# Patient Record
Sex: MALE | Race: WHITE | NOT HISPANIC OR LATINO | Employment: OTHER | URBAN - METROPOLITAN AREA
[De-identification: names, ages, dates, MRNs, and addresses within clinical notes are randomized per-mention and may not be internally consistent; named-entity substitution may affect disease eponyms.]

---

## 2017-02-13 ENCOUNTER — GENERIC CONVERSION - ENCOUNTER (OUTPATIENT)
Dept: OTHER | Facility: OTHER | Age: 67
End: 2017-02-13

## 2017-02-17 ENCOUNTER — TRANSCRIBE ORDERS (OUTPATIENT)
Dept: ADMINISTRATIVE | Facility: HOSPITAL | Age: 67
End: 2017-02-17

## 2017-02-17 DIAGNOSIS — R79.89 OTHER ABNORMAL BLOOD CHEMISTRY: Primary | ICD-10-CM

## 2017-02-27 ENCOUNTER — HOSPITAL ENCOUNTER (OUTPATIENT)
Dept: ULTRASOUND IMAGING | Facility: HOSPITAL | Age: 67
Discharge: HOME | End: 2017-02-27
Attending: INTERNAL MEDICINE
Payer: COMMERCIAL

## 2017-02-27 DIAGNOSIS — R79.89 OTHER ABNORMAL BLOOD CHEMISTRY: ICD-10-CM

## 2017-02-27 PROCEDURE — 76700 US EXAM ABDOM COMPLETE: CPT

## 2017-03-06 ENCOUNTER — GENERIC CONVERSION - ENCOUNTER (OUTPATIENT)
Dept: OTHER | Facility: OTHER | Age: 67
End: 2017-03-06

## 2017-08-02 ENCOUNTER — GENERIC CONVERSION - ENCOUNTER (OUTPATIENT)
Dept: OTHER | Facility: OTHER | Age: 67
End: 2017-08-02

## 2018-01-11 ENCOUNTER — GENERIC CONVERSION - ENCOUNTER (OUTPATIENT)
Dept: OTHER | Facility: OTHER | Age: 68
End: 2018-01-11

## 2018-01-11 LAB
BASOPHILS # BLD AUTO: 0.1 X10E3/UL (ref 0–0.2)
BASOPHILS # BLD AUTO: 1 %
BUN SERPL-MCNC: 15 MG/DL (ref 8–27)
BUN/CREA RATIO (HISTORICAL): 16 (ref 10–24)
CALCIUM SERPL-MCNC: 9.7 MG/DL (ref 8.6–10.2)
CHLORIDE SERPL-SCNC: 98 MMOL/L (ref 96–106)
CHOLEST SERPL-MCNC: 213 MG/DL (ref 100–199)
CO2 SERPL-SCNC: 25 MMOL/L (ref 18–29)
CREAT SERPL-MCNC: 0.96 MG/DL (ref 0.76–1.27)
DEPRECATED RDW RBC AUTO: 15 % (ref 12.3–15.4)
EGFR AFRICAN AMERICAN (HISTORICAL): 95 ML/MIN/1.73
EGFR-AMERICAN CALC (HISTORICAL): 82 ML/MIN/1.73
EOSINOPHIL # BLD AUTO: 0.3 X10E3/UL (ref 0–0.4)
EOSINOPHIL # BLD AUTO: 3 %
GLUCOSE SERPL-MCNC: 101 MG/DL (ref 65–99)
HCT VFR BLD AUTO: 42.5 % (ref 37.5–51)
HDLC SERPL-MCNC: 56 MG/DL
HGB BLD-MCNC: 15.1 G/DL (ref 13–17.7)
IMM.GRANULOCYTES (CD4/8) (HISTORICAL): 0.1 X10E3/UL (ref 0–0.1)
IMM.GRANULOCYTES (CD4/8) (HISTORICAL): 1 %
LDLC SERPL CALC-MCNC: 123 MG/DL (ref 0–99)
LYMPHOCYTES # BLD AUTO: 2.2 X10E3/UL (ref 0.7–3.1)
LYMPHOCYTES # BLD AUTO: 26 %
MCH RBC QN AUTO: 33.9 PG (ref 26.6–33)
MCHC RBC AUTO-ENTMCNC: 35.5 G/DL (ref 31.5–35.7)
MCV RBC AUTO: 96 FL (ref 79–97)
MONOCYTES # BLD AUTO: 0.6 X10E3/UL (ref 0.1–0.9)
MONOCYTES (HISTORICAL): 6 %
NEUTROPHILS # BLD AUTO: 5.5 X10E3/UL (ref 1.4–7)
NEUTROPHILS # BLD AUTO: 63 %
PLATELET # BLD AUTO: 326 X10E3/UL (ref 150–379)
POTASSIUM SERPL-SCNC: 4.6 MMOL/L (ref 3.5–5.2)
PROSTATE SPECIFIC ANTIGEN (HISTORICAL): 0.6 NG/ML (ref 0–4)
RBC (HISTORICAL): 4.45 X10E6/UL (ref 4.14–5.8)
SODIUM SERPL-SCNC: 139 MMOL/L (ref 134–144)
TRIGL SERPL-MCNC: 168 MG/DL (ref 0–149)
WBC # BLD AUTO: 8.7 X10E3/UL (ref 3.4–10.8)

## 2018-01-12 NOTE — RESULT NOTES
Message   has Hep C-refer to GI     Verified Results  (1) HEP B SURFACE ANTIBODY [id number] 12:15PM Fior Frees     Test Name Result Flag Reference   Hep B Surface Ab, Qual Reactive     Non Reactive:  Inconsistent with immunity,                                             less than 10 mIU/mL                               Reactive:     Consistent with immunity,                                             greater than 9 9 mIU/mL     (1) HEP B CORE AB, TOTAL [id number] 12:15PM Fior Frees     Test Name Result Flag Reference   Hep B Core Ab, Tot Positive A Negative   **Verified by repeat analysis**     () HBsAg Screen [id number] 12:15PM Fior Frees     Test Name Result Flag Reference   HBsAg Screen Negative  Negative

## 2018-01-14 NOTE — RESULT NOTES
Message   Call patient, labs OK Hep C increase LFT may be from alcohol  Get ultrasound of liver     Verified Results  ([facility]) HCV Antibody [id number] 07:52AM Marylen Hilt     Test Name Result Flag Reference   Hep C Virus Ab <0 1 s/co ratio  0 0-0 9   Negative:     < 0 8                                              Indeterminate: 0 8 - 0 9                                                   Positive:     > 0 9                  The CDC recommends that a positive HCV antibody result                  be followed up with a HCV Nucleic Acid Amplification                  test (292384)

## 2018-01-14 NOTE — PROGRESS NOTES
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)   · POST RADIATION AND CHEMOTHERAPY--2008    Plan  Elevated LFTs    · (1) HEP B CORE AB, TOTAL; Status:Active; Requested for:08Nov2016;    · (1) HEP B SURFACE ANTIBODY; Status:Active; Requested for:08Nov2016;    · (1) HEP B SURFACE ANTIGEN; Status:Active; Requested for:08Nov2016;   Elevated LFTs, Health Maintenance, Hypercholesterolemia, Lesion of right external ear    · VAS CMS AAA SCREENING; Status:Hold For - Scheduling; Requested for:08Nov2016;   Malignant neoplasm of laryngeal cartilages    · 1 Ayaan High MD Friday Otolaryngology Physician Referral  Consult-previous surgery from  your partner  Status: Hold For - Scheduling  Requested for: [id number]  Care Summary provided  : Yes    Discussion/Summary  Impression: Welcome to Medicare Visit  Diabetes screening and counseling: the risks and benefits of screening were discussed  Colorectal cancer screening and counseling: screening is current and up to date  Prostate cancer screening and counseling: screening is current  Abdominal aortic aneurysm screening and counseling: screening US recommended  Chief Complaint  Pt here for Welcome to Medicare exam  Offers no specific concerns  Does not have glasses with him for eye exam  colonoscopy up to date  Declines flu shot  CR      History of Present Illness  Welcome to Medicare and Wellness Visits: The patient is being seen for the welcome to medicare visit  Medicare Screening and Risk Factors   Hospitalizations: no previous hospitalizations  Once per lifetime medicare screening tests: AAA screening US has not yet been done  Medicare Screening Tests Risk Questions   Abdominal aortic aneurysm risk assessment: tobacco use and 25 per day  Osteoporosis risk assessment: , over 48years of age and alcohol use, but none indicated     Drug and Alcohol Use: The patient is a current cigarette smoker, currently smokes 25 cigarettes per day and has never used smokeless tobacco  He has smoked for 45 year(s)  He is ready to quit using tobacco  The patient reports occasional alcohol use, not being in recovery from alcohol abuse, drinking 8 drinks per week and drinking 32 drinks per month  Alcohol concern:   The patient has no concerns about alcohol abuse  He has never used illicit drugs  Diet and Physical Activity: Current diet includes limited junk food, 1 servings of fruit per day, 1 servings of vegetables per day, 1 servings of meat per day, 1 servings of whole grains per day, 1 servings of dairy products per day, 2 cups of coffee per day, 0 cups of tea per day, 0 cans of regular soda per day and 0 cans of diet soda per day  He exercises infrequently and exercises 2 times per week  Exercise: walking, strength training 90 minutes per week  Functional Ability/Level of Safety: Hearing is slightly decreased, slightly decreased in the right ear, slightly decreased in the left ear and a hearing aid is not used  The patient is currently able to participate in social activities with limitations, but able to do activities of daily living without limitations and able to drive without limitations  Fall risk factors: The patient fell 0 times in the past 12 months  Advance Directives: Advance directives: no living will  Co-Managers and Medical Equipment/Suppliers: See Patient Care Team      Patient Care Team    Care Team Member Role Specialty Office Number   Anoop TONEY  Internal Medicine [phone]     Review of Systems    Constitutional: negative  Active Problems    1  Elevated LFTs (790 6) (R79 89)   2  Hypercholesterolemia (272 0) (E78 00)   3  Lesion of right external ear (380 9) (H61 91)   4  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)   5  No advance directives (V49 89) (Z78 9)   6   Screening for colon cancer (V76 51) (Z12 11)   7  Screening PSA (prostate specific antigen) (V76 44) (Z12 5)   8  Tobacco use disorder (305 1) (F17 200)    Past Medical History    · 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)    Surgical History    · History of Appendectomy   · History of Skull Repair    Family History  Father    · No pertinent family history  Family History    · No pertinent family history    Social History    · Caffeine use (V49 89) (F15 90)   · COFFEE DAILY   · Cultural background   · NOT  OR    · Current every day smoker (305 1) (F17 200)   · SMOKES 1/2 PACK CIGARETTES DAILY   · Moderate alcohol use   · BEER TWICE WEEKLY   · No advance directives (V49 89) (Z78 9)   · Primary spoken language English   · Racial background   · WHITE   · Single    Current Meds   1  Atorvastatin Calcium 40 MG Oral Tablet; TAKE 1 TABLET DAILY  Requested for:   [id number]; Last Rx:03Nov2015 Ordered   2  Chantix 1 MG Oral Tablet; TAKE 1 TABLET TWICE DAILY; Therapy: [id number] to (Evaluate:10Oct2016); Last Rx:[id number] Ordered   3  Chantix Starting Month [place] 0 5 MG X 11 & 1 MG X 42 Oral Tablet; TAKE AS DIRECTED   PER PACKAGE INSTRUCTIONS; Therapy: [id number] to (Last Rx:[id number]) Ordered    Allergies    1  No Known Drug Allergies    2  No Known Environmental Allergies   3   No Known Food Allergies    Immunizations   1    PPSV  07-Dec-2009     Vitals  Signs    Systolic: 827  Diastolic: 77   Systolic: 626, LUE, Sitting  Diastolic: 82, LUE, Sitting  Heart Rate: 76, L Radial  Pulse Quality: Normal, L Radial  Height: 5 ft 6 in  Weight: 173 lb   BMI Calculated: 27 92  BSA Calculated: 1 88    Signatures   Electronically signed by : SHIRA Cortez ; Nov 8 2016  9:50AM EST                       (Author)

## 2018-01-16 NOTE — RESULT NOTES
Message   call aorta scan normal     Verified Results  VAS CMS AAA SCREENING [id number] 12:14PM Law Mode Order Number: [id number]   Performing Comments: meets criteria   - Patient Instructions: To schedule this appointment, please contact Central Scheduling at [phone]   Order Number: [id number]   Performing Comments: meets criteria   - Patient Instructions: To schedule this appointment, please contact Central Scheduling at [phone]  Test Name Result Flag Reference   VAS CMS AAA SCREENING (Report)     [facility] REPORT   CLINICAL:   Indications: Screening for Unspecified CardioVascular condition [Z13 6]  AAA   screening  Risk Factors   The patient has history of hyperlipidemia and a smoker  FINDINGS:      Unilateral   PSV (cm/s) Diameter AP    Sup-Esme Ao       53     2 5    Px Inf-Donnell Ao           2 1    Ds Inf-Donnell Ao           1 9             CONCLUSION:   Impression   The abdominal aorta is patent and normal in caliber        SIGNATURE:   Electronically Signed by: Junior Qing MD on 2016-12-27 05:22:19 PM

## 2018-01-18 NOTE — RESULT NOTES
Message   labs ok except mild elevaton of liver test-get Hep C antibody     Verified Results  (1) COMPREHENSIVE METABOLIC PANEL [id number] 37:48QC Ej Moreno     Test Name Result Flag Reference   Glucose, Serum 92 mg/dL  65-99   BUN 13 mg/dL  8-27   Creatinine, Serum 0 86 mg/dL  0 76-1 27   eGFR If NonAfricn Am 91 mL/min/1 73  >59   eGFR If Africn Am 105 mL/min/1 73  >59   BUN/Creatinine Ratio 15  10-22   Sodium, Serum 139 mmol/L  134-144   Potassium, Serum 4 7 mmol/L  3 5-5 2   Chloride, Serum 100 mmol/L     Carbon Dioxide, Total 24 mmol/L  18-29   Calcium, Serum 9 6 mg/dL  8 6-10 2   Protein, Total, Serum 6 6 g/dL  6 0-8 5   Albumin, Serum 4 5 g/dL  3 6-4 8   Globulin, Total 2 1 g/dL  1 5-4 5   A/G Ratio 2 1  1 1-2 5   Bilirubin, Total 0 3 mg/dL  0 0-1 2   Alkaline Phosphatase, S 74 IU/L     AST (SGOT) 68 IU/L H 0-40   ALT (SGPT) 18 IU/L  0-44     (1) CBC/ PLT (NO DIFF) [id number] 01:00PM Ej Moreno     Test Name Result Flag Reference   WBC 10 9 x10E3/uL H 3 4-10 8   RBC 4 23 x10E6/uL  4 14-5 80   Hemoglobin 13 9 g/dL  12 6-17 7   Hematocrit 41 5 %  37 5-51 0   MCV 98 fL H 79-97   MCH 32 9 pg  26 6-33 0   MCHC 33 5 g/dL  31 5-35 7   RDW 15 0 %  12 3-15 4   Platelets 560 G46X2/DJ  150-379     (1) PSA (SCREEN) (Dx V76 44 Screen for Prostate Cancer) [id number] 01:00PM Ej Moreno     Test Name Result Flag Reference   Prostate Specific Ag, Serum 1 5 ng/mL  0 0-4 0   Roche ECLIA methodology  According to the American Urological Association, Serum PSA should  decrease and remain at undetectable levels after radical  prostatectomy  The AUA defines biochemical recurrence as an initial  PSA value 0 2 ng/mL or greater followed by a subsequent confirmatory  PSA value 0 2 ng/mL or greater  Values obtained with different assay methods or kits cannot be used  interchangeably  Results cannot be interpreted as absolute evidence  of the presence or absence of malignant disease

## 2018-02-05 DIAGNOSIS — E78.00 PURE HYPERCHOLESTEROLEMIA: Primary | ICD-10-CM

## 2018-02-05 RX ORDER — ATORVASTATIN CALCIUM 40 MG/1
40 TABLET, FILM COATED ORAL DAILY
Qty: 30 TABLET | Refills: 0 | Status: SHIPPED | OUTPATIENT
Start: 2018-02-05 | End: 2018-05-29 | Stop reason: SDUPTHER

## 2018-02-12 ENCOUNTER — OFFICE VISIT (OUTPATIENT)
Dept: FAMILY MEDICINE CLINIC | Facility: HOSPITAL | Age: 68
End: 2018-02-12
Payer: COMMERCIAL

## 2018-02-12 VITALS
OXYGEN SATURATION: 98 % | HEIGHT: 67 IN | BODY MASS INDEX: 28.09 KG/M2 | SYSTOLIC BLOOD PRESSURE: 130 MMHG | DIASTOLIC BLOOD PRESSURE: 78 MMHG | HEART RATE: 72 BPM | WEIGHT: 179 LBS

## 2018-02-12 DIAGNOSIS — E78.00 HYPERCHOLESTEROLEMIA: Primary | ICD-10-CM

## 2018-02-12 DIAGNOSIS — C32.3: ICD-10-CM

## 2018-02-12 DIAGNOSIS — F17.200 TOBACCO USE DISORDER: ICD-10-CM

## 2018-02-12 PROCEDURE — 3725F SCREEN DEPRESSION PERFORMED: CPT | Performed by: INTERNAL MEDICINE

## 2018-02-12 PROCEDURE — 4040F PNEUMOC VAC/ADMIN/RCVD: CPT | Performed by: INTERNAL MEDICINE

## 2018-02-12 PROCEDURE — 1160F RVW MEDS BY RX/DR IN RCRD: CPT | Performed by: INTERNAL MEDICINE

## 2018-02-12 PROCEDURE — 99214 OFFICE O/P EST MOD 30 MIN: CPT | Performed by: INTERNAL MEDICINE

## 2018-02-12 PROCEDURE — 3008F BODY MASS INDEX DOCD: CPT | Performed by: INTERNAL MEDICINE

## 2018-02-12 PROCEDURE — 1101F PT FALLS ASSESS-DOCD LE1/YR: CPT | Performed by: INTERNAL MEDICINE

## 2018-02-12 RX ORDER — VARENICLINE TARTRATE 25 MG
KIT ORAL
COMMUNITY
Start: 2016-07-12 | End: 2019-01-28 | Stop reason: SDDI

## 2018-02-12 RX ORDER — VARENICLINE TARTRATE 1 MG/1
1 TABLET, FILM COATED ORAL 2 TIMES DAILY
COMMUNITY
Start: 2016-07-12 | End: 2019-01-28 | Stop reason: SDDI

## 2018-02-12 NOTE — PROGRESS NOTES
Assessment/Plan:       Diagnoses and all orders for this visit:    Hypercholesterolemia    Primary malignant neoplasm of laryngeal cartilage ([address] )    Tobacco use disorder        Labs reviewed and discussed with the patient  All of the above diagnoses have been assessed  Yes   Reufses any further vaccines    Subjective:      Patient ID: Danish Burgess is a 78 y o  male  HPI     Hypothyroid    Patient denies any symptoms such as heat or cold intolerance, change in hair texture, or change in bowel habits  There has been no significant change in weight  Tobacco- still smokes 1 PPD-still has chantix    The following portions of the patient's history were reviewed and updated as appropriate: allergies and current medications  Current Outpatient Prescriptions   Medication Sig Dispense Refill    atorvastatin (LIPITOR) 40 mg tablet Take 1 tablet (40 mg total) by mouth daily 30 tablet 0    varenicline (CHANTIX STARTING MONTH PAK) 0 5 MG X 11 & 1 MG X 42 tablet Take by mouth      varenicline (CHANTIX) 1 mg tablet Take 1 tablet by mouth 2 (two) times a day       No current facility-administered medications for this visit  Review of Systems   Constitutional: Negative  HENT:        Hard of hearing   Respiratory: Negative  Cardiovascular: Negative  Gastrointestinal: Negative  Had colonscopy in 2016   Endocrine: Negative  Genitourinary: Negative  All other systems reviewed and are negative  Objective:    /78   Pulse 72   Ht 5' 7" (1 702 m)   Wt 81 2 kg (179 lb)   SpO2 98%   BMI 28 04 kg/m²      Physical Exam   Constitutional: He appears well-developed and well-nourished  Neck: No JVD present  Cardiovascular: Normal rate and regular rhythm  Pulmonary/Chest: Effort normal and breath sounds normal    Abdominal: Soft  Bowel sounds are normal    Musculoskeletal: He exhibits no edema  Skin: Skin is warm and dry  Vitals reviewed          No visits with results within 2 Week(s) from this visit  Latest known visit with results is:   Generic Conversion - Encounter on 01/11/2018   Component Date Value Ref Range Status    PROSTATE SPECIFIC ANTIGEN 01/11/2018 0 6  0 0 - 4 0 ng/mL Final    Comment: Result Comment: Roche ECLIA methodology  According to the American Urological Association, Serum PSA should  decrease and remain at undetectable levels after radical  prostatectomy  The AUA defines biochemical recurrence as an initial  PSA value 0 2 ng/mL or greater followed by a subsequent confirmatory  PSA value 0 2 ng/mL or greater  Values obtained with different assay methods or kits cannot be used  interchangeably  Results cannot be interpreted as absolute evidence  of the presence or absence of malignant disease        Glucose 01/11/2018 101* 65 - 99 mg/dL Final    BUN 01/11/2018 15  8 - 27 mg/dL Final    Creatinine 01/11/2018 0 96  0 76 - 1 27 mg/dL Final    BUN/CREA Ratio 01/11/2018 16  10 - 24 Final    Sodium 01/11/2018 139  134 - 144 mmol/L Final    Potassium 01/11/2018 4 6  3 5 - 5 2 mmol/L Final    Chloride 01/11/2018 98  96 - 106 mmol/L Final    CO2 01/11/2018 25  18 - 29 mmol/L Final    Calcium 01/11/2018 9 7  8 6 - 10 2 mg/dL Final    EGFR-AMERICAN CALC 01/11/2018 82  >59 mL/min/1 73 Final    eGFR African American 01/11/2018 95  >59 mL/min/1 73 Final    Cholesterol 01/11/2018 213* 100 - 199 mg/dL Final    Triglycerides 01/11/2018 168* 0 - 149 mg/dL Final    HDL 01/11/2018 56  >39 mg/dL Final    LDL Calculated 01/11/2018 123* 0 - 99 mg/dL Final    WBC 01/11/2018 8 7  3 4 - 10 8 x10E3/uL Final    RBC 01/11/2018 4 45  4 14 - 5 80 x10E6/uL Final    Hemoglobin 01/11/2018 15 1  13 0 - 17 7 g/dL Final    Hematocrit 01/11/2018 42 5  37 5 - 51 0 % Final    MCV 01/11/2018 96  79 - 97 fL Final    MCH 01/11/2018 33 9* 26 6 - 33 0 pg Final    MCHC 01/11/2018 35 5  31 5 - 35 7 g/dL Final    RDW 01/11/2018 15 0  12 3 - 15 4 % Final    Platelets 01/11/2018 326  150 - 379 x10E3/uL Final    Neutrophils Absolute 01/11/2018 63  Not Estab  % Final    Lymphocytes Absolute 01/11/2018 26  Not Estab  % Final    MONOCYTES 01/11/2018 6  Not Estab  % Final    Eosinophils Absolute 01/11/2018 3  Not Estab  % Final    Basophils Absolute 01/11/2018 1  Not Estab  % Final    Neutrophils Absolute 01/11/2018 5 5  1 4 - 7 0 x10E3/uL Final    Lymphocytes Absolute 01/11/2018 2 2  0 7 - 3 1 x10E3/uL Final    Monocytes Absolute 01/11/2018 0 6  0 1 - 0 9 x10E3/uL Final    Eosinophils Absolute 01/11/2018 0 3  0 0 - 0 4 x10E3/uL Final    Basophils Absolute 01/11/2018 0 1  0 0 - 0 2 x10E3/uL Final    IMM  GRANULOCYTES (CD4/8) 01/11/2018 1  Not Estab  % Final    IMM  GRANULOCYTES (CD4/8) 01/11/2018 0 1  0 0 - 0 1 x10E3/uL Final         Raymundo Samuels MD

## 2018-05-29 DIAGNOSIS — E78.00 PURE HYPERCHOLESTEROLEMIA: ICD-10-CM

## 2018-05-29 RX ORDER — ATORVASTATIN CALCIUM 40 MG/1
40 TABLET, FILM COATED ORAL DAILY
Qty: 90 TABLET | Refills: 1 | Status: SHIPPED | OUTPATIENT
Start: 2018-05-29 | End: 2019-02-25 | Stop reason: SDUPTHER

## 2018-12-18 ENCOUNTER — TELEPHONE (OUTPATIENT)
Dept: FAMILY MEDICINE CLINIC | Facility: HOSPITAL | Age: 68
End: 2018-12-18

## 2018-12-18 DIAGNOSIS — E78.00 HYPERCHOLESTEROLEMIA: Primary | ICD-10-CM

## 2018-12-18 DIAGNOSIS — Z12.5 SCREENING FOR PROSTATE CANCER: ICD-10-CM

## 2019-01-25 LAB
ALBUMIN SERPL-MCNC: 4.6 G/DL (ref 3.6–4.8)
ALBUMIN/GLOB SERPL: 1.8 {RATIO} (ref 1.2–2.2)
ALP SERPL-CCNC: 88 IU/L (ref 39–117)
ALT SERPL-CCNC: 10 IU/L (ref 0–44)
AST SERPL-CCNC: 20 IU/L (ref 0–40)
BASOPHILS # BLD AUTO: 0.1 X10E3/UL (ref 0–0.2)
BASOPHILS NFR BLD AUTO: 1 %
BILIRUB SERPL-MCNC: 0.4 MG/DL (ref 0–1.2)
BUN SERPL-MCNC: 13 MG/DL (ref 8–27)
BUN/CREAT SERPL: 13 (ref 10–24)
CALCIUM SERPL-MCNC: 9.5 MG/DL (ref 8.6–10.2)
CHLORIDE SERPL-SCNC: 101 MMOL/L (ref 96–106)
CHOLEST SERPL-MCNC: 176 MG/DL (ref 100–199)
CHOLEST/HDLC SERPL: 4.1 RATIO (ref 0–5)
CO2 SERPL-SCNC: 24 MMOL/L (ref 20–29)
CREAT SERPL-MCNC: 1.02 MG/DL (ref 0.76–1.27)
EOSINOPHIL # BLD AUTO: 0.2 X10E3/UL (ref 0–0.4)
EOSINOPHIL NFR BLD AUTO: 2 %
ERYTHROCYTE [DISTWIDTH] IN BLOOD BY AUTOMATED COUNT: 14.3 % (ref 12.3–15.4)
GLOBULIN SER-MCNC: 2.5 G/DL (ref 1.5–4.5)
GLUCOSE SERPL-MCNC: 102 MG/DL (ref 65–99)
HCT VFR BLD AUTO: 44.1 % (ref 37.5–51)
HDLC SERPL-MCNC: 43 MG/DL
HGB BLD-MCNC: 15 G/DL (ref 13–17.7)
IMM GRANULOCYTES # BLD: 0.1 X10E3/UL (ref 0–0.1)
IMM GRANULOCYTES NFR BLD: 1 %
LDLC SERPL CALC-MCNC: 96 MG/DL (ref 0–99)
LYMPHOCYTES # BLD AUTO: 2.3 X10E3/UL (ref 0.7–3.1)
LYMPHOCYTES NFR BLD AUTO: 22 %
MCH RBC QN AUTO: 33.7 PG (ref 26.6–33)
MCHC RBC AUTO-ENTMCNC: 34 G/DL (ref 31.5–35.7)
MCV RBC AUTO: 99 FL (ref 79–97)
MONOCYTES # BLD AUTO: 0.8 X10E3/UL (ref 0.1–0.9)
MONOCYTES NFR BLD AUTO: 8 %
MORPHOLOGY BLD-IMP: ABNORMAL
NEUTROPHILS # BLD AUTO: 7.1 X10E3/UL (ref 1.4–7)
NEUTROPHILS NFR BLD AUTO: 66 %
PLATELET # BLD AUTO: 344 X10E3/UL (ref 150–379)
POTASSIUM SERPL-SCNC: 4.5 MMOL/L (ref 3.5–5.2)
PROT SERPL-MCNC: 7.1 G/DL (ref 6–8.5)
PSA FREE MFR SERPL: 41.7 %
PSA FREE SERPL-MCNC: 0.25 NG/ML
PSA SERPL-MCNC: 0.6 NG/ML (ref 0–4)
RBC # BLD AUTO: 4.45 X10E6/UL (ref 4.14–5.8)
SL AMB EGFR AFRICAN AMERICAN: 88 ML/MIN/1.73
SL AMB EGFR NON AFRICAN AMERICAN: 76 ML/MIN/1.73
SL AMB VLDL CHOLESTEROL CALC: 37 MG/DL (ref 5–40)
SODIUM SERPL-SCNC: 140 MMOL/L (ref 134–144)
TRIGL SERPL-MCNC: 185 MG/DL (ref 0–149)
WBC # BLD AUTO: 10.6 X10E3/UL (ref 3.4–10.8)

## 2019-01-28 ENCOUNTER — OFFICE VISIT (OUTPATIENT)
Dept: FAMILY MEDICINE CLINIC | Facility: HOSPITAL | Age: 69
End: 2019-01-28
Payer: COMMERCIAL

## 2019-01-28 VITALS — SYSTOLIC BLOOD PRESSURE: 130 MMHG | HEART RATE: 82 BPM | DIASTOLIC BLOOD PRESSURE: 80 MMHG

## 2019-01-28 DIAGNOSIS — E78.00 HYPERCHOLESTEROLEMIA: ICD-10-CM

## 2019-01-28 DIAGNOSIS — Z23 NEED FOR VACCINATION: Primary | ICD-10-CM

## 2019-01-28 DIAGNOSIS — G45.3 AMAUROSIS FUGAX: ICD-10-CM

## 2019-01-28 DIAGNOSIS — J40 BRONCHITIS: ICD-10-CM

## 2019-01-28 DIAGNOSIS — F17.200 TOBACCO USE DISORDER: ICD-10-CM

## 2019-01-28 PROCEDURE — 1170F FXNL STATUS ASSESSED: CPT | Performed by: INTERNAL MEDICINE

## 2019-01-28 PROCEDURE — 1125F AMNT PAIN NOTED PAIN PRSNT: CPT | Performed by: INTERNAL MEDICINE

## 2019-01-28 PROCEDURE — 99214 OFFICE O/P EST MOD 30 MIN: CPT | Performed by: INTERNAL MEDICINE

## 2019-01-28 PROCEDURE — G0439 PPPS, SUBSEQ VISIT: HCPCS | Performed by: INTERNAL MEDICINE

## 2019-01-28 PROCEDURE — 1160F RVW MEDS BY RX/DR IN RCRD: CPT | Performed by: INTERNAL MEDICINE

## 2019-01-28 RX ORDER — CEFUROXIME AXETIL 500 MG/1
500 TABLET ORAL EVERY 12 HOURS SCHEDULED
Qty: 14 TABLET | Refills: 0 | Status: SHIPPED | OUTPATIENT
Start: 2019-01-28 | End: 2019-02-04

## 2019-01-28 RX ORDER — ASPIRIN 81 MG/1
81 TABLET ORAL DAILY
Start: 2019-01-28

## 2019-01-28 NOTE — PROGRESS NOTES
Assessment/Plan:       Diagnoses and all orders for this visit:    Need for vaccination  -     PREFERRED: influenza vaccine, [id number], high-dose, PF 0 5 mL, for patients 65 yr+ (FLUZONE HIGH-DOSE)  -     PNEUMOCOCCAL CONJUGATE VACCINE 13-VALENT GREATER THAN 6 MONTHS    Hypercholesterolemia    Amaurosis fugax  -     VAS carotid complete study; Future  -     VAS transcranial doppler, limited study; Future  -     aspirin (ECOTRIN LOW STRENGTH) 81 mg EC tablet; Take 1 tablet (81 mg total) by mouth daily    Tobacco use disorder    Bronchitis          All of the above diagnoses have been assessed  Additional COMMENTS/PLAN: needs to quit smoking  Will get CT lung scan program will also get carotid ultrasound  I have added aspirin  Subjective:      Patient ID: Prosper Ch is a 80 y o  male  HPI     Hyperlipidemia- The patient is compliant with diet and taking meds  The patient understands the efficacy of the treatment in vascular prevention  Has bronchitis sxs  This is for 1 week    Had loss of vision in the left eye for about 2-3 minutes-No other assoc sxs    The following portions of the patient's history were revised and updated as appropriate: Problem list, allergies, med list, FH, SH, Past medical and surgical histories  Current Outpatient Prescriptions   Medication Sig Dispense Refill    atorvastatin (LIPITOR) 40 mg tablet Take 1 tablet (40 mg total) by mouth daily 90 tablet 1    aspirin (ECOTRIN LOW STRENGTH) 81 mg EC tablet Take 1 tablet (81 mg total) by mouth daily       No current facility-administered medications for this visit  Review of Systems   Respiratory: Positive for cough  All other systems reviewed and are negative  Objective:    /80 (BP Location: Left arm, Patient Position: Sitting, Cuff Size: Large)   Pulse 82      Physical Exam   Constitutional: He appears well-developed and well-nourished  HENT:   Head: Normocephalic and atraumatic     Neck: No JVD present  No thyromegaly present  No bruits   Cardiovascular: Normal rate, regular rhythm and normal heart sounds  Pulmonary/Chest: Effort normal    Few rhonchi   Abdominal: Soft  Bowel sounds are normal  There is no tenderness  Musculoskeletal: Normal range of motion  He exhibits no edema  Skin: Skin is warm and dry  Vitals reviewed  No visits with results within 2 Week(s) from this visit  Latest known visit with results is:   Telephone on 12/18/2018   Component Date Value Ref Range Status    White Blood Cell Count 01/24/2019 10 6  3 4 - 10 8 x10E3/uL Final    Red Blood Cell Count 01/24/2019 4 45  4 14 - 5 80 x10E6/uL Final    Hemoglobin 01/24/2019 15 0  13 0 - 17 7 g/dL Final    HCT 01/24/2019 44 1  37 5 - 51 0 % Final    MCV 01/24/2019 99* 79 - 97 fL Final    MCH 01/24/2019 33 7* 26 6 - 33 0 pg Final    MCHC 01/24/2019 34 0  31 5 - 35 7 g/dL Final    RDW 01/24/2019 14 3  12 3 - 15 4 % Final    Platelet Count 24/48/0834 344  150 - 379 x10E3/uL Final    Neutrophils 01/24/2019 66  Not Estab  % Final    Lymphocytes 01/24/2019 22  Not Estab  % Final    Monocytes 01/24/2019 8  Not Estab  % Final    Eosinophils 01/24/2019 2  Not Estab  % Final    Basophils PCT 01/24/2019 1  Not Estab  % Final    Neutrophils (Absolute) 01/24/2019 7 1* 1 4 - 7 0 x10E3/uL Final    Lymphocytes (Absolute) 01/24/2019 2 3  0 7 - 3 1 x10E3/uL Final    Monocytes (Absolute) 01/24/2019 0 8  0 1 - 0 9 x10E3/uL Final    Eosinophils (Absolute) 01/24/2019 0 2  0 0 - 0 4 x10E3/uL Final    Basophils ABS 01/24/2019 0 1  0 0 - 0 2 x10E3/uL Final    Immature Granulocytes 01/24/2019 1  Not Estab  % Final    Immature Granulocytes (Absolute) 01/24/2019 0 1  0 0 - 0 1 x10E3/uL Final    Hematology Comments 01/24/2019 Note:   Final    Verified by microscopic examination      Glucose, Random 01/24/2019 102* 65 - 99 mg/dL Final    BUN 01/24/2019 13  8 - 27 mg/dL Final    Creatinine 01/24/2019 1 02  0 76 - 1 27 mg/dL Final    eGFR Non  01/24/2019 76  >59 mL/min/1 73 Final    SL AMB EGFR  01/24/2019 88  >59 mL/min/1 73 Final    SL AMB BUN/CREATININE RATIO 01/24/2019 13  10 - 24 Final    Sodium 01/24/2019 140  134 - 144 mmol/L Final    Potassium 01/24/2019 4 5  3 5 - 5 2 mmol/L Final    Chloride 01/24/2019 101  96 - 106 mmol/L Final    CO2 01/24/2019 24  20 - 29 mmol/L Final    CALCIUM 01/24/2019 9 5  8 6 - 10 2 mg/dL Final    SL AMB PROTEIN, TOTAL 01/24/2019 7 1  6 0 - 8 5 g/dL Final    Albumin 01/24/2019 4 6  3 6 - 4 8 g/dL Final    Globulin, Total 01/24/2019 2 5  1 5 - 4 5 g/dL Final    Albumin/Globulin Ratio 01/24/2019 1 8  1 2 - 2 2 Final    TOTAL BILIRUBIN 01/24/2019 0 4  0 0 - 1 2 mg/dL Final    Alk Phos Isoenzymes 01/24/2019 88  39 - 117 IU/L Final    SL AMB AST 01/24/2019 20  0 - 40 IU/L Final    SL AMB ALT 01/24/2019 10  0 - 44 IU/L Final    Cholesterol, Total 01/24/2019 176  100 - 199 mg/dL Final    Triglycerides 01/24/2019 185* 0 - 149 mg/dL Final    HDL 01/24/2019 43  >39 mg/dL Final    VLDL Cholesterol Calculated 01/24/2019 37  5 - 40 mg/dL Final    LDL Direct 01/24/2019 96  0 - 99 mg/dL Final    T  Chol/HDL Ratio 01/24/2019 4 1  0 0 - 5 0 ratio Final    Comment:                                   T  Chol/HDL Ratio                                              Men  Women                                1/2 Avg  Risk  3 4    3 3                                    Avg Risk  5 0    4 4                                 2X Avg  Risk  9 6    7 1                                 3X Avg  Risk 23 4   11 0      Prostate Specific Antigen Total 01/24/2019 0 6  0 0 - 4 0 ng/mL Final    Comment: Roche ECLIA methodology  According to the American Urological Association, Serum PSA should  decrease and remain at undetectable levels after radical  prostatectomy   The AUA defines biochemical recurrence as an initial  PSA value 0 2 ng/mL or greater followed by a subsequent confirmatory  PSA value 0 2 ng/mL or greater  Values obtained with different assay methods or kits cannot be used  interchangeably  Results cannot be interpreted as absolute evidence  of the presence or absence of malignant disease   PSA, Free 01/24/2019 0 25  N/A ng/mL Final    Roche ECLIA methodology   PSA, Free Pct 01/24/2019 41 7  % Final    Comment: The table below lists the probability of prostate cancer for  men with non-suspicious DENNIS results and total PSA between  4 and 10 ng/mL, by patient age [facility], [address] [place] [address],  [phone])  % Free PSA       50-64 yr        65-75 yr                    0 00-10 00%        56%             55%                   10 01-15 00%        24%             35%                   15 01-20 00%        17%             23%                   20 01-25 00%        10%             20%                        >25 00%         5%              9%  Please note:  Asa et al did not make specific                recommendations regarding the use of                percent free PSA for any other population                of men             Doug Cintron MD

## 2019-01-28 NOTE — PROGRESS NOTES
Assessment and Plan:    Problem List Items Addressed This Visit     None      Visit Diagnoses     Medicare annual wellness visit, subsequent    -  Primary    Need for vaccination        Relevant Orders    PREFERRED: influenza vaccine, [id number], high-dose, PF 0 5 mL, for patients 65 yr+ (FLUZONE HIGH-DOSE)    PNEUMOCOCCAL CONJUGATE VACCINE 13-VALENT GREATER THAN 6 MONTHS        Health Maintenance Due   Topic Date Due    Medicare Annual Wellness Visit (AWV)  1950    DTaP,Tdap,and Td Vaccines (1 - Tdap) 05/23/1972         HPI:  Shankar Griffiths is a 80 y o  male here for his Subsequent Wellness Visit  Patient Active Problem List   Diagnosis    Hypercholesterolemia    Primary malignant neoplasm of laryngeal cartilage ([address] )    Tobacco use disorder     History reviewed  No pertinent past medical history  Past Surgical History:   Procedure Laterality Date    APPENDECTOMY  2003    SKULL FRACTURE ELEVATION  1984     Family History   Problem Relation Age of Onset    Heart disease Father     Substance Abuse Neg Hx     Mental illness Neg Hx      History   Smoking Status    Current Every Day Smoker    Packs/day: 0 50   Smokeless Tobacco    Never Used     History   Alcohol Use    2 4 oz/week    4 Cans of beer per week      History   Drug Use No       Current Outpatient Prescriptions   Medication Sig Dispense Refill    atorvastatin (LIPITOR) 40 mg tablet Take 1 tablet (40 mg total) by mouth daily 90 tablet 1     No current facility-administered medications for this visit  No Known Allergies  Immunization History   Administered Date(s) Administered    Pneumococcal Polysaccharide PPV23 12/07/2009       Patient Care Team:  Analilia Al MD as PCP - General  Analilia Al MD    Medicare Screening Tests and Risk Assessments:      Health Risk Assessment:  Patient rates overall health as good  Patient feels that their physical health rating is Same  Eyesight was rated as Slightly worse   Hearing was rated as Slightly worse  Patient feels that their emotional and mental health rating is Same  Pain experienced by patient in the last 7 days has been None  Patient states that he has experienced no weight loss or gain in last 6 months  Emotional/Mental Health:  Patient has been feeling nervous/anxious  PHQ-9 Depression Screening:    Frequency of the following problems over the past two weeks:      1  Little interest or pleasure in doing things: 0 - not at all      2  Feeling down, depressed, or hopeless: 0 - not at all  PHQ-2 Score: 0          Broken Bones/Falls: Fall Risk Assessment:    In the past year, patient has experienced: No history of falling in past year          Bladder/Bowel:  Patient has not leaked urine accidently in the last six months  Patient reports no loss of bowel control  Immunizations:  Patient has not had a flu vaccination within the last year  Patient has received a pneumonia shot  Patient has not received a shingles shot  Patient has not received tetanus/diphtheria shot  Home Safety:  Patient does not have trouble with stairs inside or outside of their home  Patient currently reports that there are no safety hazards present in home, working smoke alarms, no working carbon monoxide detectors  Preventative Screenings:   prostate cancer screen performed, 1/24/2019  colon cancer screen completed, 8/2/2017  cholesterol screen completed, 1/24/2019  no glaucoma eye exam completed    Nutrition:  Current diet: Regular with servings of the following:    Medications:  Patient is not currently taking any over-the-counter supplements  Patient is able to manage medications  Lifestyle Choices:  Patient reports current tobacco use  Patient reports alcohol use  Alcohol use per week: 3-4 beers per week  Patient drives a vehicle  Patient wears seat belt  Current level of exercise of physical activity described by patient as: limited physical activity          Activities of Daily Living:  Can get out of bed by his or her self, able to dress self, able to make own meals, able to do own shopping, able to bathe self, can do own laundry/housekeeping, can manage own money, pay bills and track expenses    Previous Hospitalizations:  No hospitalization or ED visit in past 12 months        Advanced Directives:  Patient has not decided on power of   Patient has not completed advanced directive  Preventative Screening/Counseling:      Cardiovascular:      General: Screening Current          Diabetes:      General: Screening Current          Colorectal Cancer:      General: Screening Current          Prostate Cancer:      General: Screening Current          Osteoporosis:      General: Screening Not Indicated          AAA:      General: Screening Current          Glaucoma:      General: Screening Current          HIV:      General: Screening Not Indicated          Hepatitis C:      General: Screening Current        Advanced Directives:   Patient has no living will for healthcare, does not have durable POA for healthcare, patient does not have an advanced directive       Immunizations:      Shingrix: Risks & Benefits Discussed

## 2019-02-06 ENCOUNTER — HOSPITAL ENCOUNTER (OUTPATIENT)
Dept: NON INVASIVE DIAGNOSTICS | Facility: HOSPITAL | Age: 69
Discharge: HOME | End: 2019-02-06
Attending: INTERNAL MEDICINE
Payer: COMMERCIAL

## 2019-02-06 DIAGNOSIS — G45.3 AMAUROSIS FUGAX: ICD-10-CM

## 2019-02-06 PROCEDURE — 93880 EXTRACRANIAL BILAT STUDY: CPT | Performed by: INTERNAL MEDICINE

## 2019-02-06 PROCEDURE — 93880 EXTRACRANIAL BILAT STUDY: CPT

## 2019-02-07 ENCOUNTER — HOSPITAL ENCOUNTER (OUTPATIENT)
Dept: CT IMAGING | Facility: HOSPITAL | Age: 69
Discharge: HOME | End: 2019-02-07
Attending: INTERNAL MEDICINE
Payer: COMMERCIAL

## 2019-02-07 DIAGNOSIS — F17.200 TOBACCO USE DISORDER: ICD-10-CM

## 2019-02-10 PROBLEM — I65.23 CAROTID OCCLUSION, BILATERAL: Status: ACTIVE | Noted: 2019-02-10

## 2019-02-25 DIAGNOSIS — E78.00 PURE HYPERCHOLESTEROLEMIA: ICD-10-CM

## 2019-02-25 RX ORDER — ATORVASTATIN CALCIUM 40 MG/1
TABLET, FILM COATED ORAL
Qty: 90 TABLET | Refills: 3 | Status: SHIPPED | OUTPATIENT
Start: 2019-02-25 | End: 2020-04-06 | Stop reason: SDUPTHER

## 2019-04-17 ENCOUNTER — HOSPITAL ENCOUNTER (OUTPATIENT)
Dept: NON INVASIVE DIAGNOSTICS | Facility: HOSPITAL | Age: 69
Discharge: HOME | End: 2019-04-17
Attending: INTERNAL MEDICINE
Payer: COMMERCIAL

## 2019-04-17 DIAGNOSIS — G45.3 AMAUROSIS FUGAX: ICD-10-CM

## 2019-04-17 PROCEDURE — 93886 INTRACRANIAL COMPLETE STUDY: CPT

## 2019-04-17 PROCEDURE — 93886 INTRACRANIAL COMPLETE STUDY: CPT | Performed by: SURGERY

## 2019-07-15 ENCOUNTER — OFFICE VISIT (OUTPATIENT)
Dept: FAMILY MEDICINE CLINIC | Facility: HOSPITAL | Age: 69
End: 2019-07-15
Payer: COMMERCIAL

## 2019-07-15 VITALS
HEART RATE: 83 BPM | SYSTOLIC BLOOD PRESSURE: 120 MMHG | HEIGHT: 66 IN | WEIGHT: 166.5 LBS | DIASTOLIC BLOOD PRESSURE: 78 MMHG | BODY MASS INDEX: 26.76 KG/M2

## 2019-07-15 DIAGNOSIS — N52.9 ERECTILE DYSFUNCTION, UNSPECIFIED ERECTILE DYSFUNCTION TYPE: Primary | ICD-10-CM

## 2019-07-15 PROCEDURE — 3008F BODY MASS INDEX DOCD: CPT | Performed by: INTERNAL MEDICINE

## 2019-07-15 PROCEDURE — 1160F RVW MEDS BY RX/DR IN RCRD: CPT | Performed by: INTERNAL MEDICINE

## 2019-07-15 PROCEDURE — 99213 OFFICE O/P EST LOW 20 MIN: CPT | Performed by: INTERNAL MEDICINE

## 2019-07-15 RX ORDER — MULTIVITAMIN
1 TABLET ORAL DAILY
Status: ON HOLD | COMMUNITY
End: 2019-12-16 | Stop reason: ALTCHOICE

## 2019-07-15 RX ORDER — SILDENAFIL CITRATE 20 MG/1
40 TABLET ORAL DAILY PRN
Qty: 12 TABLET | Refills: 4 | Status: SHIPPED | OUTPATIENT
Start: 2019-07-15 | End: 2019-07-19 | Stop reason: SDUPTHER

## 2019-07-15 NOTE — PROGRESS NOTES
Assessment/Plan:       Diagnoses and all orders for this visit:    Erectile dysfunction, unspecified erectile dysfunction type  -     Testosterone, free, total; Future  -     sildenafil (REVATIO) 20 mg tablet; Take 2 tablets (40 mg total) by mouth daily as needed (ED)    Other orders  -     Multiple Vitamin (MULTIVITAMIN) tablet; Take 1 tablet by mouth daily          All of the above diagnoses have been assessed  Additional COMMENTS/PLAN:  visit in the new year      Subjective:      Patient ID: Marquis Montilla is a 77 y o  male  HPI     In a new relationship  Concerned about her hepatitis hx  He has been screened in the past-this is neg  Having some ED problems  The following portions of the patient's history were revised and updated as appropriate: Problem list, allergies, med list, FH, SH, Past medical and surgical histories  Current Outpatient Medications   Medication Sig Dispense Refill    aspirin (ECOTRIN LOW STRENGTH) 81 mg EC tablet Take 1 tablet (81 mg total) by mouth daily      atorvastatin (LIPITOR) 40 mg tablet TAKE 1 TABLET BY MOUTH ONCE DAILY 90 tablet 3    Multiple Vitamin (MULTIVITAMIN) tablet Take 1 tablet by mouth daily      sildenafil (REVATIO) 20 mg tablet Take 2 tablets (40 mg total) by mouth daily as needed (ED) 12 tablet 4     No current facility-administered medications for this visit  Review of Systems   All other systems reviewed and are negative  Objective:    /78 (BP Location: Left arm, Patient Position: Sitting, Cuff Size: Large)   Pulse 83   Ht 5' 6" (1 676 m)   Wt 75 5 kg (166 lb 8 oz)   BMI 26 87 kg/m²     BP Readings from Last 3 Encounters:   07/15/19 120/78   01/28/19 130/80   02/12/18 130/78                  Wt Readings from Last 3 Encounters:   07/15/19 75 5 kg (166 lb 8 oz)   02/12/18 81 2 kg (179 lb)   11/08/16 78 5 kg (173 lb)         Physical Exam   Vitals reviewed          No visits with results within 2 Week(s) from this visit  Latest known visit with results is:   Telephone on 12/18/2018   Component Date Value Ref Range Status    White Blood Cell Count 01/24/2019 10 6  3 4 - 10 8 x10E3/uL Final    Red Blood Cell Count 01/24/2019 4 45  4 14 - 5 80 x10E6/uL Final    Hemoglobin 01/24/2019 15 0  13 0 - 17 7 g/dL Final    HCT 01/24/2019 44 1  37 5 - 51 0 % Final    MCV 01/24/2019 99* 79 - 97 fL Final    MCH 01/24/2019 33 7* 26 6 - 33 0 pg Final    MCHC 01/24/2019 34 0  31 5 - 35 7 g/dL Final    RDW 01/24/2019 14 3  12 3 - 15 4 % Final    Platelet Count 19/25/2385 344  150 - 379 x10E3/uL Final    Neutrophils 01/24/2019 66  Not Estab  % Final    Lymphocytes 01/24/2019 22  Not Estab  % Final    Monocytes 01/24/2019 8  Not Estab  % Final    Eosinophils 01/24/2019 2  Not Estab  % Final    Basophils PCT 01/24/2019 1  Not Estab  % Final    Neutrophils (Absolute) 01/24/2019 7 1* 1 4 - 7 0 x10E3/uL Final    Lymphocytes (Absolute) 01/24/2019 2 3  0 7 - 3 1 x10E3/uL Final    Monocytes (Absolute) 01/24/2019 0 8  0 1 - 0 9 x10E3/uL Final    Eosinophils (Absolute) 01/24/2019 0 2  0 0 - 0 4 x10E3/uL Final    Basophils ABS 01/24/2019 0 1  0 0 - 0 2 x10E3/uL Final    Immature Granulocytes 01/24/2019 1  Not Estab  % Final    Immature Granulocytes (Absolute) 01/24/2019 0 1  0 0 - 0 1 x10E3/uL Final    Hematology Comments 01/24/2019 Note:   Final    Verified by microscopic examination      Glucose, Random 01/24/2019 102* 65 - 99 mg/dL Final    BUN 01/24/2019 13  8 - 27 mg/dL Final    Creatinine 01/24/2019 1 02  0 76 - 1 27 mg/dL Final    eGFR Non  01/24/2019 76  >59 mL/min/1 73 Final    eGFR  01/24/2019 88  >59 mL/min/1 73 Final    SL AMB BUN/CREATININE RATIO 01/24/2019 13  10 - 24 Final    Sodium 01/24/2019 140  134 - 144 mmol/L Final    Potassium 01/24/2019 4 5  3 5 - 5 2 mmol/L Final    Chloride 01/24/2019 101  96 - 106 mmol/L Final    CO2 01/24/2019 24  20 - 29 mmol/L Final    CALCIUM 01/24/2019 9 5  8 6 - 10 2 mg/dL Final    Protein, Total 01/24/2019 7 1  6 0 - 8 5 g/dL Final    Albumin 01/24/2019 4 6  3 6 - 4 8 g/dL Final    Globulin, Total 01/24/2019 2 5  1 5 - 4 5 g/dL Final    Albumin/Globulin Ratio 01/24/2019 1 8  1 2 - 2 2 Final    TOTAL BILIRUBIN 01/24/2019 0 4  0 0 - 1 2 mg/dL Final    Alk Phos Isoenzymes 01/24/2019 88  39 - 117 IU/L Final    AST 01/24/2019 20  0 - 40 IU/L Final    ALT 01/24/2019 10  0 - 44 IU/L Final    Cholesterol, Total 01/24/2019 176  100 - 199 mg/dL Final    Triglycerides 01/24/2019 185* 0 - 149 mg/dL Final    HDL 01/24/2019 43  >39 mg/dL Final    VLDL Cholesterol Calculated 01/24/2019 37  5 - 40 mg/dL Final    LDL Direct 01/24/2019 96  0 - 99 mg/dL Final    T  Chol/HDL Ratio 01/24/2019 4 1  0 0 - 5 0 ratio Final    Comment:                                   T  Chol/HDL Ratio                                              Men  Women                                1/2 Avg  Risk  3 4    3 3                                    Avg Risk  5 0    4 4                                 2X Avg  Risk  9 6    7 1                                 3X Avg  Risk 23 4   11 0      Prostate Specific Antigen Total 01/24/2019 0 6  0 0 - 4 0 ng/mL Final    Comment: Roche Quand[employer]IA methodology  According to the American Urological Association, Serum PSA should  decrease and remain at undetectable levels after radical  prostatectomy  The AUA defines biochemical recurrence as an initial  PSA value 0 2 ng/mL or greater followed by a subsequent confirmatory  PSA value 0 2 ng/mL or greater  Values obtained with different assay methods or kits cannot be used  interchangeably  Results cannot be interpreted as absolute evidence  of the presence or absence of malignant disease   PSA, Free 01/24/2019 0 25  N/A ng/mL Final    Roche ECLIA methodology      PSA, Free Pct 01/24/2019 41 7  % Final    Comment: The table below lists the probability of prostate cancer for  men with non-suspicious DENNIS results and total PSA between  4 and 10 ng/mL, by patient age Georgiana Goncalves, [address] [facility],  [phone])  % Free PSA       50-64 yr        65-75 yr                    0 00-10 00%        56%             55%                   10 01-15 00%        24%             35%                   15 01-20 00%        17%             23%                   20 01-25 00%        10%             20%                        >25 00%         5%              9%  Please note:  Asa et al did not make specific                recommendations regarding the use of                percent free PSA for any other population                of men             Vonnie Herman MD

## 2019-07-19 DIAGNOSIS — N52.9 ERECTILE DYSFUNCTION, UNSPECIFIED ERECTILE DYSFUNCTION TYPE: ICD-10-CM

## 2019-07-19 RX ORDER — SILDENAFIL CITRATE 20 MG/1
40 TABLET ORAL DAILY PRN
Qty: 12 TABLET | Refills: 4 | Status: SHIPPED | OUTPATIENT
Start: 2019-07-19 | End: 2019-12-19 | Stop reason: HOSPADM

## 2019-10-21 ENCOUNTER — OFFICE VISIT (OUTPATIENT)
Dept: FAMILY MEDICINE CLINIC | Facility: HOSPITAL | Age: 69
End: 2019-10-21
Payer: COMMERCIAL

## 2019-10-21 VITALS
SYSTOLIC BLOOD PRESSURE: 120 MMHG | HEIGHT: 66 IN | WEIGHT: 156.4 LBS | DIASTOLIC BLOOD PRESSURE: 78 MMHG | HEART RATE: 77 BPM | BODY MASS INDEX: 25.13 KG/M2 | TEMPERATURE: 98.1 F

## 2019-10-21 DIAGNOSIS — J01.90 ACUTE NON-RECURRENT SINUSITIS, UNSPECIFIED LOCATION: ICD-10-CM

## 2019-10-21 DIAGNOSIS — H66.91 OTITIS OF RIGHT EAR: Primary | ICD-10-CM

## 2019-10-21 PROCEDURE — G0008 ADMIN INFLUENZA VIRUS VAC: HCPCS

## 2019-10-21 PROCEDURE — 90662 IIV NO PRSV INCREASED AG IM: CPT

## 2019-10-21 PROCEDURE — 99213 OFFICE O/P EST LOW 20 MIN: CPT | Performed by: INTERNAL MEDICINE

## 2019-10-21 PROCEDURE — 3008F BODY MASS INDEX DOCD: CPT | Performed by: INTERNAL MEDICINE

## 2019-10-21 RX ORDER — AMOXICILLIN AND CLAVULANATE POTASSIUM 875; 125 MG/1; MG/1
1 TABLET, FILM COATED ORAL EVERY 12 HOURS SCHEDULED
Qty: 14 TABLET | Refills: 0 | Status: SHIPPED | OUTPATIENT
Start: 2019-10-21 | End: 2019-10-28

## 2019-10-21 RX ORDER — AMOXICILLIN AND CLAVULANATE POTASSIUM 875; 125 MG/1; MG/1
1 TABLET, FILM COATED ORAL EVERY 12 HOURS SCHEDULED
Qty: 14 TABLET | Refills: 0 | Status: SHIPPED | OUTPATIENT
Start: 2019-10-21 | End: 2019-10-21

## 2019-10-21 NOTE — PROGRESS NOTES
BMI Counseling: Body mass index is 25 24 kg/m²  The BMI is above normal  Nutrition recommendations include reducing portion sizes

## 2019-10-21 NOTE — PROGRESS NOTES
Assessment/Plan:       Diagnoses and all orders for this visit:    Otitis of right ear  -     Discontinue: amoxicillin-clavulanate (AUGMENTIN) 875-125 mg per tablet; Take 1 tablet by mouth every 12 (twelve) hours for 7 days  -     amoxicillin-clavulanate (AUGMENTIN) 875-125 mg per tablet; Take 1 tablet by mouth every 12 (twelve) hours for 7 days    Acute non-recurrent sinusitis, unspecified location  -     Discontinue: amoxicillin-clavulanate (AUGMENTIN) 875-125 mg per tablet; Take 1 tablet by mouth every 12 (twelve) hours for 7 days  -     amoxicillin-clavulanate (AUGMENTIN) 875-125 mg per tablet; Take 1 tablet by mouth every 12 (twelve) hours for 7 days          All of the above diagnoses have been assessed  Additional COMMENTS/PLAN: seems to be doing well      Subjective:      Patient ID: Humberto Tellez is a 77 y o  male  HPI     Has URI for 12 days with discomfort of the ear  This is the right  Coughing up phlegm  The following portions of the patient's history were revised and updated as appropriate: Problem list, allergies, med list, FH, SH, Past medical and surgical histories  Current Outpatient Medications   Medication Sig Dispense Refill    aspirin (ECOTRIN LOW STRENGTH) 81 mg EC tablet Take 1 tablet (81 mg total) by mouth daily      atorvastatin (LIPITOR) 40 mg tablet TAKE 1 TABLET BY MOUTH ONCE DAILY 90 tablet 3    Multiple Vitamin (MULTIVITAMIN) tablet Take 1 tablet by mouth daily      sildenafil (REVATIO) 20 mg tablet Take 2 tablets (40 mg total) by mouth daily as needed (ED) 12 tablet 4    amoxicillin-clavulanate (AUGMENTIN) 875-125 mg per tablet Take 1 tablet by mouth every 12 (twelve) hours for 7 days 14 tablet 0     No current facility-administered medications for this visit  Review of Systems   All other systems reviewed and are negative          Objective:    /78 (BP Location: Left arm, Patient Position: Sitting, Cuff Size: Standard)   Pulse 77   Temp 98 1 °F (36 7 °C) (Tympanic)   Ht 5' 6" (1 676 m)   Wt 70 9 kg (156 lb 6 4 oz)   BMI 25 24 kg/m²     BP Readings from Last 3 Encounters:   10/21/19 120/78   07/15/19 120/78   01/28/19 130/80                  Wt Readings from Last 3 Encounters:   10/21/19 70 9 kg (156 lb 6 4 oz)   07/15/19 75 5 kg (166 lb 8 oz)   02/12/18 81 2 kg (179 lb)         Physical Exam   Constitutional: He appears well-developed and well-nourished  HENT:   Mouth/Throat: Oropharynx is clear and moist    R otitis   Pulmonary/Chest: Effort normal and breath sounds normal    Vitals reviewed  No visits with results within 2 Week(s) from this visit     Latest known visit with results is:   Telephone on 12/18/2018   Component Date Value Ref Range Status    White Blood Cell Count 01/24/2019 10 6  3 4 - 10 8 x10E3/uL Final    Red Blood Cell Count 01/24/2019 4 45  4 14 - 5 80 x10E6/uL Final    Hemoglobin 01/24/2019 15 0  13 0 - 17 7 g/dL Final    HCT 01/24/2019 44 1  37 5 - 51 0 % Final    MCV 01/24/2019 99* 79 - 97 fL Final    MCH 01/24/2019 33 7* 26 6 - 33 0 pg Final    MCHC 01/24/2019 34 0  31 5 - 35 7 g/dL Final    RDW 01/24/2019 14 3  12 3 - 15 4 % Final    Platelet Count 64/52/2127 344  150 - 379 x10E3/uL Final    Neutrophils 01/24/2019 66  Not Estab  % Final    Lymphocytes 01/24/2019 22  Not Estab  % Final    Monocytes 01/24/2019 8  Not Estab  % Final    Eosinophils 01/24/2019 2  Not Estab  % Final    Basophils PCT 01/24/2019 1  Not Estab  % Final    Neutrophils (Absolute) 01/24/2019 7 1* 1 4 - 7 0 x10E3/uL Final    Lymphocytes (Absolute) 01/24/2019 2 3  0 7 - 3 1 x10E3/uL Final    Monocytes (Absolute) 01/24/2019 0 8  0 1 - 0 9 x10E3/uL Final    Eosinophils (Absolute) 01/24/2019 0 2  0 0 - 0 4 x10E3/uL Final    Basophils ABS 01/24/2019 0 1  0 0 - 0 2 x10E3/uL Final    Immature Granulocytes 01/24/2019 1  Not Estab  % Final    Immature Granulocytes (Absolute) 01/24/2019 0 1  0 0 - 0 1 x10E3/uL Final    Hematology Comments 01/24/2019 Note:   Final    Verified by microscopic examination   Glucose, Random 01/24/2019 102* 65 - 99 mg/dL Final    BUN 01/24/2019 13  8 - 27 mg/dL Final    Creatinine 01/24/2019 1 02  0 76 - 1 27 mg/dL Final    eGFR Non  01/24/2019 76  >59 mL/min/1 73 Final    eGFR  01/24/2019 88  >59 mL/min/1 73 Final    SL AMB BUN/CREATININE RATIO 01/24/2019 13  10 - 24 Final    Sodium 01/24/2019 140  134 - 144 mmol/L Final    Potassium 01/24/2019 4 5  3 5 - 5 2 mmol/L Final    Chloride 01/24/2019 101  96 - 106 mmol/L Final    CO2 01/24/2019 24  20 - 29 mmol/L Final    CALCIUM 01/24/2019 9 5  8 6 - 10 2 mg/dL Final    Protein, Total 01/24/2019 7 1  6 0 - 8 5 g/dL Final    Albumin 01/24/2019 4 6  3 6 - 4 8 g/dL Final    Globulin, Total 01/24/2019 2 5  1 5 - 4 5 g/dL Final    Albumin/Globulin Ratio 01/24/2019 1 8  1 2 - 2 2 Final    TOTAL BILIRUBIN 01/24/2019 0 4  0 0 - 1 2 mg/dL Final    Alk Phos Isoenzymes 01/24/2019 88  39 - 117 IU/L Final    AST 01/24/2019 20  0 - 40 IU/L Final    ALT 01/24/2019 10  0 - 44 IU/L Final    Cholesterol, Total 01/24/2019 176  100 - 199 mg/dL Final    Triglycerides 01/24/2019 185* 0 - 149 mg/dL Final    HDL 01/24/2019 43  >39 mg/dL Final    VLDL Cholesterol Calculated 01/24/2019 37  5 - 40 mg/dL Final    LDL Direct 01/24/2019 96  0 - 99 mg/dL Final    T  Chol/HDL Ratio 01/24/2019 4 1  0 0 - 5 0 ratio Final    Comment:                                   T  Chol/HDL Ratio                                              Men  Women                                1/2 Avg  Risk  3 4    3 3                                    Avg Risk  5 0    4 4                                 2X Avg  Risk  9 6    7 1                                 3X Avg  Risk 23 4   11 0      Prostate Specific Antigen Total 01/24/2019 0 6  0 0 - 4 0 ng/mL Final    Comment: Roche ECLIA methodology    According to the American Urological Association, Serum PSA should  decrease and remain at undetectable levels after radical  prostatectomy  The AUA defines biochemical recurrence as an initial  PSA value 0 2 ng/mL or greater followed by a subsequent confirmatory  PSA value 0 2 ng/mL or greater  Values obtained with different assay methods or kits cannot be used  interchangeably  Results cannot be interpreted as absolute evidence  of the presence or absence of malignant disease   PSA, Free 01/24/2019 0 25  N/A ng/mL Final    Roche ECLIA methodology   PSA, Free Pct 01/24/2019 41 7  % Final    Comment: The table below lists the probability of prostate cancer for  men with non-suspicious DENNIS results and total PSA between  4 and 10 ng/mL, by patient age Levankatlyn MejíaZeke, [facility],  [phone])  % Free PSA       50-64 yr        65-75 yr                    0 00-10 00%        56%             55%                   10 01-15 00%        24%             35%                   15 01-20 00%        17%             23%                   20 01-25 00%        10%             20%                        >25 00%         5%              9%  Please note:  Asa et al did not make specific                recommendations regarding the use of                percent free PSA for any other population                of men             Panda Castillo MD

## 2019-11-07 ENCOUNTER — TELEPHONE (OUTPATIENT)
Dept: FAMILY MEDICINE CLINIC | Facility: HOSPITAL | Age: 69
End: 2019-11-07

## 2019-11-18 PROBLEM — H91.90 HEARING LOSS: Status: ACTIVE | Noted: 2019-11-18

## 2019-11-18 PROBLEM — H93.8X1 BLOCKED EAR, RIGHT: Status: ACTIVE | Noted: 2019-11-18

## 2019-12-16 ENCOUNTER — APPOINTMENT (INPATIENT)
Dept: NON INVASIVE DIAGNOSTICS | Facility: HOSPITAL | Age: 69
DRG: 065 | End: 2019-12-16
Payer: COMMERCIAL

## 2019-12-16 ENCOUNTER — APPOINTMENT (EMERGENCY)
Dept: CT IMAGING | Facility: HOSPITAL | Age: 69
DRG: 065 | End: 2019-12-16
Payer: COMMERCIAL

## 2019-12-16 ENCOUNTER — HOSPITAL ENCOUNTER (INPATIENT)
Facility: HOSPITAL | Age: 69
LOS: 3 days | DRG: 065 | End: 2019-12-19
Attending: EMERGENCY MEDICINE | Admitting: INTERNAL MEDICINE
Payer: COMMERCIAL

## 2019-12-16 ENCOUNTER — APPOINTMENT (EMERGENCY)
Dept: RADIOLOGY | Facility: HOSPITAL | Age: 69
DRG: 065 | End: 2019-12-16
Payer: COMMERCIAL

## 2019-12-16 DIAGNOSIS — E78.00 HYPERCHOLESTEROLEMIA: ICD-10-CM

## 2019-12-16 DIAGNOSIS — I63.9 EMBOLIC STROKE (HCC): Primary | ICD-10-CM

## 2019-12-16 DIAGNOSIS — R42 DIZZINESS: ICD-10-CM

## 2019-12-16 DIAGNOSIS — I69.393 ATAXIA DUE TO RECENT STROKE: ICD-10-CM

## 2019-12-16 DIAGNOSIS — F17.200 TOBACCO USE DISORDER: ICD-10-CM

## 2019-12-16 LAB
ALBUMIN SERPL BCP-MCNC: 3.9 G/DL (ref 3.5–5)
ALP SERPL-CCNC: 85 U/L (ref 46–116)
ALT SERPL W P-5'-P-CCNC: 14 U/L (ref 12–78)
ANION GAP SERPL CALCULATED.3IONS-SCNC: 7 MMOL/L (ref 4–13)
APTT PPP: 25 SECONDS (ref 23–37)
AST SERPL W P-5'-P-CCNC: 11 U/L (ref 5–45)
ATRIAL RATE: 61 BPM
ATRIAL RATE: 62 BPM
BASOPHILS # BLD AUTO: 0.03 THOUSANDS/ΜL (ref 0–0.1)
BASOPHILS NFR BLD AUTO: 0 % (ref 0–1)
BILIRUB SERPL-MCNC: 0.5 MG/DL (ref 0.2–1)
BUN SERPL-MCNC: 13 MG/DL (ref 5–25)
CALCIUM SERPL-MCNC: 9.2 MG/DL (ref 8.3–10.1)
CHLORIDE SERPL-SCNC: 102 MMOL/L (ref 100–108)
CO2 SERPL-SCNC: 28 MMOL/L (ref 21–32)
CREAT SERPL-MCNC: 0.93 MG/DL (ref 0.6–1.3)
EOSINOPHIL # BLD AUTO: 0.03 THOUSAND/ΜL (ref 0–0.61)
EOSINOPHIL NFR BLD AUTO: 0 % (ref 0–6)
ERYTHROCYTE [DISTWIDTH] IN BLOOD BY AUTOMATED COUNT: 14.6 % (ref 11.6–15.1)
GFR SERPL CREATININE-BSD FRML MDRD: 84 ML/MIN/1.73SQ M
GLUCOSE SERPL-MCNC: 124 MG/DL (ref 65–140)
HCT VFR BLD AUTO: 40.4 % (ref 36.5–49.3)
HGB BLD-MCNC: 13.8 G/DL (ref 12–17)
IMM GRANULOCYTES # BLD AUTO: 0.08 THOUSAND/UL (ref 0–0.2)
IMM GRANULOCYTES NFR BLD AUTO: 1 % (ref 0–2)
INR PPP: 0.96 (ref 0.84–1.19)
LYMPHOCYTES # BLD AUTO: 1.72 THOUSANDS/ΜL (ref 0.6–4.47)
LYMPHOCYTES NFR BLD AUTO: 12 % (ref 14–44)
MCH RBC QN AUTO: 33.3 PG (ref 26.8–34.3)
MCHC RBC AUTO-ENTMCNC: 34.2 G/DL (ref 31.4–37.4)
MCV RBC AUTO: 97 FL (ref 82–98)
MONOCYTES # BLD AUTO: 0.83 THOUSAND/ΜL (ref 0.17–1.22)
MONOCYTES NFR BLD AUTO: 6 % (ref 4–12)
NEUTROPHILS # BLD AUTO: 11.37 THOUSANDS/ΜL (ref 1.85–7.62)
NEUTS SEG NFR BLD AUTO: 81 % (ref 43–75)
NRBC BLD AUTO-RTO: 0 /100 WBCS
P AXIS: 72 DEGREES
P AXIS: 78 DEGREES
PLATELET # BLD AUTO: 271 THOUSANDS/UL (ref 149–390)
PMV BLD AUTO: 10.2 FL (ref 8.9–12.7)
POTASSIUM SERPL-SCNC: 3.7 MMOL/L (ref 3.5–5.3)
PR INTERVAL: 144 MS
PR INTERVAL: 162 MS
PROT SERPL-MCNC: 7.2 G/DL (ref 6.4–8.2)
PROTHROMBIN TIME: 12.5 SECONDS (ref 11.6–14.5)
QRS AXIS: 67 DEGREES
QRS AXIS: 71 DEGREES
QRSD INTERVAL: 66 MS
QRSD INTERVAL: 84 MS
QT INTERVAL: 420 MS
QT INTERVAL: 444 MS
QTC INTERVAL: 426 MS
QTC INTERVAL: 446 MS
RBC # BLD AUTO: 4.15 MILLION/UL (ref 3.88–5.62)
SODIUM SERPL-SCNC: 137 MMOL/L (ref 136–145)
T WAVE AXIS: 62 DEGREES
T WAVE AXIS: 69 DEGREES
TROPONIN I SERPL-MCNC: <0.02 NG/ML
VENTRICULAR RATE: 61 BPM
VENTRICULAR RATE: 62 BPM
WBC # BLD AUTO: 14.06 THOUSAND/UL (ref 4.31–10.16)

## 2019-12-16 PROCEDURE — 85610 PROTHROMBIN TIME: CPT | Performed by: EMERGENCY MEDICINE

## 2019-12-16 PROCEDURE — 97163 PT EVAL HIGH COMPLEX 45 MIN: CPT

## 2019-12-16 PROCEDURE — 70496 CT ANGIOGRAPHY HEAD: CPT

## 2019-12-16 PROCEDURE — 85025 COMPLETE CBC W/AUTO DIFF WBC: CPT | Performed by: EMERGENCY MEDICINE

## 2019-12-16 PROCEDURE — 84484 ASSAY OF TROPONIN QUANT: CPT | Performed by: EMERGENCY MEDICINE

## 2019-12-16 PROCEDURE — 70498 CT ANGIOGRAPHY NECK: CPT

## 2019-12-16 PROCEDURE — 71046 X-RAY EXAM CHEST 2 VIEWS: CPT

## 2019-12-16 PROCEDURE — 99223 1ST HOSP IP/OBS HIGH 75: CPT | Performed by: PHYSICIAN ASSISTANT

## 2019-12-16 PROCEDURE — 70450 CT HEAD/BRAIN W/O DYE: CPT

## 2019-12-16 PROCEDURE — 93010 ELECTROCARDIOGRAM REPORT: CPT | Performed by: INTERNAL MEDICINE

## 2019-12-16 PROCEDURE — G8979 MOBILITY GOAL STATUS: HCPCS

## 2019-12-16 PROCEDURE — 97166 OT EVAL MOD COMPLEX 45 MIN: CPT

## 2019-12-16 PROCEDURE — 99285 EMERGENCY DEPT VISIT HI MDM: CPT | Performed by: PHYSICIAN ASSISTANT

## 2019-12-16 PROCEDURE — 85730 THROMBOPLASTIN TIME PARTIAL: CPT | Performed by: EMERGENCY MEDICINE

## 2019-12-16 PROCEDURE — G8988 SELF CARE GOAL STATUS: HCPCS

## 2019-12-16 PROCEDURE — G8978 MOBILITY CURRENT STATUS: HCPCS

## 2019-12-16 PROCEDURE — G8987 SELF CARE CURRENT STATUS: HCPCS

## 2019-12-16 PROCEDURE — 36415 COLL VENOUS BLD VENIPUNCTURE: CPT

## 2019-12-16 PROCEDURE — 80053 COMPREHEN METABOLIC PANEL: CPT | Performed by: EMERGENCY MEDICINE

## 2019-12-16 PROCEDURE — 99285 EMERGENCY DEPT VISIT HI MDM: CPT

## 2019-12-16 PROCEDURE — 93306 TTE W/DOPPLER COMPLETE: CPT

## 2019-12-16 PROCEDURE — 93005 ELECTROCARDIOGRAM TRACING: CPT

## 2019-12-16 RX ORDER — ASPIRIN 325 MG
325 TABLET ORAL ONCE
Status: COMPLETED | OUTPATIENT
Start: 2019-12-16 | End: 2019-12-16

## 2019-12-16 RX ORDER — ATORVASTATIN CALCIUM 40 MG/1
40 TABLET, FILM COATED ORAL DAILY
Status: DISCONTINUED | OUTPATIENT
Start: 2019-12-16 | End: 2019-12-19 | Stop reason: HOSPADM

## 2019-12-16 RX ORDER — MECLIZINE HCL 12.5 MG/1
25 TABLET ORAL EVERY 8 HOURS PRN
Status: DISCONTINUED | OUTPATIENT
Start: 2019-12-16 | End: 2019-12-19 | Stop reason: HOSPADM

## 2019-12-16 RX ORDER — ASPIRIN 81 MG/1
81 TABLET ORAL DAILY
Status: DISCONTINUED | OUTPATIENT
Start: 2019-12-16 | End: 2019-12-19 | Stop reason: HOSPADM

## 2019-12-16 RX ORDER — ASPIRIN 325 MG
325 TABLET ORAL ONCE
Status: DISCONTINUED | OUTPATIENT
Start: 2019-12-16 | End: 2019-12-16

## 2019-12-16 RX ORDER — NICOTINE 21 MG/24HR
1 PATCH, TRANSDERMAL 24 HOURS TRANSDERMAL DAILY PRN
Status: DISCONTINUED | OUTPATIENT
Start: 2019-12-16 | End: 2019-12-19 | Stop reason: HOSPADM

## 2019-12-16 RX ADMIN — NICOTINE 1 PATCH: 14 PATCH TRANSDERMAL at 16:08

## 2019-12-16 RX ADMIN — ASPIRIN 325 MG ORAL TABLET 325 MG: 325 PILL ORAL at 12:01

## 2019-12-16 RX ADMIN — ATORVASTATIN CALCIUM 40 MG: 40 TABLET, FILM COATED ORAL at 16:05

## 2019-12-16 RX ADMIN — ENOXAPARIN SODIUM 40 MG: 40 INJECTION SUBCUTANEOUS at 13:32

## 2019-12-16 RX ADMIN — IOHEXOL 85 ML: 350 INJECTION, SOLUTION INTRAVENOUS at 10:25

## 2019-12-16 NOTE — OCCUPATIONAL THERAPY NOTE
Occupational Therapy Evaluation      Anjelica Uribe    12/16/2019    Principal Problem:    Dizziness  Active Problems:    Hypercholesterolemia    Primary malignant neoplasm of laryngeal cartilage (HCC)    Tobacco use disorder    Carotid occlusion, bilateral    Blocked ear, right      Past Medical History:   Diagnosis Date    Cancer ([address] )     Hyperlipidemia        Past Surgical History:   Procedure Laterality Date    APPENDECTOMY  2003    SKULL FRACTURE ELEVATION  1984 12/16/19 1326   Note Type   Note type Eval only   Restrictions/Precautions   Weight Bearing Precautions Per Order No   Other Precautions Telemetry; Fall Risk   Home Living   Type of Home   (Condo)   Home Layout   (14STE)   Home Equipment   (denies)   Prior Function   Level of [place] Independent with ADLs and functional mobility   Lives With Alone   ADL Assistance Independent   IADLs Independent   Falls in the last 6 months 0   Vocational Retired   Subjective   Subjective I'm tired, I don't feel like doing anything   ADL   Eating Assistance 7  Independent   Grooming Assistance 7  Independent   UB Bathing Assistance 5  Supervision/Setup   LB Bathing Assistance 5  Supervision/Setup   [address] 5  Supervision/Setup   LB Dressing Assistance 5  [address]  5  Supervision/Setup   Bed Mobility   Supine to Sit 5  Supervision   Sit to Supine 5  Supervision   Transfers   Sit to Stand 5  Supervision   Stand to Sit 5  Supervision   Additional Comments Pt refused further   Functional Mobility   Additional Comments Pt refused   Activity Tolerance   Activity Tolerance Treatment limited secondary to agitation   Medical Staff Made Aware PT Mojgan   Nurse Made Aware MARGIE Pandey   RUE Assessment   RUE Assessment   (Pt refused)   LUE Assessment   LUE Assessment   (Pt refused)   Hand Function   Gross Motor Coordination   (Pt refused)   Fine Motor Coordination   (Pt refused)   Sensation   Additional Comments Pt refused testing but reports no issues   Vision-Basic Assessment   Current Vision Wears glasses all the time   Vision - Complex Assessment   Additional Comments Chart states R eye blurriness; pt declined full evaluation   Perception   Inattention/Neglect Appears intact   Motor Planning   (Pt declines further testing)   Cognition   Overall Cognitive Status WFL   Arousal/Participation Uncooperative   Attention Within functional limits   Orientation Level Oriented X4   Memory Within functional limits   Comments Pt uncooperative, refusing full evaluation  Assessment   Limitation Decreased ADL status; Decreased Safe judgement during ADL;Decreased endurance;Decreased self-care trans;Decreased high-level ADLs   Prognosis Fair   Assessment Pt is a 77 y o  male seen for OT evaluation at [facility], admitted 12/16/2019 w/ Dizziness  OT completed expanded review of pt's medical and social history  Comorbidities affecting pt's functional performance at time of assessment include: malignant neoplasm of laryngeal cartilage, tobacco use, carotid occlusion, hearing loss  Personal factors affecting pt at time of IE include:steps to enter environment, limited home support, behavioral pattern, difficulty performing ADLS, difficulty performing IADLS , limited insight into deficits, compliance, flat affect, decreased initiation and engagement  and health management   Prior to admission, pt was living in [facility], alone, with 14STE and was independent with ADL/IADL  Pt declining full OT/PT eval at this time  Willing to work with therapy/nursing for orthostatic BP only  Pt able to sit supine to EOB and stand with supervision  Pt declining MMT and other neuro/CVA eval items  Upon evaluation, pt presents to OT below baseline due to the following performance deficits: weakness, decreased strength, decreased balance and decreased tolerance   Pt to benefit from continued skilled OT tx while in the hospital to address deficits as defined above and maximize level of functional independence w ADL's and functional mobility  Occupational Performance areas to address include: grooming, bathing/shower, toilet hygiene, dressing, functional mobility and functional transfers  Based on findings, pt is of moderate complexity  At this time, OT recommendations at time of discharge are TBD, pending outcome of further assessment  Goals   Patient Goals get some rest   Plan   Treatment Interventions ADL retraining;Functional transfer training;UE strengthening/ROM; Endurance training;Cognitive reorientation;Patient/family training;Equipment evaluation/education; Neuromuscular reeducation; Fine motor coordination activities; Compensatory technique education;Continued evaluation; Energy conservation   Goal Expiration Date 12/26/19   OT Frequency 3-5x/wk   Recommendation   OT Discharge Recommendation Other (Comment)  (TBD pending full evaluation)   OT - OK to Discharge No   Barthel Index   Feeding 10   Bathing 0   Grooming Score 5   Dressing Score 5   Bladder Score 10   Bowels Score 10   Toilet Use Score 5   Transfers (Bed/Chair) Score 10   Mobility (Level Surface) Score 0   Stairs Score 0   Barthel Index Score 55     Pt will achieve the following goals within 10 days  *Pt will complete grooming with Mod I     *Pt will complete UB bathing and dressing with Mod I     *Pt will complete LB bathing and dressing with Mod I      *Pt will complete toileting (hygiene and clothing management) with independence    *Pt will complete bed mobility with independence, with bed flat and no side rail to prep for purposeful tasks    *Pt will perform functional transfers with independence in order to complete ADL routine  *Pt will increase standing tolerance to 10+ minutes in order to complete ADL/IADL routines  *Pt will complete item retrieval and light home management with Mod I while demonstrating good safety      *Pt will demonstrate increased activity tolerance in order to complete ADL routine  *Pt will participate in UE therapeutic exercise in order to maximize strength for ADL transfers      JW Player, OT

## 2019-12-16 NOTE — PHYSICAL THERAPY NOTE
PHYSICAL THERAPY EVAL       12/16/19 1325   Note Type   Note type Eval only   Pain Assessment   Pain Assessment No/denies pain   Pain Score No Pain   Home Living   Type of Home Other (Comment)  (condo)   Home Layout Stairs to enter with rails  (14STE)   Prior Function   Level of [place] Independent with ADLs and functional mobility   Lives With Alone   ADL Assistance Independent   IADLs Independent   Falls in the last 6 months 0   Restrictions/Precautions   Weight Bearing Precautions Per Order No   Other Precautions Fall Risk;Telemetry   General   Family/Caregiver Present Yes   Cognition   Overall Cognitive Status WFL   Arousal/Participation Alert   Orientation Level Oriented X4   Memory Within functional limits   Following Commands Follows all commands and directions without difficulty   RLE Assessment   RLE Assessment   (Patient declined testing)   LLE Assessment   LLE Assessment   (patient declined testing)   Coordination   Sensation WFL   Light Touch   RLE Light Touch Grossly intact   LLE Light Touch Grossly intact   Bed Mobility   Supine to Sit 5  Supervision   Additional items HOB elevated   Sit to Supine 5  Supervision   Additional items HOB elevated   Transfers   Sit to Stand 5  Supervision   Additional items Armrests   Stand to Sit 5  Supervision   Additional items Armrests   Balance   Static Sitting Good   Dynamic Sitting Fair +   Static Standing Fair +   Activity Tolerance   Medical Staff Made Aware RBOBI Fontenot   Nurse Made Aware MARGIE Pandey   Assessment   Prognosis Good   Problem List Decreased strength;Decreased endurance   Assessment Patient is a 70y/o M who presented with dizziness, blurriness in R eye  CT: chronic lacunar infarcts  PMH significant for R hearing loss, carotid occlusion, smoker, laryngeal ca, hypercholesterolemia  Lives alone in a condo with steps to enter   Was ind with mobility and ADL's prior to admission  Current medical status includes fall risk, telemetry, dizziness  Patient not fully cooperative with P T  Eval  Unwilling to undergo strength, sensation, proprioception and coordination testing  Performed bed mobility, transfers at a supervision level  RN took orthostatic vitals which were negative  Patient unwilling to perform further activity stating "This is too much today " Will need to further evaluate next session to determine discharge recommendation  Barriers to Discharge   (14 steps to enter)   Goals   Patient Goals to go home   STG Expiration Date 12/23/19   Short Term Goal #1 1  Perform supine<>sit with HOB flat and without the use of bedrails independently 2  perform sit<>stand transers at a Mary/I level 3  Amb 200ft at an independent level 4  Ascend/descend 14 stairs with railing mod I level reciprocal   PT Treatment Day 0   Plan   Treatment/Interventions Functional transfer training;Bed mobility;Gait training;Equipment eval/education;Spoke to nursing;OT   PT Frequency 2-3x/wk   Recommendation   Recommendation   (to be determined)   Equipment Recommended   (to be determined)   Modified [place] Scale   Modified Prosper Scale 4   Barthel Index   Feeding 10   Bathing 0   Grooming Score 5   Dressing Score 5   Bladder Score 10   Bowels Score 10   Toilet Use Score 5   Transfers (Bed/Chair) Score 10   Mobility (Level Surface) Score 0   Stairs Score 0   Barthel Index Score 55   Katherin Sanchez, PT            Patient Name: Aruna Booker  IBPFM'X Date: 12/16/2019

## 2019-12-16 NOTE — ASSESSMENT & PLAN NOTE
· Occurred yesterday at 12:30 p m   While patient was driving  · Admitted under stroke pathway  · Symptoms >12 hours prior to admission, NIH 0, no tPA given  · Hemoglobin A1c, lipid panel pending  · Neurology consulted  · Monitor on telemetry  · PT/OT  · Pt received 325 aspirin, continue 81mg daily and atorvastatin   · Initial CT revealed artifact, CTA negative for thrombus, revealed less than 50% stenosis bilaterally in carotids  · Meclizine made available PRN

## 2019-12-16 NOTE — PLAN OF CARE
Problem: OCCUPATIONAL THERAPY ADULT  Goal: Performs self-care activities at highest level of function for planned discharge setting  See evaluation for individualized goals  Outcome: Progressing  Note:   Limitation: Decreased ADL status, Decreased Safe judgement during ADL, Decreased endurance, Decreased self-care trans, Decreased high-level ADLs  Prognosis: Fair  Assessment: Pt is a 77 y o  male seen for OT evaluation at [facility], admitted 12/16/2019 w/ Dizziness  OT completed expanded review of pt's medical and social history  Comorbidities affecting pt's functional performance at time of assessment include: malignant neoplasm of laryngeal cartilage, tobacco use, carotid occlusion, hearing loss  Personal factors affecting pt at time of IE include:steps to enter environment, limited home support, behavioral pattern, difficulty performing ADLS, difficulty performing IADLS , limited insight into deficits, compliance, flat affect, decreased initiation and engagement  and health management   Prior to admission, pt was living in [facility], alone, with 14STE and was independent with ADL/IADL  Pt declining full OT/PT eval at this time  Willing to work with therapy/nursing for orthostatic BP only  Pt able to sit supine to EOB and stand with supervision  Pt declining MMT and other neuro/CVA eval items  Upon evaluation, pt presents to OT below baseline due to the following performance deficits: weakness, decreased strength, decreased balance and decreased tolerance  Pt to benefit from continued skilled OT tx while in the hospital to address deficits as defined above and maximize level of functional independence w ADL's and functional mobility  Occupational Performance areas to address include: grooming, bathing/shower, toilet hygiene, dressing, functional mobility and functional transfers  Based on findings, pt is of moderate complexity   At this time, OT recommendations at time of discharge are TBD, pending outcome of further assessment       OT Discharge Recommendation: Other (Comment)(TBD pending full evaluation)  OT - OK to Discharge: No

## 2019-12-16 NOTE — CONSULTS
Consultation - Neurology   Dorris Habermann 77 y o  male MRN: [id number]  Unit/Bed#: -01 Encounter: [id number]      Assessment/Plan   1)  Lightheadedness - unclear etiology at this time  Suspect this is related to pt's chronic R inner ear issues, but cannot exclude vascular etiology at this time  -MRI B pending   -CTA head and without contrast demonstrates no hemodynamically significant stenosis, occlusion or dissection   -CT head demonstrates no acute intracranial abnormality  There is a higher density associate with the basilar artery which appears to be artifactual   -tele   -echo pending   - x 1 then 81 mg p o  Daily    -the patient is on aspirin 81 out home, but he reports he takes it inconsistently   -statin   -HbA1c, lipid profile pending   -PT/OT/speech   -will continue to follow, please monitor exam and notify with changes    History of Present Illness     Reason for Consult / Principal Problem:  Ischemic stroke  Hx and PE limited by:  None  HPI: Dorris Habermann is a 77 y o   male, current smoker, with a history of laryngeal cartilage cancer approximately 10 years ago, status post radiation, in with history of blocked right ear" x several weeks, who presents with a feeling of "being unwell" starting this AM  Per patient, he was in his normal state of health this morning when he was driving to his girl friend's house when he suddenly started feeling unwell  Pt is an extremely poor and undescriptive historian, but does report that he was feeling "light headed" and "my right eye vision seemed off"  Denies CP, palpitations, vertigo, difficulty with speech or word finding, numbness, weakness, vision loss, blurred vision, ataxia, tunnel vision, diaphoresis  Reports that this was first ever occurrence  On exam today, the pt is resting in bed in no acute distress    A 12 point ROS is completed and is currently negative except for "I just feel bad" - pt unable to elaborate despite given several descriptors  No focal neurological deficits as detailed below  Inpatient consult to Neurology  Consult performed by: Iker Wayne PA-C  Consult ordered by: Masoud Holman PA-C    Inpatient consult to Neurology  Consult performed by: Iker Wayne PA-C  Consult ordered by: Shannon Quiles PA-C          Review of Systems   See HPI    Historical Information   Past Medical History:   Diagnosis Date    Cancer [facility])     Hyperlipidemia      Past Surgical History:   Procedure Laterality Date    APPENDECTOMY  2003    SKULL FRACTURE ELEVATION  1984     Social History   Social History     Substance and Sexual Activity   Alcohol Use Yes    Alcohol/week: 5 0 standard drinks    Types: 5 Cans of beer per week    Frequency: 2-3 times a week    Drinks per session: 3 or 4     Social History     Substance and Sexual Activity   Drug Use No     Social History     Tobacco Use   Smoking Status Current Every Day Smoker    Packs/day: 1 00   Smokeless Tobacco Never Used     Family History: non-contributory    Review of previous medical records was completed  Meds/Allergies    Scheduled Meds:  Current Facility-Administered Medications:  aspirin 81 mg Oral Daily Corie RODRIGUEZ PA-C   atorvastatin 40 mg Oral Daily Corie RODRIGUEZ PA-C   enoxaparin 40 mg Subcutaneous Daily Corie RODRIGUEZ PA-C   meclizine 25 mg Oral Q8H PRN Corie RODRIGUEZ PA-C   nicotine 1 patch Transdermal Daily PRN Corie RODRIGUEZ PA-C     Continuous Infusions:   PRN Meds: meclizine    nicotine      No Known Allergies    Objective   Vitals:Blood pressure 152/74, pulse 60, temperature 97 8 °F (36 6 °C), resp  rate 19, height 5' 6" (1 676 m), weight 71 6 kg (157 lb 13 6 oz), SpO2 96 %  ,Body mass index is 25 48 kg/m²  No intake or output data in the 24 hours ending 12/16/19 1335    Invasive Devices:    Invasive Devices     Peripheral Intravenous Line            Peripheral IV 12/16/19 Right Antecubital less than 1 day Physical Exam   Constitutional: He is oriented to person, place, and time  He appears well-developed and well-nourished  No distress  HENT:   Head: Normocephalic and atraumatic  Right Ear: External ear normal    Left Ear: External ear normal    Mouth/Throat: Oropharynx is clear and moist  No oropharyngeal exudate  Eyes: Conjunctivae are normal  Right eye exhibits no discharge  Left eye exhibits no discharge  No scleral icterus  Neck: Normal range of motion  Neck supple  Pulmonary/Chest: Effort normal  No respiratory distress  Musculoskeletal: Normal range of motion  He exhibits no edema, tenderness or deformity  Neurological: He is oriented to person, place, and time  He has normal strength  He has a normal Finger-Nose-Finger Test    Skin: Skin is warm and dry  No rash noted  He is not diaphoretic  No erythema  No pallor  Psychiatric: He has a normal mood and affect  His speech is normal and behavior is normal    Nursing note and vitals reviewed  Neurologic Exam     Mental Status   Oriented to person, place, and time  Follows 3 step commands  Attention: normal  Concentration: normal    Speech: speech is normal   Level of consciousness: alert  Knowledge: good  Able to name object  Able to repeat  Normal comprehension  Cranial Nerves   Cranial nerves II through XII intact  R fast beat nystagmus with far R gaze      Motor Exam   Muscle bulk: normal  Overall muscle tone: normal    Strength   Strength 5/5 throughout  Sensory Exam   Light touch normal      Gait, Coordination, and Reflexes     Gait  Gait: (deferred for safety )    Coordination   Finger to nose coordination: normal    Tremor   Resting tremor: absent    Reflexes   Right plantar: normal  Left plantar: normal  Right ankle clonus: absent  Left ankle clonus: absent      Lab Results: I have personally reviewed pertinent reports       Recent Results (from the past 24 hour(s))   ECG 12 lead    Collection Time: 12/16/19 8:32 AM   Result Value Ref Range    Ventricular Rate 62 BPM    Atrial Rate 62 BPM    NV Interval 162 ms    QRSD Interval 66 ms    QT Interval 420 ms    QTC Interval 426 ms    P Corea 72 degrees    QRS Axis 67 degrees    T Wave Axis 62 degrees   ECG 12 lead    Collection Time: 12/16/19  8:39 AM   Result Value Ref Range    Ventricular Rate 61 BPM    Atrial Rate 61 BPM    NV Interval 144 ms    QRSD Interval 84 ms    QT Interval 444 ms    QTC Interval 446 ms    P Axis 78 degrees    QRS Axis 71 degrees    T Wave Axis 69 degrees   CBC and differential    Collection Time: 12/16/19  8:42 AM   Result Value Ref Range    WBC 14 06 (H) 4 31 - 10 16 Thousand/uL    RBC 4 15 3 88 - 5 62 Million/uL    Hemoglobin 13 8 12 0 - 17 0 g/dL    Hematocrit 40 4 36 5 - 49 3 %    MCV 97 82 - 98 fL    MCH 33 3 26 8 - 34 3 pg    MCHC 34 2 31 4 - 37 4 g/dL    RDW 14 6 11 6 - 15 1 %    MPV 10 2 8 9 - 12 7 fL    Platelets 396 071 - 198 Thousands/uL    nRBC 0 /100 WBCs    Neutrophils Relative 81 (H) 43 - 75 %    Immat GRANS % 1 0 - 2 %    Lymphocytes Relative 12 (L) 14 - 44 %    Monocytes Relative 6 4 - 12 %    Eosinophils Relative 0 0 - 6 %    Basophils Relative 0 0 - 1 %    Neutrophils Absolute 11 37 (H) 1 85 - 7 62 Thousands/µL    Immature Grans Absolute 0 08 0 00 - 0 20 Thousand/uL    Lymphocytes Absolute 1 72 0 60 - 4 47 Thousands/µL    Monocytes Absolute 0 83 0 17 - 1 22 Thousand/µL    Eosinophils Absolute 0 03 0 00 - 0 61 Thousand/µL    Basophils Absolute 0 03 0 00 - 0 10 Thousands/µL   Comprehensive metabolic panel    Collection Time: 12/16/19  8:42 AM   Result Value Ref Range    Sodium 137 136 - 145 mmol/L    Potassium 3 7 3 5 - 5 3 mmol/L    Chloride 102 100 - 108 mmol/L    CO2 28 21 - 32 mmol/L    ANION GAP 7 4 - 13 mmol/L    BUN 13 5 - 25 mg/dL    Creatinine 0 93 0 60 - 1 30 mg/dL    Glucose 124 65 - 140 mg/dL    Calcium 9 2 8 3 - 10 1 mg/dL    AST 11 5 - 45 U/L    ALT 14 12 - 78 U/L    Alkaline Phosphatase 85 46 - 116 U/L    Total Protein 7 2 6 4 - 8 2 g/dL    Albumin 3 9 3 5 - 5 0 g/dL    Total Bilirubin 0 50 0 20 - 1 00 mg/dL    eGFR 84 ml/min/1 73sq m   Troponin I    Collection Time: 12/16/19  8:42 AM   Result Value Ref Range    Troponin I <0 02 <=0 04 ng/mL   Protime-INR    Collection Time: 12/16/19  8:42 AM   Result Value Ref Range    Protime 12 5 11 6 - 14 5 seconds    INR 0 96 0 84 - 1 19   APTT    Collection Time: 12/16/19  8:42 AM   Result Value Ref Range    PTT 25 23 - 37 seconds   ]    Imaging Studies: I have personally reviewed pertinent reports  and I have personally reviewed pertinent films in PACS  EKG, Pathology, and Other Studies: I have personally reviewed pertinent reports      VTE Prophylaxis: Sequential compression device (Venodyne)  and Enoxaparin (Lovenox)    Code Status: Level 1 - Full Code  Advance Directive and Living Will:      Power of :    POLST:

## 2019-12-16 NOTE — ED NOTES
Patient and family updated on admission status, no questions at this time        Brenda Marcial RN  12/16/19 6505

## 2019-12-16 NOTE — H&P
[address] [facility]  H&P- Aruna Huge 1950, 77 y o  male MRN: [id number]  Unit/Bed#: -01 Encounter: [id number]  Primary Care Provider: Ramu Hudson MD   Date and time admitted to hospital: 12/16/2019  8:26 AM    * Dizziness  Assessment & Plan  · Occurred yesterday at 12:30 p m  While patient was driving  · Admitted under stroke pathway  · Symptoms >12 hours prior to admission, NIH 0, no tPA given  · Hemoglobin A1c, lipid panel pending  · Neurology consulted  · Monitor on telemetry  · PT/OT  · Pt received 325 aspirin, continue 81mg daily and atorvastatin   · Initial CT revealed artifact, CTA negative for thrombus, revealed less than 50% stenosis bilaterally in carotids  · Meclizine made available PRN    Blocked ear, right  Assessment & Plan  · Following upper respiratory infection this October  · Has appointment with ENT in January    Carotid occlusion, bilateral  Assessment & Plan  · Evaluated yearly  · CTA this admission reveals <50% stenosis    Tobacco use disorder  Assessment & Plan  · Current daily smoker  · Nicotine patch provided p r n , patient wants to quit  Primary malignant neoplasm of laryngeal cartilage (HCC)  Assessment & Plan  · In remission >10 years  · No longer follows up outpatient    Hypercholesterolemia  Assessment & Plan  · Lipid panel pending this admission pending  · Continue Lipitor    VTE Prophylaxis: Enoxaparin (Lovenox)  / sequential compression device   Code Status: Full Code  POLST: POLST form is not discussed and not completed at this time  Discussion with family: Discussed care plan with patient and patient's girlfriend at bedside  Answered all questions to the best of my ability  Anticipated Length of Stay:  Patient will be admitted on an Inpatient basis with an anticipated length of stay of  Greater than 2 midnights  Justification for Hospital Stay: stroke pathway    Total Time for Visit, including Counseling / Coordination of Care: 45 minutes  Greater than 50% of this total time spent on direct patient counseling and coordination of care  Chief Complaint:   Dizziness/feeling "off"    History of Present Illness:    Margaree Brunner is a 77 y o  male a past medical history of laryngeal cancer active smoker, hypercholesterolemia who presents with dizziness  Patient reports he was driving about 12 30 yesterday when he started to feel off he reports some blurriness in his right eye and some dizziness/lightheadedness associated with difficulty keeping his balance after he parked and got out of his car  He denies experiencing anything like this before  He does have a history of cancer for which he received radiation and chemotherapy over 10 years ago and has been in remission since  He is an active smoker and takes atorvastatin and aspirin on a daily basis  Though he admits he does not always take the aspirin  Plan is to start improved slightly though he still study speech  Reviewed findings from CT and CTA on admission which revealed artifact CTA was negative for thrombus  Patient admitted on stroke pathway, neurology consulted  Discussed this with Neurology  Will await MRI report discussed importance pathway  Patient is medically stable  NIH score of 0  No tPA administered given the fact he was outside of the window and treatments 0  Review of Systems:    Review of Systems   Eyes:        Blurry vision, R eye   Neurological: Positive for dizziness  All other systems reviewed and are negative  Past Medical and Surgical History:     Past Medical History:   Diagnosis Date    Cancer ([facility] [address] )     Hyperlipidemia        Past Surgical History:   Procedure Laterality Date    APPENDECTOMY  2003    SKULL FRACTURE ELEVATION  1984       Meds/Allergies:    Prior to Admission medications    Medication Sig Start Date End Date Taking?  Authorizing Provider   aspirin (ECOTRIN LOW STRENGTH) 81 mg EC tablet Take 1 tablet (81 mg total) by mouth daily 1/28/19   Kael Pacheco MD   atorvastatin (LIPITOR) 40 mg tablet TAKE 1 TABLET BY MOUTH ONCE DAILY 2/25/19   Kael Pacheco MD   PREVIDENT 5000 BOOSTER PLUS 1 1 % PSTE Use as directed 11/5/19   Historical Provider, MD   sildenafil (REVATIO) 20 mg tablet Take 2 tablets (40 mg total) by mouth daily as needed (ED) 7/19/19   Valerie Mcknight PA-C   Multiple Vitamin (MULTIVITAMIN) tablet Take 1 tablet by mouth daily  12/16/19  Historical Provider, MD     I have reviewed home medications with patient personally  Allergies: No Known Allergies    Social History:     Marital Status: Single   Patient Pre-hospital Living Situation:  Home  Patient Pre-hospital Level of Mobility:  Independent  Patient Pre-hospital Diet Restrictions:  None  Substance Use History:   Social History     Substance and Sexual Activity   Alcohol Use Yes    Alcohol/week: 5 0 standard drinks    Types: 5 Cans of beer per week    Frequency: 2-3 times a week    Drinks per session: 3 or 4     Social History     Tobacco Use   Smoking Status Current Every Day Smoker    Packs/day: 1 00   Smokeless Tobacco Never Used     Social History     Substance and Sexual Activity   Drug Use No       Family History:    Family History   Problem Relation Age of Onset    Heart disease Father     Substance Abuse Neg Hx     Mental illness Neg Hx        Physical Exam:     Vitals:   Blood Pressure: 152/74 (12/16/19 1323)  Pulse: 60 (12/16/19 1323)  Temperature: 97 8 °F (36 6 °C) (12/16/19 1200)  Temp Source: Temporal (12/16/19 0828)  Respirations: 19 (12/16/19 1200)  Height: 5' 6" (167 6 cm) (12/16/19 0828)  Weight - Scale: 71 6 kg (157 lb 13 6 oz) (12/16/19 0828)  SpO2: 96 % (12/16/19 1200)    Physical Exam   Constitutional: He appears well-developed and well-nourished  No distress  HENT:   Head: Normocephalic and atraumatic  Eyes: Conjunctivae are normal  No scleral icterus  Cardiovascular: Normal rate and regular rhythm  No murmur heard    Pulmonary/Chest: Effort normal and breath sounds normal  No respiratory distress  He has no wheezes  He has no rales  Abdominal: Soft  He exhibits no distension  There is no tenderness  Musculoskeletal: He exhibits no edema  Neurological: He is alert  Skin: Skin is warm and dry  No erythema  Psychiatric: He has a normal mood and affect  Nursing note and vitals reviewed  Additional Data:     Lab Results: I have personally reviewed pertinent reports  Results from last 7 days   Lab Units 12/16/19  0842   WBC Thousand/uL 14 06*   HEMOGLOBIN g/dL 13 8   HEMATOCRIT % 40 4   PLATELETS Thousands/uL 271   NEUTROS PCT % 81*   LYMPHS PCT % 12*   MONOS PCT % 6   EOS PCT % 0     Results from last 7 days   Lab Units 12/16/19  0842   SODIUM mmol/L 137   POTASSIUM mmol/L 3 7   CHLORIDE mmol/L 102   CO2 mmol/L 28   BUN mg/dL 13   CREATININE mg/dL 0 93   ANION GAP mmol/L 7   CALCIUM mg/dL 9 2   ALBUMIN g/dL 3 9   TOTAL BILIRUBIN mg/dL 0 50   ALK PHOS U/L 85   ALT U/L 14   AST U/L 11   GLUCOSE RANDOM mg/dL 124     Results from last 7 days   Lab Units 12/16/19  0842   INR  0 96                   Imaging: I have personally reviewed pertinent reports  CTA head and neck with and without contrast   Final Result by Adeel Chanel DO (12/16 6223)      Mild left carotid stenosis less than 50%  Atherosclerotic calcification bilateral carotid bifurcation noted  No focal intracranial stenosis or aneurysm  Specifically, no evidence of basilar thrombus or stenosis  Stable appearance of 16 mm calcified left supraglottic laryngeal mass  This is unchanged for greater than 9 years                    Workstation performed: [id number]         CT head without contrast   Final Result by Jeff Monique MD (12/16 5159)      There is high density associated with the basilar artery which, though probably artifactual related to high density bone at the skull base, raises the suspicion of possible basilar artery thrombus and in this patient with vertigo, CT angiogram of the    head and neck is recommended  Chronic small vessel ischemic changes  Chronic lacunar infarctions  I personally discussed this study with Cassandra Luna on 12/16/2019 at 9:30 AM                Workstation performed: [id number]         XR chest 2 views   Final Result by Katheryn Mo MD (12/16 5742)      No acute cardiopulmonary disease  Workstation performed: [id number]         MRI inpatient order    (Results Pending)       EKG, Pathology, and Other Studies Reviewed on Admission:   · EKG: NSR 61bpm    Allscripts / Epic Records Reviewed: Yes     ** Please Note: This note has been constructed using a voice recognition system   **

## 2019-12-16 NOTE — ED PROVIDER NOTES
History  Chief Complaint   Patient presents with    Dizziness     Patient reports dizziness and weakness since 1230 yesterday  reports that he has difficulty with spacial awareness impairing his ability to walk  vision is "blurry sometimes or can't focus well " Admits to nausea and vomiting  Denies weakness on one side of the body, porblems swalling, difficulty speaking, facial droop     Patient is a 76 y/o M with h/o hyperlipidemia that presents to the ED with dizziness, feeling off balance since yesterday at noon  He appears to have a right facial droop, but patient states that is normal for him  He denies headaches or recent head injury  He denies weakness, but states he has had difficulty with spatial awareness  He denies chest pain or SOB  No vision changes or trouble speaking  He states he is supposed to take aspirin daily, but only takes it occasionally  History provided by:  Patient  Dizziness   Quality:  Imbalance  Severity:  Moderate  Onset quality:  Sudden  Duration:  2 days  Timing:  Constant  Progression:  Unchanged  Chronicity:  New  Relieved by:  Nothing  Worsened by:  Nothing  Ineffective treatments:  None tried  Associated symptoms: no blood in stool, no chest pain, no diarrhea, no headaches, no nausea, no palpitations, no shortness of breath, no vision changes, no vomiting and no weakness    Risk factors: no heart disease, no hx of stroke, no multiple medications and no new medications        Prior to Admission Medications   Prescriptions Last Dose Informant Patient Reported? Taking?    PREVIDENT 5000 BOOSTER PLUS 1 1 % PSTE   Yes No   Sig: Use as directed   aspirin (ECOTRIN LOW STRENGTH) 81 mg EC tablet  Self No No   Sig: Take 1 tablet (81 mg total) by mouth daily   atorvastatin (LIPITOR) 40 mg tablet  Self No No   Sig: TAKE 1 TABLET BY MOUTH ONCE DAILY   sildenafil (REVATIO) 20 mg tablet  Self No No   Sig: Take 2 tablets (40 mg total) by mouth daily as needed (ED) Facility-Administered Medications: None       Past Medical History:   Diagnosis Date    Cancer ([facility] [address] )     Hyperlipidemia        Past Surgical History:   Procedure Laterality Date    APPENDECTOMY  2003    SKULL FRACTURE ELEVATION  1984       Family History   Problem Relation Age of Onset    Heart disease Father     Substance Abuse Neg Hx     Mental illness Neg Hx      I have reviewed and agree with the history as documented  Social History     Tobacco Use    Smoking status: Current Every Day Smoker     Packs/day: 1 00    Smokeless tobacco: Never Used   Substance Use Topics    Alcohol use: Yes     Alcohol/week: 5 0 standard drinks     Types: 5 Cans of beer per week     Frequency: 2-3 times a week     Drinks per session: 3 or 4    Drug use: No        Review of Systems   Constitutional: Negative for chills and fever  HENT: Negative  Eyes: Negative for visual disturbance  Respiratory: Negative for cough and shortness of breath  Cardiovascular: Negative for chest pain, palpitations and leg swelling  Gastrointestinal: Negative for blood in stool, diarrhea, nausea and vomiting  Musculoskeletal: Negative for back pain  Skin: Negative for color change and rash  Neurological: Positive for dizziness and facial asymmetry  Negative for tremors, speech difficulty, weakness, light-headedness, numbness and headaches  Psychiatric/Behavioral: Negative for decreased concentration  All other systems reviewed and are negative  Physical Exam  Physical Exam   Constitutional: He is oriented to person, place, and time  He appears well-developed and well-nourished  He is cooperative  He does not appear ill  No distress  HENT:   Head: Normocephalic and atraumatic     Right Ear: Hearing and tympanic membrane normal    Left Ear: Hearing and tympanic membrane normal    Nose: Nose normal    Mouth/Throat: Uvula is midline and oropharynx is clear and moist    Eyes: Pupils are equal, round, and reactive to light  Conjunctivae and EOM are normal    Neck: Normal range of motion  Cardiovascular: Normal rate, regular rhythm and normal heart sounds  No murmur heard  Pulmonary/Chest: Effort normal and breath sounds normal    Abdominal: Soft  Normal appearance and bowel sounds are normal  There is no tenderness  Musculoskeletal: Normal range of motion  He exhibits no edema  Neurological: He is alert and oriented to person, place, and time  He has normal strength and normal reflexes  A cranial nerve deficit is present  No sensory deficit  GCS eye subscore is 4  GCS verbal subscore is 5  GCS motor subscore is 6  Patient with right facial droop  B/L UE and LE strength 5/5  Patient with past pointing on finger to nose test     Skin: Skin is warm and dry  No rash noted  He is not diaphoretic  No pallor  Psychiatric: His speech is normal  His affect is blunt  Cognition and memory are normal    Nursing note and vitals reviewed        Vital Signs  ED Triage Vitals [12/16/19 0828]   Temperature Pulse Respirations Blood Pressure SpO2   98 2 °F (36 8 °C) 59 18 167/82 97 %      Temp Source Heart Rate Source Patient Position - Orthostatic VS BP Location FiO2 (%)   Temporal Monitor Sitting Right arm --      Pain Score       No Pain           Vitals:    12/16/19 1319 12/16/19 1322 12/16/19 1323 12/16/19 1358   BP: 156/81 153/75 152/74    Pulse: 55 61 60 64   Patient Position - Orthostatic VS: Lying - Orthostatic VS Sitting - Orthostatic VS Standing - Orthostatic VS          Visual Acuity  Visual Acuity      Most Recent Value   L Pupil Size (mm)  3   R Pupil Size (mm)  3   L Pupil Shape  Round   R Pupil Shape  Round          ED Medications  Medications   meclizine (ANTIVERT) tablet 25 mg (has no administration in time range)   enoxaparin (LOVENOX) subcutaneous injection 40 mg (40 mg Subcutaneous Given 12/16/19 1332)   aspirin (ECOTRIN LOW STRENGTH) EC tablet 81 mg (81 mg Oral Not Given 12/16/19 1608)   atorvastatin (LIPITOR) tablet 40 mg (40 mg Oral Given 12/16/19 1605)   nicotine (NICODERM CQ) 14 mg/24hr TD 24 hr patch 1 patch (1 patch Transdermal Medication Applied 12/16/19 1608)   iohexol (OMNIPAQUE) 350 MG/ML injection (MULTI-DOSE) 85 mL (85 mL Intravenous Given 12/16/19 1025)   aspirin tablet 325 mg (325 mg Oral Given 12/16/19 1201)       Diagnostic Studies  Results Reviewed     Procedure Component Value Units Date/Time    Troponin I [[id number]]  (Normal) Collected:  12/16/19 0842    Lab Status:  Final result Specimen:  Blood from Arm, Right Updated:  12/16/19 0909     Troponin I <0 02 ng/mL     Comprehensive metabolic panel [[id number]] Collected:  12/16/19 0842    Lab Status:  Final result Specimen:  Blood from Arm, Right Updated:  12/16/19 0908     Sodium 137 mmol/L      Potassium 3 7 mmol/L      Chloride 102 mmol/L      CO2 28 mmol/L      ANION GAP 7 mmol/L      BUN 13 mg/dL      Creatinine 0 93 mg/dL      Glucose 124 mg/dL      Calcium 9 2 mg/dL      AST 11 U/L      ALT 14 U/L      Alkaline Phosphatase 85 U/L      Total Protein 7 2 g/dL      Albumin 3 9 g/dL      Total Bilirubin 0 50 mg/dL      eGFR 84 ml/min/1 73sq m     Narrative:       [place] guidelines for Chronic Kidney Disease (CKD):     Stage 1 with normal or high GFR (GFR > 90 mL/min/1 73 square meters)    Stage 2 Mild CKD (GFR = 60-89 mL/min/1 73 square meters)    Stage 3A Moderate CKD (GFR = 45-59 mL/min/1 73 square meters)    Stage 3B Moderate CKD (GFR = 30-44 mL/min/1 73 square meters)    Stage 4 Severe CKD (GFR = 15-29 mL/min/1 73 square meters)    Stage 5 End Stage CKD (GFR <15 mL/min/1 73 square meters)  Note: GFR calculation is accurate only with a steady state creatinine    Protime-INR [[id number]]  (Normal) Collected:  12/16/19 0842    Lab Status:  Final result Specimen:  Blood from Arm, Right Updated:  12/16/19 0904     Protime 12 5 seconds      INR 0 96    APTT [[id number]]  (Normal) Collected:  12/16/19 0842    Lab Status: Final result Specimen:  Blood from Arm, Right Updated:  12/16/19 0904     PTT 25 seconds     CBC and differential [[id number]]  (Abnormal) Collected:  12/16/19 0842    Lab Status:  Final result Specimen:  Blood from Arm, Right Updated:  12/16/19 0848     WBC 14 06 Thousand/uL      RBC 4 15 Million/uL      Hemoglobin 13 8 g/dL      Hematocrit 40 4 %      MCV 97 fL      MCH 33 3 pg      MCHC 34 2 g/dL      RDW 14 6 %      MPV 10 2 fL      Platelets 985 Thousands/uL      nRBC 0 /100 WBCs      Neutrophils Relative 81 %      Immat GRANS % 1 %      Lymphocytes Relative 12 %      Monocytes Relative 6 %      Eosinophils Relative 0 %      Basophils Relative 0 %      Neutrophils Absolute 11 37 Thousands/µL      Immature Grans Absolute 0 08 Thousand/uL      Lymphocytes Absolute 1 72 Thousands/µL      Monocytes Absolute 0 83 Thousand/µL      Eosinophils Absolute 0 03 Thousand/µL      Basophils Absolute 0 03 Thousands/µL                  CTA head and neck with and without contrast   Final Result by Adeel Chanel DO (12/16 1126)      Mild left carotid stenosis less than 50%  Atherosclerotic calcification bilateral carotid bifurcation noted  No focal intracranial stenosis or aneurysm  Specifically, no evidence of basilar thrombus or stenosis  Stable appearance of 16 mm calcified left supraglottic laryngeal mass  This is unchanged for greater than 9 years  Workstation performed: [id number]         CT head without contrast   Final Result by Ynes Lynne MD (12/16 0932)      There is high density associated with the basilar artery which, though probably artifactual related to high density bone at the skull base, raises the suspicion of possible basilar artery thrombus and in this patient with vertigo, CT angiogram of the    head and neck is recommended  Chronic small vessel ischemic changes  Chronic lacunar infarctions               I personally discussed this study with Veronica Lagos on 12/16/2019 at 9:30 AM                Workstation performed: [id number]         XR chest 2 views   Final Result by Coco Wiggins MD (12/16 1806)      No acute cardiopulmonary disease  Workstation performed: [id number]         MRI inpatient order    (Results Pending)              Procedures  ECG 12 Lead Documentation Only  Date/Time: 12/16/2019 9:00 AM  Performed by: Karlene Lorenz PA-C  Authorized by: Karlene Lorenz PA-C     Indications / Diagnosis:  Dizziness  ECG reviewed by me, the ED Provider: yes    Patient location:  ED  Previous ECG:     Previous ECG:  Unavailable  Rate:     ECG rate:  62    ECG rate assessment: normal    Rhythm:     Rhythm: sinus rhythm    T waves:     T waves: inverted      Inverted:  V2 and V1             ED Course  ED Course as of Dec 16 1616   Mon Dec 16, 2019   1200 Dr Shama Dong from neurology aware of patient and advised aspirin  Stroke Assessment     Row Name 12/16/19 0900             NIH Stroke Scale    Interval  Baseline      Level of Consciousness (1a )  0      LOC Questions (1b )  0      LOC Commands (1c )  0      Best Gaze (2 )  0      Visual (3 )  0      Facial Palsy (4 )  1      Motor Arm, Left (5a )  0      Motor Arm, Right (5b )  0      Motor Leg, Left (6a )  0      Motor Leg, Right (6b )  0      Limb Ataxia (7 )  1      Sensory (8 )  0      Best Language (9 )  0      Dysarthria (10 )  0      Extinction and Inattention (11 ) (Formerly Neglect)  0      Total  2          First Filed Value   TPA Decision  Patient not a TPA candidate  Patient is not a candidate options  Unclear time of onset outside appropriate time window  MDM  Number of Diagnoses or Management Options  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: new and requires workup  Dizziness: new and requires workup  Diagnosis management comments: Patient with dizziness, off balance, will check labs, CT head to r/o Stroke           Amount and/or Complexity of Data Reviewed  Clinical lab tests: ordered and reviewed  Tests in the radiology section of CPT®: ordered and reviewed  Discuss the patient with other providers: yes (Dr Jayna Sifuentes)          Disposition  Final diagnoses:   Dizziness   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     Time reflects when diagnosis was documented in both MDM as applicable and the Disposition within this note     Time User Action Codes Description Comment    12/16/2019 12:03 PM Estle Doom Add [R42] Dizziness     12/16/2019 12:03 PM Estle Doom Add [I69 [id number]] CVA, old, ataxia     12/16/2019 12:04 PM Osmin Purpura [I69 [id number]] CVA, old, ataxia     12/16/2019 12:04 PM Estle Doom Add [I69 [id number]] 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     12/16/2019  1:30 PM Georgiacatrachocholo Malcolm Add [E78 [id number]] Hypercholesterolemia       ED Disposition     ED Disposition Condition Date/Time Comment    Admit Stable Mon Dec 16, 2019 12:03 PM Case was discussed with Dr Jayna Sifuentes and the patient's admission status was agreed to be Admission Status: inpatient status to the service of Dr Daphne Nur   Follow-up Information    None         Current Discharge Medication List      CONTINUE these medications which have NOT CHANGED    Details   aspirin (ECOTRIN LOW STRENGTH) 81 mg EC tablet Take 1 tablet (81 mg total) by mouth daily    Associated Diagnoses: Amaurosis fugax      atorvastatin (LIPITOR) 40 mg tablet TAKE 1 TABLET BY MOUTH ONCE DAILY  Qty: 90 tablet, Refills: 3    Associated Diagnoses: Pure hypercholesterolemia      PREVIDENT 5000 BOOSTER PLUS 1 1 % PSTE Use as directed  Refills: 5      sildenafil (REVATIO) 20 mg tablet Take 2 tablets (40 mg total) by mouth daily as needed (ED)  Qty: 12 tablet, Refills: 4    Associated Diagnoses: Erectile dysfunction, unspecified erectile dysfunction type           No discharge procedures on file      ED Provider  Electronically Signed by           Rasheeda Everett PA-C  12/16/19 1125

## 2019-12-16 NOTE — PLAN OF CARE
Problem: PHYSICAL THERAPY ADULT  Goal: Performs mobility at highest level of function for planned discharge setting  See evaluation for individualized goals  Description  Treatment/Interventions: Functional transfer training, Bed mobility, Gait training, Equipment eval/education, Spoke to nursing, OT  Equipment Recommended: (to be determined)       See flowsheet documentation for full assessment, interventions and recommendations  Note:   Prognosis: Good  Problem List: Decreased strength, Decreased endurance  Assessment: Patient is a 70y/o M who presented with dizziness, blurriness in R eye  CT: chronic lacunar infarcts  PMH significant for R hearing loss, carotid occlusion, smoker, laryngeal ca, hypercholesterolemia  Lives alone in a condo with steps to enter  Was ind with mobility and ADL's prior to admission  Current medical status includes fall risk, telemetry, dizziness  Patient not fully cooperative with P T  Eval  Unwilling to undergo strength, sensation, proprioception and coordination testing  Performed bed mobility, transfers at a supervision level  RN took orthostatic vitals which were negative  Patient unwilling to perform further activity stating "This is too much today " Will need to further evaluate next session to determine discharge recommendation  Barriers to Discharge: (14 steps to enter)     Recommendation: (to be determined)          See flowsheet documentation for full assessment

## 2019-12-17 ENCOUNTER — APPOINTMENT (INPATIENT)
Dept: MRI IMAGING | Facility: HOSPITAL | Age: 69
DRG: 065 | End: 2019-12-17
Payer: COMMERCIAL

## 2019-12-17 ENCOUNTER — APPOINTMENT (INPATIENT)
Dept: CT IMAGING | Facility: HOSPITAL | Age: 69
DRG: 065 | End: 2019-12-17
Payer: COMMERCIAL

## 2019-12-17 PROBLEM — I63.9 EMBOLIC STROKE (HCC): Status: ACTIVE | Noted: 2019-12-16

## 2019-12-17 LAB
ANION GAP SERPL CALCULATED.3IONS-SCNC: 9 MMOL/L (ref 4–13)
BASOPHILS # BLD AUTO: 0.05 THOUSANDS/ΜL (ref 0–0.1)
BASOPHILS NFR BLD AUTO: 1 % (ref 0–1)
BUN SERPL-MCNC: 12 MG/DL (ref 5–25)
CALCIUM SERPL-MCNC: 8.7 MG/DL (ref 8.3–10.1)
CHLORIDE SERPL-SCNC: 104 MMOL/L (ref 100–108)
CHOLEST SERPL-MCNC: 166 MG/DL (ref 50–200)
CO2 SERPL-SCNC: 24 MMOL/L (ref 21–32)
CREAT SERPL-MCNC: 0.84 MG/DL (ref 0.6–1.3)
EOSINOPHIL # BLD AUTO: 0.18 THOUSAND/ΜL (ref 0–0.61)
EOSINOPHIL NFR BLD AUTO: 2 % (ref 0–6)
ERYTHROCYTE [DISTWIDTH] IN BLOOD BY AUTOMATED COUNT: 14.6 % (ref 11.6–15.1)
EST. AVERAGE GLUCOSE BLD GHB EST-MCNC: 123 MG/DL
GFR SERPL CREATININE-BSD FRML MDRD: 90 ML/MIN/1.73SQ M
GLUCOSE SERPL-MCNC: 96 MG/DL (ref 65–140)
HBA1C MFR BLD: 5.9 % (ref 4.2–6.3)
HCT VFR BLD AUTO: 38.6 % (ref 36.5–49.3)
HDLC SERPL-MCNC: 46 MG/DL
HGB BLD-MCNC: 13.2 G/DL (ref 12–17)
IMM GRANULOCYTES # BLD AUTO: 0.05 THOUSAND/UL (ref 0–0.2)
IMM GRANULOCYTES NFR BLD AUTO: 1 % (ref 0–2)
LDLC SERPL CALC-MCNC: 94 MG/DL (ref 0–100)
LYMPHOCYTES # BLD AUTO: 2.09 THOUSANDS/ΜL (ref 0.6–4.47)
LYMPHOCYTES NFR BLD AUTO: 20 % (ref 14–44)
MCH RBC QN AUTO: 33.3 PG (ref 26.8–34.3)
MCHC RBC AUTO-ENTMCNC: 34.2 G/DL (ref 31.4–37.4)
MCV RBC AUTO: 98 FL (ref 82–98)
MONOCYTES # BLD AUTO: 0.69 THOUSAND/ΜL (ref 0.17–1.22)
MONOCYTES NFR BLD AUTO: 7 % (ref 4–12)
NEUTROPHILS # BLD AUTO: 7.57 THOUSANDS/ΜL (ref 1.85–7.62)
NEUTS SEG NFR BLD AUTO: 69 % (ref 43–75)
NONHDLC SERPL-MCNC: 120 MG/DL
NRBC BLD AUTO-RTO: 0 /100 WBCS
PLATELET # BLD AUTO: 240 THOUSANDS/UL (ref 149–390)
PMV BLD AUTO: 10.3 FL (ref 8.9–12.7)
POTASSIUM SERPL-SCNC: 3.6 MMOL/L (ref 3.5–5.3)
RBC # BLD AUTO: 3.96 MILLION/UL (ref 3.88–5.62)
SODIUM SERPL-SCNC: 137 MMOL/L (ref 136–145)
TRIGL SERPL-MCNC: 131 MG/DL
WBC # BLD AUTO: 10.63 THOUSAND/UL (ref 4.31–10.16)

## 2019-12-17 PROCEDURE — 97530 THERAPEUTIC ACTIVITIES: CPT

## 2019-12-17 PROCEDURE — 83036 HEMOGLOBIN GLYCOSYLATED A1C: CPT | Performed by: PHYSICIAN ASSISTANT

## 2019-12-17 PROCEDURE — 97116 GAIT TRAINING THERAPY: CPT

## 2019-12-17 PROCEDURE — 97112 NEUROMUSCULAR REEDUCATION: CPT

## 2019-12-17 PROCEDURE — 99232 SBSQ HOSP IP/OBS MODERATE 35: CPT | Performed by: INTERNAL MEDICINE

## 2019-12-17 PROCEDURE — 99221 1ST HOSP IP/OBS SF/LOW 40: CPT | Performed by: INTERNAL MEDICINE

## 2019-12-17 PROCEDURE — 85025 COMPLETE CBC W/AUTO DIFF WBC: CPT | Performed by: PHYSICIAN ASSISTANT

## 2019-12-17 PROCEDURE — 70551 MRI BRAIN STEM W/O DYE: CPT

## 2019-12-17 PROCEDURE — 71260 CT THORAX DX C+: CPT

## 2019-12-17 PROCEDURE — 93306 TTE W/DOPPLER COMPLETE: CPT | Performed by: INTERNAL MEDICINE

## 2019-12-17 PROCEDURE — 74177 CT ABD & PELVIS W/CONTRAST: CPT

## 2019-12-17 PROCEDURE — 80061 LIPID PANEL: CPT | Performed by: PHYSICIAN ASSISTANT

## 2019-12-17 PROCEDURE — 80048 BASIC METABOLIC PNL TOTAL CA: CPT | Performed by: PHYSICIAN ASSISTANT

## 2019-12-17 PROCEDURE — 99233 SBSQ HOSP IP/OBS HIGH 50: CPT | Performed by: PSYCHIATRY & NEUROLOGY

## 2019-12-17 RX ADMIN — ATORVASTATIN CALCIUM 40 MG: 40 TABLET, FILM COATED ORAL at 10:15

## 2019-12-17 RX ADMIN — NICOTINE 1 PATCH: 14 PATCH TRANSDERMAL at 10:20

## 2019-12-17 RX ADMIN — ASPIRIN 81 MG: 81 TABLET, COATED ORAL at 10:15

## 2019-12-17 RX ADMIN — IOHEXOL 100 ML: 350 INJECTION, SOLUTION INTRAVENOUS at 18:02

## 2019-12-17 RX ADMIN — IOHEXOL 50 ML: 240 INJECTION, SOLUTION INTRATHECAL; INTRAVASCULAR; INTRAVENOUS; ORAL at 18:02

## 2019-12-17 RX ADMIN — ENOXAPARIN SODIUM 40 MG: 40 INJECTION SUBCUTANEOUS at 10:15

## 2019-12-17 NOTE — ASSESSMENT & PLAN NOTE
· Occurred yesterday at 12:30 p m  While patient was driving  · Admitted under stroke pathway  · Symptoms >12 hours prior to admission, NIH 0, no tPA given  · Hemoglobin A1c, pending  · Lipid panel WNL  · Neurology consulted  · MRI reviewed, will continue to look for embolic source of stroke    · D-dimer pending, CT chest, abd, pelvis for malignancy   · Reveals right cerebellum and left frontal lobe ischemia  · Monitor on telemetry  · PT/OT  · Pt received 325 aspirin, continue 81mg daily and atorvastatin   · Initial CT revealed artifact, CTA negative for thrombus, revealed less than 50% stenosis bilaterally in carotids

## 2019-12-17 NOTE — ASSESSMENT & PLAN NOTE
· In remission >10 years  · No longer follows up outpatient  · Will order CT chest, abdomen, pelvis with contrast to assess for recurrence given embolic stroke

## 2019-12-17 NOTE — PROGRESS NOTES
Pastoral Care Progress Note    2019  Patient: Ashly Hoang : 1950  Admission Date & Time: 2019 8949  MRN: [id number] CSN: [id number]                     Chaplaincy Interventions Utilized:   Empowerment: Provided chaplaincy education    Exploration: Explored emotional needs & resources and Explored spiritual needs & resources    Relationship Building: Cultivated a relationship of care and support    Chaplaincy Outcomes Achieved:  Declined  support    Spiritual Coping Strategies Utilized:   No spiritual coping       [facility] Affiliation None   Spiritual Beliefs/Perceptions   Support Systems Friends/neighbors   Stress Factors   Patient Stress Factors None identified   Coping Responses   Patient Coping Accepting; Anxiety   Plan of Care   Assessment Completed by: Unit visit

## 2019-12-17 NOTE — UTILIZATION REVIEW
Notification of Inpatient Admission/Inpatient Authorization Request   This is a Notification of Inpatient Admission for [address]  Be advised that this patient was admitted to our facility under Inpatient Status  Contact Vita Crain at [phone] for additional admission information  [facility] DEPT  DEDICATED [phone]  Patient Name:   Cristiane Harper   YOB: 1950       Authorization Information   Servicing Facility:   [address] [place] [address]  Tax ID: [id number]  NPI: [id number] Attending Provider/NPI: Liss Epperson [[id number]]   Place of Service Code: 24     Place of Service Name:  [address] [place] [address]   Start Date: 12/16/19 1205     Discharge Date & Time: No discharge date for patient encounter  Type of Admission: Inpatient Status Discharge Disposition (if discharged): Final discharge disposition not confirmed   Patient Diagnoses: Dizziness [R42]  Nausea [R11 0]  Dizzy [R42]  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 [I69 393]     Orders: Admission Orders (From admission, onward)     Ordered        12/16/19 1205  Inpatient Admission (expected length of stay for this patient Order details is greater than two midnights)  Once                    Assigned Utilization Review Contact: Hiram Crain  Utilization   Network Utilization Review Department  Phone: [phone]; Fax [phone]  Email: [email or web address]   ATTENTION PAYERS: Please call the assigned Utilization  directly with any questions or concerns ALL voicemails in the department are confidential  Send all requests for admission clinical reviews, approved or denied determinations and any other requests to dedicated fax number belonging to the campus where the patient is receiving treatment

## 2019-12-17 NOTE — CONSULTS
Consultation - Cardiology   Denise Ramirez 77 y o  male MRN: [id number]  Unit/Bed#: -01 Encounter: [id number]        Assessment:   Principal Problem:    Embolic stroke [facility])  Active Problems:    Hypercholesterolemia    Primary malignant neoplasm of laryngeal cartilage (HCC)    Tobacco use disorder    Blocked ear, right        Plan:     Stroke-MRI evidence of multifocal distribution, suggestive of embolic phenomenon  Eliquis started empirically by Neurology  Echocardiogram shows normal LV function, minimal valve disease  Quite reasonable to proceed with loop recorder implantation to look for arrhythmic etiology to his stroke  Her at this time the patient remains resistant to this idea  He is being placed on Eliquis in the meantime which I think is appropriate  He will follow up with his internal medicine physician, Neurology, and can be referred back to our office if he is willing to proceed with loop recorder implantation, or if he is ever found to be in atrial fibrillation at any follow-up appointments  History of Present Illness   Physician Requesting Consult: Amilcar Erazo DO  Reason for Consult / Principal Problem:  CVA  HPI: Denise Ramirez is a 77y o  year old male who presents with a stroke  Imaging revealed multifocal strokes, embolism suspected, were consulted for loop recorder implantation  Patient acknowledges that he had a stroke, understands the reason for the loop recorder, but at this time resistant  He denies palpitations, lightheadedness, chest pain, shortness of breath he has a visual field defect, some ambulatory dysfunction, some unilateral weakness and mild facial droop  Imaging also revealed brain volume loss, suspicious for cerebral vascular disease  He admits to a lifelong poor diet, states he has been taking statin therapy for a few years  He has a h/o laryngeal cancer and is an active smoker      Review of Systems   Constitutional: Negative for appetite change, diaphoresis, fatigue and fever  Eyes: Positive for visual disturbance  Respiratory: Negative for chest tightness, shortness of breath and wheezing  Cardiovascular: Negative for chest pain, palpitations and leg swelling  Gastrointestinal: Negative for abdominal pain and blood in stool  Musculoskeletal: Negative for arthralgias and joint swelling  Skin: Negative for rash  Neurological: Negative for dizziness, syncope and light-headedness  Historical Information   Past Medical History:   Diagnosis Date    Cancer ([facility] [address] )     Hyperlipidemia      Past Surgical History:   Procedure Laterality Date    APPENDECTOMY  2003    SKULL FRACTURE ELEVATION  1984     Social History     Substance and Sexual Activity   Alcohol Use Yes    Alcohol/week: 5 0 standard drinks    Types: 5 Cans of beer per week    Frequency: 2-3 times a week    Drinks per session: 3 or 4     Social History     Substance and Sexual Activity   Drug Use No     Social History     Tobacco Use   Smoking Status Current Every Day Smoker    Packs/day: 1 00   Smokeless Tobacco Never Used     Family History   Problem Relation Age of Onset    Heart disease Father     Substance Abuse Neg Hx     Mental illness Neg Hx        Allergies:  No Known Allergies    Medications:   Scheduled Meds:  Current Facility-Administered Medications:  apixaban 5 mg Oral BID Ara Fly, DO   aspirin 81 mg Oral Daily MARYCARMEN Huston-VANIA   atorvastatin 40 mg Oral Daily MARYCARMEN Huston-VANIA   enoxaparin 40 mg Subcutaneous Daily MARYCARMEN Huston-VANIA   meclizine 25 mg Oral Q8H PRN MARYCARMEN Huston-VANIA   nicotine 1 patch Transdermal Daily PRN MARYCARMEN Huston-VANIA     Continuous Infusions:   PRN Meds:    meclizine 25 mg Q8H PRN   nicotine 1 patch Daily PRN       Objective:   Vitals:   Vitals:    12/17/19 1323   BP: 143/65   Pulse: 80   Resp:    Temp: 97 8 °F (36 6 °C)   SpO2: 96%     Body mass index is 25 48 kg/m²    Orthostatic Blood Pressures      Most Recent Value Blood Pressure  143/65 filed at 12/17/2019 1323   Patient Position - Orthostatic VS  Sitting filed at 12/17/2019 1931        Systolic (06PQO), KLT:401 , Min:125 , JGO:862     Diastolic (23CFH), XDR:73, Min:65, Max:79    No intake or output data in the 24 hours ending 12/17/19 1623  Weight (last 2 days)     Date/Time   Weight    12/16/19 0828   71 6 (157 85)            Invasive Devices     Peripheral Intravenous Line            Peripheral IV 12/16/19 Right Antecubital 1 day                Physical Exam   Constitutional: He is oriented to person, place, and time  He appears well-developed and well-nourished  No distress  HENT:   Head: Normocephalic and atraumatic  Mouth/Throat: Oropharynx is clear and moist    Eyes: Pupils are equal, round, and reactive to light  Conjunctivae and EOM are normal  No scleral icterus  Neck: Normal range of motion  Neck supple  No JVD present  No thyromegaly present  Cardiovascular: Normal rate, regular rhythm, normal heart sounds and intact distal pulses  Exam reveals no gallop and no friction rub  No murmur heard  Pulmonary/Chest: Effort normal and breath sounds normal  No respiratory distress  He has no wheezes  He has no rales  Abdominal: Soft  Bowel sounds are normal  He exhibits no distension  There is no tenderness  Musculoskeletal: Normal range of motion  He exhibits no edema or deformity  Neurological: He is alert and oriented to person, place, and time  + right facial droop  Skin: Skin is warm and dry  No rash noted  He is not diaphoretic  No erythema  Psychiatric: He has a normal mood and affect         Labs:     Results from last 7 days   Lab Units 12/16/19  0842   TROPONIN I ng/mL <0 02     CBC with diff: Results from last 7 days   Lab Units 12/17/19  0459 12/16/19  0842   WBC Thousand/uL 10 63* 14 06*   HEMOGLOBIN g/dL 13 2 13 8   HEMATOCRIT % 38 6 40 4   MCV fL 98 97   PLATELETS Thousands/uL 240 271   MCH pg 33 3 33 3   MCHC g/dL 34 2 34 2   RDW % 14 6 14 6   MPV fL 10 3 10 2   NRBC AUTO /100 WBCs 0 0     CMP:  Results from last 7 days   Lab Units 199 19  0842   POTASSIUM mmol/L 3 6 3 7   CHLORIDE mmol/L 104 102   CO2 mmol/L 24 28   BUN mg/dL 12 13   CREATININE mg/dL 0 84 0 93   CALCIUM mg/dL 8 7 9 2   AST U/L  --  11   ALT U/L  --  14   ALK PHOS U/L  --  85   EGFR ml/min/1 73sq m 90 84     NT-proBNP: No results found for: NTBNP   Magnesium:    Coags:  Results from last 7 days   Lab Units 19  0842   PTT seconds 25   INR  0 96     TSH:     Hemoglobin A1C:  Results from last 7 days   Lab Units 19   HEMOGLOBIN A1C % 5 9     Lipid Profile:   Lab Results   Component Value Date    CHOL 213 (H) 2018    CHOL 156 2015    CHOL 248 2014     Lab Results   Component Value Date    HDL 46 2019    HDL 43 2019    HDL 56 2018     Lab Results   Component Value Date    LDLCALC 94 2019    LDLCALC 123 (H) 2018    LDLCALC 98 2015     Lab Results   Component Value Date    TRIG 131 2019    TRIG 185 (H) 2019    TRIG 168 (H) 2018       Imaging & Testing   Cardiac testing:   EKG reviewed personally: NSR  Telemetry reviewed personally: NSR  Results for orders placed during the hospital encounter of 19   Echo complete with contrast if indicated    Narrative [address] [place] [address]    Transthoracic Echocardiogram  2D, M-mode, Doppler, and Color Doppler    Study date:  16-Dec-2019    Patient: Love Reynoso  MR number: [id number]  Account number: [de-identified]  : 1950  Age: 77 years  Gender: Male  Status: Inpatient  Location: [facility]  Height: 66 in  Weight: 156 6 lb  BP: 152/ 74 mmHg    Indications: CVA    Diagnoses: 56 - CVA    Sonographer:  VILMA Knott  Primary Physician:  Wesley Shultz MD  Referring Physician:  Justin May DO  Group:  [facility]  Interpreting Physician:  Eladio Dinh MD    SUMMARY    LEFT VENTRICLE:  Systolic function was normal  Ejection fraction was estimated to be 60 %  There were no regional wall motion abnormalities  MITRAL VALVE:  There was trace regurgitation  AORTIC VALVE:  The valve was trileaflet  Leaflets exhibited mildly increased thickness, mild calcification, and sclerosis  TRICUSPID VALVE:  There was mild regurgitation  HISTORY: PRIOR HISTORY: HLD; Smoker; Carotid Occl ; Dizziness; Cancer    PROCEDURE: The study was performed in the [facility]  This was a routine study  The transthoracic approach was used  The study included complete 2D imaging, M-mode, complete spectral Doppler, and color Doppler  Echocardiographic views were limited due to decreased penetration and lung interference  This was a technically difficult study  LEFT VENTRICLE: Size was normal  Systolic function was normal  Ejection fraction was estimated to be 60 %  There were no regional wall motion abnormalities  Wall thickness was normal  DOPPLER: Left ventricular diastolic function parameters  were normal     RIGHT VENTRICLE: The size was normal  Systolic function was normal  Wall thickness was normal     LEFT ATRIUM: Size was normal     RIGHT ATRIUM: Size was normal     MITRAL VALVE: Valve structure was normal  There was normal leaflet separation  DOPPLER: The transmitral velocity was within the normal range  There was no evidence for stenosis  There was trace regurgitation  AORTIC VALVE: The valve was trileaflet  Leaflets exhibited mildly increased thickness, mild calcification, and sclerosis  DOPPLER: Transaortic velocity was within the normal range  There was no evidence for stenosis  There was no  significant regurgitation  TRICUSPID VALVE: The valve structure was normal  There was normal leaflet separation  DOPPLER: The transtricuspid velocity was within the normal range  There was no evidence for stenosis   There was mild regurgitation  PULMONIC VALVE: Leaflets exhibited normal thickness, no calcification, and normal cuspal separation  DOPPLER: The transpulmonic velocity was within the normal range  There was no significant regurgitation  PERICARDIUM: There was no pericardial effusion  The pericardium was normal in appearance  AORTA: The root exhibited normal size  SYSTEMIC VEINS: IVC: The inferior vena cava was normal in size  SYSTEM MEASUREMENT TABLES    2D  %FS: 29 02 %  AV Diam: 2 39 cm  EDV(Teich): 59 14 ml  EF(Teich): 56 57 %  ESV(Teich): 25 69 ml  IVSd: 0 75 cm  LA Area: 15 69 cm2  LA Diam: 2 69 cm  LVEDV MOD A4C: 119 95 ml  LVEF MOD A4C: 60 07 %  LVESV MOD A4C: 47 89 ml  LVIDd: 3 73 cm  LVIDs: 2 65 cm  LVLd A4C: 8 67 cm  LVLs A4C: 6 25 cm  LVPWd: 0 96 cm  RA Area: 13 38 cm2  RVIDd: 3 52 cm  SV MOD A4C: 72 06 ml  SV(Teich): 33 46 ml    CW  RAP: 10 mmHg  TR Vmax: 2 51 m/s  TR maxP 29 mmHg    PW  E': 0 12 m/s  E/E': 8 66  MV A Wil: 0 96 m/s  MV Dec Warrick: 4 6 m/s2  MV DecT: 225 55 ms  MV E Wil: 1 04 m/s  MV E/A Ratio: 1 08  MV PHT: 65 41 ms  MVA By PHT: 3 36 cm2  RVSP: 35 29 mmHg    [facility]    Prepared and electronically signed by    Mary Arroyo MD  Signed 17-Dec-2019 12:39:03       No results found for this or any previous visit  No results found for this or any previous visit  No results found for this or any previous visit  Imaging:   I have personally reviewed pertinent reports  Cta Head And Neck With And Without Contrast    Result Date: 2019  Narrative: CTA NECK AND BRAIN WITH CONTRAST INDICATION: off balance COMPARISON:   CT neck 2010 TECHNIQUE: Post contrast imaging was performed after administration of iodinated contrast through the neck and brain  Post contrast axial 0 625 mm images timed to opacify the arterial system  3D rendering was performed on an independent workstation  MIP reconstructions performed   Coronal reconstructions were performed of the noncontrast portion of the brain  Radiation dose length product (DLP) for this visit:  357 81 mGy-cm   This examination, like all CT scans performed in the [facility], was performed utilizing techniques to minimize radiation dose exposure, including the use of iterative  reconstruction and automated exposure control  IV Contrast:  85 mL of iohexol (OMNIPAQUE)  IMAGE QUALITY:   Diagnostic FINDINGS: CERVICAL VASCULATURE AORTIC ARCH AND GREAT VESSELS:  Normal aortic arch and great vessel origins  Normal visualized subclavian vessels  RIGHT VERTEBRAL ARTERY CERVICAL SEGMENT:  Normal origin  The vessel is normal in caliber throughout the neck  LEFT VERTEBRAL ARTERY CERVICAL SEGMENT:  Normal origin  The vessel is normal in caliber throughout the neck  RIGHT EXTRACRANIAL CAROTID SEGMENT:  Mild atherosclerotic disease of the distal common carotid artery and proximal cervical internal carotid artery without significant stenosis compared to the more distal ICA  LEFT EXTRACRANIAL CAROTID SEGMENT:  Mild atherosclerotic disease of the distal common carotid artery and proximal cervical internal carotid artery without significant stenosis compared to the more distal ICA  NASCET criteria was used to determine the degree of internal carotid artery diameter stenosis  INTRACRANIAL VASCULATURE INTERNAL CAROTID ARTERIES:  Normal enhancement of the intracranial portions of the internal carotid arteries  Normal ophthalmic artery origins  Normal ICA terminus  ANTERIOR CIRCULATION:  Symmetric A1 segments and anterior cerebral arteries with normal enhancement  Normal anterior communicating artery  MIDDLE CEREBRAL ARTERY CIRCULATION:  M1 segment and middle cerebral artery branches demonstrate normal enhancement bilaterally  DISTAL VERTEBRAL ARTERIES:  Normal distal vertebral arteries  Posterior inferior cerebellar artery origins are normal  Normal vertebral basilar junction  BASILAR ARTERY:  Basilar artery is normal in caliber  Normal superior cerebellar arteries  POSTERIOR CEREBRAL ARTERIES: Both posterior cerebral arteries arises from the basilar tip  Both arteries demonstrate normal enhancement  Normal posterior communicating arteries  DURAL VENOUS SINUSES:  Normal  NON VASCULAR ANATOMY BONY STRUCTURES:  No acute osseous abnormality  SOFT TISSUES OF THE NECK:  Supraglottic left-sided calcified laryngeal mass again identified unchanged from prior study  This is stable for greater than 9 years  THORACIC INLET:  Unremarkable  Impression: Mild left carotid stenosis less than 50%  Atherosclerotic calcification bilateral carotid bifurcation noted  No focal intracranial stenosis or aneurysm  Specifically, no evidence of basilar thrombus or stenosis  Stable appearance of 16 mm calcified left supraglottic laryngeal mass  This is unchanged for greater than 9 years  Workstation performed: [id number]     Xr Chest 2 Views    Result Date: 12/16/2019  Narrative: CHEST INDICATION:   Chest Pain  COMPARISON:  9/5/2008 EXAM PERFORMED/VIEWS:  XR CHEST PA & LATERAL FINDINGS: Cardiomediastinal silhouette appears unremarkable  The lungs are clear  No pneumothorax or pleural effusion  Osseous structures appear within normal limits for patient age  Impression: No acute cardiopulmonary disease  Workstation performed: [id number]     Ct Head Without Contrast    Result Date: 12/16/2019  Narrative: CT BRAIN - WITHOUT CONTRAST INDICATION:   Dizziness, non-specific  COMPARISON:  November 18, 2009 TECHNIQUE:  CT examination of the brain was performed  In addition to axial images, coronal 2D reformatted images were created and submitted for interpretation  Radiation dose length product (DLP) for this visit:  841 01 mGy-cm     This examination, like all CT scans performed in the [facility], was performed utilizing techniques to minimize radiation dose exposure, including the use of iterative  reconstruction and automated exposure control  IMAGE QUALITY:  Diagnostic  FINDINGS: PARENCHYMA: Decreased attenuation is noted in periventricular and subcortical white matter demonstrating an appearance that is statistically most likely to represent moderate microangiopathic change; this appearance is similar when compared to most recent prior examination  Large chronic lacunar infarction in left anterior limb internal capsule is unchanged from prior examination  There is a small chronic lacunar infarction in the left thalamus, though this was not present in November 2009  No CT signs of acute infarction  No intracranial mass, mass effect or midline shift  No acute parenchymal hemorrhage  There is high density associated with the basilar artery which, though probably artifactual related to high density bone at the skull base, raises the suspicion of possible basilar artery thrombus and in this patient with vertigo, CT angiogram of the head and neck is necessary  VENTRICLES AND EXTRA-AXIAL SPACES:  Normal for the patient's age  VISUALIZED ORBITS AND PARANASAL SINUSES:  Orbits appear unremarkable  Ethmoid mucosal thickening is seen  CALVARIUM AND EXTRACRANIAL SOFT TISSUES:  Normal      Impression: There is high density associated with the basilar artery which, though probably artifactual related to high density bone at the skull base, raises the suspicion of possible basilar artery thrombus and in this patient with vertigo, CT angiogram of the head and neck is recommended  Chronic small vessel ischemic changes  Chronic lacunar infarctions  I personally discussed this study with Esperanza Mirza on 12/16/2019 at 9:30 AM  Workstation performed: [id number]     Mri Brain Wo Contrast    Result Date: 12/17/2019  Narrative: MRI BRAIN WITHOUT CONTRAST INDICATION: stroke r/o  COMPARISON:   CT/CTA 12/16/2019 TECHNIQUE:  Sagittal T1, axial T2, axial FLAIR, axial T1, axial [place] and axial diffusion imaging  IMAGE QUALITY:  Diagnostic  FINDINGS: BRAIN PARENCHYMA:  Multifocal recent ischemic disease is identified to include a subcentimeter focus in the mid posteromedial margin of the right brachium pontis, adjacent to the lateral margin of the 4th ventricle, a linear focus in the left centrum semiovale ovale and at least 2 punctate cortical foci in the opercular left frontal lobe  All lesions are small, without hemorrhage or significant mass effect  Multidose distribution changes suggest embolic disease  Findings occurred in the face of diffuse chronic small vessel disease and diffuse generalized volume loss greater than would be expected for age  VENTRICLES:  Prominent, reflecting precocious volume loss  SELLA AND PITUITARY GLAND:  Partially empty sella ORBITS:  Normal  PARANASAL SINUSES:  Scattered sinus disease with near opacification of the dominant right frontal sinus  Right mastoid fluid  VASCULATURE:  Evaluation of the major intracranial vasculature demonstrates appropriate flow voids  CALVARIUM AND SKULL BASE:  Normal  EXTRACRANIAL SOFT TISSUES:  Normal      Impression: Recent ischemia within the right cerebellum and left frontal lobe, without hemorrhage or significant mass effect; suggesting embolic etiology  Precocious volume loss and chronic small vessel disease  * I personally telephoned this result to Nimo Boothe on 12/17/2019 11:50 PM  Workstation performed: [id number]       Cheyenne Mckay MD    Portions of the record may have been created with voice recognition software   Occasional wrong word or "sound a like" substitutions may have occurred due to the inherent limitations of voice recognition software   Read the chart carefully and recognize, using context, where substitutions have occurred

## 2019-12-17 NOTE — OCCUPATIONAL THERAPY NOTE
Occupational Therapy Note    Patient Name: Vladislav Nguyen  [id number] Date: 12/17/2019    Pt refused full OT evaluation yesterday, only able to assess bed mobility and standing  Attempted to see for followup session to address further neuro testing and more through evaluation this session, but pt DON at MRI  Will reattempt later today      [place], OT
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
## 2019-12-17 NOTE — SOCIAL WORK
LOS 1 DAY  PATIENT IS NOT A BUNDLE  PATIENT IS NOT A READMISSION  CM met with patient at bedside to complete CM open and review discharge planning  Patient reported with friend at bedside that he lives in 2 story home with about 14 steps to enter  Patient denied Hx of VNA or Rehab and reported no DME use  Angle, is currently retired, does not have POA or [employer] [place] and is not interested in documents  [facility] and reported that he doesn't believe he will need anything  Cm will await PT, OT, and MRI  CM department will continue to follow through discharge  CM reviewed d/c planning process including the following: identifying help at home, patient preference for d/c planning needs, availability of treatment team to discuss questions or concerns patient and/or family may have regarding understanding medications and recognizing signs and symptoms once discharged  CM also encouraged patient to follow up with all recommended appointments after discharge  Patient advised of importance for patient and family to participate in managing patients medical well being  No referrals placed at this time  CM department will continue to follow and await PT, OT, and MRI results  CM department will follow for any possible needs

## 2019-12-17 NOTE — PLAN OF CARE
Problem: Potential for Falls  Goal: Patient will remain free of falls  Description  INTERVENTIONS:  - Assess patient frequently for physical needs  -  Identify cognitive and physical deficits and behaviors that affect risk of falls    -  [place] fall precautions as indicated by assessment   - Educate patient/family on patient safety including physical limitations  - Instruct patient to call for assistance with activity based on assessment  - Modify environment to reduce risk of injury  - Consider OT/PT consult to assist with strengthening/mobility  Outcome: Progressing     Problem: SAFETY ADULT  Goal: Maintain or return to baseline ADL function  Description  INTERVENTIONS:  -  Assess patient's ability to carry out ADLs; assess patient's baseline for ADL function and identify physical deficits which impact ability to perform ADLs (bathing, care of mouth/teeth, toileting, grooming, dressing, etc )  - Assess/evaluate cause of self-care deficits   - Assess range of motion  - Assess patient's mobility; develop plan if impaired  - Assess patient's need for assistive devices and provide as appropriate  - Encourage maximum independence but intervene and supervise when necessary  - Involve family in performance of ADLs  - Assess for home care needs following discharge   - Consider OT consult to assist with ADL evaluation and planning for discharge  - Provide patient education as appropriate  Outcome: Progressing  Goal: Maintain or return mobility status to optimal level  Description  INTERVENTIONS:  - Assess patient's baseline mobility status (ambulation, transfers, stairs, etc )    - Identify cognitive and physical deficits and behaviors that affect mobility  - Identify mobility aids required to assist with transfers and/or ambulation (gait belt, sit-to-stand, lift, walker, cane, etc )  - [place] fall precautions as indicated by assessment  - Record patient progress and toleration of activity level on Mobility SBAR; progress patient to next Phase/Stage  - Instruct patient to call for assistance with activity based on assessment  - Consider rehabilitation consult to assist with strengthening/weightbearing, etc   Outcome: Progressing     Problem: DISCHARGE PLANNING  Goal: Discharge to home or other facility with appropriate resources  Description  INTERVENTIONS:  - Identify barriers to discharge w/patient and caregiver  - Arrange for needed discharge resources and transportation as appropriate  - Identify discharge learning needs (meds, wound care, etc )  - Arrange for interpretive services to assist at discharge as needed  - Refer to Case Management Department for coordinating discharge planning if the patient needs post-hospital services based on physician/advanced practitioner order or complex needs related to functional status, cognitive ability, or social support system  Outcome: Progressing     Problem: Knowledge Deficit  Goal: Patient/family/caregiver demonstrates understanding of disease process, treatment plan, medications, and discharge instructions  Description  Complete learning assessment and assess knowledge base    Interventions:  - Provide teaching at level of understanding  - Provide teaching via preferred learning methods  Outcome: Progressing     Problem: NEUROSENSORY - ADULT  Goal: Achieves stable or improved neurological status  Description  INTERVENTIONS  - Monitor and report changes in neurological status  - Monitor vital signs such as temperature, blood pressure, glucose, and any other labs ordered   - Initiate measures to prevent increased intracranial pressure  - Monitor for seizure activity and implement precautions if appropriate      Outcome: Progressing

## 2019-12-17 NOTE — PHYSICAL THERAPY NOTE
PT tx     12/17/19 5345   Pain Assessment   Pain Assessment No/denies pain   Pain Score No Pain   Restrictions/Precautions   Other Precautions Cognitive; Fall Risk   General   Chart Reviewed Yes   Additional Pertinent History MRI + for infarctions   Cognition   Overall Cognitive Status Impaired   Arousal/Participation Alert; Cooperative   Attention Attends with cues to redirect   Orientation Level Oriented X4   Memory Within functional limits   Following Commands Follows one step commands with increased time or repetition   Comments decreased insight into deficits   Subjective   Subjective feels better thatn on admission, states has been walking into BR alone holding furniture, reminded to ring for staff   Transfers   Sit to Stand 5  Supervision   Stand to Sit 5  Supervision   Stand pivot 4  Minimal assistance   Additional items Assist x 1; Increased time required;Verbal cues   Ambulation/Elevation   Gait pattern Narrow ANJANA; Forward Flexion; Inconsistent taj; Short stride  (veers to R adn decreased attention to items on R)   Gait Assistance 4  Minimal assist   Additional items Assist x 1;Verbal cues; Tactile cues  (sasist for walker management)   Assistive Device Rolling walker   Distance 50',100'   Balance   Static Sitting Good   Dynamic Sitting Good   Static Standing Fair   Dynamic Standing Fair   Ambulatory Fair -   Endurance Deficit   Endurance Deficit No   Activity Tolerance   Activity Tolerance Patient tolerated treatment well   Medical Staff Made Aware ROBBI Fontenot   Nurse Made Aware MARGIE Pandey   Assessment   Prognosis Good   Problem List Impaired balance;Decreased coordination;Decreased cognition;Decreased safety awareness   Assessment Pt able to use RW but requires assist to manage safely fatoumata veering to R and jonas not attned well to R unless cued  Can benefit form skilledTP serivces to regain  safe ind functional mobility  PT not sure if he is agreeable to in-pt rehab   Would benefit from same as pt has safety issues   Barriers to Discharge   (medical clearance)   Goals   Patient Goals go home  asks if he can drive!    Plan   Treatment/Interventions ADL retraining;Functional transfer training;Gait training;Equipment eval/education;Patient/family training;Cognitive reorientation;Elevations   Progress Progressing toward goals   PT Frequency 5x/wk   Recommendation   Recommendation Short-term skilled PT;Post acute IP rehab   Equipment Recommended Walker   PT - OK to Discharge Yes   Additional Comments   (to STR)   Yvonne Montesinos, PT

## 2019-12-17 NOTE — PROGRESS NOTES
Pleasant man  Oriented him and guest to pastoral care  Chose not to engage in any depth       12/17/19 Zeny 46 Affiliation None   Spiritual Beliefs/Perceptions   Support Systems Friends/neighbors   Stress Factors   Patient Stress Factors None identified   Coping Responses   Patient Coping Accepting; Anxiety   Plan of Care   Assessment Completed by: Unit visit

## 2019-12-17 NOTE — SOCIAL WORK
CM met with patient at bedside again today and review PT and OT recs post assessment  CM reviewed options as far as acute due to his movement but deficits  CM department will need to follow up tomorrow regarding options and if he is agreeable to going to acute rehab  Acute rehab would be appropriate for patient given mobility  Cm department will follow
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
## 2019-12-17 NOTE — PLAN OF CARE
Problem: PHYSICAL THERAPY ADULT  Goal: Performs mobility at highest level of function for planned discharge setting  See evaluation for individualized goals  Description  Treatment/Interventions: ADL retraining, Functional transfer training, Gait training, Equipment eval/education, Patient/family training, Cognitive reorientation, Elevations  Equipment Recommended: [place]       See flowsheet documentation for full assessment, interventions and recommendations  Outcome: Progressing  Note:   Prognosis: Good  Problem List: Impaired balance, Decreased coordination, Decreased cognition, Decreased safety awareness  Assessment: Pt able to use RW but requires assist to manage safely fatoumata veering to R and jonas not attned well to R unless cued  Can benefit form skilledTP serivces to regain  safe ind functional mobility  PT not sure if he is agreeable to in-pt rehab  Would benefit from same as pt has safety issues  Barriers to Discharge: (medical clearance)     Recommendation: Short-term skilled PT, Post acute IP rehab     PT - OK to Discharge: Yes    See flowsheet documentation for full assessment

## 2019-12-17 NOTE — OCCUPATIONAL THERAPY NOTE
Occupational Therapy Treatment Note    Patient Name: Kim Eubanks  PTKGD'A Date: 12/17/2019 12/17/19 1447   Restrictions/Precautions   Weight Bearing Precautions Per Order No   Other Precautions Cognitive; Impulsive; Chair Alarm; Bed Alarm; Fall Risk   Pain Assessment   Pain Assessment No/denies pain  (pt only complains of feeling "off balance")   Psychosocial   Psychosocial (WDL) WDL   Length of Time/Family Visitation   (friend present- pt more agreeable to participate this session)   Bed Mobility   Additional Comments Pt received OOB in recliner chair   Transfers   Sit to Stand 5  Supervision   Stand to Sit 5  Supervision   Stand pivot 4  Minimal assistance  (CGA)   Additional items   (RW)   Functional Mobility   Functional Mobility 4  Minimal assistance  (CGA)   Additional Comments X1; VCs for steering RW, pt drifting to R side  Additional items Rolling walker   Balance   Dynamic Standing   (see PT note)   Activity Tolerance   Activity Tolerance Patient tolerated treatment well   Medical Staff Made Aware PT Kathy   Nurse Made Aware MARGIE DIAZ   RUE Assessment   RUE Assessment WFL   LUE Assessment   LUE Assessment WFL   Hand Function   Gross Motor Coordination Functional   Fine Motor Coordination Impaired  (difficulty with finger-nose coordination and grooming)   Sensation   Additional Comments No deficits   Vision-Basic Assessment   Current Vision Wears glasses all the time   Vision - Complex Assessment   Additional Comments Pt reports no deficits but RN reports he has been experiencing visual deficits in one eye since admission   Perception   Inattention/Neglect Cues to attend right visual field  (drifting to right side with ambulation, cues for RW safety)   Motor Planning   (some mild deficits noted)   Cognition   Overall Cognitive Status Impaired   Arousal/Participation Alert; Cooperative   Attention Attends with cues to redirect   Orientation Level Oriented X4   Memory Within functional limits Following Commands Follows one step commands without difficulty   Comments Pt with poor safety awareness/insight  Recommend driving cessation (at least temporarily)     Plan   OT Treatment Day 1   Recommendation   OT Discharge Recommendation Short Term Rehab  (Pt ideal for acute rehab)   OT - OK to Discharge Yes  (to acute rehab when medically stable)     Jass Alegre, OT

## 2019-12-17 NOTE — PLAN OF CARE
Problem: OCCUPATIONAL THERAPY ADULT  Goal: Performs self-care activities at highest level of function for planned discharge setting  See evaluation for individualized goals  Outcome: Progressing  Note:   Limitation: Decreased ADL status, Decreased Safe judgement during ADL, Decreased endurance, Decreased self-care trans, Decreased high-level ADLs  Prognosis: Fair  Assessment: Pt is a 77 y o  male seen for OT evaluation at [facility], admitted 12/16/2019 w/ Dizziness  OT completed expanded review of pt's medical and social history  Comorbidities affecting pt's functional performance at time of assessment include: malignant neoplasm of laryngeal cartilage, tobacco use, carotid occlusion, hearing loss  Personal factors affecting pt at time of IE include:steps to enter environment, limited home support, behavioral pattern, difficulty performing ADLS, difficulty performing IADLS , limited insight into deficits, compliance, flat affect, decreased initiation and engagement  and health management   Prior to admission, pt was living in [facility], alone, with 14STE and was independent with ADL/IADL  Pt declining full OT/PT eval at this time  Willing to work with therapy/nursing for orthostatic BP only  Pt able to sit supine to EOB and stand with supervision  Pt declining MMT and other neuro/CVA eval items  Upon evaluation, pt presents to OT below baseline due to the following performance deficits: weakness, decreased strength, decreased balance and decreased tolerance  Pt to benefit from continued skilled OT tx while in the hospital to address deficits as defined above and maximize level of functional independence w ADL's and functional mobility  Occupational Performance areas to address include: grooming, bathing/shower, toilet hygiene, dressing, functional mobility and functional transfers  Based on findings, pt is of moderate complexity   At this time, OT recommendations at time of discharge are TBD, pending outcome of further assessment       OT Discharge Recommendation: Short Term Rehab(Pt ideal for acute rehab)  OT - OK to Discharge: Yes(to acute rehab when medically stable)

## 2019-12-17 NOTE — PROGRESS NOTES
Progress Note - Neurology   Leidy Torre 77 y o  male MRN: [id number]  Unit/Bed#: -01 Encounter: [id number]        Assessment/Plan :  78 yo male with multiple vascular risk factors including HLD, current tobacco use, h/o  laryngeal cartilage cancer s/p radiation who presented with lightheadedness  MRI Brain demonstrating recent ischemia in right cerebellum and left frontal lobe  Etiology likely embolic  Plan:  - Discussed with Attending Neurologist  Sergio Chase not need MONI at this time but recommend Loop recorder placement  Will discuss with Primary team and Cardiology  - CT chest/abdomen/pelvis pending   - telemetry monitoring  - secondary stroke prevention   - recommend initiation of NOAC (Eliquis preferred)    - continue lipitor 40 mg daily   - permissive hypertension; avoid hypotension   - recommend euglycemia; HgA1c pending   - recommend tobacco cessation, ongoing support and education   - stroke education  - PT/OT/SL following  - medical management per Primary team including infectious and metabolic monitoring and correction  Results reviewed:  - labs:  Triglycerides 131, HDL 46, LDL 94  - 12/17/19 Echocardiogram: LVEF 60%, no regional wall abnormalities, trace MR, mild TR, aortic valve with increased thickness, mild calcification and sclerosis  Bilateral atrium normal size  - 12/17/19 MRI Brain wo contrast: Recent ischemia within the right cerebellum and left frontal lobe, without hemorrhage or significant mass effect; suggesting embolic etiology  Precocious volume loss and chronic small vessel disease    - 12/16/19 CTA head and neck: per report: Mild left carotid stenosis less than 50%  Atherosclerotic calcification bilateral carotid bifurcation noted  No focal intracranial stenosis or aneurysm  Specifically, no evidence of basilar thrombus or stenosis  Stable appearance of 16 mm calcified left supraglottic laryngeal mass  This is unchanged for greater than 9 years    12/16/19 CT Head wo contrast: per report: Marialuisa Arrington is high density associated with the basilar artery which, though probably artifactual related to high density bone at the skull base, raises the suspicion of possible basilar artery thrombus and in this patient with vertigo, CT angiogram of the   head and neck is recommended      Chronic small vessel ischemic changes  Chronic lacunar infarctions  Subjective:   Patient reports he continues to feel "weird"  When asked if he feels off balance he says yes, especially when up walking  Otherwise he has no new complaints  He has no numbness/tingling/weakness in face or extremities, reports no visual or speech difficulties, chest pain , palpitations, nausea, or dyspepsia  Per PT/OT patient with right neglect, needs cueing to attend to right visual field, drifts to right when ambulating with rolling walker  Review of Systems - conducted and negative except as noted in subjective  Vitals: Blood pressure 143/79, pulse 62, temperature 97 9 °F (36 6 °C), resp  rate 18, height 5' 6" (1 676 m), weight 71 6 kg (157 lb 13 6 oz), SpO2 94 %  ,Body mass index is 25 48 kg/m²  MEDS:    Current Facility-Administered Medications:  aspirin 81 mg Oral Daily Corie RODRIGUEZ PA-C   atorvastatin 40 mg Oral Daily Corie Nichols V, KASIA   enoxaparin 40 mg Subcutaneous Daily MARYCARMEN Huston-VANIA   meclizine 25 mg Oral Q8H PRN Corie RODRIGUEZ PA-C   nicotine 1 patch Transdermal Daily PRN Nathan Lawton PA-C     Physical Exam   Constitutional: He is oriented to person, place, and time  He appears well-developed and well-nourished  No distress  HENT:   Head: Normocephalic and atraumatic  Mouth/Throat: Oropharynx is clear and moist    Eyes: Pupils are equal, round, and reactive to light  Conjunctivae and EOM are normal  Right eye exhibits no discharge  Left eye exhibits no discharge  Neck: Normal range of motion  Cardiovascular: Normal rate, regular rhythm and normal heart sounds   Exam reveals no gallop and no friction rub  No murmur heard  Pulmonary/Chest: Effort normal and breath sounds normal  No respiratory distress  He has no wheezes  He has no rales  He exhibits no tenderness  Abdominal: Soft  Bowel sounds are normal  He exhibits no distension  There is no tenderness  Musculoskeletal: Normal range of motion  He exhibits no edema  Neurological: He is alert and oriented to person, place, and time  He has normal strength  He has an abnormal Finger-Nose-Finger Test (left slight clumsiness)  Skin: Skin is warm and dry  He is not diaphoretic  Psychiatric: He has a normal mood and affect  His speech is normal and behavior is normal  Judgment and thought content normal    Vitals reviewed  Neurologic Exam     Mental Status   Oriented to person, place, and time  Follows 3 step commands  Attention: normal  Concentration: normal    Speech: speech is normal   Level of consciousness: alert  Knowledge: good  Able to name object  Cranial Nerves     CN II   Visual fields full to confrontation  CN III, IV, VI   Pupils are equal, round, and reactive to light  Extraocular motions are normal    Right pupil: Size: 2 mm  Shape: regular  Left pupil: Size: 2 mm  Shape: regular  Nystagmus: none   Diplopia: none    CN V   Facial sensation intact  CN VII   Facial expression full, symmetric  CN VIII   Hearing: intact    CN IX, X   Palate: symmetric    CN XI   CN XI normal      CN XII   Tongue deviation: left    Motor Exam   Muscle bulk: normal  Overall muscle tone: normal  Right arm pronator drift: absent  Left arm pronator drift: absent    Strength   Strength 5/5 throughout  Sensory Exam   Light touch normal      Gait, Coordination, and Reflexes     Gait  Gait: (per PT/OT patient veering to right when ambulating  with rolling walker   He is not aware of deficits  )    Coordination   Finger to nose coordination: abnormal (left slight clumsiness)      Lab Results:   I have personally reviewed pertinent reports  CBC:   Results from last 7 days   Lab Units 12/17/19  0459 12/16/19  0842   WBC Thousand/uL 10 63* 14 06*   RBC Million/uL 3 96 4 15   HEMOGLOBIN g/dL 13 2 13 8   HEMATOCRIT % 38 6 40 4   MCV fL 98 97   PLATELETS Thousands/uL 240 271   BMP/CMP:   Results from last 7 days   Lab Units 12/17/19  0459 12/16/19  0842   SODIUM mmol/L 137 137   POTASSIUM mmol/L 3 6 3 7   CHLORIDE mmol/L 104 102   CO2 mmol/L 24 28   BUN mg/dL 12 13   CREATININE mg/dL 0 84 0 93   CALCIUM mg/dL 8 7 9 2   AST U/L  --  11   ALT U/L  --  14   ALK PHOS U/L  --  85   EGFR ml/min/1 73sq m 90 84   Coagulation:   Results from last 7 days   Lab Units 12/16/19  0842   INR  0 96   Lipid Profile:   Results from last 7 days   Lab Units 12/17/19  0459   HDL mg/dL 46   LDL CALC mg/dL 94   TRIGLYCERIDES mg/dL 131     Imaging Studies: I have personally reviewed pertinent reports  and I have personally reviewed pertinent films in PACS     EEG, Pathology, and Other Studies: I have personally reviewed pertinent reports  and I have personally reviewed pertinent films in PACS    VTE Prophylaxis: Sequential compression device (Venodyne)  and Enoxaparin (Lovenox)     Counseling / Coordination of Care  Total time spent today 40 minutes  Greater than 50% of total time was spent with the patient and / or family counseling and / or coordination of care  A description of the counseling / coordination of care: discussion of diagnostic results and plan of care including medication, recommendation for tobacco cessation, Loop  recorder and follow up with Stroke clinic

## 2019-12-18 PROBLEM — I65.29 CAROTID STENOSIS: Status: ACTIVE | Noted: 2019-02-10

## 2019-12-18 LAB
ANION GAP SERPL CALCULATED.3IONS-SCNC: 8 MMOL/L (ref 4–13)
BASOPHILS # BLD AUTO: 0.05 THOUSANDS/ΜL (ref 0–0.1)
BASOPHILS NFR BLD AUTO: 1 % (ref 0–1)
BUN SERPL-MCNC: 12 MG/DL (ref 5–25)
CALCIUM SERPL-MCNC: 8.6 MG/DL (ref 8.3–10.1)
CHLORIDE SERPL-SCNC: 104 MMOL/L (ref 100–108)
CO2 SERPL-SCNC: 23 MMOL/L (ref 21–32)
CREAT SERPL-MCNC: 0.92 MG/DL (ref 0.6–1.3)
EOSINOPHIL # BLD AUTO: 0.22 THOUSAND/ΜL (ref 0–0.61)
EOSINOPHIL NFR BLD AUTO: 2 % (ref 0–6)
ERYTHROCYTE [DISTWIDTH] IN BLOOD BY AUTOMATED COUNT: 14.6 % (ref 11.6–15.1)
GFR SERPL CREATININE-BSD FRML MDRD: 85 ML/MIN/1.73SQ M
GLUCOSE SERPL-MCNC: 101 MG/DL (ref 65–140)
HCT VFR BLD AUTO: 39.5 % (ref 36.5–49.3)
HGB BLD-MCNC: 13.3 G/DL (ref 12–17)
IMM GRANULOCYTES # BLD AUTO: 0.06 THOUSAND/UL (ref 0–0.2)
IMM GRANULOCYTES NFR BLD AUTO: 1 % (ref 0–2)
LYMPHOCYTES # BLD AUTO: 2.21 THOUSANDS/ΜL (ref 0.6–4.47)
LYMPHOCYTES NFR BLD AUTO: 22 % (ref 14–44)
MCH RBC QN AUTO: 33.2 PG (ref 26.8–34.3)
MCHC RBC AUTO-ENTMCNC: 33.7 G/DL (ref 31.4–37.4)
MCV RBC AUTO: 99 FL (ref 82–98)
MONOCYTES # BLD AUTO: 0.86 THOUSAND/ΜL (ref 0.17–1.22)
MONOCYTES NFR BLD AUTO: 8 % (ref 4–12)
NEUTROPHILS # BLD AUTO: 6.89 THOUSANDS/ΜL (ref 1.85–7.62)
NEUTS SEG NFR BLD AUTO: 66 % (ref 43–75)
NRBC BLD AUTO-RTO: 0 /100 WBCS
PLATELET # BLD AUTO: 242 THOUSANDS/UL (ref 149–390)
PMV BLD AUTO: 10.5 FL (ref 8.9–12.7)
POTASSIUM SERPL-SCNC: 3.6 MMOL/L (ref 3.5–5.3)
RBC # BLD AUTO: 4.01 MILLION/UL (ref 3.88–5.62)
SODIUM SERPL-SCNC: 135 MMOL/L (ref 136–145)
WBC # BLD AUTO: 10.29 THOUSAND/UL (ref 4.31–10.16)

## 2019-12-18 PROCEDURE — 85025 COMPLETE CBC W/AUTO DIFF WBC: CPT | Performed by: PHYSICIAN ASSISTANT

## 2019-12-18 PROCEDURE — 99232 SBSQ HOSP IP/OBS MODERATE 35: CPT | Performed by: INTERNAL MEDICINE

## 2019-12-18 PROCEDURE — 80048 BASIC METABOLIC PNL TOTAL CA: CPT | Performed by: PHYSICIAN ASSISTANT

## 2019-12-18 RX ORDER — NICOTINE 21 MG/24HR
21 PATCH, TRANSDERMAL 24 HOURS TRANSDERMAL DAILY
Status: DISCONTINUED | OUTPATIENT
Start: 2019-12-18 | End: 2019-12-19 | Stop reason: HOSPADM

## 2019-12-18 RX ORDER — NICOTINE 21 MG/24HR
1 PATCH, TRANSDERMAL 24 HOURS TRANSDERMAL DAILY PRN
Qty: 28 PATCH | Refills: 0 | Status: CANCELLED | OUTPATIENT
Start: 2019-12-18

## 2019-12-18 RX ADMIN — ATORVASTATIN CALCIUM 40 MG: 40 TABLET, FILM COATED ORAL at 08:30

## 2019-12-18 RX ADMIN — APIXABAN 5 MG: 5 TABLET, FILM COATED ORAL at 17:27

## 2019-12-18 RX ADMIN — ASPIRIN 81 MG: 81 TABLET, COATED ORAL at 08:30

## 2019-12-18 RX ADMIN — APIXABAN 5 MG: 5 TABLET, FILM COATED ORAL at 08:30

## 2019-12-18 RX ADMIN — NICOTINE 21 MG: 21 PATCH, EXTENDED RELEASE TRANSDERMAL at 15:41

## 2019-12-18 NOTE — ACP (ADVANCE CARE PLANNING)
Continuing to follow patient  Spoke with Stanley Powers and bladimir is approved there for [facility]  She will work on obtaining [facility]  Notified patient of above and he is agreeable

## 2019-12-18 NOTE — PROGRESS NOTES
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[facility])  Assessment & Plan  · MRI: Recent ischemia within the right cerebellum and left frontal lobe, without hemorrhage or significant mass effect; suggesting embolic etiology  Precocious volume loss and chronic small vessel disease  · Symptoms began 12/15 at 12:30 PM while patient was driving  · R7O 5 9 (prediabetes), LDL 94  · Neurology consulted and recommended initiating eliquis given concern for embolic phenomenon  · Patient was recommended to have loop recorder however he is unclear if he will proceed with this  · No indication for MONI  · Cardio evaluated - if patient agreeable to loop recorder this can be put in in the office  · Await rehab at [facility]     Carotid stenosis  Assessment & Plan  · CT: Mild left carotid stenosis less than 50%  Atherosclerotic calcification bilateral carotid bifurcation noted  · Statin and ASA    Hypercholesterolemia  Assessment & Plan  · LDL 94 on lipid panel  · Continue Lipitor    Primary malignant neoplasm of laryngeal cartilage (HCC)  Assessment & Plan  · In remission >10 years  · Saw ENT 11/2019 and laryngoscopy showed no evidence of lesions  · Was unclear whether his primary treatment was laryngeal carcinoma versus pharyngeal  · Status post radiation and chemotherapy in 2008  · CT chest abdomen pelvis shows no evidence of malignancy  · outpt follow up with ENT    Tobacco use disorder  Assessment & Plan  · Current daily smoker  · Nicotine patch provided  VTE Pharmacologic Prophylaxis:   Pharmacologic: Apixaban (Eliquis)  Mechanical VTE Prophylaxis in Place: No    Patient Centered Rounds: I have performed bedside rounds with nursing staff today   nursing    Discussions with Specialists or Other Care Team Provider: patient    Education and Discussions with Family / Patient: patient, friend at bedside    Time Spent for Care: 30 minutes  More than 50% of total time spent on counseling and coordination of care as described above  Current Length of Stay: 2 day(s)    Current Patient Status: Inpatient   Certification Statement: The patient will continue to require additional inpatient hospital stay due to awaiting rehab    Discharge Plan: await good ba insurance auth    Code Status: Level 1 - Full Code      Subjective:   No nausea or vomiting  No chest pain or shortness of breath  Anxious to be able to go to rehab and started getting stronger  Objective:     Vitals:   Temp (24hrs), Av 8 °F (36 6 °C), Min:97 5 °F (36 4 °C), Max:98 1 °F (36 7 °C)    Temp:  [97 5 °F (36 4 °C)-98 1 °F (36 7 °C)] 97 5 °F (36 4 °C)  HR:  [55-75] 58  Resp:  [17-20] 20  BP: (113-152)/(63-86) 113/63  SpO2:  [93 %-98 %] 95 %  Body mass index is 25 48 kg/m²  Input and Output Summary (last 24 hours):     No intake or output data in the 24 hours ending 19 1549    Physical Exam:     Physical Exam   Constitutional: He is oriented to person, place, and time  No distress  HENT:   Head: Normocephalic and atraumatic  Eyes: No scleral icterus  Cardiovascular: Normal rate, regular rhythm, normal heart sounds and intact distal pulses  No murmur heard  Pulmonary/Chest: Effort normal and breath sounds normal  No accessory muscle usage  No respiratory distress  He has no wheezes  He has no rales  Abdominal: Soft  Bowel sounds are normal  He exhibits no distension  There is no tenderness  There is no rigidity, no rebound and no guarding  Musculoskeletal: He exhibits no edema  Neurological: He is alert and oriented to person, place, and time  Left hand clumsiness when eating salad  Speech clear without dysarthria  Follows commands  Skin: Skin is warm and dry  No rash noted  He is not diaphoretic  No erythema  Psychiatric: He has a normal mood and affect  His behavior is normal    Nursing note and vitals reviewed  Additional Data:     Labs:    Results from last 7 days   Lab Units 12/18/19  0508   WBC Thousand/uL 10 29*   HEMOGLOBIN g/dL 13 3   HEMATOCRIT % 39 5   PLATELETS Thousands/uL 242   NEUTROS PCT % 66   LYMPHS PCT % 22   MONOS PCT % 8   EOS PCT % 2     Results from last 7 days   Lab Units 12/18/19  0508  12/16/19  0842   SODIUM mmol/L 135*   < > 137   POTASSIUM mmol/L 3 6   < > 3 7   CHLORIDE mmol/L 104   < > 102   CO2 mmol/L 23   < > 28   BUN mg/dL 12   < > 13   CREATININE mg/dL 0 92   < > 0 93   ANION GAP mmol/L 8   < > 7   CALCIUM mg/dL 8 6   < > 9 2   ALBUMIN g/dL  --   --  3 9   TOTAL BILIRUBIN mg/dL  --   --  0 50   ALK PHOS U/L  --   --  85   ALT U/L  --   --  14   AST U/L  --   --  11   GLUCOSE RANDOM mg/dL 101   < > 124    < > = values in this interval not displayed  Results from last 7 days   Lab Units 12/16/19  0842   INR  0 96         Results from last 7 days   Lab Units 12/17/19  0459   HEMOGLOBIN A1C % 5 9     * I Have Reviewed All Lab Data Listed Above  * Additional Pertinent Lab Tests Reviewed: All Labs Within Last 24 Hours Reviewed    Imaging:    Imaging Reports Reviewed Today Include: MRI  Imaging Personally Reviewed by Myself Includes:  none    Recent Cultures (last 7 days):           Last 24 Hours Medication List:     Current Facility-Administered Medications:  apixaban 5 mg Oral BID Nimo Boothe, DO   aspirin 81 mg Oral Daily Corie RODRIGUEZ PA-C   atorvastatin 40 mg Oral Daily Corie RODRIGUEZ PA-C   meclizine 25 mg Oral Q8H PRN Corie RODRIGUEZ PA-C   nicotine 1 patch Transdermal Daily PRN Corie RODRIGUEZ PA-C   nicotine 21 mg Transdermal Daily Nimo Peak, DO        Today, Patient Was Seen By: Pallavi Roldan PA-C    ** Please Note: Dictation voice to text software may have been used in the creation of this document   **

## 2019-12-18 NOTE — ASSESSMENT & PLAN NOTE
· In remission >10 years  · Saw ENT 11/2019 and laryngoscopy showed no evidence of lesions    · Was unclear whether his primary treatment was laryngeal carcinoma versus pharyngeal  · Status post radiation and chemotherapy in 2008  · CT chest abdomen pelvis shows no evidence of malignancy  · outpt follow up with ENT

## 2019-12-18 NOTE — ASSESSMENT & PLAN NOTE
· CT: Mild left carotid stenosis less than 50%  Atherosclerotic calcification bilateral carotid bifurcation noted    · Statin and ASA

## 2019-12-18 NOTE — ASSESSMENT & PLAN NOTE
· MRI: Recent ischemia within the right cerebellum and left frontal lobe, without hemorrhage or significant mass effect; suggesting embolic etiology  Precocious volume loss and chronic small vessel disease  · Symptoms began 12/15 at 12:30 PM while patient was driving  · M6K 5 9 (prediabetes), LDL 94  · Neurology consulted and recommended initiating eliquis given concern for embolic phenomenon  · Patient was recommended to have loop recorder however he is unclear if he will proceed with this    · No indication for MONI  · Cardio evaluated - if patient agreeable to loop recorder this can be put in in the office  · Await rehab at [facility]

## 2019-12-18 NOTE — SOCIAL WORK
Continuing to follow patient  Met with patient and after thinking about acute rehab overnight, he has decided he wants acute inpatient rehab  List provided and patient request a referral be made to [facility] acute rehab  Referrals sent via [address], wait bed availability

## 2019-12-18 NOTE — PLAN OF CARE
Problem: Potential for Falls  Goal: Patient will remain free of falls  Description  INTERVENTIONS:  - Assess patient frequently for physical needs  -  Identify cognitive and physical deficits and behaviors that affect risk of falls    -  [place] fall precautions as indicated by assessment   - Educate patient/family on patient safety including physical limitations  - Instruct patient to call for assistance with activity based on assessment  - Modify environment to reduce risk of injury  - Consider OT/PT consult to assist with strengthening/mobility  Outcome: Progressing     Problem: SAFETY ADULT  Goal: Maintain or return to baseline ADL function  Description  INTERVENTIONS:  -  Assess patient's ability to carry out ADLs; assess patient's baseline for ADL function and identify physical deficits which impact ability to perform ADLs (bathing, care of mouth/teeth, toileting, grooming, dressing, etc )  - Assess/evaluate cause of self-care deficits   - Assess range of motion  - Assess patient's mobility; develop plan if impaired  - Assess patient's need for assistive devices and provide as appropriate  - Encourage maximum independence but intervene and supervise when necessary  - Involve family in performance of ADLs  - Assess for home care needs following discharge   - Consider OT consult to assist with ADL evaluation and planning for discharge  - Provide patient education as appropriate  Outcome: Progressing  Goal: Maintain or return mobility status to optimal level  Description  INTERVENTIONS:  - Assess patient's baseline mobility status (ambulation, transfers, stairs, etc )    - Identify cognitive and physical deficits and behaviors that affect mobility  - Identify mobility aids required to assist with transfers and/or ambulation (gait belt, sit-to-stand, lift, walker, cane, etc )  - [place] fall precautions as indicated by assessment  - Record patient progress and toleration of activity level on Mobility SBAR; progress patient to next Phase/Stage  - Instruct patient to call for assistance with activity based on assessment  - Consider rehabilitation consult to assist with strengthening/weightbearing, etc   Outcome: Progressing     Problem: DISCHARGE PLANNING  Goal: Discharge to home or other facility with appropriate resources  Description  INTERVENTIONS:  - Identify barriers to discharge w/patient and caregiver  - Arrange for needed discharge resources and transportation as appropriate  - Identify discharge learning needs (meds, wound care, etc )  - Arrange for interpretive services to assist at discharge as needed  - Refer to Case Management Department for coordinating discharge planning if the patient needs post-hospital services based on physician/advanced practitioner order or complex needs related to functional status, cognitive ability, or social support system  Outcome: Progressing     Problem: Knowledge Deficit  Goal: Patient/family/caregiver demonstrates understanding of disease process, treatment plan, medications, and discharge instructions  Description  Complete learning assessment and assess knowledge base  Interventions:  - Provide teaching at level of understanding  - Provide teaching via preferred learning methods  Outcome: Progressing     Problem: NEUROSENSORY - ADULT  Goal: Achieves stable or improved neurological status  Description  INTERVENTIONS  - Monitor and report changes in neurological status  - Monitor vital signs such as temperature, blood pressure, glucose, and any other labs ordered   - Initiate measures to prevent increased intracranial pressure  - Monitor for seizure activity and implement precautions if appropriate      Outcome: Progressing     Problem: Nutrition/Hydration-ADULT  Goal: Nutrient/Hydration intake appropriate for improving, restoring or maintaining nutritional needs  Description  Monitor and assess patient's nutrition/hydration status for malnutrition   Collaborate with interdisciplinary team and initiate plan and interventions as ordered  Monitor patient's weight and dietary intake as ordered or per policy  Utilize nutrition screening tool and intervene as necessary  Determine patient's food preferences and provide high-protein, high-caloric foods as appropriate       INTERVENTIONS:  - Monitor oral intake, urinary output, labs, and treatment plans  - Assess nutrition and hydration status and recommend course of action  - Evaluate amount of meals eaten  - Assist patient with eating if necessary   - Allow adequate time for meals  - Recommend/ encourage appropriate diets, oral nutritional supplements, and vitamin/mineral supplements  - Order, calculate, and assess calorie counts as needed  - Recommend, monitor, and adjust tube feedings and TPN/PPN based on assessed needs  - Assess need for intravenous fluids  - Provide specific nutrition/hydration education as appropriate  - Include patient/family/caregiver in decisions related to nutrition  Outcome: Progressing

## 2019-12-18 NOTE — SOCIAL WORK
Received call from [address] [id number] and she is attempting to obtain auth from [place] [id number], she was informed their were updating their computer system and it would be over an hour til she could submit information for auth  Daniel Morales will not be able to be obtained til Thursday

## 2019-12-18 NOTE — DISCHARGE SUMMARY
Discharge Summary - Danelle Diallo 77 y o  male MRN: [id number]    Unit/Bed#: -01 Encounter: [id number]    Admission Date: 12/16/2019     Admitting Diagnosis: Dizziness [R42]  Nausea [R11 0]  Dizzy [R42]  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 [I69 [id number]]    HPI: 77year old male presented with dizziness and feeling of  imbalance  Consults  Neurology- Dr Iman Crabtree  Cardiology- Dr Umberto Valdes  PT/OT  Procedures Performed:   Heidy Larkin This Encounter   Procedures    ED ECG Documentation Only       Hospital Course:Patient was noted to have 2 sites of acute stroke on MRI- right cerebellar region and left frontal and was started on Eliquis  He was seen by neurology and cArdiology and consideration for  loop recorder was raised but patient initially declined this option  The plan is for him to have follow-up as an outpatient with Cardiology team as well as Neurology  He had nausea and dizziness on presentation and difficulty ambulating with tendency to fall to right  He was seen by Pt and ot and was recommended for acute rehab  He has a prior hx of laryngeal cancer so a ct of chest abdomen and pelvis were performed withou out any evidence of recurrent disease    Significant Findings, Care, Treatment and Services Provided:  MRI BRAIN WITHOUT CONTRAST     INDICATION: stroke r/o      COMPARISON:   CT/CTA 12/16/2019     TECHNIQUE:  Sagittal T1, axial T2, axial FLAIR, axial T1, axial [place] and axial diffusion imaging      IMAGE QUALITY:  Diagnostic      FINDINGS:     BRAIN PARENCHYMA:  Multifocal recent ischemic disease is identified to include a subcentimeter focus in the mid posteromedial margin of the right brachium pontis, adjacent to the lateral margin of the 4th ventricle, a linear focus in the left centrum   semiovale ovale and at least 2 punctate cortical foci in the opercular left frontal lobe  All lesions are small, without hemorrhage or significant mass effect    Multidose distribution changes suggest embolic disease      Findings occurred in the face of diffuse chronic small vessel disease and diffuse generalized volume loss greater than would be expected for age      VENTRICLES:  Prominent, reflecting precocious volume loss      SELLA AND PITUITARY GLAND:  Partially empty sella     ORBITS:  Normal      PARANASAL SINUSES:  Scattered sinus disease with near opacification of the dominant right frontal sinus  Right mastoid fluid      VASCULATURE:  Evaluation of the major intracranial vasculature demonstrates appropriate flow voids      CALVARIUM AND SKULL BASE:  Normal      EXTRACRANIAL SOFT TISSUES:  Normal      IMPRESSION:     Recent ischemia within the right cerebellum and left frontal lobe, without hemorrhage or significant mass effect; suggesting embolic etiology      Precocious volume loss and chronic small vessel disease  General appearance: alert and cooperative  Neck: no adenopathy and no JVD  Lungs: clear to auscultation bilaterally  Heart: regular rate and rhythm, S1, S2 normal, no murmur, click, rub or gallop  Abdomen: soft, non-tender; bowel sounds normal; no masses,  no organomegaly  Extremities: extremities normal, warm and well-perfused; no cyanosis, clubbing, or edema  Skin: Skin color, texture, turgor normal  No rashes or lesions  Neurologic:  Tendency to lean to the right with ambulation  Speech is clear        Complications: none    Discharge Diagnosis: Principal Problem:    Embolic stroke [facility])  Active Problems:    Hypercholesterolemia    Primary malignant neoplasm of laryngeal cartilage (HCC)    Tobacco use disorder    Carotid occlusion, bilateral    Blocked ear, right        Condition at Discharge: good     Discharge instructions/Information to patient and family:   See after visit summary for information provided to patient and family  Provisions for Follow-Up Care:  See after visit summary for information related to follow-up care and any pertinent home health orders        Disposition: Acute care rehab [facility] [place]    Planned Readmission: No    Discharge Statement   I spent 30 minutes discharging the patient  This time was spent on the day of discharge  I had direct contact with the patient on the day of discharge  Discharge Medications:  See after visit summary for reconciled discharge medications provided to patient and family          Ryan Dominguez DO

## 2019-12-19 VITALS
OXYGEN SATURATION: 95 % | TEMPERATURE: 98 F | WEIGHT: 157.85 LBS | HEIGHT: 66 IN | RESPIRATION RATE: 15 BRPM | SYSTOLIC BLOOD PRESSURE: 138 MMHG | DIASTOLIC BLOOD PRESSURE: 81 MMHG | HEART RATE: 69 BPM | BODY MASS INDEX: 25.37 KG/M2

## 2019-12-19 PROBLEM — H93.8X1 BLOCKED EAR, RIGHT: Status: RESOLVED | Noted: 2019-11-18 | Resolved: 2019-12-19

## 2019-12-19 PROCEDURE — 97116 GAIT TRAINING THERAPY: CPT

## 2019-12-19 PROCEDURE — 99238 HOSP IP/OBS DSCHRG MGMT 30/<: CPT | Performed by: INTERNAL MEDICINE

## 2019-12-19 PROCEDURE — 97530 THERAPEUTIC ACTIVITIES: CPT

## 2019-12-19 RX ORDER — NICOTINE 21 MG/24HR
1 PATCH, TRANSDERMAL 24 HOURS TRANSDERMAL DAILY
Qty: 28 PATCH | Refills: 0 | Status: SHIPPED | OUTPATIENT
Start: 2019-12-20 | End: 2020-10-06

## 2019-12-19 RX ADMIN — ATORVASTATIN CALCIUM 40 MG: 40 TABLET, FILM COATED ORAL at 09:32

## 2019-12-19 RX ADMIN — ASPIRIN 81 MG: 81 TABLET, COATED ORAL at 09:32

## 2019-12-19 RX ADMIN — APIXABAN 5 MG: 5 TABLET, FILM COATED ORAL at 09:32

## 2019-12-19 RX ADMIN — NICOTINE 21 MG: 21 PATCH, EXTENDED RELEASE TRANSDERMAL at 09:32

## 2019-12-19 NOTE — PLAN OF CARE
Problem: OCCUPATIONAL THERAPY ADULT  Goal: Performs self-care activities at highest level of function for planned discharge setting  See evaluation for individualized goals  Outcome: Progressing  Note:   Limitation: Decreased ADL status, Decreased Safe judgement during ADL, Decreased endurance, Decreased self-care trans, Decreased high-level ADLs  Prognosis: Fair  Assessment: Patient participated in Skilled OT session this date with interventions consisting of ADL re training with the use of correct body mechnaics, Energy Conservation techniques, safety awareness and fall prevention techniques,  therapeutic activities to: increase activity tolerance and increase dynamic sit/ stand balance during functional activity    Patient agreeable to OT treatment session, upon arrival patient was found supine in bed  Treatment session as follows: Pt able to sit EOB with supervision  VCs required for safety with sit to stand  Pt able to ambulate with RW and CGA, requiring VCs to attend to R side  Pt reports he has been up OOB doing some ADL tasks but pt demos poor safety insight and balance and was re-educated that he requires supervision/assist to reduce risk of falls  Pt agreeable and reports he is excited to get to rehab, work hard and get better  Patient continues to be functioning below baseline level, occupational performance remains limited secondary to factors listed above and increased risk for falls and injury  From OT standpoint, recommendation at time of d/c would be Short Term Rehab (acute)  Patient to benefit from continued Occupational Therapy treatment while in the hospital to address deficits as defined above and maximize level of functional independence with ADLs and functional mobility  Pt left with call bell in reach, tray table in reach, needs met       OT Discharge Recommendation: Short Term Rehab(pt ideal for acute rehab)  OT - OK to Discharge: Yes(to acute rehab when medically stable)

## 2019-12-19 NOTE — SOCIAL WORK
LOS: 3    Spoke with Jessica of [facility] and she was able to obtain auth from [facility] [employer] for inpatient rehab beginning today  Notified and his friend, she will transport him at 2:30  Notified Jessica of [address] and Eliz Diamond, patient's nurse  Also provided Eliz Diamond phone number of [phone] to give report

## 2019-12-19 NOTE — PROGRESS NOTES
Pastoral Care Progress Note    2019  Patient: Aruna Booker : 1950  Admission Date & Time: 2019 2196  MRN: [id number] CSN: [id number]                     Chaplaincy Interventions Utilized:   Empowerment: Normalized experience of patient/family, Provided anxiety containment and Provided chaplaincy education    Exploration: Explored emotional needs & resources, Explored relational needs & resources, Explored spiritual needs & resources and Facilitated story telling    Relationship Building: Facilitated reconciliation with the transcendent and Hospitality    Chaplaincy Outcomes Achieved:  Debriefed/defused experience and Expressed gratitude    Spiritual Coping Strategies Utilized:   No spiritual coping       [facility] Affiliation None   Stress Factors   Patient Stress Factors Health changes; Other (Comment)  (Uncertainty of future recovery is stressing him )   Coping Responses   Patient Coping Anxiety; Fearful;Open/discussion; Other (Comment)  (Put this in context of his difficult  [address] experiences )   Plan of Care   Assessment Completed by: Unit visit

## 2019-12-19 NOTE — PLAN OF CARE
Problem: Potential for Falls  Goal: Patient will remain free of falls  Description  INTERVENTIONS:  - Assess patient frequently for physical needs  -  Identify cognitive and physical deficits and behaviors that affect risk of falls    -  [place] fall precautions as indicated by assessment   - Educate patient/family on patient safety including physical limitations  - Instruct patient to call for assistance with activity based on assessment  - Modify environment to reduce risk of injury  - Consider OT/PT consult to assist with strengthening/mobility  Outcome: Adequate for Discharge     Problem: SAFETY ADULT  Goal: Maintain or return to baseline ADL function  Description  INTERVENTIONS:  -  Assess patient's ability to carry out ADLs; assess patient's baseline for ADL function and identify physical deficits which impact ability to perform ADLs (bathing, care of mouth/teeth, toileting, grooming, dressing, etc )  - Assess/evaluate cause of self-care deficits   - Assess range of motion  - Assess patient's mobility; develop plan if impaired  - Assess patient's need for assistive devices and provide as appropriate  - Encourage maximum independence but intervene and supervise when necessary  - Involve family in performance of ADLs  - Assess for home care needs following discharge   - Consider OT consult to assist with ADL evaluation and planning for discharge  - Provide patient education as appropriate  Outcome: Adequate for Discharge  Goal: Maintain or return mobility status to optimal level  Description  INTERVENTIONS:  - Assess patient's baseline mobility status (ambulation, transfers, stairs, etc )    - Identify cognitive and physical deficits and behaviors that affect mobility  - Identify mobility aids required to assist with transfers and/or ambulation (gait belt, sit-to-stand, lift, walker, cane, etc )  - [place] fall precautions as indicated by assessment  - Record patient progress and toleration of activity level on Mobility SBAR; progress patient to next Phase/Stage  - Instruct patient to call for assistance with activity based on assessment  - Consider rehabilitation consult to assist with strengthening/weightbearing, etc   Outcome: Adequate for Discharge     Problem: DISCHARGE PLANNING  Goal: Discharge to home or other facility with appropriate resources  Description  INTERVENTIONS:  - Identify barriers to discharge w/patient and caregiver  - Arrange for needed discharge resources and transportation as appropriate  - Identify discharge learning needs (meds, wound care, etc )  - Arrange for interpretive services to assist at discharge as needed  - Refer to Case Management Department for coordinating discharge planning if the patient needs post-hospital services based on physician/advanced practitioner order or complex needs related to functional status, cognitive ability, or social support system  Outcome: Adequate for Discharge     Problem: Knowledge Deficit  Goal: Patient/family/caregiver demonstrates understanding of disease process, treatment plan, medications, and discharge instructions  Description  Complete learning assessment and assess knowledge base    Interventions:  - Provide teaching at level of understanding  - Provide teaching via preferred learning methods  Outcome: Adequate for Discharge     Problem: NEUROSENSORY - ADULT  Goal: Achieves stable or improved neurological status  Description  INTERVENTIONS  - Monitor and report changes in neurological status  - Monitor vital signs such as temperature, blood pressure, glucose, and any other labs ordered   - Initiate measures to prevent increased intracranial pressure  - Monitor for seizure activity and implement precautions if appropriate      Outcome: Adequate for Discharge     Problem: Nutrition/Hydration-ADULT  Goal: Nutrient/Hydration intake appropriate for improving, restoring or maintaining nutritional needs  Description  Monitor and assess patient's nutrition/hydration status for malnutrition  Collaborate with interdisciplinary team and initiate plan and interventions as ordered  Monitor patient's weight and dietary intake as ordered or per policy  Utilize nutrition screening tool and intervene as necessary  Determine patient's food preferences and provide high-protein, high-caloric foods as appropriate       INTERVENTIONS:  - Monitor oral intake, urinary output, labs, and treatment plans  - Assess nutrition and hydration status and recommend course of action  - Evaluate amount of meals eaten  - Assist patient with eating if necessary   - Allow adequate time for meals  - Recommend/ encourage appropriate diets, oral nutritional supplements, and vitamin/mineral supplements  - Order, calculate, and assess calorie counts as needed  - Recommend, monitor, and adjust tube feedings and TPN/PPN based on assessed needs  - Assess need for intravenous fluids  - Provide specific nutrition/hydration education as appropriate  - Include patient/family/caregiver in decisions related to nutrition  Outcome: Adequate for Discharge

## 2019-12-19 NOTE — PHYSICAL THERAPY NOTE
PHYSICAL THERAPY NOTE       12/19/19 1026   Pain Assessment   Pain Assessment No/denies pain   Pain Score No Pain   Restrictions/Precautions   Weight Bearing Precautions Per Order No   Other Precautions Impulsive; Fall Risk   General   Chart Reviewed Yes   Response to Previous Treatment Patient with no complaints from previous session  Family/Caregiver Present Yes   Cognition   Overall Cognitive Status Impaired   Arousal/Participation Alert; Cooperative   Attention Attends with cues to redirect   Orientation Level Oriented X4   Following Commands Follows one step commands with increased time or repetition   Comments Impulsive   Subjective   Subjective "I am ready to start rehab "   Bed Mobility   Supine to Sit 5  Supervision   Additional items HOB elevated   Additional Comments Sitting edge of bed at end of session with call bell and all needs met  Transfers   Sit to Stand 4  Minimal assistance   Additional items Assist x 1; Armrests; Verbal cues   Stand to Sit 4  Minimal assistance   Additional items Assist x 1; Armrests; Verbal cues   Additional Comments 5 repeated sit to stand with use of UE's at a min A level  Mild sway to the R observed  Ambulation/Elevation   Gait pattern Narrow ANJANA  (Unsteady  Veering towards the R with +LOB)   Gait Assistance 4  Minimal assist   Additional items Assist x 1;Verbal cues; Tactile cues   Assistive Device Rolling walker   Distance 60ft x 2  (Standing rest break)   Balance   Static Sitting Good   Dynamic Sitting Fair +   Static Standing Fair   Dynamic Standing Fair   Ambulatory Fair -   Higher level balance   (Standing with feet together-+LOB min A to correct)   Endurance Deficit   Endurance Deficit No   Activity Tolerance   Activity Tolerance Patient tolerated treatment well   Medical Staff Made Aware ROBBI Fontenot   Nurse Made Aware RN   Assessment   Prognosis Good   Problem List Impaired balance; Impaired judgement;Decreased safety awareness   Assessment Patient received supine in bed and agreeable to session  No c/o pain  Performed transfers and amb with min A  Unsteady gait with LOB towards the R  Patient is a high fall risk  Difficulty navigating RW at times due to balance deficits  Patient also unable to perform higher level balance activities without LOB  He is also very impulsive and lacks safety awareness  Verbal cues and re-direction required  He is not currently at his baseline  At baseline he was independent without the use of an AD  He will require rehab at discharge   Barriers to Discharge Inaccessible home environment;Decreased caregiver support   Goals   Patient Goals To get better and not have to use an AD   STG Expiration Date 12/23/19   Plan   Treatment/Interventions Functional transfer training;Elevations; Equipment eval/education; Bed mobility;Gait training;Spoke to nursing;OT   Progress Progressing toward goals   PT Frequency 5x/wk   Recommendation   Recommendation Post acute IP rehab   Equipment Recommended Walker   PT - OK to Discharge Yes   Additional Comments when medically stable   Katherin Sanchez, PT            Patient Name: Gena Bolanos  [id number] Date: 12/19/2019

## 2019-12-19 NOTE — PROGRESS NOTES
Patient initially reluctant to allow  to visit  Later expressed in words and presentation that he was impatient to "get on with it" and was anxious about the full recovery of function  12/19/19 [facility] Affiliation None   Stress Factors   Patient Stress Factors Health changes; Other (Comment)  (Uncertainty of future recovery is stressing him )   Coping Responses   Patient Coping Anxiety; Fearful;Open/discussion; Other (Comment)  (Put this in context of his difficult [address] experiences )   Plan of Care   Assessment Completed by: Unit visit

## 2019-12-19 NOTE — OCCUPATIONAL THERAPY NOTE
Occupational Therapy Treatment Note    Patient Name: Aruna Booker  NIAWQ'N Date: 12/19/2019 12/19/19 1025   Restrictions/Precautions   Weight Bearing Precautions Per Order No   Other Precautions Fall Risk;Impulsive   General   Response to Previous Treatment Patient with no complaints from previous session   Pain Assessment   Pain Assessment No/denies pain   ADL   Eating Assistance 7  Independent   Grooming Assistance 5  Supervision/Setup   UB Bathing Assistance 5  Supervision/Setup   LB Bathing Assistance 4  Minimal Assistance   UB Dressing Assistance 5  Supervision/Setup   LB Dressing Assistance 4  Minimal Assistance   Toileting Assistance  5  Supervision/Setup   Functional Standing Tolerance   Time approx 2-3 min   Activity balance activity   Comments CGA/Min A   Bed Mobility   Supine to Sit 5  Supervision   Sit to Supine 5  Supervision   Transfers   Sit to Stand 5  Supervision   Stand to Sit 5  Supervision   Stand pivot   (contact guard assist w RW)   Functional Mobility   Functional Mobility 4  Minimal assistance   Additional Comments CGA; VCs to attend to right side   Additional items Rolling walker   Cognition   Overall Cognitive Status Impaired   Arousal/Participation Alert; Cooperative   Attention Attends with cues to redirect   Orientation Level Oriented X4   Memory Within functional limits   Following Commands Follows one step commands without difficulty   Comments Impulsive; requiring VCs for safety/neglect; poor safety awareness/judgement   Activity Tolerance   Activity Tolerance Patient tolerated treatment well   Medical Staff Made Aware PT Mojgan   Assessment   Assessment Patient participated in Skilled OT session this date with interventions consisting of ADL re training with the use of correct body mechnaics, Energy Conservation techniques, safety awareness and fall prevention techniques,  therapeutic activities to: increase activity tolerance and increase dynamic sit/ stand balance during functional activity    Patient agreeable to OT treatment session, upon arrival patient was found supine in bed  Treatment session as follows: Pt able to sit EOB with supervision  VCs required for safety with sit to stand  Pt able to ambulate with RW and CGA, requiring VCs to attend to R side  Pt reports he has been up OOB doing some ADL tasks but pt demos poor safety insight and balance and was re-educated that he requires supervision/assist to reduce risk of falls  Pt agreeable and reports he is excited to get to rehab, work hard and get better  Patient continues to be functioning below baseline level, occupational performance remains limited secondary to factors listed above and increased risk for falls and injury  From OT standpoint, recommendation at time of d/c would be Short Term Rehab (acute)  Patient to benefit from continued Occupational Therapy treatment while in the hospital to address deficits as defined above and maximize level of functional independence with ADLs and functional mobility  Pt left with call bell in reach, tray table in reach, needs met     Plan   OT Treatment Day 2   Recommendation   OT Discharge Recommendation Short Term Rehab  (pt ideal for acute rehab)   OT - OK to Discharge Yes  (to acute rehab when medically stable)     Meena Navas, OT

## 2019-12-27 ENCOUNTER — TELEPHONE (OUTPATIENT)
Dept: NEUROLOGY | Facility: CLINIC | Age: 69
End: 2019-12-27

## 2019-12-27 NOTE — TELEPHONE ENCOUNTER
----- Message from Rometta Dance, RN sent at 12/26/2019  2:31 PM EST -----  Regarding: FW: HFU  Please assist with scheduling  Thank you!    ----- Message -----  From: ANTONIO Mathews  Sent: 12/18/2019   3:22 PM EST  To: Rometta Dance, RN, Neurology [facility] Clerical  Subject: HFU                                              Diagnosis/Reason for follow-up: acute left frontal and right cerebellar infarcts likely embolic  Subspecialty for follow-up:  Stroke clinic  Existing neurologist: none  Recommended timing for follow-up: within 4-6 weeks  Tests/Labs/Imaging ordered: none  AP/Attending: Attending  Additional notes: Started on Eliquis and lipitor  Recommended LOOP placement to assess for arrhythmia but patient refused  Thank You so much!

## 2020-01-02 ENCOUNTER — TRANSITIONAL CARE MANAGEMENT (OUTPATIENT)
Dept: FAMILY MEDICINE CLINIC | Facility: HOSPITAL | Age: 70
End: 2020-01-02

## 2020-01-07 ENCOUNTER — OFFICE VISIT (OUTPATIENT)
Dept: FAMILY MEDICINE CLINIC | Facility: HOSPITAL | Age: 70
End: 2020-01-07
Payer: COMMERCIAL

## 2020-01-07 VITALS
BODY MASS INDEX: 25.07 KG/M2 | HEIGHT: 66 IN | HEART RATE: 74 BPM | DIASTOLIC BLOOD PRESSURE: 78 MMHG | WEIGHT: 156 LBS | SYSTOLIC BLOOD PRESSURE: 130 MMHG

## 2020-01-07 DIAGNOSIS — E78.00 HYPERCHOLESTEROLEMIA: ICD-10-CM

## 2020-01-07 DIAGNOSIS — I63.10 CEREBROVASCULAR ACCIDENT (CVA) DUE TO EMBOLISM OF PRECEREBRAL ARTERY (HCC): Primary | ICD-10-CM

## 2020-01-07 PROCEDURE — 99495 TRANSJ CARE MGMT MOD F2F 14D: CPT | Performed by: INTERNAL MEDICINE

## 2020-01-07 RX ORDER — AMLODIPINE BESYLATE 2.5 MG/1
2.5 TABLET ORAL DAILY
COMMUNITY
Start: 2019-12-27 | End: 2020-02-27 | Stop reason: SDUPTHER

## 2020-01-07 NOTE — PROGRESS NOTES
Assessment/Plan:      Diagnoses and all orders for this visit:    Cerebrovascular accident (CVA) due to embolism of precerebral artery ([address] )  -     Ambulatory referral to Physical Therapy; Future  -     Ambulatory referral to Occupational Therapy; Future    Hypercholesterolemia    Other orders  -     amLODIPine (NORVASC) 2 5 mg tablet; Take 2 5 mg by mouth daily         Subjective:     Patient ID: Rich Min is a 77 y o  male  HPI     Patient seen after being discharged from rehabilitation  The patient presents hospital with embolic stroke in both hemispheric distributions  Patient was felt to possibly have a arrhythmias specifically PAF  His workup for other embolic substrates was unremarkable  He during that hospitalization refused he loop recorder  The patient [facility] to [facility]  The pt has made progress  R foot still drags  Handwriting is off  Has not been able to drive  Review of Systems   All other systems reviewed and are negative  Objective:     Physical Exam   Constitutional: He appears well-developed and well-nourished  Neck: No JVD present  Cardiovascular: Normal rate and regular rhythm  Pulmonary/Chest: Effort normal    Abdominal: Soft  Bowel sounds are normal    Musculoskeletal: He exhibits edema  Neurological:   Patient alert orient x3  Cranial nerves are intact  Finger-nose testing is slightly diminished on the right hand side  There is minimal weakness and slight pronator drift on the right upper extremity there is no weakness in lower extremity  Gait both stressed and nondistressed appears to be reasonably intact  There is no foot drop  No sensory loss   Vitals reviewed          Vitals:    01/07/20 1331   BP: 130/78   BP Location: Left arm   Patient Position: Sitting   Cuff Size: Standard   Pulse: 74   Weight: 70 8 kg (156 lb)   Height: 5' 6" (1 676 m)       Transitional Care Management Review:  Rich Min is a 77 y o  male here for TCM follow up  During the TCM phone call patient stated:    TCM Call (since 12/7/2019)     Date and time call was made  1/3/2020  3:05 PM    Hospital care reviewed  Records not available    Patient was hospitialized at  [facility]    Date of Admission  12/16/19    Date of discharge  12/19/19 <img src='C:FILES (X86)    Diagnosis  Embolic stroke     Disposition  Home <img src='C:FILES (X86)    Were the patients medications reviewed and updated  No <img src='C:FILES (X86)    Current Symptoms  -- <img src='C:FILES (X86)      TCM Call (since 12/7/2019)     Should patient be enrolled in anticoag monitoring? No    Scheduled for follow up? Yes    Did you obtain your prescribed medications  Yes    Do you need help managing your prescriptions or medications  No    Is transportation to your appointment needed  No    I have advised the patient to call PCP with any new or worsening symptoms  EMILY Garcia MA    Living Arrangements  Spouse or Significiant other    Support System  Spouse; Friends; Family    Are you recieving any outpatient services  Yes    What type of services  Physical therapy/OT/speech     Are you recieving home care services  No          No will see the patient back in 3 weeks  Hopefully will be able to release him to drive at that time after he has PT and OT evaluation here      Jolly Mirza MD

## 2020-01-07 NOTE — PROGRESS NOTES
BMI Counseling: There is no height or weight on file to calculate BMI  The BMI is above normal  Nutrition recommendations include reducing portion sizes

## 2020-01-08 ENCOUNTER — EVALUATION (OUTPATIENT)
Dept: PHYSICAL THERAPY | Facility: CLINIC | Age: 70
End: 2020-01-08
Payer: COMMERCIAL

## 2020-01-08 ENCOUNTER — EVALUATION (OUTPATIENT)
Dept: OCCUPATIONAL THERAPY | Facility: CLINIC | Age: 70
End: 2020-01-08
Payer: COMMERCIAL

## 2020-01-08 DIAGNOSIS — I63.10 CEREBROVASCULAR ACCIDENT (CVA) DUE TO EMBOLISM OF PRECEREBRAL ARTERY (HCC): ICD-10-CM

## 2020-01-08 PROCEDURE — 97166 OT EVAL MOD COMPLEX 45 MIN: CPT | Performed by: OCCUPATIONAL THERAPIST

## 2020-01-08 PROCEDURE — 97162 PT EVAL MOD COMPLEX 30 MIN: CPT | Performed by: PHYSICAL THERAPIST

## 2020-01-08 NOTE — PROGRESS NOTES
OT Evaluation     Today's date: 2020  Patient name: Anjelica Uribe  : 1950  MRN: [id number]  Referring provider: Rita Hudson MD  Dx:   Encounter Diagnosis     ICD-10-CM    1  Cerebrovascular accident (CVA) due to embolism of precerebral artery ([facility] [address] ) [address] 10 Ambulatory referral to Occupational Therapy                  Assessment  Assessment details: Pt  S/p CVA with R sided weakness and decreased [address] [place] and dexterity distally  He has good functional strength and is modified Independent with ADLs at home  Pt  To benefit from outpatient OT to improve hand dexterity of R dominant hand for IADLs and return to baseline function  Impairments: abnormal coordination and lacks appropriate home exercise program  Functional limitations: Pt  has difficulty with [address] [place], dexterity  He has slower speed with coordinated movements  He is not cleared by MD to return to drive  His girlfriend has been staying with him for supervision with IADLs  Understanding of Dx/Px/POC: excellent  Goals  STG( 6 visits)  1  Compliant with HEP  2  Improve purdue pegboard R hand by 2 pegs for [address] [place]  LTG( 12 visits)  1  Improve FOTO score to predicted outcome or greater  2  Return to driving independently  3  Improve R hand [address] [place] to WNLs for IADLs  Plan  Patient would benefit from: skilled occupational therapy  Planned therapy interventions: therapeutic exercise, therapeutic activities, graded exercise, graded activity, home exercise program and coordination  Frequency: 2x week  Plan of Care beginning date: 2020  Plan of Care expiration date: 2020  Treatment plan discussed with: patient        Subjective Evaluation    History of Present Illness  Mechanism of injury: Patient was noted to have 2 sites of acute stroke on MRI- right cerebellar region and left frontal that occurred on 19  He was hospitalized for 3 days then transitioned to acute rehab with Carmen N[facility] for 11 days, he was discharged on 19    He is now referred to outpatient occupational therapy for evaluation and treatment  Quality of life: excellent    Pain  Current pain ratin  At worst pain ratin  Relieving factors: medications    Social Support  Lives in: condominium  Lives with: alone    Employment status: not working  Hand dominance: right    Treatments  Previous treatment: physical therapy, occupational therapy and speech therapy  Current treatment: occupational therapy  Discharged from (in last 30 days): inpatient hospitalization  Patient Goals  Patient goal: to recover back to my normal        Objective     Neurological Testing     Sensation     Wrist/Hand     Right   Intact: light touch    Active Range of Motion     Right Shoulder   Normal active range of motion    Right Elbow   Normal active range of motion    Right Wrist   Normal active range of motion    Additional Active Range of Motion Details  Full composite fist    Strength/Myotome Testing     Right Shoulder     Planes of Motion   Flexion: 4   Extension: 4   Abduction: 4   Adduction: 4     Right Elbow   Flexion: 4  Extension: 4    Left Wrist/Hand      (2nd hand position)     Trial 1: 100    Thumb Strength  Key/Lateral Pinch     Trail 1: 18  Tip/Two-Point Pinch     Trial 1: 11  Palmar/Three-Point Pinch     Trial 1: 12    Right Wrist/Hand   Wrist extension: 4-  Wrist flexion: 4-     (2nd hand position)     Trial 1: 110    Thumb Strength   Key/Lateral Pinch     Trial 1: 18  Tip/Two-Point Pinch     Trial 1: 13  Palmar/Three-Point Pinch     Trial 1: 13    General Comments:      Wrist/Hand Comments  + convergence, peripheral vision WFLs  FTN moderate slower R side  Rapid alternating hand moderate slow R hand    Purdue pegboard (1min) L 20 pegs, R 14pegs    Neuro Exam :     Cognition   Following Directions: follows one step commands    Long-term memory: long-term memory within normal limits    short-term memory: short-term memory within normal limits    Problem Solving:  WNL    Judgement: intact      Fine Motor Assessment   Left    Strength Trial 1:  100  Right    Strength Trial 1:  110    Neurologic Exam     Mental Status   Oriented to country, city, area and street  Oriented to time  Oriented to year and day  Attention: normal  Concentration: normal    Speech: speech is normal   Knowledge: good            Precautions: Universal      Manual  1/8                                                                                 Exercise Diary  1/8            UBE             Wrist curls             P/S therabar             Pinch clips w/ rotation             Translation small pegs             tweezer w/ pegs             Stress load on table                                                                                                                                                                         HEP- [address] reviewed                Modalities

## 2020-01-08 NOTE — PROGRESS NOTES
PT Evaluation     Today's date: 2020  Patient name: Rusty Joseph  : 1950  MRN: [id number]  Referring provider: Skyler Molina MD  Dx:   Encounter Diagnosis     ICD-10-CM    1  Cerebrovascular accident (CVA) due to embolism of precerebral artery [facility]) I63 10 Ambulatory referral to Physical Therapy                  Assessment  Assessment details: Rusty Joseph is a 77 y o  male presenting to outpatient physical therapy at [place] [address] with complaints of poor R LE balance  He presents with decreased strength, altered gait pattern, poor balance, decreased tolerance to activity and decreased functional mobility due to Cerebrovascular accident (CVA) due to embolism of precerebral artery ([address] ) on 12/15/19  He would benefit from skilled PT services in order to address these deficits and reach maximum level of function  Thank you for the referral!  Impairments: abnormal gait, activity intolerance, impaired balance, impaired physical strength and lacks appropriate home exercise program  Barriers to therapy: None  Understanding of Dx/Px/POC: excellent   Prognosis: good    Goals  ST  Independent with HEP in 2 weeks  2  Good R LE balance in 3 weeks     LT  Achieve FOTO score of 78/100 in 10 weeks   2  Able to ambulate with normal gait pattern in 10 weeks  3  Strength = 5/5 R DF in 6 weeks  4  Excellent R LE balance in 10 weeks  5    Able to fish on a boat standing in 10 weeks    Plan  Patient would benefit from: skilled PT and PT eval  Planned therapy interventions: ADL retraining, balance/weight bearing training, flexibility, functional ROM exercises, home exercise program, manual therapy, neuromuscular re-education, postural training, strengthening, stretching, therapeutic activities, therapeutic exercise and gait training  Frequency: 2x week  Duration in weeks: 10  Treatment plan discussed with: patient        Subjective Evaluation    History of Present Illness  Mechanism of injury: Pt reports having a CVA with R hemiparesis on 12/15/19 and was hospitalized until 19  He is a retired coley  Feels that he drags his R foot at times and feels unsteady on uneven surfaces, but is strong  No prior hx of balance limitations  Reports straining his LB a few days ago at home which is altering his gait pattern a little more  Used a walker for a few days after the stroke, but none now  Would like to be able to stand on a boat to fish              Not a recurrent problem   Quality of life: good    Pain  Current pain ratin  At best pain ratin  At worst pain ratin    Social Support  Steps to enter house: yes  Stairs in house: yes   Lives in: condominium  Lives with: significant other    Employment status: not working  Treatments  Previous treatment: physical therapy  Patient Goals  Patient goals for therapy: improved balance, increased strength and return to sport/leisure activities          Objective     Neurological Testing     Sensation     Ankle/Foot   Left Ankle/Foot   Intact: light touch    Right Ankle/Foot   Intact: light touch     Reflexes   Left   Patellar (L4): normal (2+)  Achilles (S1): normal (2+)    Right   Patellar (L4): normal (2+)  Achilles (S1): brisk (3+)    Active Range of Motion   Left Hip   Normal active range of motion    Right Hip   Normal active range of motion  Left Knee   Normal active range of motion    Right Knee   Normal active range of motion  Left Ankle/Foot   Normal active range of motion    Right Ankle/Foot   Normal active range of motion    Strength/Myotome Testing     Left Hip   Normal muscle strength    Right Hip   Normal muscle strength    Left Knee   Flexion: 5  Extension: 5    Right Knee   Flexion: 5  Extension: 5    Left Ankle/Foot   Dorsiflexion: 5  Plantar flexion: 5    Right Ankle/Foot   Dorsiflexion: 4+  Plantar flexion: 4    Ambulation   Weight-Bearing Status   Assistive device used: none    Ambulation: Level Surfaces   Ambulation without assistive device: independent    Ambulation: Stairs   Ascend stairs: independent  Pattern: reciprocal  Railings: one rail  Descend stairs: independent  Pattern: reciprocal  Railings: one rail  Curbs: independent    Observational Gait     Additional Observational Gait Details  Mod increased R hip flexion with swing phase to attempt to clean R foot  Pt used to have R foot drop and has formed altered gait pattern habits  Comments   Good L vs poor R LE balance  Daily Treatment Diary     Dx:    1   Cerebrovascular accident (CVA) due to embolism of precerebral artery ([facility] [address] )      POC EXPIRES On:  3/18/20  PRECAUTIONS:  Fall risk  CO-MORBIDITES:  CVA with R hemiparesis 12/15/19  PERSONAL FACTORS:  None    Manual                                                                                   Exercise Diary              Bike             SLS R             Tandem walk             T-band DF R             Marching             Stepping over hurdles             Gait training                                                                                                                                                                                          Modalities

## 2020-01-08 NOTE — LETTER
2020    MD Rubi Quarles  [address] [facility]  [id number] [place] [id number]    Patient: Mahesh Mims   YOB: 1950   Date of Visit: 2020     Encounter Diagnosis     ICD-10-CM    1  Cerebrovascular accident (CVA) due to embolism of precerebral artery [facility]) I63 10 Ambulatory referral to Occupational Therapy       Dear Dr Sidra Zimmerman:    Thank you for your recent referral of Mahesh Mims  Please review the attached evaluation summary from Jere's recent visit  Please verify that you agree with the plan of care by signing the attached order  If you have any questions or concerns, please do not hesitate to call  I sincerely appreciate the opportunity to share in the care of one of your patients and hope to have another opportunity to work with you in the near future  Sincerely,    Alana Andre OT      Referring Provider:     I certify that I have read the below Plan of Care and certify the need for these services furnished under this plan of treatment while under my care  MD Rubi Quarles  [facility] [address]  [address] [place] [address]        OT Evaluation     Today's date: 2020  Patient name: Mahesh Mims  : 1950  MRN: [id number]  Referring provider: Kayden Langford MD  Dx:   Encounter Diagnosis     ICD-10-CM    1  Cerebrovascular accident (CVA) due to embolism of precerebral artery ([address] ) I63 10 Ambulatory referral to Occupational Therapy                  Assessment  Assessment details: Pt  S/p CVA with R sided weakness and decreased [address] and dexterity distally  He has good functional strength and is modified Independent with ADLs at home  Pt  To benefit from outpatient OT to improve hand dexterity of R dominant hand for IADLs and return to baseline function  Impairments: abnormal coordination and lacks appropriate home exercise program  Functional limitations: Pt  has difficulty with [address] [place], dexterity    He has slower speed with coordinated movements  He is not cleared by MD to return to drive  His girlfriend has been staying with him for supervision with IADLs  Understanding of Dx/Px/POC: excellent  Goals  STG( 6 visits)  1  Compliant with HEP  2  Improve purdue pegboard R hand by 2 pegs for [address]  LTG( 12 visits)  1  Improve FOTO score to predicted outcome or greater  2  Return to driving independently  3  Improve R hand [address] to WNLs for IADLs  Plan  Patient would benefit from: skilled occupational therapy  Planned therapy interventions: therapeutic exercise, therapeutic activities, graded exercise, graded activity, home exercise program and coordination  Frequency: 2x week  Plan of Care beginning date: 2020  Plan of Care expiration date: 2020  Treatment plan discussed with: patient        Subjective Evaluation    History of Present Illness  Mechanism of injury: Patient was noted to have 2 sites of acute stroke on MRI- right cerebellar region and left frontal that occurred on 19  He was hospitalized for 3 days then transitioned to acute rehab with Joce Pineda for 11 days, he was discharged on 19  He is now referred to outpatient occupational therapy for evaluation and treatment    Quality of life: excellent    Pain  Current pain ratin  At worst pain ratin  Relieving factors: medications    Social Support  Lives in: condominium  Lives with: alone    Employment status: not working  Hand dominance: right    Treatments  Previous treatment: physical therapy, occupational therapy and speech therapy  Current treatment: occupational therapy  Discharged from (in last 30 days): inpatient hospitalization  Patient Goals  Patient goal: to recover back to my normal        Objective     Neurological Testing     Sensation     Wrist/Hand     Right   Intact: light touch    Active Range of Motion     Right Shoulder   Normal active range of motion    Right Elbow   Normal active range of motion    Right Wrist Normal active range of motion    Additional Active Range of Motion Details  Full composite fist    Strength/Myotome Testing     Right Shoulder     Planes of Motion   Flexion: 4   Extension: 4   Abduction: 4   Adduction: 4     Right Elbow   Flexion: 4  Extension: 4    Left Wrist/Hand      (2nd hand position)     Trial 1: 100    Thumb Strength  Key/Lateral Pinch     Trail 1: 18  Tip/Two-Point Pinch     Trial 1: 11  Palmar/Three-Point Pinch     Trial 1: 12    Right Wrist/Hand   Wrist extension: 4-  Wrist flexion: 4-     (2nd hand position)     Trial 1: 110    Thumb Strength   Key/Lateral Pinch     Trial 1: 18  Tip/Two-Point Pinch     Trial 1: 13  Palmar/Three-Point Pinch     Trial 1: 13    General Comments:      Wrist/Hand Comments  + convergence, peripheral vision WFLs  FTN moderate slower R side  Rapid alternating hand moderate slow R hand    Purdue pegboard (1min) L 20 pegs, R 14pegs    Neuro Exam :     Cognition   Following Directions: follows one step commands    Long-term memory: long-term memory within normal limits    short-term memory: short-term memory within normal limits    Problem Solving: WNL    Judgement: intact      Fine Motor Assessment   Left    Strength Trial 1:  100  Right    Strength Trial 1:  110    Neurologic Exam     Mental Status   Oriented to country, city, area and street  Oriented to time  Oriented to year and day  Attention: normal  Concentration: normal    Speech: speech is normal   Knowledge: good            Precautions: Universal      Manual  1/8                                                                                 Exercise Diary  1/8            UBE             Wrist curls             P/S therabar             Pinch clips w/ rotation             Translation small pegs             tweezer w/ pegs             Stress load on table [facility] reviewed                Modalities

## 2020-01-13 ENCOUNTER — OFFICE VISIT (OUTPATIENT)
Dept: OCCUPATIONAL THERAPY | Facility: CLINIC | Age: 70
End: 2020-01-13
Payer: COMMERCIAL

## 2020-01-13 ENCOUNTER — OFFICE VISIT (OUTPATIENT)
Dept: PHYSICAL THERAPY | Facility: CLINIC | Age: 70
End: 2020-01-13
Payer: COMMERCIAL

## 2020-01-13 DIAGNOSIS — I63.10 CEREBROVASCULAR ACCIDENT (CVA) DUE TO EMBOLISM OF PRECEREBRAL ARTERY (HCC): Primary | ICD-10-CM

## 2020-01-13 PROCEDURE — 97112 NEUROMUSCULAR REEDUCATION: CPT | Performed by: OCCUPATIONAL THERAPIST

## 2020-01-13 PROCEDURE — 97110 THERAPEUTIC EXERCISES: CPT | Performed by: PHYSICAL THERAPIST

## 2020-01-13 PROCEDURE — 97530 THERAPEUTIC ACTIVITIES: CPT | Performed by: OCCUPATIONAL THERAPIST

## 2020-01-13 PROCEDURE — 97112 NEUROMUSCULAR REEDUCATION: CPT | Performed by: PHYSICAL THERAPIST

## 2020-01-13 NOTE — PROGRESS NOTES
Daily Note     Today's date: 2020  Patient name: Kim Eubanks  : 1950  MRN: [id number]  Referring provider: Khushi Baeza MD  Dx:   Encounter Diagnosis     ICD-10-CM    1  Cerebrovascular accident (CVA) due to embolism of precerebral artery (HCC) I63 10                   Subjective:  Pt reports feeling good  Most concern is his LBP  Objective:  See treatment diary below      Assessment:  Pt presented to outpatient physical therapy at [place] [address] with complaints of poor R LE balance  He presented with decreased strength, altered gait pattern, poor balance, decreased tolerance to activity and decreased functional mobility due to Cerebrovascular accident (CVA) due to embolism of precerebral artery ([facility] [address] ) on 12/15/19  He will continue to benefit from skilled PT services in order to address these deficits and reach maximum level of function  Pt did better than expected with balance ex today  He is very motivated to improve  Plan:  Add step overs, rocker board march, more reps of joselin step overs  Daily Treatment Diary     Dx:    1   Cerebrovascular accident (CVA) due to embolism of precerebral artery ([facility] [address] )      POC EXPIRES On:  3/18/20  PRECAUTIONS:  Fall risk  CO-MORBIDITES:  CVA with R hemiparesis 12/15/19  PERSONAL FACTORS:  None    Manual                                                                                   Exercise Diary              Bike L1 8'            SLS R on foam disc 5" 10            Tandem walk on foam 12 ft x 3 laps FWD            T-band DF R Blue 30            Marching on foam L/R No foam 20 ea Add foam           Stepping over hurdles FWD/SIDE 6 ea            Gait training             T-band kicks for LE balance hip flex/ext L/R Blue 15 ea            RB march NV            Step overs R NV                                                                                                                                                  Modalities

## 2020-01-13 NOTE — PROGRESS NOTES
Daily Note     Today's date: 2020  Patient name: Kim Eubanks  : 1950  MRN: [id number]  Referring provider: Khushi Baeza MD  Dx:   Encounter Diagnosis     ICD-10-CM    1  Cerebrovascular accident (CVA) due to embolism of precerebral artery (HCC) I63 10                   Subjective: I am just a little tired from PT  Objective: See treatment diary below      Assessment: Tolerated treatment well  Patient demonstrated fatigue post treatment  Pt  Has slow coordinated movements with dexterity activities  Plan: Continue per plan of care  Daily Treatment Diary     Dx:    1   Cerebrovascular accident (CVA) due to embolism of precerebral artery ([address] )      POC EXPIRES On:  3/18/20  PRECAUTIONS:  Fall risk  CO-MORBIDITES:  CVA with R hemiparesis 12/15/19  PERSONAL FACTORS:  None    Exercise Diary             UBE  5/5 2 0 level           Wrist curls  #4 3x10           P/S therabar  Red 3x10           Pinch clips w/ rotation  Green 3 sets           Translation small pegs  Small pegs/board           tweezer w/ pegs  3 rows           Stress load on table  1m                                                                                                                                                                        [facility] reviewed                Modalities

## 2020-01-15 ENCOUNTER — OFFICE VISIT (OUTPATIENT)
Dept: OCCUPATIONAL THERAPY | Facility: CLINIC | Age: 70
End: 2020-01-15
Payer: COMMERCIAL

## 2020-01-15 ENCOUNTER — OFFICE VISIT (OUTPATIENT)
Dept: PHYSICAL THERAPY | Facility: CLINIC | Age: 70
End: 2020-01-15
Payer: COMMERCIAL

## 2020-01-15 DIAGNOSIS — I63.10 CEREBROVASCULAR ACCIDENT (CVA) DUE TO EMBOLISM OF PRECEREBRAL ARTERY (HCC): Primary | ICD-10-CM

## 2020-01-15 PROCEDURE — 97110 THERAPEUTIC EXERCISES: CPT | Performed by: OCCUPATIONAL THERAPIST

## 2020-01-15 PROCEDURE — 97110 THERAPEUTIC EXERCISES: CPT | Performed by: PHYSICAL THERAPIST

## 2020-01-15 PROCEDURE — 97112 NEUROMUSCULAR REEDUCATION: CPT | Performed by: OCCUPATIONAL THERAPIST

## 2020-01-15 PROCEDURE — 97530 THERAPEUTIC ACTIVITIES: CPT | Performed by: OCCUPATIONAL THERAPIST

## 2020-01-15 PROCEDURE — 97112 NEUROMUSCULAR REEDUCATION: CPT | Performed by: PHYSICAL THERAPIST

## 2020-01-15 NOTE — PROGRESS NOTES
Daily Note     Today's date: 1/15/2020  Patient name: Vladislav Nguyen  : 1950  MRN: [id number]  Referring provider: Minnie Thorpe MD  Dx:   Encounter Diagnosis     ICD-10-CM    1  Cerebrovascular accident (CVA) due to embolism of precerebral artery (HCC) I63 10                   Subjective: My back is bothering me more  Objective: See treatment diary below      Assessment: Tolerated treatment well  Patient demonstrated fatigue post treatment  Pt  Has slow coordinated movements with dexterity activities  Plan: Continue per plan of care  Daily Treatment Diary     Dx:    1   Cerebrovascular accident (CVA) due to embolism of precerebral artery ([address] )      POC EXPIRES On:  3/18/20  PRECAUTIONS:  Fall risk  CO-MORBIDITES:  CVA with R hemiparesis 12/15/19  PERSONAL FACTORS:  None    Exercise Diary  1/8 1/13 1/15          UBE  5/5 2 0 level /5 2 0 level          Wrist curls  #4 3x10 #4 3x10          P/S therabar  Red 3x10 Red 3x10          Pinch clips w/ rotation  Green 3 sets Green 3 sets          Translation small pegs  Small pegs/board Small /pegs board          tweezer w/ pegs  3 rows           Stress load on table  1m  1m          Clip w/ pegs   Red clip                                                                                                                                                         HEP- [facility] reviewed                Modalities

## 2020-01-15 NOTE — PROGRESS NOTES
Daily Note     Today's date: 1/15/2020  Patient name: Leoncio Morse  : 1950  MRN: [id number]  Referring provider: Fely Mills MD  Dx:   Encounter Diagnosis     ICD-10-CM    1  Cerebrovascular accident (CVA) due to embolism of precerebral artery (HCC) I63 10                   Subjective:  Pt reports feeling good  Most concern is his constipation that is causing LBP  Objective:  See treatment diary below      Assessment:  Pt presented to outpatient physical therapy at [place] [address] with complaints of poor R LE balance  He presented with decreased strength, altered gait pattern, poor balance, decreased tolerance to activity and decreased functional mobility due to Cerebrovascular accident (CVA) due to embolism of precerebral artery ([facility] [address] ) on 12/15/19  He will continue to benefit from skilled PT services in order to address these deficits and reach maximum level of function  Pt is showing improved balance already, but still needs mod more to be safe with ambulation in uneven surfaces  He is very motivated to improve  Plan:  Add step overs, more reps of joselin step overs  Daily Treatment Diary     Dx:    1   Cerebrovascular accident (CVA) due to embolism of precerebral artery ([facility] [address] )      POC EXPIRES On:  3/18/20  PRECAUTIONS:  Fall risk  CO-MORBIDITES:  CVA with R hemiparesis 12/15/19  PERSONAL FACTORS:  None    Manual                                                                                   Exercise Diary  1/13 1/15           Bike L1 8' L1 8'           SLS R on foam disc Blue 5" 10 Blue 10" 10           Tandem walk on foam 12 ft x 3 laps FWD 12 ft x 3 laps FWD           T-band DF R Blue 30 Blue 30           Marching on foam L/R No foam 20 ea            Stepping over hurdles FWD/SIDE 6 ea 6 hurdles x4 lengths ea           Gait training             T-band kicks for LE balance hip flex/ext L/R Blue 15 ea Blue 15 ea           RB march NV 15 ea           Step overs R NV Modalities

## 2020-01-16 ENCOUNTER — TELEPHONE (OUTPATIENT)
Dept: FAMILY MEDICINE CLINIC | Facility: HOSPITAL | Age: 70
End: 2020-01-16

## 2020-01-20 ENCOUNTER — OFFICE VISIT (OUTPATIENT)
Dept: PHYSICAL THERAPY | Facility: CLINIC | Age: 70
End: 2020-01-20
Payer: COMMERCIAL

## 2020-01-20 ENCOUNTER — OFFICE VISIT (OUTPATIENT)
Dept: OCCUPATIONAL THERAPY | Facility: CLINIC | Age: 70
End: 2020-01-20
Payer: COMMERCIAL

## 2020-01-20 DIAGNOSIS — I63.10 CEREBROVASCULAR ACCIDENT (CVA) DUE TO EMBOLISM OF PRECEREBRAL ARTERY (HCC): Primary | ICD-10-CM

## 2020-01-20 PROCEDURE — 97530 THERAPEUTIC ACTIVITIES: CPT | Performed by: OCCUPATIONAL THERAPIST

## 2020-01-20 PROCEDURE — 97110 THERAPEUTIC EXERCISES: CPT | Performed by: OCCUPATIONAL THERAPIST

## 2020-01-20 PROCEDURE — 97110 THERAPEUTIC EXERCISES: CPT

## 2020-01-20 PROCEDURE — 97112 NEUROMUSCULAR REEDUCATION: CPT

## 2020-01-20 PROCEDURE — 97112 NEUROMUSCULAR REEDUCATION: CPT | Performed by: OCCUPATIONAL THERAPIST

## 2020-01-20 NOTE — PROGRESS NOTES
Daily Note     Today's date: 2020  Patient name: Rich Min  : 1950  MRN: [id number]  Referring provider: Tae Arana MD  Dx:   Encounter Diagnosis     ICD-10-CM    1  Cerebrovascular accident (CVA) due to embolism of precerebral artery (HCC) I63 10                   Subjective: I have been driving a little bit  Objective: See treatment diary below      Assessment: Tolerated treatment well  Patient demonstrated fatigue post treatment  Pt  Coordination is improving  Pt  Reports he has driven locally  Plan: Continue per plan of care  Daily Treatment Diary     Dx:    1   Cerebrovascular accident (CVA) due to embolism of precerebral artery ([address] )      POC EXPIRES On:  3/18/20  PRECAUTIONS:  Fall risk  CO-MORBIDITES:  CVA with R hemiparesis 12/15/19  PERSONAL FACTORS:  None    Exercise Diary  1/8 1/13 1/15 1/20         UBE  5/5 2 0 level 5/5 2 0 level 5/5 2 0 level         Wrist curls  #4 3x10 #4 3x10 #4 3x10         P/S therabar  Red 3x10 Red 3x10 Red 3x10         Pinch clips w/ rotation  Green 3 sets Green 3 sets Blue 3 std         Translation small pegs  Small pegs/board Small /pegs board [place]          tweezer w/ pegs  3 rows           Stress load on table  1m  1m 1m         Clip w/ pegs   Red clip                                                                                                                                                         HEP- [facility] reviewed                Modalities

## 2020-01-20 NOTE — PROGRESS NOTES
Daily Note     Today's date: 2020  Patient name: Anjelica Uribe  : 1950  MRN: [id number]  Referring provider: Rita Hudson MD  Dx:   Encounter Diagnosis     ICD-10-CM    1  Cerebrovascular accident (CVA) due to embolism of precerebral artery (HCC) I63 10      Transfer OT->PT 10' late  Subjective:  Pt reports his back is hurting him  Objective:  See treatment diary below      Assessment:  Pt presented to outpatient physical therapy at [place] [address] with complaints of poor R LE balance  He presented with decreased strength, altered gait pattern, poor balance, decreased tolerance to activity and decreased functional mobility due to Cerebrovascular accident (CVA) due to embolism of precerebral artery ([facility] ) on 12/15/19  He will continue to benefit from skilled PT services in order to address these deficits and reach maximum level of function  Pt performed below TE with good TE however, pt experienced increased LBP/discomfort  Pt required vcs throughout [place] especially during standing TB ext/abd  Plan:  Add step overs, more reps of joselin step overs  Daily Treatment Diary     Dx:    1   Cerebrovascular accident (CVA) due to embolism of precerebral artery ([facility] )      POC EXPIRES On:  3/18/20  PRECAUTIONS:  Fall risk  CO-MORBIDITES:  CVA with R hemiparesis 12/15/19  PERSONAL FACTORS:  None    Manual                                                                                   Exercise Diary  1/13 1/15 1/20          Bike L1 8' L1 8' L2 8'          Gastroc stretch on wedge   30" 3          SLS R on foam disc Blue 5" 10 Blue 10" 10 Blue 10" 10          Tandem walk on foam 12 ft x 3 laps FWD 12 ft x 3 laps FWD           T-band DF R Blue 30 Blue 30           Marching on foam L/R No foam 20 ea            Stepping over hurdles FWD/SIDE 6 ea 6 hurdles x4 lengths ea 6 hurdles x4 lengths ea          Gait training             T-band kicks for LE balance hip flex/ext L/R Blue 15 ea Blue 15 ea Blue 15 ea          RB march NV 15 ea 15 ea          Step overs R NV                                                                                                                                                  Modalities

## 2020-01-22 ENCOUNTER — OFFICE VISIT (OUTPATIENT)
Dept: PHYSICAL THERAPY | Facility: CLINIC | Age: 70
End: 2020-01-22
Payer: COMMERCIAL

## 2020-01-22 ENCOUNTER — OFFICE VISIT (OUTPATIENT)
Dept: OCCUPATIONAL THERAPY | Facility: CLINIC | Age: 70
End: 2020-01-22
Payer: COMMERCIAL

## 2020-01-22 DIAGNOSIS — I63.10 CEREBROVASCULAR ACCIDENT (CVA) DUE TO EMBOLISM OF PRECEREBRAL ARTERY (HCC): Primary | ICD-10-CM

## 2020-01-22 PROCEDURE — 97110 THERAPEUTIC EXERCISES: CPT | Performed by: OCCUPATIONAL THERAPIST

## 2020-01-22 PROCEDURE — 97530 THERAPEUTIC ACTIVITIES: CPT | Performed by: OCCUPATIONAL THERAPIST

## 2020-01-22 PROCEDURE — 97110 THERAPEUTIC EXERCISES: CPT | Performed by: PHYSICAL THERAPIST

## 2020-01-22 PROCEDURE — 97112 NEUROMUSCULAR REEDUCATION: CPT | Performed by: PHYSICAL THERAPIST

## 2020-01-22 PROCEDURE — 97112 NEUROMUSCULAR REEDUCATION: CPT | Performed by: OCCUPATIONAL THERAPIST

## 2020-01-22 NOTE — PROGRESS NOTES
Daily Note     Today's date: 2020  Patient name: Leoncio Morse  : 1950  MRN: [id number]  Referring provider: Fely Mills MD  Dx:   Encounter Diagnosis     ICD-10-CM    1  Cerebrovascular accident (CVA) due to embolism of precerebral artery (HCC) I63 10                   Subjective: I drove here  Objective: See treatment diary below      Assessment: Tolerated treatment well  Patient demonstrated fatigue post treatment  Pt  Coordination is improving  Pt  Reports he has driven locally  Plan: Continue per plan of care  Daily Treatment Diary     Dx:    1   Cerebrovascular accident (CVA) due to embolism of precerebral artery ([address] )      POC EXPIRES On:  3/18/20  PRECAUTIONS:  Fall risk  CO-MORBIDITES:  CVA with R hemiparesis 12/15/19  PERSONAL FACTORS:  None    Exercise Diary  1/8 1/13 1/15 1/20 1/22        UBE  5/5 2 0 level 5/5 2 0 level 5/5 2 0 level 5/5 2 0 level        Wrist curls  #4 3x10 #4 3x10 #4 3x10 #4 3x10        P/S therabar  Red 3x10 Red 3x10 Red 3x10 Red 3x10        Pinch clips w/ rotation  Green 3 sets Green 3 sets Blue 3 std Blue 3 sets        Translation small pegs  Small pegs/board Small /pegs board [place]          tweezer w/ pegs  3 rows           Stress load on table  1m  1m 1m 1m        Clip w/ pegs   Red clip  Red clip                                                                                                                                                       HEP- [address] reviewed                Modalities

## 2020-01-22 NOTE — PROGRESS NOTES
Daily Note     Today's date: 2020  Patient name: Humberto Tellez  : 1950  MRN: [id number]  Referring provider: Tien Bridges MD  Dx:   Encounter Diagnosis     ICD-10-CM    1  Cerebrovascular accident (CVA) due to embolism of precerebral artery (HCC) I63 10                  Subjective:  Pt reports that his back is improving, but still hurting him at times  Objective:  See treatment diary below      Assessment:  Pt presented to outpatient physical therapy at [place] [address] with complaints of poor R LE balance  He presented with decreased strength, altered gait pattern, poor balance, decreased tolerance to activity and decreased functional mobility due to Cerebrovascular accident (CVA) due to embolism of precerebral artery ([facility] [address] ) on 12/15/19  He will continue to benefit from skilled PT services in order to address these deficits and reach maximum level of function  Pt performed well with increased difficulty on balance ex with less difficulty on rocker board marching, but had moderate challenge with addition of foam to joselin step  Plan:  Try tandem walk on foam BWD  Daily Treatment Diary     Dx:    1   Cerebrovascular accident (CVA) due to embolism of precerebral artery ([facility] [address] )      POC EXPIRES On:  3/18/20  PRECAUTIONS:  Fall risk  CO-MORBIDITES:  CVA with R hemiparesis 12/15/19  PERSONAL FACTORS:  None    Manual                                                                                   Exercise Diary  1/13 1/15 1/20 1/22         Bike L1 8' L1 8' L2 8' L2 8'         Gastroc stretch on wedge   30" 3 30" 3         SLS R on foam disc Blue 5" 10 Blue 10" 10 Blue 10" 10 Black 10" 10          Tandem walk on foam 12 ft x 3 laps FWD 12 ft x 3 laps FWD  12 ft x 3 laps FWD         T-band DF R Blue 30 Blue 30  Blue 30         Marching on foam L/R No foam 20 ea            Stepping over hurdles FWD/SIDE 6 ea 6 hurdles x4 lengths ea 6 hurdles x4 lengths ea Hurdles 4x12 ft ea on foam T-band kicks for LE balance hip flex/ext L/R Blue 15 ea Blue 15 ea Blue 15 ea Blue 15 ea         RB march NV 15 ea 15 ea 20 ea                                                                                                                                                            Modalities

## 2020-01-27 ENCOUNTER — OFFICE VISIT (OUTPATIENT)
Dept: OCCUPATIONAL THERAPY | Facility: CLINIC | Age: 70
End: 2020-01-27
Payer: COMMERCIAL

## 2020-01-27 ENCOUNTER — OFFICE VISIT (OUTPATIENT)
Dept: PHYSICAL THERAPY | Facility: CLINIC | Age: 70
End: 2020-01-27
Payer: COMMERCIAL

## 2020-01-27 DIAGNOSIS — I63.10 CEREBROVASCULAR ACCIDENT (CVA) DUE TO EMBOLISM OF PRECEREBRAL ARTERY (HCC): Primary | ICD-10-CM

## 2020-01-27 PROCEDURE — 97112 NEUROMUSCULAR REEDUCATION: CPT

## 2020-01-27 PROCEDURE — 97110 THERAPEUTIC EXERCISES: CPT

## 2020-01-27 PROCEDURE — 97110 THERAPEUTIC EXERCISES: CPT | Performed by: OCCUPATIONAL THERAPIST

## 2020-01-27 PROCEDURE — 97112 NEUROMUSCULAR REEDUCATION: CPT | Performed by: OCCUPATIONAL THERAPIST

## 2020-01-27 PROCEDURE — 97530 THERAPEUTIC ACTIVITIES: CPT | Performed by: OCCUPATIONAL THERAPIST

## 2020-01-27 NOTE — PROGRESS NOTES
Daily Note     Today's date: 2020  Patient name: To Pina  : 1950  MRN: [id number]  Referring provider: Kayla Pacheco MD  Dx:   Encounter Diagnosis     ICD-10-CM    1  Cerebrovascular accident (CVA) due to embolism of precerebral artery (HCC) I63 10                   Subjective: I drove here  Objective: See treatment diary below      Assessment: Tolerated treatment well  Patient demonstrated fatigue post treatment  Pt  Coordination is improving  Pt  Reports he has driven locally  [employer] (1min) R 11 pegs,  14 pegs ()      Plan: Continue per plan of care  re do FOTO     Daily Treatment Diary     Dx:    1   Cerebrovascular accident (CVA) due to embolism of precerebral artery ([address] )      POC EXPIRES On:  3/18/20  PRECAUTIONS:  Fall risk  CO-MORBIDITES:  CVA with R hemiparesis 12/15/19  PERSONAL FACTORS:  None    Exercise Diary  1/8 1/13 1/15 1/20 1/22 1/27       UBE  5/5 2 0 level 5/5 2 0 level 5/5 2 0 level 5/5 2 0 level 5/5 2 0 level       Wrist curls  #4 3x10 #4 3x10 #4 3x10 #4 3x10 #4 3x10       P/S therabar  Red 3x10 Red 3x10 Red 3x10 Red 3x10 Green 3x10       Pinch clips w/ rotation  Green 3 sets Green 3 sets Blue 3 std Blue 3 sets [employer] 3 sets       Translation small pegs  Small pegs/board Small /pegs board [place]          tweezer w/ pegs  3 rows           Stress load on table  1m  1m 1m 1m 1m       Clip w/ pegs   Red clip  Red clip Blue clip                                                                                                                                                      HEP- [facility] reviewed                Modalities

## 2020-01-27 NOTE — PROGRESS NOTES
Daily Note     Today's date: 2020  Patient name: Mahesh Mims  : 1950  MRN: [id number]  Referring provider: Kayden Langford MD  Dx:   Encounter Diagnosis     ICD-10-CM    1  Cerebrovascular accident (CVA) due to embolism of precerebral artery (HCC) I63 10                  Subjective:  Pt states he has no changes form last session  Objective:  See treatment diary below      Assessment:   Pt progressed through exercises well with no increase in pain and min to moderate fatigue  Pt required VC for proper proper form with balance recovery as he automatically uses his hands and we focused on ankle strategy  Pt was able to preformed tandem walking backwards well  Pt would continue to benefit from Pt  Plan:  Continue with POC  Daily Treatment Diary     Dx:    1  Cerebrovascular accident (CVA) due to embolism of precerebral artery (HCC)      POC EXPIRES On:  3/18/20  PRECAUTIONS:  Fall risk  CO-MORBIDITES:  CVA with R hemiparesis 12/15/19  PERSONAL FACTORS:  None    Manual                                                                                   Exercise Diary  1/13 1/15 1/20 1/22 1/27         Bike L1 8' L1 8' L2 8' L2 8' L2 8'         Gastroc stretch on wedge   30" 3 30" 3 30" x 3         SLS R on foam disc Blue 5" 10 Blue 10" 10 Blue 10" 10 Black 10" 10  Black   10 x 10"         Tandem walk on foam 12 ft x 3 laps FWD 12 ft x 3 laps FWD  12 ft x 3 laps FWD 12 ft x 3 laps FWD/ BWD        T-band DF R Blue 30 Blue 30  Blue 30 Heel raise R ecc   Control         Marching on foam L/R No foam 20 ea            Stepping over hurdles FWD/SIDE 6 ea 6 hurdles x4 lengths ea 6 hurdles x4 lengths ea Hurdles 4x12 ft ea on foam Hurdles 4x12 ft ea on foam                     T-band kicks for LE balance hip flex/ext L/R Blue 15 ea Blue 15 ea Blue 15 ea Blue 15 ea BTB 15x ea         RB march NV 15 ea 15 ea 20 ea 20x ea Modalities

## 2020-01-29 ENCOUNTER — OFFICE VISIT (OUTPATIENT)
Dept: PHYSICAL THERAPY | Facility: CLINIC | Age: 70
End: 2020-01-29
Payer: COMMERCIAL

## 2020-01-29 ENCOUNTER — OFFICE VISIT (OUTPATIENT)
Dept: OCCUPATIONAL THERAPY | Facility: CLINIC | Age: 70
End: 2020-01-29
Payer: COMMERCIAL

## 2020-01-29 DIAGNOSIS — I63.10 CEREBROVASCULAR ACCIDENT (CVA) DUE TO EMBOLISM OF PRECEREBRAL ARTERY (HCC): Primary | ICD-10-CM

## 2020-01-29 PROCEDURE — 97112 NEUROMUSCULAR REEDUCATION: CPT

## 2020-01-29 PROCEDURE — 97530 THERAPEUTIC ACTIVITIES: CPT | Performed by: OCCUPATIONAL THERAPIST

## 2020-01-29 PROCEDURE — 97110 THERAPEUTIC EXERCISES: CPT

## 2020-01-29 PROCEDURE — 97110 THERAPEUTIC EXERCISES: CPT | Performed by: OCCUPATIONAL THERAPIST

## 2020-01-29 NOTE — PROGRESS NOTES
Daily Note     Today's date: 2020  Patient name: Anjelica Uribe  : 1950  MRN: [id number]  Referring provider: Rita Hudson MD  Dx:   Encounter Diagnosis     ICD-10-CM    1  Cerebrovascular accident (CVA) due to embolism of precerebral artery (HCC) I63 10                  Subjective:  Pt states he is feeling pretty good today  Pt states he doesn't really seem to change from day to day  Objective:  See treatment diary below      Assessment: Pt progressed through exercises well with no increase in pain and min to moderate fatigue   Pt required VC with RB squats to help improve his form  Pt was able to tolerated BOSU step ups today as he is gaining balance and strength in his RE  Pt had less LOB with tandem walking on foam today  Pt would continue to benefit form PT  Plan:  Continue with POC  Daily Treatment Diary     Dx:    1  Cerebrovascular accident (CVA) due to embolism of precerebral artery (HCC)      POC EXPIRES On:  3/18/20  PRECAUTIONS:  Fall risk  CO-MORBIDITES:  CVA with R hemiparesis 12/15/19  PERSONAL FACTORS:  None    Manual                                                                                   Exercise Diary  1/13 1/15 1/20 1/22 1/27  1/29       Bike L1 8' L1 8' L2 8' L2 8' L2 8'  L7 10'        Gastroc stretch on wedge   30" 3 30" 3 30" x 3  30" x 3        SLS R on foam disc Blue 5" 10 Blue 10" 10 Blue 10" 10 Black 10" 10  Black   10 x 10"  black  10" x 10        Tandem walk on foam 12 ft x 3 laps FWD 12 ft x 3 laps FWD  12 ft x 3 laps FWD 12 ft x 3 laps FWD/ BWD 12 ft x 4 laps FWD/ BWD       T-band DF R Blue 30 Blue 30  Blue 30 Heel raise R ecc  Control  Heel raise R ecc   Control 20x        Marching on foam L/R No foam 20 ea            Stepping over hurdles FWD/SIDE 6 ea 6 hurdles x4 lengths ea 6 hurdles x4 lengths ea Hurdles 4x12 ft ea on foam Hurdles 4x12 ft ea on foam Hurdles 4x12 ft ea on foam                    T-band kicks for LE balance hip flex/ext L/R Blue 15 ea Blue 15 ea Blue 15 ea Blue 15 ea BTB 15x ea  BTB  20x ea        RB march NV 15 ea 15 ea 20 ea 20x ea  20x ea        BOSU step ups FWD      FWD 5" x 10  ea        RB mini squats       20x                                                                                                                                 Modalities

## 2020-01-29 NOTE — PROGRESS NOTES
Daily Note     Today's date: 2020  Patient name: Gustavo Tian  : 1950  MRN: [id number]  Referring provider: Dylan Vernon MD  Dx:   Encounter Diagnosis     ICD-10-CM    1  Cerebrovascular accident (CVA) due to embolism of precerebral artery (HCC) I63 10                   Subjective: I see the Dr tomorrow      Objective: See treatment diary below      Assessment: Tolerated treatment well  Patient has good strength  Plan: Continue per plan of care  Daily Treatment Diary     Dx:    1   Cerebrovascular accident (CVA) due to embolism of precerebral artery ([address] )      POC EXPIRES On:  3/18/20  PRECAUTIONS:  Fall risk  CO-MORBIDITES:  CVA with R hemiparesis 12/15/19  PERSONAL FACTORS:  None    Exercise Diary  1/8 1/13 1/15 1/20 1/22 1/27 1/29      UBE  5/5 2 0 level 5/5 2 0 level 5/5 2 0 level 5/5 2 0 level 5/5 2 0 level 5/5  2 0        Wrist curls  #4 3x10 #4 3x10 #4 3x10 #4 3x10 #4 3x10 4#  3x10      P/S therabar  Red 3x10 Red 3x10 Red 3x10 Red 3x10 Green 3x10 Green  3x10      Pinch clips w/ rotation  Green 3 sets Green 3 sets Blue 3 std Blue 3 sets [employer] 3 sets [email or web address]  3 sets      Translation small pegs  Small pegs/board Small /pegs board [place]    grooved      tweezer w/ pegs  3 rows           Stress load on table  1m  1m 1m 1m 1m       Clip w/ pegs   Red clip  Red clip Blue clip Blue  Clip                                                                                                                                                      HEP- [address] reviewed                Modalities

## 2020-01-30 ENCOUNTER — OFFICE VISIT (OUTPATIENT)
Dept: FAMILY MEDICINE CLINIC | Facility: HOSPITAL | Age: 70
End: 2020-01-30
Payer: COMMERCIAL

## 2020-01-30 VITALS
WEIGHT: 154.8 LBS | HEIGHT: 66 IN | SYSTOLIC BLOOD PRESSURE: 120 MMHG | BODY MASS INDEX: 24.88 KG/M2 | DIASTOLIC BLOOD PRESSURE: 70 MMHG | HEART RATE: 65 BPM

## 2020-01-30 DIAGNOSIS — I63.10 CEREBROVASCULAR ACCIDENT (CVA) DUE TO EMBOLISM OF PRECEREBRAL ARTERY (HCC): Primary | ICD-10-CM

## 2020-01-30 DIAGNOSIS — E78.00 HYPERCHOLESTEROLEMIA: ICD-10-CM

## 2020-01-30 DIAGNOSIS — Z12.5 ENCOUNTER FOR PROSTATE CANCER SCREENING: ICD-10-CM

## 2020-01-30 PROCEDURE — 4004F PT TOBACCO SCREEN RCVD TLK: CPT | Performed by: INTERNAL MEDICINE

## 2020-01-30 PROCEDURE — 3008F BODY MASS INDEX DOCD: CPT | Performed by: INTERNAL MEDICINE

## 2020-01-30 PROCEDURE — 1160F RVW MEDS BY RX/DR IN RCRD: CPT | Performed by: INTERNAL MEDICINE

## 2020-01-30 PROCEDURE — 99214 OFFICE O/P EST MOD 30 MIN: CPT | Performed by: INTERNAL MEDICINE

## 2020-01-30 NOTE — PROGRESS NOTES
Assessment/Plan:       Diagnoses and all orders for this visit:    Cerebrovascular accident (CVA) due to embolism of precerebral artery ([facility] [address] )    Encounter for prostate cancer screening  -     PSA, Total Screen; Future    Hypercholesterolemia  -     CBC; Future  -     Comprehensive metabolic panel; Future  -     Lipid Panel with Direct LDL reflex; Future          All of the above diagnoses have been assessed  Additional COMMENTS/PLAN:  I do believe the patient has the motor and and fine discrimination skills of the right side to safely operate a motor vehicle  Subjective:      Patient ID: Margaree Brunner is a 77 y o  male  HPI     Patient here to follow-up on his stroke  Patient has been going to physical therapy and occupational therapy  I reviewed those records  He appears to be doing quite well  He still occasionally drags his foot when walking  Otherwise notices no decrease in strength  The following portions of the patient's history were revised and updated as appropriate: Problem list, allergies, med list, FH, SH, Past medical and surgical histories  Current Outpatient Medications   Medication Sig Dispense Refill    amLODIPine (NORVASC) 2 5 mg tablet Take 2 5 mg by mouth daily      apixaban (ELIQUIS) 5 mg Take 1 tablet (5 mg total) by mouth 2 (two) times a day 60 tablet 0    aspirin (ECOTRIN LOW STRENGTH) 81 mg EC tablet Take 1 tablet (81 mg total) by mouth daily      atorvastatin (LIPITOR) 40 mg tablet TAKE 1 TABLET BY MOUTH ONCE DAILY 90 tablet 3    nicotine (NICODERM CQ) 21 mg/24 hr TD 24 hr patch Place 1 patch on the skin daily 28 patch 0     No current facility-administered medications for this visit  Review of Systems   All other systems reviewed and are negative          Objective:    /70 (BP Location: Left arm, Patient Position: Sitting, Cuff Size: Standard)   Pulse 65   Ht 5' 6" (1 676 m)   Wt 70 2 kg (154 lb 12 8 oz)   BMI 24 99 kg/m²     BP Readings from Last 3 Encounters:   01/30/20 120/70   01/07/20 130/78   12/19/19 138/81                  Wt Readings from Last 3 Encounters:   01/30/20 70 2 kg (154 lb 12 8 oz)   01/07/20 70 8 kg (156 lb)   12/16/19 71 6 kg (157 lb 13 6 oz)         Physical Exam   Constitutional: He is oriented to person, place, and time  He appears well-developed and well-nourished  Cardiovascular: Normal rate and regular rhythm  Pulmonary/Chest: Effort normal and breath sounds normal    Neurological: He is alert and oriented to person, place, and time  No cranial nerve deficit (No visual field cut)  Finger-nose testing is intact rapid alternating movements of the upper extremities intact  There is no tactile agnosia of the upper extremities  Examination lower extremity reveals equal an accurate heel to shin testing  Fine motor movement of the distal right lower extremities intact  Of course the left is normal    Vitals reviewed  No visits with results within 2 Week(s) from this visit     Latest known visit with results is:   Admission on 12/16/2019, Discharged on 12/19/2019   Component Date Value Ref Range Status    WBC 12/16/2019 14 06* 4 31 - 10 16 Thousand/uL Final    RBC 12/16/2019 4 15  3 88 - 5 62 Million/uL Final    Hemoglobin 12/16/2019 13 8  12 0 - 17 0 g/dL Final    Hematocrit 12/16/2019 40 4  36 5 - 49 3 % Final    MCV 12/16/2019 97  82 - 98 fL Final    MCH 12/16/2019 33 3  26 8 - 34 3 pg Final    MCHC 12/16/2019 34 2  31 4 - 37 4 g/dL Final    RDW 12/16/2019 14 6  11 6 - 15 1 % Final    MPV 12/16/2019 10 2  8 9 - 12 7 fL Final    Platelets 81/26/5440 271  149 - 390 Thousands/uL Final    nRBC 12/16/2019 0  /100 WBCs Final    Neutrophils Relative 12/16/2019 81* 43 - 75 % Final    Immat GRANS % 12/16/2019 1  0 - 2 % Final    Lymphocytes Relative 12/16/2019 12* 14 - 44 % Final    Monocytes Relative 12/16/2019 6  4 - 12 % Final    Eosinophils Relative 12/16/2019 0  0 - 6 % Final    Basophils Relative 12/16/2019 0  0 - 1 % Final    Neutrophils Absolute 12/16/2019 11 37* 1 85 - 7 62 Thousands/µL Final    Immature Grans Absolute 12/16/2019 0 08  0 00 - 0 20 Thousand/uL Final    Lymphocytes Absolute 12/16/2019 1 72  0 60 - 4 47 Thousands/µL Final    Monocytes Absolute 12/16/2019 0 83  0 17 - 1 22 Thousand/µL Final    Eosinophils Absolute 12/16/2019 0 03  0 00 - 0 61 Thousand/µL Final    Basophils Absolute 12/16/2019 0 03  0 00 - 0 10 Thousands/µL Final    Sodium 12/16/2019 137  136 - 145 mmol/L Final    Potassium 12/16/2019 3 7  3 5 - 5 3 mmol/L Final    Chloride 12/16/2019 102  100 - 108 mmol/L Final    CO2 12/16/2019 28  21 - 32 mmol/L Final    ANION GAP 12/16/2019 7  4 - 13 mmol/L Final    BUN 12/16/2019 13  5 - 25 mg/dL Final    Creatinine 12/16/2019 0 93  0 60 - 1 30 mg/dL Final    Standardized to IDMS reference method    Glucose 12/16/2019 124  65 - 140 mg/dL Final      If the patient is fasting, the ADA then defines impaired fasting glucose as > 100 mg/dL and diabetes as > or equal to 123 mg/dL  Specimen collection should occur prior to Sulfasalazine administration due to the potential for falsely depressed results  Specimen collection should occur prior to Sulfapyridine administration due to the potential for falsely elevated results   Calcium 12/16/2019 9 2  8 3 - 10 1 mg/dL Final    AST 12/16/2019 11  5 - 45 U/L Final      Specimen collection should occur prior to Sulfasalazine administration due to the potential for falsely depressed results   ALT 12/16/2019 14  12 - 78 U/L Final      Specimen collection should occur prior to Sulfasalazine administration due to the potential for falsely depressed results       Alkaline Phosphatase 12/16/2019 85  46 - 116 U/L Final    Total Protein 12/16/2019 7 2  6 4 - 8 2 g/dL Final    Albumin 12/16/2019 3 9  3 5 - 5 0 g/dL Final    Total Bilirubin 12/16/2019 0 50  0 20 - 1 00 mg/dL Final    eGFR 12/16/2019 84  ml/min/1 73sq m Final  Troponin I 12/16/2019 <0 02  <=0 04 ng/mL Final    3Autovalidation override  [employer] Chemistry analyzer 99% cutoff is > 0 04 ng/mL in network labs     o cTnI 99% cutoff is useful only when applied to patients in the clinical setting of myocardial ischemia   o cTnI 99% cutoff should be interpreted in the context of clinical history, ECG findings and possibly cardiac imaging to establish correct diagnosis  o cTnI 99% cutoff may be suggestive but clearly not indicative of a coronary event without the clinical setting of myocardial ischemia        Protime 12/16/2019 12 5  11 6 - 14 5 seconds Final    INR 12/16/2019 0 96  0 84 - 1 19 Final    PTT 12/16/2019 25  23 - 37 seconds Final    Therapeutic Heparin Range =  60-90 seconds    Ventricular Rate 12/16/2019 61  BPM Final    Atrial Rate 12/16/2019 61  BPM Final    MS Interval 12/16/2019 144  ms Final    QRSD Interval 12/16/2019 84  ms Final    QT Interval 12/16/2019 444  ms Final    QTC Interval 12/16/2019 446  ms Final    P Axis 12/16/2019 78  degrees Final    QRS Axis 12/16/2019 71  degrees Final    T Wave Axis 12/16/2019 69  degrees Final    Ventricular Rate 12/16/2019 62  BPM Final    Atrial Rate 12/16/2019 62  BPM Final    MS Interval 12/16/2019 162  ms Final    QRSD Interval 12/16/2019 66  ms Final    QT Interval 12/16/2019 420  ms Final    QTC Interval 12/16/2019 426  ms Final    P Axis 12/16/2019 72  degrees Final    QRS Axis 12/16/2019 67  degrees Final    T Wave Axis 12/16/2019 62  degrees Final    Sodium 12/17/2019 137  136 - 145 mmol/L Final    Potassium 12/17/2019 3 6  3 5 - 5 3 mmol/L Final    Chloride 12/17/2019 104  100 - 108 mmol/L Final    CO2 12/17/2019 24  21 - 32 mmol/L Final    ANION GAP 12/17/2019 9  4 - 13 mmol/L Final    BUN 12/17/2019 12  5 - 25 mg/dL Final    Creatinine 12/17/2019 0 84  0 60 - 1 30 mg/dL Final    Standardized to IDMS reference method    Glucose 12/17/2019 96  65 - 140 mg/dL Final      If the patient is fasting, the ADA then defines impaired fasting glucose as > 100 mg/dL and diabetes as > or equal to 123 mg/dL  Specimen collection should occur prior to Sulfasalazine administration due to the potential for falsely depressed results  Specimen collection should occur prior to Sulfapyridine administration due to the potential for falsely elevated results   Calcium 12/17/2019 8 7  8 3 - 10 1 mg/dL Final    eGFR 12/17/2019 90  ml/min/1 73sq m Final    Cholesterol 12/17/2019 166  50 - 200 mg/dL Final      Cholesterol:       Desirable         <200 mg/dl       Borderline         200-239 mg/dl       High              >239           Triglycerides 12/17/2019 131  <=150 mg/dL Final      Triglyceride:     Normal          <150 mg/dl     Borderline High 150-199 mg/dl     High            200-499 mg/dl        Very High       >499 mg/dl    Specimen collection should occur prior to N-Acetylcysteine or Metamizole administration due to the potential for falsely depressed results   HDL, Direct 12/17/2019 46  >=40 mg/dL Final      HDL Cholesterol:       Low     <41 mg/dL  Specimen collection should occur prior to Metamizole administration due to the potential for falsley depressed results   LDL Calculated 12/17/2019 94  0 - 100 mg/dL Final      LDL Cholesterol:     Optimal           <100 mg/dl     Near Optimal      100-129 mg/dl     Above Optimal       Borderline High 130-159 mg/dl       High            160-189 mg/dl       Very High       >189 mg/dl         This screening LDL is a calculated result  It does not have the accuracy of the Direct Measured LDL in the monitoring of patients with hyperlipidemia and/or statin therapy  Direct Measure LDL (KMF521) must be ordered separately in these patients      Non-HDL-Chol (CHOL-HDL) 12/17/2019 120  mg/dl Final    WBC 12/17/2019 10 63* 4 31 - 10 16 Thousand/uL Final    RBC 12/17/2019 3 96  3 88 - 5 62 Million/uL Final    Hemoglobin 12/17/2019 13 2  12 0 - 17 0 g/dL Final    Hematocrit 12/17/2019 38 6  36 5 - 49 3 % Final    MCV 12/17/2019 98  82 - 98 fL Final    MCH 12/17/2019 33 3  26 8 - 34 3 pg Final    MCHC 12/17/2019 34 2  31 4 - 37 4 g/dL Final    RDW 12/17/2019 14 6  11 6 - 15 1 % Final    MPV 12/17/2019 10 3  8 9 - 12 7 fL Final    Platelets 93/99/6868 240  149 - 390 Thousands/uL Final    nRBC 12/17/2019 0  /100 WBCs Final    Neutrophils Relative 12/17/2019 69  43 - 75 % Final    Immat GRANS % 12/17/2019 1  0 - 2 % Final    Lymphocytes Relative 12/17/2019 20  14 - 44 % Final    Monocytes Relative 12/17/2019 7  4 - 12 % Final    Eosinophils Relative 12/17/2019 2  0 - 6 % Final    Basophils Relative 12/17/2019 1  0 - 1 % Final    Neutrophils Absolute 12/17/2019 7 57  1 85 - 7 62 Thousands/µL Final    Immature Grans Absolute 12/17/2019 0 05  0 00 - 0 20 Thousand/uL Final    Lymphocytes Absolute 12/17/2019 2 09  0 60 - 4 47 Thousands/µL Final    Monocytes Absolute 12/17/2019 0 69  0 17 - 1 22 Thousand/µL Final    Eosinophils Absolute 12/17/2019 0 18  0 00 - 0 61 Thousand/µL Final    Basophils Absolute 12/17/2019 0 05  0 00 - 0 10 Thousands/µL Final    Hemoglobin A1C 12/17/2019 5 9  4 2 - 6 3 % Final    EAG 12/17/2019 123  mg/dl Final    WBC 12/18/2019 10 29* 4 31 - 10 16 Thousand/uL Final    RBC 12/18/2019 4 01  3 88 - 5 62 Million/uL Final    Hemoglobin 12/18/2019 13 3  12 0 - 17 0 g/dL Final    Hematocrit 12/18/2019 39 5  36 5 - 49 3 % Final    MCV 12/18/2019 99* 82 - 98 fL Final    MCH 12/18/2019 33 2  26 8 - 34 3 pg Final    MCHC 12/18/2019 33 7  31 4 - 37 4 g/dL Final    RDW 12/18/2019 14 6  11 6 - 15 1 % Final    MPV 12/18/2019 10 5  8 9 - 12 7 fL Final    Platelets 21/52/2668 242  149 - 390 Thousands/uL Final    nRBC 12/18/2019 0  /100 WBCs Final    Neutrophils Relative 12/18/2019 66  43 - 75 % Final    Immat GRANS % 12/18/2019 1  0 - 2 % Final    Lymphocytes Relative 12/18/2019 22  14 - 44 % Final    Monocytes Relative 12/18/2019 8  4 - 12 % Final    Eosinophils Relative 12/18/2019 2  0 - 6 % Final    Basophils Relative 12/18/2019 1  0 - 1 % Final    Neutrophils Absolute 12/18/2019 6 89  1 85 - 7 62 Thousands/µL Final    Immature Grans Absolute 12/18/2019 0 06  0 00 - 0 20 Thousand/uL Final    Lymphocytes Absolute 12/18/2019 2 21  0 60 - 4 47 Thousands/µL Final    Monocytes Absolute 12/18/2019 0 86  0 17 - 1 22 Thousand/µL Final    Eosinophils Absolute 12/18/2019 0 22  0 00 - 0 61 Thousand/µL Final    Basophils Absolute 12/18/2019 0 05  0 00 - 0 10 Thousands/µL Final    Sodium 12/18/2019 135* 136 - 145 mmol/L Final    Potassium 12/18/2019 3 6  3 5 - 5 3 mmol/L Final    Chloride 12/18/2019 104  100 - 108 mmol/L Final    CO2 12/18/2019 23  21 - 32 mmol/L Final    ANION GAP 12/18/2019 8  4 - 13 mmol/L Final    BUN 12/18/2019 12  5 - 25 mg/dL Final    Creatinine 12/18/2019 0 92  0 60 - 1 30 mg/dL Final    Standardized to IDMS reference method    Glucose 12/18/2019 101  65 - 140 mg/dL Final      If the patient is fasting, the ADA then defines impaired fasting glucose as > 100 mg/dL and diabetes as > or equal to 123 mg/dL  Specimen collection should occur prior to Sulfasalazine administration due to the potential for falsely depressed results  Specimen collection should occur prior to Sulfapyridine administration due to the potential for falsely elevated results      Calcium 12/18/2019 8 6  8 3 - 10 1 mg/dL Final    eGFR 12/18/2019 85  ml/min/1 73sq m Final         Angelica Luo MD

## 2020-01-31 ENCOUNTER — TELEPHONE (OUTPATIENT)
Dept: FAMILY MEDICINE CLINIC | Facility: HOSPITAL | Age: 70
End: 2020-01-31

## 2020-02-03 ENCOUNTER — APPOINTMENT (OUTPATIENT)
Dept: PHYSICAL THERAPY | Facility: CLINIC | Age: 70
End: 2020-02-03
Payer: COMMERCIAL

## 2020-02-03 ENCOUNTER — APPOINTMENT (OUTPATIENT)
Dept: OCCUPATIONAL THERAPY | Facility: CLINIC | Age: 70
End: 2020-02-03
Payer: COMMERCIAL

## 2020-02-05 ENCOUNTER — OFFICE VISIT (OUTPATIENT)
Dept: OCCUPATIONAL THERAPY | Facility: CLINIC | Age: 70
End: 2020-02-05
Payer: COMMERCIAL

## 2020-02-05 ENCOUNTER — OFFICE VISIT (OUTPATIENT)
Dept: PHYSICAL THERAPY | Facility: CLINIC | Age: 70
End: 2020-02-05
Payer: COMMERCIAL

## 2020-02-05 DIAGNOSIS — I63.10 CEREBROVASCULAR ACCIDENT (CVA) DUE TO EMBOLISM OF PRECEREBRAL ARTERY (HCC): Primary | ICD-10-CM

## 2020-02-05 PROCEDURE — 97110 THERAPEUTIC EXERCISES: CPT | Performed by: OCCUPATIONAL THERAPIST

## 2020-02-05 PROCEDURE — 97110 THERAPEUTIC EXERCISES: CPT

## 2020-02-05 PROCEDURE — 97530 THERAPEUTIC ACTIVITIES: CPT | Performed by: OCCUPATIONAL THERAPIST

## 2020-02-05 PROCEDURE — 97112 NEUROMUSCULAR REEDUCATION: CPT

## 2020-02-05 NOTE — PROGRESS NOTES
Discharge Note  Today's date: 2020  Patient name: Cristiane Harper  : 1950  MRN: [id number]  Referring provider: Cathyann Goldberg, MD  Dx:   Encounter Diagnosis     ICD-10-CM    1  Cerebrovascular accident (CVA) due to embolism of precerebral artery (HCC) [address] 10                  Subjective:  Pt states he wants to be done with PT due to feeling great  Objective:  See treatment diary below      Assessment: Reviewed HEP with pt, no questions or concerns  Pt performed below TE with great tolerance and technique  Plan:  D/C TO hep       Daily Treatment Diary     Dx:    1  Cerebrovascular accident (CVA) due to embolism of precerebral artery (HCC)      POC EXPIRES On:  3/18/20  PRECAUTIONS:  Fall risk  CO-MORBIDITES:  CVA with R hemiparesis 12/15/19  PERSONAL FACTORS:  None    Manual                                                                                   Exercise Diary  1/13 1/15 1/20 1/22 1/27  1/29 2/5      Bike L1 8' L1 8' L2 8' L2 8' L2 8'  L7 10'  10'      Gastroc stretch on wedge   30" 3 30" 3 30" x 3  30" x 3  30" 3      SLS R on foam disc Blue 5" 10 Blue 10" 10 Blue 10" 10 Black 10" 10  Black   10 x 10"  black  10" x 10  Black 10" 10      Tandem walk on foam 12 ft x 3 laps FWD 12 ft x 3 laps FWD  12 ft x 3 laps FWD 12 ft x 3 laps FWD/ BWD 12 ft x 4 laps FWD/ BWD 12ft x 4      T-band DF R Blue 30 Blue 30  Blue 30 Heel raise R ecc  Control  Heel raise R ecc   Control 20x        Marching on foam L/R No foam 20 ea            Stepping over hurdles FWD/SIDE 6 ea 6 hurdles x4 lengths ea 6 hurdles x4 lengths ea Hurdles 4x12 ft ea on foam Hurdles 4x12 ft ea on foam Hurdles 4x12 ft ea on foam                    T-band kicks for LE balance hip flex/ext L/R Blue 15 ea Blue 15 ea Blue 15 ea Blue 15 ea BTB 15x ea  BTB  20x ea  [id number]      RB march NV 15 ea 15 ea 20 ea 20x ea  20x ea  20      BOSU step ups FWD      FWD 5" x 10  ea        RB mini squats       20x  20 Modalities

## 2020-02-05 NOTE — PROGRESS NOTES
Daily Note     Today's date: 2020  Patient name: Venkatesh Encarnacion  : 1950  MRN: [id number]  Referring provider: Karol Mercado MD  Dx:   Encounter Diagnosis     ICD-10-CM    1  Cerebrovascular accident (CVA) due to embolism of precerebral artery (HCC) I63 10                   Subjective: I am driving now  Objective: See treatment diary below      Assessment: Tolerated treatment well  Patient has good strength  Pt  Has been cleared to return to drive by MD  FOTO score improved  Plan: Continue per plan of care  Daily Treatment Diary     Dx:    1   Cerebrovascular accident (CVA) due to embolism of precerebral artery ([address] )      POC EXPIRES On:  3/18/20  PRECAUTIONS:  Fall risk  CO-MORBIDITES:  CVA with R hemiparesis 12/15/19  PERSONAL FACTORS:  None    Exercise Diary  1/8 1/13 1/15 1/20 1/22 1/27 1/29 2/5     UBE  5/5 2 0 level 5/5 2 0 level 5/5 2 0 level 5/5 2 0 level 5/5 2 0 level 5/5  2 0   5/5 2 0 level     Wrist curls  #4 3x10 #4 3x10 #4 3x10 #4 3x10 #4 3x10 4#  3x10 #4 3x10     P/S therabar  Red 3x10 Red 3x10 Red 3x10 Red 3x10 Green 3x10 Green  3x10 Green 3x10     Pinch clips w/ rotation  Green 3 sets Green 3 sets Blue 3 std Blue 3 sets [employer] 3 sets [employer]  3 sets [employer] 3 sets      Translation small pegs  Small pegs/board Small /pegs board [place]    grooved grooved     tweezer w/ pegs  3 rows           Stress load on table  1m  1m 1m 1m 1m       Clip w/ pegs   Red clip  Red clip Blue clip Blue  Clip  Blue clip                                                                                                                                                    HEP- [facility] reviewed                Modalities

## 2020-02-27 DIAGNOSIS — I63.9 EMBOLIC STROKE (HCC): ICD-10-CM

## 2020-02-27 DIAGNOSIS — I63.10 CEREBROVASCULAR ACCIDENT (CVA) DUE TO EMBOLISM OF PRECEREBRAL ARTERY (HCC): Primary | ICD-10-CM

## 2020-02-27 RX ORDER — AMLODIPINE BESYLATE 2.5 MG/1
2.5 TABLET ORAL DAILY
Qty: 30 TABLET | Refills: 11 | Status: SHIPPED | OUTPATIENT
Start: 2020-02-27 | End: 2021-01-06 | Stop reason: SDUPTHER

## 2020-03-03 ENCOUNTER — TELEPHONE (OUTPATIENT)
Dept: NEUROLOGY | Facility: CLINIC | Age: 70
End: 2020-03-03

## 2020-03-03 NOTE — TELEPHONE ENCOUNTER
Called PT to confirm appointment for tomorrow 3/4/2020 at 10 AM with Dr Vivienne Richard  PT got upset and asked who is calling and I explained to him that this is [facility] and I am calling to confirm his appt for tomorrow  PT stated he cancelled this appoinment and hung up

## 2020-04-06 DIAGNOSIS — E78.00 PURE HYPERCHOLESTEROLEMIA: ICD-10-CM

## 2020-04-06 RX ORDER — ATORVASTATIN CALCIUM 40 MG/1
40 TABLET, FILM COATED ORAL DAILY
Qty: 90 TABLET | Refills: 3 | Status: SHIPPED | OUTPATIENT
Start: 2020-04-06 | End: 2021-04-02 | Stop reason: SDUPTHER

## 2020-08-04 LAB
ALBUMIN SERPL-MCNC: 5.1 G/DL (ref 3.8–4.8)
ALBUMIN/GLOB SERPL: 2.3 {RATIO} (ref 1.2–2.2)
ALP SERPL-CCNC: 87 IU/L (ref 39–117)
ALT SERPL-CCNC: 8 IU/L (ref 0–44)
AST SERPL-CCNC: 16 IU/L (ref 0–40)
BILIRUB SERPL-MCNC: 0.6 MG/DL (ref 0–1.2)
BUN SERPL-MCNC: 18 MG/DL (ref 8–27)
BUN/CREAT SERPL: 19 (ref 10–24)
CALCIUM SERPL-MCNC: 9.9 MG/DL (ref 8.6–10.2)
CHLORIDE SERPL-SCNC: 99 MMOL/L (ref 96–106)
CHOLEST SERPL-MCNC: 169 MG/DL (ref 100–199)
CO2 SERPL-SCNC: 21 MMOL/L (ref 20–29)
CREAT SERPL-MCNC: 0.96 MG/DL (ref 0.76–1.27)
GLOBULIN SER-MCNC: 2.2 G/DL (ref 1.5–4.5)
GLUCOSE SERPL-MCNC: 90 MG/DL (ref 65–99)
HDLC SERPL-MCNC: 52 MG/DL
LDLC SERPL CALC-MCNC: 94 MG/DL (ref 0–99)
POTASSIUM SERPL-SCNC: 4.4 MMOL/L (ref 3.5–5.2)
PROT SERPL-MCNC: 7.3 G/DL (ref 6–8.5)
SL AMB EGFR AFRICAN AMERICAN: 93 ML/MIN/1.73
SL AMB EGFR NON AFRICAN AMERICAN: 80 ML/MIN/1.73
SODIUM SERPL-SCNC: 138 MMOL/L (ref 134–144)
TRIGL SERPL-MCNC: 113 MG/DL (ref 0–149)

## 2020-08-12 ENCOUNTER — OFFICE VISIT (OUTPATIENT)
Dept: GASTROENTEROLOGY | Facility: CLINIC | Age: 70
End: 2020-08-12
Payer: COMMERCIAL

## 2020-08-12 ENCOUNTER — TELEPHONE (OUTPATIENT)
Dept: GASTROENTEROLOGY | Facility: CLINIC | Age: 70
End: 2020-08-12

## 2020-08-12 VITALS
HEIGHT: 66 IN | TEMPERATURE: 99.1 F | HEART RATE: 75 BPM | SYSTOLIC BLOOD PRESSURE: 122 MMHG | DIASTOLIC BLOOD PRESSURE: 86 MMHG | BODY MASS INDEX: 23.78 KG/M2 | WEIGHT: 148 LBS

## 2020-08-12 DIAGNOSIS — I63.10 CEREBROVASCULAR ACCIDENT (CVA) DUE TO EMBOLISM OF PRECEREBRAL ARTERY (HCC): Primary | ICD-10-CM

## 2020-08-12 DIAGNOSIS — Z12.11 SCREENING FOR COLON CANCER: ICD-10-CM

## 2020-08-12 DIAGNOSIS — Z86.010 HISTORY OF COLON POLYPS: ICD-10-CM

## 2020-08-12 PROBLEM — Z86.0100 HISTORY OF COLON POLYPS: Status: ACTIVE | Noted: 2020-08-12

## 2020-08-12 PROCEDURE — 3008F BODY MASS INDEX DOCD: CPT | Performed by: NURSE PRACTITIONER

## 2020-08-12 PROCEDURE — 4004F PT TOBACCO SCREEN RCVD TLK: CPT | Performed by: NURSE PRACTITIONER

## 2020-08-12 PROCEDURE — 4040F PNEUMOC VAC/ADMIN/RCVD: CPT | Performed by: NURSE PRACTITIONER

## 2020-08-12 PROCEDURE — 99204 OFFICE O/P NEW MOD 45 MIN: CPT | Performed by: NURSE PRACTITIONER

## 2020-08-12 PROCEDURE — 1160F RVW MEDS BY RX/DR IN RCRD: CPT | Performed by: NURSE PRACTITIONER

## 2020-08-12 NOTE — PROGRESS NOTES
[id number] [facility] - Outpatient Consultation  Carmen Hale 72 y o  male MRN: [id number]  Encounter: [id number]    ASSESSMENT AND PLAN:      1  Cerebrovascular accident (CVA) due to embolism of precerebral artery ([address] )  2  Chronic anticoagulation   New CVA in January of 2020 for which she is maintained on chronic anticoagulation with Eliquis  Would prefer not to hold Eliquis or have him undergo anesthesia until a year following his CVA  Will place in recall to repeat a surveillance colonoscopy in January of 2021  The patient realizes at that time we will need to hold Eliquis for 2 days if cleared by his primary care physician as he is yet to see a neurologist and or cardiologist   He understands that holding anticoagulation places him at a slight increased risk of developing a thromboembolic complication  It will be up to Dr Camron Espinoza discretion to either hold  anticoagulation for 2 days prior to the procedure or to bridge him with Lovenox/Heparin  2  History of colon polyps  3  Screening for colon cancer   Increased risk with multiple tubular adenomas and a remote history of a large adenoma  Last colonoscopy in August of 2017 showed multiple adenomatous colon  Polyps completely removed  A 3 year recall advised  Holding on surveillance colonoscopy as the patient recently suffered a CVA in January of this year and was placed on anticoagulation with Eliquis    Plan for colonoscopy in January of 2021 if cleared by Dr Fowler Luh his PCP, as he has not followed up on recommendations to see a neurologist and or a cardiologist     -  Plan for surveillance colonoscopy in January of 2021    Followup Appointment:   5 months  ______________________________________________________________________    Chief Complaint   Patient presents with    Due for Colonoscopy     Chronic A/C use       HPI:   Carmen Hale is a 72y o  year old male who presents  To the office for cardiac clearance prior to upcoming surveillance colonoscopy for history of multiple/large adenomatous colon polyps  The patient denies any rectal bleeding, melena, abdominal pain, change in bowel habits, diarrhea constipation  Has had an intentional 10 lb weight loss over the past 6 months  Denies any GERD, early satiety, dysphagia or odynophagia  Denies any fevers or chills  Denies any chest pain, shortness of breath, easy bruising or prolonged bleeding  Historical Information   Past Medical History:   Diagnosis Date    Cancer ([facility] [address] )     Hyperlipidemia      Past Surgical History:   Procedure Laterality Date    APPENDECTOMY  2003    SKULL FRACTURE ELEVATION  1984     Social History     Substance and Sexual Activity   Alcohol Use Yes    Alcohol/week: 5 0 standard drinks    Types: 5 Cans of beer per week    Frequency: 2-3 times a week    Drinks per session: 3 or 4     Social History     Substance and Sexual Activity   Drug Use No     Social History     Tobacco Use   Smoking Status Current Every Day Smoker    Packs/day: 1 00   Smokeless Tobacco Never Used     Family History   Problem Relation Age of Onset    Heart disease Father     Colon polyps Father     Substance Abuse Neg Hx     Mental illness Neg Hx     Colon cancer Neg Hx        Meds/Allergies     Current Outpatient Medications:     amLODIPine (NORVASC) 2 5 mg tablet    apixaban (ELIQUIS) 5 mg    aspirin (ECOTRIN LOW STRENGTH) 81 mg EC tablet    atorvastatin (LIPITOR) 40 mg tablet    nicotine (NICODERM CQ) 21 mg/24 hr TD 24 hr patch    No Known Allergies    PHYSICAL EXAM:    Blood pressure 122/86, pulse 75, temperature 99 1 °F (37 3 °C), height 5' 6" (1 676 m), weight 67 1 kg (148 lb)  Body mass index is 23 89 kg/m²  General Appearance: NAD, cooperative, alert  Eyes: Anicteric  ENT:  Normocephalic      Neck:   Appears normal   Resp:  Clear to auscultation bilaterally; no rales, rhonchi or wheezing; respirations unlabored   CV:  S1 S2, Regular rate and rhythm; no murmur, rub, or gallop  GI:  Soft, non-tender, non-distended; normal bowel sounds; no masses, no organomegaly   Rectal: Deferred  Musculoskeletal: No cyanosis, clubbing or edema  Normal ROM  Skin:  No jaundice, rashes, or lesions   Psych: Normal affect, good eye contact  Neuro: No gross deficits, AAOx3    Lab Results:   Lab Results   Component Value Date    WBC 10 29 (H) 12/18/2019    HGB 13 3 12/18/2019    HCT 39 5 12/18/2019    MCV 99 (H) 12/18/2019     12/18/2019     Lab Results   Component Value Date     01/11/2018    K 4 4 08/03/2020    CL 99 08/03/2020    CO2 21 08/03/2020    BUN 18 08/03/2020    CREATININE 0 96 08/03/2020    GLUCOSE 101 (H) 01/11/2018    CALCIUM 8 6 12/18/2019    AST 16 08/03/2020    ALT 8 08/03/2020    ALKPHOS 85 12/16/2019    PROT 6 6 09/09/2016    BILITOT 0 3 09/09/2016    EGFR 85 12/18/2019     No results found for: IRON, TIBC, FERRITIN  No results found for: LIPASE    Radiology Results:   No results found  REVIEW OF SYSTEMS:    CONSTITUTIONAL: Denies any fever, chills, rigors  Positive for intentional weight loss  HEENT: No earache or tinnitus  Denies hearing loss or visual disturbances  CARDIOVASCULAR: No chest pain or palpitations  RESPIRATORY: Denies any cough, hemoptysis, shortness of breath or dyspnea on exertion  GASTROINTESTINAL: As noted in the History of Present Illness  GENITOURINARY: No problems with urination  Denies any hematuria or dysuria  NEUROLOGIC: No dizziness or vertigo, denies headaches  MUSCULOSKELETAL: Denies any muscle or joint pain  SKIN: Denies skin rashes or itching  ENDOCRINE: Denies excessive thirst  Denies intolerance to heat or cold  PSYCHOSOCIAL: Denies depression or anxiety  Denies any recent memory loss

## 2020-10-06 ENCOUNTER — OFFICE VISIT (OUTPATIENT)
Dept: FAMILY MEDICINE CLINIC | Facility: HOSPITAL | Age: 70
End: 2020-10-06
Payer: COMMERCIAL

## 2020-10-06 VITALS
DIASTOLIC BLOOD PRESSURE: 72 MMHG | WEIGHT: 151.6 LBS | BODY MASS INDEX: 24.36 KG/M2 | HEART RATE: 69 BPM | TEMPERATURE: 98.1 F | HEIGHT: 66 IN | SYSTOLIC BLOOD PRESSURE: 128 MMHG

## 2020-10-06 DIAGNOSIS — I63.10 CEREBROVASCULAR ACCIDENT (CVA) DUE TO EMBOLISM OF PRECEREBRAL ARTERY (HCC): ICD-10-CM

## 2020-10-06 DIAGNOSIS — E78.00 HYPERCHOLESTEROLEMIA: Primary | ICD-10-CM

## 2020-10-06 DIAGNOSIS — Z12.5 ENCOUNTER FOR PROSTATE CANCER SCREENING: ICD-10-CM

## 2020-10-06 DIAGNOSIS — F17.200 TOBACCO USE DISORDER: ICD-10-CM

## 2020-10-06 PROCEDURE — 1170F FXNL STATUS ASSESSED: CPT | Performed by: INTERNAL MEDICINE

## 2020-10-06 PROCEDURE — 3725F SCREEN DEPRESSION PERFORMED: CPT | Performed by: INTERNAL MEDICINE

## 2020-10-06 PROCEDURE — G0439 PPPS, SUBSEQ VISIT: HCPCS | Performed by: INTERNAL MEDICINE

## 2020-10-06 PROCEDURE — 1160F RVW MEDS BY RX/DR IN RCRD: CPT | Performed by: INTERNAL MEDICINE

## 2020-10-06 PROCEDURE — 4004F PT TOBACCO SCREEN RCVD TLK: CPT | Performed by: INTERNAL MEDICINE

## 2020-10-06 PROCEDURE — 99214 OFFICE O/P EST MOD 30 MIN: CPT | Performed by: INTERNAL MEDICINE

## 2020-10-06 PROCEDURE — 1125F AMNT PAIN NOTED PAIN PRSNT: CPT | Performed by: INTERNAL MEDICINE

## 2021-01-06 DIAGNOSIS — I63.9 EMBOLIC STROKE (HCC): ICD-10-CM

## 2021-01-06 RX ORDER — AMLODIPINE BESYLATE 2.5 MG/1
2.5 TABLET ORAL DAILY
Qty: 90 TABLET | Refills: 3 | Status: SHIPPED | OUTPATIENT
Start: 2021-01-06 | End: 2021-04-02 | Stop reason: SDUPTHER

## 2021-01-19 ENCOUNTER — TELEPHONE (OUTPATIENT)
Dept: FAMILY MEDICINE CLINIC | Facility: HOSPITAL | Age: 71
End: 2021-01-19

## 2021-02-24 ENCOUNTER — TELEPHONE (OUTPATIENT)
Dept: GASTROENTEROLOGY | Facility: CLINIC | Age: 71
End: 2021-02-24

## 2021-02-25 ENCOUNTER — TELEPHONE (OUTPATIENT)
Dept: GASTROENTEROLOGY | Facility: CLINIC | Age: 71
End: 2021-02-25

## 2021-02-25 VITALS — WEIGHT: 151 LBS | HEIGHT: 67 IN | BODY MASS INDEX: 23.7 KG/M2

## 2021-02-25 DIAGNOSIS — Z86.010 HISTORY OF COLON POLYPS: Primary | ICD-10-CM

## 2021-02-25 RX ORDER — SODIUM PICOSULFATE, MAGNESIUM OXIDE, AND ANHYDROUS CITRIC ACID 10; 3.5; 12 MG/160ML; G/160ML; G/160ML
LIQUID ORAL
Qty: 2 BOTTLE | Refills: 0 | Status: SHIPPED | OUTPATIENT
Start: 2021-02-25 | End: 2021-03-04 | Stop reason: HOSPADM

## 2021-02-25 NOTE — TELEPHONE ENCOUNTER
Why does your doctor want you to have this procedure? Hx of polyps    Do you have kidney disease?  no  If yes, are you on dialysis :     Have you had diverticulitis within the past 2 months? no    Are you diabetic?  no  If yes, insulin dependent: If yes, provide diabetic instructions sheet     Do take iron supplements?  no  If yes, instruct patient to hold iron supplement for 7 days prior    Are you on a blood thinner? yes   Was the blood thinner sheet complete and faxed to cardiologist yes  Plavix (clopidogrel), Coumadin (warfarin), Lovenox (enoxaparin), Xarelto (rivaroxaban), Pradaxa(dabigatran), Eliquis(apixaban) Savaysa/Lixiana (edoxapan)    Do you have an automatic implantable cardiac defibrillator (AICD)/pacemaker ([facility])? no  Was AICD/pacemaker sheet completed and faxed to cardiologist? no    Are you on home oxygen? no  If yes, continuous or nocturnal:     Have you been treated for MRSA, VRE or any communicable diseases? no    Heart attack, stroke, or stent within 3 months? no  Schedule at Hospital if within 3-6 months   Use nitroglycerin for chest pain in the last 6 months? no    History of organ  transplant?  no   If yes, notify Endo      History of neck/throat/tongue surgery or cancer? yes  IF yes, notify Endo   Head and neck CA 2009    Any problems with anesthesia in the past? no     Was stool C diff ordered?  no Stool specimen needs to be completed prior to procedure    Do have any facial or body piercings?no     Do you have a latex allergy? no     Do have an allergy to metals? (Bravo study only) no     If pediatric patient, was consent faxed to pediatrician no     Patient rights reviewed yes   Rx Clenpiq sent to provider for signature  Instructions emailed to patient

## 2021-03-03 ENCOUNTER — HOSPITAL ENCOUNTER (OUTPATIENT)
Dept: GASTROENTEROLOGY | Facility: AMBULATORY SURGERY CENTER | Age: 71
Discharge: HOME | End: 2021-03-03

## 2021-03-04 ENCOUNTER — ANESTHESIA (OUTPATIENT)
Dept: GASTROENTEROLOGY | Facility: AMBULATORY SURGERY CENTER | Age: 71
End: 2021-03-04

## 2021-03-04 ENCOUNTER — ANESTHESIA EVENT (OUTPATIENT)
Dept: GASTROENTEROLOGY | Facility: AMBULATORY SURGERY CENTER | Age: 71
End: 2021-03-04

## 2021-03-04 ENCOUNTER — HOSPITAL ENCOUNTER (OUTPATIENT)
Dept: GASTROENTEROLOGY | Facility: AMBULATORY SURGERY CENTER | Age: 71
Discharge: HOME | End: 2021-03-04
Payer: COMMERCIAL

## 2021-03-04 VITALS
OXYGEN SATURATION: 98 % | HEART RATE: 76 BPM | RESPIRATION RATE: 30 BRPM | TEMPERATURE: 98.4 F | BODY MASS INDEX: 23.34 KG/M2 | SYSTOLIC BLOOD PRESSURE: 143 MMHG | WEIGHT: 149 LBS | DIASTOLIC BLOOD PRESSURE: 76 MMHG

## 2021-03-04 DIAGNOSIS — Z23 ENCOUNTER FOR IMMUNIZATION: ICD-10-CM

## 2021-03-04 DIAGNOSIS — Z86.010 PERSONAL HISTORY OF COLONIC POLYPS: ICD-10-CM

## 2021-03-04 PROBLEM — Z85.818 HISTORY OF CANCER TONSIL: Status: ACTIVE | Noted: 2021-03-04

## 2021-03-04 PROCEDURE — 45385 COLONOSCOPY W/LESION REMOVAL: CPT | Performed by: INTERNAL MEDICINE

## 2021-03-04 PROCEDURE — 45380 COLONOSCOPY AND BIOPSY: CPT | Performed by: INTERNAL MEDICINE

## 2021-03-04 RX ORDER — PROPOFOL 10 MG/ML
INJECTION, EMULSION INTRAVENOUS AS NEEDED
Status: DISCONTINUED | OUTPATIENT
Start: 2021-03-04 | End: 2021-03-04

## 2021-03-04 RX ORDER — SODIUM CHLORIDE 9 MG/ML
50 INJECTION, SOLUTION INTRAVENOUS CONTINUOUS
Status: DISCONTINUED | OUTPATIENT
Start: 2021-03-04 | End: 2021-03-08 | Stop reason: HOSPADM

## 2021-03-04 RX ADMIN — PROPOFOL 40 MG: 10 INJECTION, EMULSION INTRAVENOUS at 13:16

## 2021-03-04 RX ADMIN — PROPOFOL 30 MG: 10 INJECTION, EMULSION INTRAVENOUS at 13:12

## 2021-03-04 RX ADMIN — PROPOFOL 40 MG: 10 INJECTION, EMULSION INTRAVENOUS at 13:24

## 2021-03-04 RX ADMIN — PROPOFOL 40 MG: 10 INJECTION, EMULSION INTRAVENOUS at 13:19

## 2021-03-04 RX ADMIN — PROPOFOL 70 MG: 10 INJECTION, EMULSION INTRAVENOUS at 13:10

## 2021-03-04 RX ADMIN — PROPOFOL 30 MG: 10 INJECTION, EMULSION INTRAVENOUS at 13:28

## 2021-03-04 RX ADMIN — SODIUM CHLORIDE 50 ML/HR: 9 INJECTION, SOLUTION INTRAVENOUS at 12:41

## 2021-03-04 NOTE — H&P
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    Colon polyp     CVA (cerebral vascular accident) ([address] ) 01/2020    Hepatitis B     History of hepatitis-B exposure with positive surface and core antibodies with negative surface antigen    Hyperlipidemia     Hypertension     Stroke [facility])      Past Surgical History:   Procedure Laterality Date    APPENDECTOMY  2003    COLONOSCOPY  08/02/2017     a single sessile 12 mm polyp in the midtransverse colon, 2 sessile nonbleeding polyps ranging from 4-5 mm in the sigmoid colon  Prior tattoo site from large polypectomy in the splenic flexure noted  Pathology of colon polyps were tubular adenomas  A 3 year recall advised      SKULL FRACTURE ELEVATION  1984     Social History   Social History     Substance and Sexual Activity   Alcohol Use Not Currently     Social History     Substance and Sexual Activity   Drug Use No     Social History     Tobacco Use   Smoking Status Current Every Day Smoker    Packs/day: 1 00   Smokeless Tobacco Never Used     Family History   Problem Relation Age of Onset    Heart disease Father     Colon polyps Father     Substance Abuse Neg Hx     Mental illness Neg Hx     Colon cancer Neg Hx        Meds/Allergies       Current Outpatient Medications:     amLODIPine (NORVASC) 2 5 mg tablet    aspirin (ECOTRIN LOW STRENGTH) 81 mg EC tablet    atorvastatin (LIPITOR) 40 mg tablet    Sod Picosulfate-Mag Ox-Cit Acd (Clenpiq) 10-3 5-12 MG-GM -GM/160ML SOLN    apixaban (ELIQUIS) 5 mg    Current Facility-Administered Medications:     sodium chloride 0 9 % infusion, 50 mL/hr, Intravenous, Continuous, Continue from Pre-op at 03/04/21 1308    No Known Allergies    Objective     /76   Pulse 84   Temp 98 4 °F (36 9 °C) (Temporal)   Resp 14   Wt 67 6 kg (149 lb)   SpO2 99%   BMI 23 34 kg/m²     PHYSICAL EXAM    Gen: NAD AAOx3  CV: S1S2 RRR no m/r/g  CHEST: Clear b/l no c/r/w  ABD: soft, +BS NT/ND  EXT: no edema    ASSESSMENT/PLAN:  This is a 72y o  year old male here for colonoscopy, and he is stable and optimized for his procedure

## 2021-03-04 NOTE — DISCHARGE INSTRUCTIONS
High Fiber Diet   WHAT YOU NEED TO KNOW:   What is a high-fiber diet? A high-fiber diet includes foods that have a high amount of fiber  Fiber is the part of fruits, vegetables, and grains that is not broken down by your body  Fiber keeps your bowel movements regular  Fiber can also help lower your cholesterol level, control blood sugar in people with diabetes, and relieve constipation  Fiber can also help you control your weight because it helps you feel full faster  Most adults should eat 25 to 35 grams of fiber each day  Talk to your dietitian or healthcare provider about the amount of fiber you need  What foods are good sources of fiber? · Foods with at least 4 grams of fiber per serving:      ? ? to ½ cup of high-fiber cereal (check the nutrition label on the box)    ? ½ cup of blackberries or raspberries    ? 4 dried prunes    ? 1 cooked artichoke    ? ½ cup of cooked legumes, such as lentils, or red, kidney, and isaacs beans    · Foods with 1 to 3 grams of fiber per serving:      ? 1 slice of whole-wheat, pumpernickel, or rye bread    ? ½ cup of cooked brown rice    ? 4 whole-wheat crackers    ? 1 cup of oatmeal    ? ½ cup of cereal with 1 to 3 grams of fiber per serving (check the nutrition label on the box)    ? 1 small piece of fruit, such as an apple, banana, pear, kiwi, or orange    ? 3 dates    ? ½ cup of canned apricots, fruit cocktail, peaches, or pears    ? ½ cup of raw or cooked vegetables, such as carrots, cauliflower, cabbage, spinach, squash, or corn  What are some ways that I can increase fiber in my diet? · Choose brown or wild rice instead of white rice  · Use whole wheat flour in recipes instead of white or all-purpose flour  · Add beans and peas to casseroles or soups  · Choose fresh fruit and vegetables with peels or skins on instead of juices  What other guidelines should I follow? · Add fiber to your diet slowly    You may have abdominal discomfort, bloating, and gas if you add fiber to your diet too quickly  · Drink plenty of liquids as you add fiber to your diet  You may have nausea or develop constipation if you do not drink enough water  Ask how much liquid to drink each day and which liquids are best for you  CARE AGREEMENT:   You have the right to help plan your care  Discuss treatment options with your healthcare provider to decide what care you want to receive  You always have the right to refuse treatment  The above information is an  only  It is not intended as medical advice for individual conditions or treatments  Talk to your doctor, nurse or pharmacist before following any medical regimen to see if it is safe and effective for you  © Copyright [address] Information is for End User's use only and may not be sold, redistributed or otherwise used for commercial purposes  All illustrations and images included in CareNotes® are the copyrighted property of [employer]  or [facility] Manjit Perez   Hemorrhoids   WHAT YOU NEED TO KNOW:   What are hemorrhoids? Hemorrhoids are swollen blood vessels inside your rectum (internal hemorrhoids) or on your anus (external hemorrhoids)  Sometimes a hemorrhoid may prolapse  This means it extends out of your anus  What increases my risk for hemorrhoids? · Pregnancy or obesity    · Straining or sitting for a long time during bowel movements    · Liver disease    · Weak muscles around the anus caused by older age, rectal surgery, or anal intercourse    · A lack of physical activity    · Chronic diarrhea or constipation    · A low-fiber diet    What are the signs and symptoms of hemorrhoids?    · Pain or itching around your anus or inside your rectum    · Swelling or bumps around your anus    · Bright red blood in your bowel movement, on the toilet paper, or in the toilet bowl    · Tissue bulging out of your anus (prolapsed hemorrhoids)    · Incontinence (poor control over urine or bowel movements)    How are hemorrhoids diagnosed? Your healthcare provider will ask about your symptoms, the foods you eat, and your bowel movements  He or she will examine your anus for external hemorrhoids  You may need the following:  · A digital rectal exam  is a test to check for hemorrhoids  Your healthcare provider will put a gloved finger inside your anus to feel for the hemorrhoids  · An anoscopy  is a test that uses a scope (small tube with a light and camera on the end) to look at your hemorrhoids  How are hemorrhoids treated? Treatment will depend on your symptoms  You may need any of the following:  · Medicines  can help decrease pain and swelling, and soften your bowel movement  The medicine may be a pill, pad, cream, or ointment  · Procedures  may be used to shrink or remove your hemorrhoid  Examples include rubber-band ligation, sclerotherapy, and photocoagulation  These procedures may be done in your healthcare provider's office  Ask your healthcare provider for more information about these procedures  · Surgery  may be needed to shrink or remove your hemorrhoids  How can I manage my symptoms? · Apply ice on your anus for 15 to 20 minutes every hour or as directed  Use an ice pack, or put crushed ice in a plastic bag  Cover it with a towel before you apply it to your anus  Ice helps prevent tissue damage and decreases swelling and pain  · Take a sitz bath  Fill a bathtub with 4 to 6 inches of warm water  You may also use a sitz bath pan that fits inside a toilet bowl  Sit in the sitz bath for 15 minutes  Do this 3 times a day, and after each bowel movement  The warm water can help decrease pain and swelling  · Keep your anal area clean  Gently wash the area with warm water daily  Soap may irritate the area  After a bowel movement, wipe with moist towelettes or wet toilet paper  Dry toilet paper can irritate the area  How can I help prevent hemorrhoids?    · Do not strain to have a bowel movement  Do not sit on the toilet too long  These actions can increase pressure on the tissues in your rectum and anus  · Drink plenty of liquids  Liquids can help prevent constipation  Ask how much liquid to drink each day and which liquids are best for you  · Eat a variety of high-fiber foods  Examples include fruits, vegetables, and whole grains  Ask your healthcare provider how much fiber you need each day  You may need to take a fiber supplement  · Exercise as directed  Exercise, such as walking, may make it easier to have a bowel movement  Ask your healthcare provider to help you create an exercise plan  · Do not have anal sex  Anal sex can weaken the skin around your rectum and anus  · Avoid heavy lifting  This can cause straining and increase your risk for another hemorrhoid  When should I seek immediate care? · You have severe pain in your rectum or around your anus  · You have severe pain in your abdomen and you are vomiting  · You have bleeding from your anus that soaks through your underwear  When should I contact my healthcare provider? · You have frequent and painful bowel movements  · Your hemorrhoid looks or feels more swollen than usual      · You do not have a bowel movement for 2 days or more  · You see or feel tissue coming through your anus  · You have questions or concerns about your condition or care  CARE AGREEMENT:   You have the right to help plan your care  Learn about your health condition and how it may be treated  Discuss treatment options with your healthcare providers to decide what care you want to receive  You always have the right to refuse treatment  The above information is an  only  It is not intended as medical advice for individual conditions or treatments  Talk to your doctor, nurse or pharmacist before following any medical regimen to see if it is safe and effective for you    © Copyright [address] Information is for [employer] use only and may not be sold, redistributed or otherwise used for commercial purposes  All illustrations and images included in CareNotes® are the copyrighted property of A [employer] A M , Inc  or Jose Lira  Diverticulosis   WHAT YOU NEED TO KNOW:   What is diverticulosis? Diverticulosis is a condition that causes small pockets called diverticula to form in your intestine  These pockets make it difficult for bowel movements to pass through your digestive system  What causes diverticulosis? Diverticula form when muscles have to work hard to move bowel movements through the intestine  The force causes bulges to form at weak areas in the intestine  This may happen if you eat foods that are low in fiber  Fiber helps give your bowel movements more bulk so they are larger and easier to move through your colon  The following may increase your risk of diverticulosis:  · A history of constipation    · Age 36 or older    · Obesity    · Lack of exercise    What are the signs and symptoms of diverticulosis? Diverticulosis usually does not cause any signs or symptoms  It may cause any of the following in some people:  · Pain or discomfort in your lower abdomen    · Abdominal bloating    · Constipation or diarrhea    How is diverticulosis diagnosed? Your healthcare provider will examine you and ask about your bowel movements, diet, and symptoms  He or she will also ask about any medical conditions you have or medicines you take  You may need any of the following:  · Blood tests  may be done to check for signs of inflammation  · A barium enema  is an x-ray of your colon that may show diverticula  A tube is put into your anus, and a liquid called barium is put through the tube  Barium is used so that healthcare providers can see your colon more clearly  · Flexible sigmoidoscopy  is a test to look for any changes in your lower intestines and rectum   It may also show the cause of any bleeding or pain  A soft, bendable tube with a light on the end will be put into your anus  It will then be moved forward into your intestine  · A colonoscopy  is used to look at your whole colon  A scope (long bendable tube with a light on the end) is used to take pictures  This test may show diverticula  · A CT scan , or CAT scan, may show diverticula  You may be given contrast liquid before the scan  Tell the healthcare provider if you have ever had an allergic reaction to contrast liquid  How is diverticulosis managed? The goal of treatment is to manage any symptoms you have and prevent other problems such as diverticulitis  Diverticulitis is swelling or infection of the diverticula  Your healthcare provider may recommend any of the following:  · Eat a variety of high-fiber foods  High-fiber foods help you have regular bowel movements  High-fiber foods include cooked beans, fruits, vegetables, and some cereals  Most adults need 25 to 35 grams of fiber each day  Your healthcare provider may recommend that you have more  Ask your healthcare provider how much fiber you need  Increase fiber slowly  You may have abdominal discomfort, bloating, and gas if you add fiber to your diet too quickly  You may need to take a fiber supplement if you are not getting enough fiber from food  · Medicines  to soften your bowel movements may be given  You may also need medicines to treat symptoms such as bloating and pain  · Drink liquids as directed  You may need to drink 2 to 3 liters (8 to 12 cups) of liquids every day  Ask your healthcare provider how much liquid to drink each day and which liquids are best for you  · Apply heat  on your abdomen for 20 to 30 minutes every 2 hours for as many days as directed  Heat helps decrease pain and muscle spasms  How can I help prevent diverticulitis or other symptoms?   The following may help decrease your risk for diverticulitis or symptoms, such as bleeding  Talk to your provider about these or other things you can do to prevent problems that may occur with diverticulosis  · Exercise regularly  Ask your healthcare provider about the best exercise plan for you  Exercise can help you have regular bowel movements  Get 30 minutes of exercise on most days of the week  · Maintain a healthy weight  Ask your healthcare provider how much you should weigh  Ask him or her to help you create a weight loss plan if you are overweight  · Do not smoke  Nicotine and other chemicals in cigarettes increase your risk for diverticulitis  Ask your healthcare provider for information if you currently smoke and need help to quit  E-cigarettes or smokeless tobacco still contain nicotine  Talk to your healthcare provider before you use these products  · Ask your healthcare provider if it is safe to take NSAIDs  NSAIDs may increase your risk of diverticulitis  When should I seek immediate care? · You have severe pain on the left side of your lower abdomen  · Your bowel movements are bright or dark red  When should I contact my healthcare provider? · You have a fever and chills  · You feel dizzy or lightheaded  · You have nausea, or you are vomiting  · You have a change in your bowel movements  · You have questions or concerns about your condition or care  CARE AGREEMENT:   You have the right to help plan your care  Learn about your health condition and how it may be treated  Discuss treatment options with your healthcare providers to decide what care you want to receive  You always have the right to refuse treatment  The above information is an  only  It is not intended as medical advice for individual conditions or treatments  Talk to your doctor, nurse or pharmacist before following any medical regimen to see if it is safe and effective for you    © Copyright [employer] 2020 Information is for End User's use only and may not be sold, redistributed or otherwise used for commercial purposes  All illustrations and images included in CareNotes® are the copyrighted property of A [employer] M , Inc  or Jose Perez   Colorectal Polyps   WHAT YOU NEED TO KNOW:   What are colorectal polyps? Colorectal polyps are small growths of tissue in the lining of the colon and rectum  Most polyps are hyperplastic polyps and are usually benign (noncancerous)  Certain types of polyps, called adenomatous polyps, may turn into cancer  What increases my risk of colorectal polyps? The exact cause of colorectal polyps is unknown  The following may increase your risk:  · Older age    · A diet of foods high in fat and low in fiber     · Family history of polyps    · Intestinal diseases, such as Crohn's disease or ulcerative colitis    · An unhealthy lifestyle, such as physical inactivity, smoking, or drinking alcohol    · Obesity    What are the signs and symptoms of colorectal polyps? · Blood in your bowel movement or bleeding from the rectum    · Change in bowel movement habits, such as diarrhea and constipation    · Abdominal pain    How are colorectal polyps diagnosed? You should have fecal blood screening once a year for colorectal disease if you are over 48years old  You should be screened earlier if you have an intestinal disease or a family history of polyps or colorectal cancer  During this screening, a sample of your bowel movement is checked for blood, which may be an early sign of colorectal polyps or cancer  You may also need any of the following tests:  · Digital rectal exam:  Your healthcare provider will examine your anus and use a finger to check your rectum for polyps  · Barium enema: A barium enema is an x-ray of the colon  A tube is put into your anus, and a liquid called barium is put through the tube  Barium is used so that healthcare providers can see your colon better on the x-ray film       · Virtual colonoscopy: This is a CT scan that takes pictures of the inside of your colon and rectum  A small, flexible tube is put into your rectum and air or carbon dioxide (gas) is used to expand your colon  This lets healthcare providers clearly see your colon and any polyps on a monitor  · Colonoscopy or sigmoidoscopy: These procedures help your healthcare provider see the inside of your colon using a flexible tube with a small light and camera on the end  During a sigmoidoscopy, your healthcare provider will only look at rectum and lower colon  During a colonoscopy, healthcare providers will look at the full length of your colon  Healthcare providers may remove a small amount of tissue from the colon for a biopsy  How are colorectal polyps treated? A polypectomy is a minimally invasive procedure to remove your polyps  They may be removed during a colonoscopy or sigmoidoscopy  Your healthcare provider may need to remove the polyps with a laparoscope  Laparoscopy is done by inserting a small, flexible scope into incisions made on your abdomen  What are the risks of colorectal polyps? You may bleed during a colonoscopy procedure  Your bowel may be perforated (torn) when polyps are removed  This may lead to an open abdominal surgery  During surgery, you may bleed too much or get an infection  Adenomatous polyps that are not removed may turn into cancer and become more difficult to treat  Where can I find support and more information? · Gal Moon ([facility]) [address], [place] [address]  Phone: [phone]  Web Address: Abiel Moreno  [facility] gov    When should I contact my healthcare provider? · You have a fever  · You have chills, a cough, or feel weak and achy  · You have abdominal pain that does not go away or gets worse after you take medicine  · Your abdomen is swollen  · You are losing weight without trying      · You have questions or concerns about your condition or care  When should I seek immediate care or call 911? · You have sudden shortness of breath  · You have a fast heart rate, fast breathing, or are too dizzy to stand up  · You have severe abdominal pain  · You see blood in your bowel movement  CARE AGREEMENT:   You have the right to help plan your care  Learn about your health condition and how it may be treated  Discuss treatment options with your healthcare providers to decide what care you want to receive  You always have the right to refuse treatment  The above information is an  only  It is not intended as medical advice for individual conditions or treatments  Talk to your doctor, nurse or pharmacist before following any medical regimen to see if it is safe and effective for you  © Copyright [address] Information is for End User's use only and may not be sold, redistributed or otherwise used for commercial purposes   All illustrations and images included in CareNotes® are the copyrighted property of A D A M , Inc  or [address] [place] [address]

## 2021-03-04 NOTE — ANESTHESIA PREPROCEDURE EVALUATION
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)   (+) Hypercholesterolemia      NEURO/PSYCH   (+) Cerebrovascular accident (CVA) due to embolism of precerebral artery (HCC)   (+) History of cancer tonsil   (+) History of colon polyps        Physical Exam    Airway    Mallampati score: I  TM Distance: >3 FB  Neck ROM: full     Dental   No notable dental hx     Cardiovascular  Cardiovascular exam normal    Pulmonary  Pulmonary exam normal     Other Findings        Anesthesia Plan  ASA Score- 3     Anesthesia Type- IV sedation with anesthesia with ASA Monitors  Additional Monitors:   Airway Plan:           Plan Factors-    Chart reviewed  Patient summary reviewed  Patient is a current smoker  Patient instructed to abstain from smoking on day of procedure  Patient smoked on day of surgery  Induction- intravenous  Postoperative Plan-     Informed Consent- Anesthetic plan and risks discussed with patient

## 2021-03-11 LAB
ERYTHROCYTE [DISTWIDTH] IN BLOOD BY AUTOMATED COUNT: 13.1 % (ref 11.6–15.4)
HCT VFR BLD AUTO: 47.1 % (ref 37.5–51)
HGB BLD-MCNC: 15.6 G/DL (ref 13–17.7)
MCH RBC QN AUTO: 32.4 PG (ref 26.6–33)
MCHC RBC AUTO-ENTMCNC: 33.1 G/DL (ref 31.5–35.7)
MCV RBC AUTO: 98 FL (ref 79–97)
PLATELET # BLD AUTO: 298 X10E3/UL (ref 150–450)
PSA SERPL-MCNC: 0.8 NG/ML (ref 0–4)
RBC # BLD AUTO: 4.81 X10E6/UL (ref 4.14–5.8)
WBC # BLD AUTO: 10.4 X10E3/UL (ref 3.4–10.8)

## 2021-04-02 DIAGNOSIS — E78.00 PURE HYPERCHOLESTEROLEMIA: ICD-10-CM

## 2021-04-02 DIAGNOSIS — I63.9 EMBOLIC STROKE (HCC): ICD-10-CM

## 2021-04-04 DIAGNOSIS — E78.00 PURE HYPERCHOLESTEROLEMIA: ICD-10-CM

## 2021-04-04 DIAGNOSIS — I63.9 EMBOLIC STROKE (HCC): ICD-10-CM

## 2021-04-04 RX ORDER — ATORVASTATIN CALCIUM 40 MG/1
40 TABLET, FILM COATED ORAL DAILY
Qty: 90 TABLET | Refills: 3 | Status: SHIPPED | OUTPATIENT
Start: 2021-04-04 | End: 2021-10-11

## 2021-04-04 RX ORDER — AMLODIPINE BESYLATE 2.5 MG/1
2.5 TABLET ORAL DAILY
Qty: 90 TABLET | Refills: 3 | Status: SHIPPED | OUTPATIENT
Start: 2021-04-04 | End: 2022-03-24

## 2021-04-04 RX ORDER — ATORVASTATIN CALCIUM 40 MG/1
40 TABLET, FILM COATED ORAL DAILY
Qty: 90 TABLET | Refills: 0 | Status: CANCELLED | OUTPATIENT
Start: 2021-04-04

## 2021-04-04 RX ORDER — AMLODIPINE BESYLATE 2.5 MG/1
2.5 TABLET ORAL DAILY
Qty: 90 TABLET | Refills: 0 | Status: CANCELLED | OUTPATIENT
Start: 2021-04-04

## 2021-04-06 ENCOUNTER — OFFICE VISIT (OUTPATIENT)
Dept: FAMILY MEDICINE CLINIC | Facility: HOSPITAL | Age: 71
End: 2021-04-06
Payer: COMMERCIAL

## 2021-04-06 VITALS
SYSTOLIC BLOOD PRESSURE: 130 MMHG | HEIGHT: 66 IN | DIASTOLIC BLOOD PRESSURE: 80 MMHG | WEIGHT: 152.4 LBS | BODY MASS INDEX: 24.49 KG/M2 | HEART RATE: 75 BPM

## 2021-04-06 DIAGNOSIS — E78.00 HYPERCHOLESTEROLEMIA: Primary | ICD-10-CM

## 2021-04-06 DIAGNOSIS — I63.10 CEREBROVASCULAR ACCIDENT (CVA) DUE TO EMBOLISM OF PRECEREBRAL ARTERY (HCC): ICD-10-CM

## 2021-04-06 DIAGNOSIS — I65.23 BILATERAL CAROTID ARTERY STENOSIS: ICD-10-CM

## 2021-04-06 PROCEDURE — 4004F PT TOBACCO SCREEN RCVD TLK: CPT | Performed by: INTERNAL MEDICINE

## 2021-04-06 PROCEDURE — 3008F BODY MASS INDEX DOCD: CPT | Performed by: INTERNAL MEDICINE

## 2021-04-06 PROCEDURE — 1101F PT FALLS ASSESS-DOCD LE1/YR: CPT | Performed by: INTERNAL MEDICINE

## 2021-04-06 PROCEDURE — 3288F FALL RISK ASSESSMENT DOCD: CPT | Performed by: INTERNAL MEDICINE

## 2021-04-06 PROCEDURE — 1160F RVW MEDS BY RX/DR IN RCRD: CPT | Performed by: INTERNAL MEDICINE

## 2021-04-06 PROCEDURE — 99214 OFFICE O/P EST MOD 30 MIN: CPT | Performed by: INTERNAL MEDICINE

## 2021-04-06 PROCEDURE — 3725F SCREEN DEPRESSION PERFORMED: CPT | Performed by: INTERNAL MEDICINE

## 2021-04-06 NOTE — PROGRESS NOTES
Assessment/Plan:       Diagnoses and all orders for this visit:    Hypercholesterolemia  -     Lipid Panel with Direct LDL reflex; Future  -     Comprehensive metabolic panel; Future  -     Lipid Panel with Direct LDL reflex  -     Comprehensive metabolic panel    Cerebrovascular accident (CVA) due to embolism of precerebral artery (HCC)    Bilateral carotid artery stenosis          All of the above diagnoses have been assessed  Additional COMMENTS/PLAN: wellness next time      Subjective:      Patient ID: Donna Betancourt is a 72 y o  male  HPI     CVA-still has some balance issues  Some vision  Tobacco abuse -needs lung screening  COD-will repeat at end of the year  Hyperlipidemia- The patient is compliant with diet and taking meds  The patient understands the efficacy of the treatment in vascular prevention  The following portions of the patient's history were revised and updated as appropriate: Problem list, allergies, med list, FH, SH, Past medical and surgical histories  Current Outpatient Medications   Medication Sig Dispense Refill    amLODIPine (NORVASC) 2 5 mg tablet Take 1 tablet (2 5 mg total) by mouth daily 90 tablet 3    apixaban (ELIQUIS) 5 mg Take 1 tablet (5 mg total) by mouth 2 (two) times a day 180 tablet 3    aspirin (ECOTRIN LOW STRENGTH) 81 mg EC tablet Take 1 tablet (81 mg total) by mouth daily      atorvastatin (LIPITOR) 40 mg tablet Take 1 tablet (40 mg total) by mouth daily 90 tablet 3     No current facility-administered medications for this visit  Review of Systems   All other systems reviewed and are negative          Objective:    /80 (BP Location: Left arm, Patient Position: Sitting, Cuff Size: Standard)   Pulse 75   Ht 5' 6" (1 676 m)   Wt 69 1 kg (152 lb 6 4 oz)   BMI 24 60 kg/m²     BP Readings from Last 3 Encounters:   04/06/21 130/80   03/04/21 143/76   10/06/20 128/72                  Wt Readings from Last 3 Encounters:   04/06/21 69 1 kg (152 lb 6 4 oz)   03/04/21 67 6 kg (149 lb)   02/25/21 68 5 kg (151 lb)         Physical Exam  Vitals signs reviewed  Constitutional:       Appearance: Normal appearance  Neck:      Musculoskeletal: Normal range of motion and neck supple  Cardiovascular:      Rate and Rhythm: Normal rate  Pulmonary:      Effort: Pulmonary effort is normal       Breath sounds: Normal breath sounds  Musculoskeletal:      Right lower leg: No edema  Left lower leg: No edema  Neurological:      General: No focal deficit present  Mental Status: He is alert and oriented to person, place, and time  Mental status is at baseline  Comments: Gait stressed and unstressed not impaired  No visits with results within 2 Week(s) from this visit  Latest known visit with results is:   Office Visit on 10/06/2020   Component Date Value Ref Range Status    White Blood Cell Count 03/10/2021 10 4  3 4 - 10 8 x10E3/uL Final    Red Blood Cell Count 03/10/2021 4 81  4 14 - 5 80 x10E6/uL Final    Hemoglobin 03/10/2021 15 6  13 0 - 17 7 g/dL Final    HCT 03/10/2021 47 1  37 5 - 51 0 % Final    MCV 03/10/2021 98* 79 - 97 fL Final    MCH 03/10/2021 32 4  26 6 - 33 0 pg Final    MCHC 03/10/2021 33 1  31 5 - 35 7 g/dL Final    RDW 03/10/2021 13 1  11 6 - 15 4 % Final    Platelet Count 77/70/0111 298  150 - 450 x10E3/uL Final    Prostate Specific Antigen Total 03/10/2021 0 8  0 0 - 4 0 ng/mL Final    Comment: Roche ECLIA methodology  According to the American Urological Association,   Serum PSA shoulddecrease and remain at undetectable levels after   radicalprostatectomy  The AUA defines biochemical recurrence as an initialPSA   value 0 2 ng/mL or greater followed by a subsequent confirmatoryPSA value 0 2   ng/mL or greater  Values obtained with different assay methods or kits cannot   be usedinterchangeably  Results cannot be interpreted as absolute evidenceof   the presence or absence of malignant disease  Mitch Brambila MD    Some or all of this note was generated with a voice recognition dictation system and therefore my contain grammatical or spelling errors

## 2021-05-06 ENCOUNTER — HOSPITAL ENCOUNTER (OUTPATIENT)
Dept: CT IMAGING | Facility: HOSPITAL | Age: 71
Discharge: HOME | End: 2021-05-06
Attending: INTERNAL MEDICINE
Payer: COMMERCIAL

## 2021-05-06 DIAGNOSIS — F17.200 TOBACCO USE DISORDER: ICD-10-CM

## 2021-05-06 PROCEDURE — 71271 CT THORAX LUNG CANCER SCR C-: CPT

## 2021-05-12 DIAGNOSIS — S22.070S COMPRESSION FRACTURE OF T9 VERTEBRA, SEQUELA: Primary | ICD-10-CM

## 2021-05-12 DIAGNOSIS — S22.070A COMPRESSION FRACTURE OF T9 VERTEBRA, INITIAL ENCOUNTER (HCC): ICD-10-CM

## 2021-07-06 ENCOUNTER — TELEPHONE (OUTPATIENT)
Dept: FAMILY MEDICINE CLINIC | Facility: HOSPITAL | Age: 71
End: 2021-07-06

## 2021-07-06 NOTE — TELEPHONE ENCOUNTER
Shay Otto said that [place] oral surgeons are trying to get a hold of Dr Negrito Wood to go over his medication list  There number is [phone]    PCB

## 2021-10-05 LAB
ALBUMIN SERPL-MCNC: 5.2 G/DL (ref 3.8–4.8)
ALBUMIN/GLOB SERPL: 2.4 {RATIO} (ref 1.2–2.2)
ALP SERPL-CCNC: 92 IU/L (ref 44–121)
ALT SERPL-CCNC: 6 IU/L (ref 0–44)
AST SERPL-CCNC: 15 IU/L (ref 0–40)
BILIRUB SERPL-MCNC: 0.6 MG/DL (ref 0–1.2)
BUN SERPL-MCNC: 12 MG/DL (ref 8–27)
BUN/CREAT SERPL: 11 (ref 10–24)
CALCIUM SERPL-MCNC: 10.1 MG/DL (ref 8.6–10.2)
CHLORIDE SERPL-SCNC: 99 MMOL/L (ref 96–106)
CHOLEST SERPL-MCNC: 199 MG/DL (ref 100–199)
CO2 SERPL-SCNC: 24 MMOL/L (ref 20–29)
CREAT SERPL-MCNC: 1.08 MG/DL (ref 0.76–1.27)
GLOBULIN SER-MCNC: 2.2 G/DL (ref 1.5–4.5)
GLUCOSE SERPL-MCNC: 92 MG/DL (ref 65–99)
HDLC SERPL-MCNC: 62 MG/DL
LDLC SERPL CALC-MCNC: 113 MG/DL (ref 0–99)
LDLC/HDLC SERPL: 1.8 RATIO (ref 0–3.6)
POTASSIUM SERPL-SCNC: 4.3 MMOL/L (ref 3.5–5.2)
PROT SERPL-MCNC: 7.4 G/DL (ref 6–8.5)
SL AMB EGFR AFRICAN AMERICAN: 80 ML/MIN/1.73
SL AMB EGFR NON AFRICAN AMERICAN: 69 ML/MIN/1.73
SL AMB VLDL CHOLESTEROL CALC: 24 MG/DL (ref 5–40)
SODIUM SERPL-SCNC: 137 MMOL/L (ref 134–144)
TRIGL SERPL-MCNC: 137 MG/DL (ref 0–149)

## 2021-10-11 ENCOUNTER — OFFICE VISIT (OUTPATIENT)
Dept: FAMILY MEDICINE CLINIC | Facility: HOSPITAL | Age: 71
End: 2021-10-11
Payer: COMMERCIAL

## 2021-10-11 VITALS
BODY MASS INDEX: 24.37 KG/M2 | OXYGEN SATURATION: 97 % | SYSTOLIC BLOOD PRESSURE: 122 MMHG | DIASTOLIC BLOOD PRESSURE: 76 MMHG | WEIGHT: 151 LBS | HEART RATE: 72 BPM

## 2021-10-11 DIAGNOSIS — Z12.5 ENCOUNTER FOR PROSTATE CANCER SCREENING: ICD-10-CM

## 2021-10-11 DIAGNOSIS — I65.23 BILATERAL CAROTID ARTERY STENOSIS: ICD-10-CM

## 2021-10-11 DIAGNOSIS — Z23 NEED FOR INFLUENZA VACCINATION: Primary | ICD-10-CM

## 2021-10-11 DIAGNOSIS — S22.000A COMPRESSION FRACTURE OF BODY OF THORACIC VERTEBRA (HCC): ICD-10-CM

## 2021-10-11 DIAGNOSIS — I10 ESSENTIAL HYPERTENSION: ICD-10-CM

## 2021-10-11 DIAGNOSIS — J43.2 CENTRILOBULAR EMPHYSEMA (HCC): ICD-10-CM

## 2021-10-11 DIAGNOSIS — E78.00 PURE HYPERCHOLESTEROLEMIA: ICD-10-CM

## 2021-10-11 DIAGNOSIS — E78.00 HYPERCHOLESTEROLEMIA: ICD-10-CM

## 2021-10-11 PROCEDURE — 1170F FXNL STATUS ASSESSED: CPT | Performed by: INTERNAL MEDICINE

## 2021-10-11 PROCEDURE — 90662 IIV NO PRSV INCREASED AG IM: CPT

## 2021-10-11 PROCEDURE — G0008 ADMIN INFLUENZA VIRUS VAC: HCPCS

## 2021-10-11 PROCEDURE — 99214 OFFICE O/P EST MOD 30 MIN: CPT | Performed by: INTERNAL MEDICINE

## 2021-10-11 PROCEDURE — 3288F FALL RISK ASSESSMENT DOCD: CPT | Performed by: INTERNAL MEDICINE

## 2021-10-11 PROCEDURE — 1160F RVW MEDS BY RX/DR IN RCRD: CPT | Performed by: INTERNAL MEDICINE

## 2021-10-11 PROCEDURE — G0439 PPPS, SUBSEQ VISIT: HCPCS | Performed by: INTERNAL MEDICINE

## 2021-10-11 PROCEDURE — 1125F AMNT PAIN NOTED PAIN PRSNT: CPT | Performed by: INTERNAL MEDICINE

## 2021-10-11 PROCEDURE — 3725F SCREEN DEPRESSION PERFORMED: CPT | Performed by: INTERNAL MEDICINE

## 2021-10-11 PROCEDURE — 4004F PT TOBACCO SCREEN RCVD TLK: CPT | Performed by: INTERNAL MEDICINE

## 2021-10-11 RX ORDER — AMOXICILLIN 500 MG/1
CAPSULE ORAL
COMMUNITY
Start: 2021-07-27

## 2021-10-11 RX ORDER — ATORVASTATIN CALCIUM 80 MG/1
80 TABLET, FILM COATED ORAL DAILY
Qty: 90 TABLET | Refills: 3 | Status: SHIPPED | OUTPATIENT
Start: 2021-10-11

## 2021-11-02 ENCOUNTER — TELEPHONE (OUTPATIENT)
Dept: FAMILY MEDICINE CLINIC | Facility: HOSPITAL | Age: 71
End: 2021-11-02

## 2022-03-24 DIAGNOSIS — I63.9 EMBOLIC STROKE (HCC): ICD-10-CM

## 2022-03-24 RX ORDER — APIXABAN 5 MG/1
TABLET, FILM COATED ORAL
Qty: 180 TABLET | Refills: 0 | Status: SHIPPED | OUTPATIENT
Start: 2022-03-24 | End: 2022-07-08

## 2022-03-24 RX ORDER — AMLODIPINE BESYLATE 2.5 MG/1
TABLET ORAL
Qty: 90 TABLET | Refills: 0 | Status: SHIPPED | OUTPATIENT
Start: 2022-03-24 | End: 2022-07-08

## 2022-04-05 LAB
ALBUMIN SERPL-MCNC: 4.5 G/DL (ref 3.8–4.8)
ALBUMIN/GLOB SERPL: 1.7 {RATIO} (ref 1.2–2.2)
ALP SERPL-CCNC: 111 IU/L (ref 44–121)
ALT SERPL-CCNC: 8 IU/L (ref 0–44)
AST SERPL-CCNC: 17 IU/L (ref 0–40)
BILIRUB SERPL-MCNC: 0.3 MG/DL (ref 0–1.2)
BUN SERPL-MCNC: 14 MG/DL (ref 8–27)
BUN/CREAT SERPL: 15 (ref 10–24)
CALCIUM SERPL-MCNC: 9.7 MG/DL (ref 8.6–10.2)
CHLORIDE SERPL-SCNC: 96 MMOL/L (ref 96–106)
CHOLEST SERPL-MCNC: 154 MG/DL (ref 100–199)
CO2 SERPL-SCNC: 19 MMOL/L (ref 20–29)
CREAT SERPL-MCNC: 0.93 MG/DL (ref 0.76–1.27)
EGFR: 88 ML/MIN/1.73
ERYTHROCYTE [DISTWIDTH] IN BLOOD BY AUTOMATED COUNT: 12.6 % (ref 11.6–15.4)
GLOBULIN SER-MCNC: 2.7 G/DL (ref 1.5–4.5)
GLUCOSE SERPL-MCNC: 101 MG/DL (ref 65–99)
HCT VFR BLD AUTO: 41.7 % (ref 37.5–51)
HDLC SERPL-MCNC: 41 MG/DL
HGB BLD-MCNC: 14.2 G/DL (ref 13–17.7)
LDLC SERPL CALC-MCNC: 91 MG/DL (ref 0–99)
LDLC/HDLC SERPL: 2.2 RATIO (ref 0–3.6)
MCH RBC QN AUTO: 32.6 PG (ref 26.6–33)
MCHC RBC AUTO-ENTMCNC: 34.1 G/DL (ref 31.5–35.7)
MCV RBC AUTO: 96 FL (ref 79–97)
PLATELET # BLD AUTO: 418 X10E3/UL (ref 150–450)
POTASSIUM SERPL-SCNC: 4.6 MMOL/L (ref 3.5–5.2)
PROT SERPL-MCNC: 7.2 G/DL (ref 6–8.5)
PSA SERPL-MCNC: 1.5 NG/ML (ref 0–4)
RBC # BLD AUTO: 4.36 X10E6/UL (ref 4.14–5.8)
SL AMB VLDL CHOLESTEROL CALC: 22 MG/DL (ref 5–40)
SODIUM SERPL-SCNC: 137 MMOL/L (ref 134–144)
TRIGL SERPL-MCNC: 119 MG/DL (ref 0–149)
WBC # BLD AUTO: 13.7 X10E3/UL (ref 3.4–10.8)

## 2022-04-11 ENCOUNTER — OFFICE VISIT (OUTPATIENT)
Dept: FAMILY MEDICINE CLINIC | Facility: HOSPITAL | Age: 72
End: 2022-04-11
Payer: COMMERCIAL

## 2022-04-11 VITALS
HEIGHT: 67 IN | SYSTOLIC BLOOD PRESSURE: 130 MMHG | HEART RATE: 67 BPM | DIASTOLIC BLOOD PRESSURE: 82 MMHG | OXYGEN SATURATION: 96 % | BODY MASS INDEX: 23.86 KG/M2 | WEIGHT: 152 LBS

## 2022-04-11 DIAGNOSIS — I63.10 CEREBROVASCULAR ACCIDENT (CVA) DUE TO EMBOLISM OF PRECEREBRAL ARTERY (HCC): ICD-10-CM

## 2022-04-11 DIAGNOSIS — J43.2 CENTRILOBULAR EMPHYSEMA (HCC): ICD-10-CM

## 2022-04-11 DIAGNOSIS — E78.00 HYPERCHOLESTEROLEMIA: Primary | ICD-10-CM

## 2022-04-11 DIAGNOSIS — F17.200 TOBACCO USE DISORDER: ICD-10-CM

## 2022-04-11 PROCEDURE — 4004F PT TOBACCO SCREEN RCVD TLK: CPT | Performed by: INTERNAL MEDICINE

## 2022-04-11 PROCEDURE — 99214 OFFICE O/P EST MOD 30 MIN: CPT | Performed by: INTERNAL MEDICINE

## 2022-04-11 PROCEDURE — 3725F SCREEN DEPRESSION PERFORMED: CPT | Performed by: INTERNAL MEDICINE

## 2022-04-11 PROCEDURE — 3008F BODY MASS INDEX DOCD: CPT | Performed by: INTERNAL MEDICINE

## 2022-04-11 PROCEDURE — 1160F RVW MEDS BY RX/DR IN RCRD: CPT | Performed by: INTERNAL MEDICINE

## 2022-04-11 NOTE — PROGRESS NOTES
Assessment/Plan:       Diagnoses and all orders for this visit:    Hypercholesterolemia    Cerebrovascular accident (CVA) due to embolism of precerebral artery ([address] )    Centrilobular emphysema ([address] )    Tobacco use disorder  -     CT lung screening program; Future          All of the above diagnoses have been assessed  Additional COMMENTS/PLAN: Labs reviewed and discussed with the patient  Patient was a DEXA scan is ordered last time  He also needs his CT for lung cancer screening  Hips    To be seen in 6 months with Medicare wellness      Subjective:      Patient ID: Rich Min is a 80 y o  male  HPI     CVA-still has some balance issues  Compression fx-needs dexa  Hyperlipidemia- The patient is compliant with diet and taking meds  The patient understands the efficacy of the treatment in vascular prevention  Hypertension  Patient is here for follow-up of elevated blood pressure  Pt is compliant with medications and salt restriction  Denies lightheadedness  Monitors BP - no  The following portions of the patient's history were revised and updated as appropriate: Problem list, allergies, med list, FH, SH, Past medical and surgical histories  Current Outpatient Medications   Medication Sig Dispense Refill    amLODIPine (NORVASC) 2 5 mg tablet Take 1 tablet by mouth once daily 90 tablet 0    amoxicillin (AMOXIL) 500 mg capsule TAKE FOUR CAPSULES BY MOUTH ONE HOUR BEFORE APPOINTMENT      aspirin (ECOTRIN LOW STRENGTH) 81 mg EC tablet Take 1 tablet (81 mg total) by mouth daily      atorvastatin (LIPITOR) 80 mg tablet Take 1 tablet (80 mg total) by mouth daily 90 tablet 3    Eliquis 5 MG Take 1 tablet by mouth twice daily 180 tablet 0     No current facility-administered medications for this visit  Review of Systems   All other systems reviewed and are negative          Objective:    /82 (BP Location: Right arm, Patient Position: Sitting, Cuff Size: Standard)   Pulse 67 Ht 5' 7" (1 702 m)   Wt 68 9 kg (152 lb)   SpO2 96%   BMI 23 81 kg/m²     BP Readings from Last 3 Encounters:   04/11/22 130/82   10/11/21 122/76   04/06/21 130/80                  Wt Readings from Last 3 Encounters:   04/11/22 68 9 kg (152 lb)   10/11/21 68 5 kg (151 lb)   04/06/21 69 1 kg (152 lb 6 4 oz)         Physical Exam  Vitals reviewed  Constitutional:       Appearance: Normal appearance  Cardiovascular:      Rate and Rhythm: Normal rate and regular rhythm  Pulses: Normal pulses  Heart sounds: Normal heart sounds  Abdominal:      General: Abdomen is flat  Bowel sounds are normal       Palpations: Abdomen is soft  Musculoskeletal:      Right lower leg: No edema  Left lower leg: No edema  Neurological:      Mental Status: He is alert  Orders Only on 04/04/2022   Component Date Value Ref Range Status    Prostate Specific Antigen Total 04/04/2022 1 5  0 0 - 4 0 ng/mL Final    Comment: Roche ECLIA methodology  According to the American Urological Association, Serum PSA should  decrease and remain at undetectable levels after radical  prostatectomy  The AUA defines biochemical recurrence as an initial  PSA value 0 2 ng/mL or greater followed by a subsequent confirmatory  PSA value 0 2 ng/mL or greater  Values obtained with different assay methods or kits cannot be used  interchangeably  Results cannot be interpreted as absolute evidence  of the presence or absence of malignant disease  Toño Green MD    Some or all of this note was generated with a voice recognition dictation system and therefore my contain grammatical or spelling errors

## 2022-07-06 ENCOUNTER — HOSPITAL ENCOUNTER (OUTPATIENT)
Dept: CT IMAGING | Facility: HOSPITAL | Age: 72
Discharge: HOME | End: 2022-07-06
Attending: INTERNAL MEDICINE
Payer: COMMERCIAL

## 2022-07-06 DIAGNOSIS — F17.200 TOBACCO USE DISORDER: ICD-10-CM

## 2022-07-06 PROCEDURE — 71271 CT THORAX LUNG CANCER SCR C-: CPT

## 2022-07-08 DIAGNOSIS — I63.9 EMBOLIC STROKE (HCC): ICD-10-CM

## 2022-07-08 RX ORDER — AMLODIPINE BESYLATE 2.5 MG/1
TABLET ORAL
Qty: 90 TABLET | Refills: 0 | Status: SHIPPED | OUTPATIENT
Start: 2022-07-08 | End: 2022-10-17

## 2022-07-08 RX ORDER — APIXABAN 5 MG/1
TABLET, FILM COATED ORAL
Qty: 180 TABLET | Refills: 0 | Status: SHIPPED | OUTPATIENT
Start: 2022-07-08 | End: 2022-10-17

## 2022-07-14 ENCOUNTER — OFFICE VISIT (OUTPATIENT)
Dept: FAMILY MEDICINE CLINIC | Facility: HOSPITAL | Age: 72
End: 2022-07-14
Payer: COMMERCIAL

## 2022-07-14 VITALS
HEART RATE: 80 BPM | DIASTOLIC BLOOD PRESSURE: 78 MMHG | BODY MASS INDEX: 23.39 KG/M2 | WEIGHT: 149 LBS | OXYGEN SATURATION: 98 % | HEIGHT: 67 IN | SYSTOLIC BLOOD PRESSURE: 120 MMHG

## 2022-07-14 DIAGNOSIS — Z91.89 NEED FOR DENTAL CARE: ICD-10-CM

## 2022-07-14 DIAGNOSIS — I10 ESSENTIAL HYPERTENSION: ICD-10-CM

## 2022-07-14 DIAGNOSIS — Z01.818 PRE-OP EXAMINATION: Primary | ICD-10-CM

## 2022-07-14 PROCEDURE — 3288F FALL RISK ASSESSMENT DOCD: CPT | Performed by: INTERNAL MEDICINE

## 2022-07-14 PROCEDURE — 3078F DIAST BP <80 MM HG: CPT | Performed by: INTERNAL MEDICINE

## 2022-07-14 PROCEDURE — 3074F SYST BP LT 130 MM HG: CPT | Performed by: INTERNAL MEDICINE

## 2022-07-14 PROCEDURE — 99214 OFFICE O/P EST MOD 30 MIN: CPT | Performed by: INTERNAL MEDICINE

## 2022-07-14 PROCEDURE — 1101F PT FALLS ASSESS-DOCD LE1/YR: CPT | Performed by: INTERNAL MEDICINE

## 2022-07-14 PROCEDURE — 1160F RVW MEDS BY RX/DR IN RCRD: CPT | Performed by: INTERNAL MEDICINE

## 2022-07-14 RX ORDER — IBUPROFEN 600 MG/1
TABLET ORAL
COMMUNITY
Start: 2022-07-12 | End: 2022-10-14

## 2022-07-14 RX ORDER — CHLORHEXIDINE GLUCONATE 0.12 MG/ML
RINSE ORAL
COMMUNITY
Start: 2022-07-12 | End: 2022-10-14

## 2022-07-14 NOTE — PATIENT INSTRUCTIONS
Hold Eliquis the 2nd dose 2 days before surgery and the doses the day before surgery, it will be resumed the evening after surgery    Aspirin stays

## 2022-07-14 NOTE — PROGRESS NOTES
[id number]  Internal Medicine      PREOPERATIVE EVALUATION     Jamison Vicente is a 71 y o  male who presents to the office today for a preoperative consultation at the request of surgeon Dr Brandi Mcnamara who plans on performing dental extractions on date pending  Prior anesthesia adverse reactions - None    Exercise capacity - Able to walk 4 blocks or climb 2 flights of stairs without symptoms - Yes    Easy bleeding/bruising - No    Chest pain - No    Dyspnea, wheezing, cough - No    Sleep apnea - No    HPI:  Needs dental extractions  Historical Information   Past Medical History:   Diagnosis Date    Cancer [facility])     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    Colon polyp     CVA (cerebral vascular accident) ([facility] [address] ) 01/2020    Hepatitis B     History of hepatitis-B exposure with positive surface and core antibodies with negative surface antigen    Hyperlipidemia     Hypertension     Stroke [facility])        Past Surgical History:   Procedure Laterality Date    APPENDECTOMY  2003    COLONOSCOPY  08/02/2017     a single sessile 12 mm polyp in the midtransverse colon, 2 sessile nonbleeding polyps ranging from 4-5 mm in the sigmoid colon  Prior tattoo site from large polypectomy in the splenic flexure noted  Pathology of colon polyps were tubular adenomas  A 3 year recall advised     [address] [place] [address]     Social History     Social History     Tobacco Use   Smoking Status Current Every Day Smoker    Packs/day: 1 00   Smokeless Tobacco Never Used       Current Outpatient Medications   Medication Sig Dispense Refill    amLODIPine (NORVASC) 2 5 mg tablet Take 1 tablet by mouth once daily 90 tablet 0    amoxicillin (AMOXIL) 500 mg capsule TAKE FOUR CAPSULES BY MOUTH ONE HOUR BEFORE APPOINTMENT      aspirin (ECOTRIN LOW STRENGTH) 81 mg EC tablet Take 1 tablet (81 mg total) by mouth daily      atorvastatin (LIPITOR) 80 mg tablet Take 1 tablet (80 mg total) by mouth daily 90 tablet 3    chlorhexidine (PERIDEX) 0 12 % solution       Eliquis 5 MG Take 1 tablet by mouth twice daily 180 tablet 0    ibuprofen (MOTRIN) 600 mg tablet        No current facility-administered medications for this visit  Family History:   Family History   Problem Relation Age of Onset    Heart disease Father     Colon polyps Father     Substance Abuse Neg Hx     Mental illness Neg Hx     Colon cancer Neg Hx          Review of Systems   All other systems reviewed and are negative  Objective      BP 98/64   Pulse 80   Ht 5' 7" (1 702 m)   Wt 67 6 kg (149 lb)   SpO2 98%   BMI 23 34 kg/m²     Wt Readings from Last 3 Encounters:   07/14/22 67 6 kg (149 lb)   04/11/22 68 9 kg (152 lb)   10/11/21 68 5 kg (151 lb)         Physical Exam  Vitals reviewed  Constitutional:       Appearance: Normal appearance  Neck:      Vascular: No carotid bruit  Cardiovascular:      Rate and Rhythm: Normal rate and regular rhythm  Pulmonary:      Effort: Pulmonary effort is normal       Breath sounds: Normal breath sounds  Abdominal:      General: Abdomen is flat  Bowel sounds are normal       Palpations: Abdomen is soft  Musculoskeletal:      Right lower leg: Edema present  Left lower leg: No edema  Neurological:      Mental Status: He is alert  Cardiographics  ECG: not indicated    Imaging  Chest x-ray: not indicated  No visits with results within 2 Week(s) from this visit  Latest known visit with results is:   Orders Only on 04/04/2022   Component Date Value Ref Range Status    Prostate Specific Antigen Total 04/04/2022 1 5  0 0 - 4 0 ng/mL Final    Comment: Roche ECLIA methodology  According to the American Urological Association, Serum PSA should  decrease and remain at undetectable levels after radical  prostatectomy   The AUA defines biochemical recurrence as an initial  PSA value 0 2 ng/mL or greater followed by a subsequent confirmatory  PSA value 0 2 ng/mL or greater  Values obtained with different assay methods or kits cannot be used  interchangeably  Results cannot be interpreted as absolute evidence  of the presence or absence of malignant disease  Lab Review   No visits with results within 2 Month(s) from this visit  Latest known visit with results is:   Orders Only on 04/04/2022   Component Date Value    Prostate Specific Antige* 04/04/2022 1 5          Diagnoses and all orders for this visit:    Pre-op examination    Need for dental care    Essential hypertension    Other orders  -     chlorhexidine (PERIDEX) 0 12 % solution  -     ibuprofen (MOTRIN) 600 mg tablet                 Plan:         Surgical Clearance - Cleared    Risk - low    Discussion/Summary: pt will hold Eliquis the 1 5 days before surgery and restart evening of surgery  ASA stays           Teagan Baeza MD    Some or all of this note was generated with a voice recognition dictation system and therefore my contain grammatical or spelling errors

## 2022-10-14 ENCOUNTER — OFFICE VISIT (OUTPATIENT)
Dept: FAMILY MEDICINE CLINIC | Facility: HOSPITAL | Age: 72
End: 2022-10-14

## 2022-10-14 VITALS
DIASTOLIC BLOOD PRESSURE: 64 MMHG | OXYGEN SATURATION: 95 % | BODY MASS INDEX: 23.3 KG/M2 | SYSTOLIC BLOOD PRESSURE: 110 MMHG | WEIGHT: 145 LBS | HEIGHT: 66 IN | HEART RATE: 67 BPM

## 2022-10-14 DIAGNOSIS — C32.3 MALIGNANT NEOPLASM OF LARYNGEAL CARTILAGE (HCC): ICD-10-CM

## 2022-10-14 DIAGNOSIS — R73.01 IFG (IMPAIRED FASTING GLUCOSE): ICD-10-CM

## 2022-10-14 DIAGNOSIS — Z12.5 SCREENING FOR PROSTATE CANCER: ICD-10-CM

## 2022-10-14 DIAGNOSIS — Z13.1 SCREENING FOR DIABETES MELLITUS: ICD-10-CM

## 2022-10-14 DIAGNOSIS — Z13.0 SCREENING FOR IRON DEFICIENCY ANEMIA: ICD-10-CM

## 2022-10-14 DIAGNOSIS — Z23 NEED FOR INFLUENZA VACCINATION: Primary | ICD-10-CM

## 2022-10-14 DIAGNOSIS — Z13.220 SCREENING FOR HYPERLIPIDEMIA: ICD-10-CM

## 2022-10-14 DIAGNOSIS — D72.829 LEUKOCYTOSIS, UNSPECIFIED TYPE: ICD-10-CM

## 2022-10-14 NOTE — PATIENT INSTRUCTIONS
Consider DXA - Bone scan for bone density  - Call [phone] to schedule  Osteopenia diet:   To get enough calcium and vitamin D, eat dairy products such as cheese, milk, and yogurt  Some types of orange juice, breads, and cereals are fortified with calcium and vitamin D  Other foods with calcium include:    dried beans  broccoli  wild freshwater salmon  Spinach    The goal for people with osteoporosis is 1,200 mg of calcium a day and 800 international units (IU) of vitamin D     - Per [employer] Preventive Visit Patient Instructions  Thank you for completing your Welcome to Medicare Visit or Medicare Annual Wellness Visit today  Your next wellness visit will be due in one year (10/15/2023)  The screening/preventive services that you may require over the next 5-10 years are detailed below  Some tests may not apply to you based off risk factors and/or age  Screening tests ordered at today's visit but not completed yet may show as past due  Also, please note that scanned in results may not display below  Preventive Screenings:  Service Recommendations Previous Testing/Comments   Colorectal Cancer Screening  Colonoscopy    Fecal Occult Blood Test (FOBT)/Fecal Immunochemical Test (FIT)  Fecal DNA/Cologuard Test  Flexible Sigmoidoscopy Age: 39-70 years old   Colonoscopy: every 10 years (May be performed more frequently if at higher risk)  OR  FOBT/FIT: every 1 year  OR  Cologuard: every 3 years  OR  Sigmoidoscopy: every 5 years  Screening may be recommended earlier than age 39 if at higher risk for colorectal cancer  Also, an individualized decision between you and your healthcare provider will decide whether screening between the ages of 74-80 would be appropriate   Colonoscopy: 03/04/2021  FOBT/FIT: Not on file  Cologuard: Not on file  Sigmoidoscopy: Not on file    Screening Current     Prostate Cancer Screening Individualized decision between patient and health care provider in men between ages of 53-78   Medicare will cover every 12 months beginning on the day after your 50th birthday PSA: 1 5 ng/mL     Screening Current     Hepatitis C Screening Once for adults born between 1945 and 1965  More frequently in patients at high risk for Hepatitis C Hep C Antibody: 09/22/2016    Screening Current   Diabetes Screening 1-2 times per year if you're at risk for diabetes or have pre-diabetes Fasting glucose: No results in last 5 years (No results in last 5 years)  A1C: 5 9 % (12/17/2019)  Screening Current   Cholesterol Screening Once every 5 years if you don't have a lipid disorder  May order more often based on risk factors  Lipid panel: 04/04/2022  Screening Not Indicated  History Lipid Disorder      Other Preventive Screenings Covered by Medicare:  Abdominal Aortic Aneurysm (AAA) Screening: covered once if your at risk  You're considered to be at risk if you have a family history of AAA or a male between the age of 73-68 who smoking at least 100 cigarettes in your lifetime  Lung Cancer Screening: covers low dose CT scan once per year if you meet all of the following conditions: (1) Age 50-69; (2) No signs or symptoms of lung cancer; (3) Current smoker or have quit smoking within the last 15 years; (4) You have a tobacco smoking history of at least 20 pack years (packs per day x number of years you smoked); (5) You get a written order from a healthcare provider  Glaucoma Screening: covered annually if you're considered high risk: (1) You have diabetes OR (2) Family history of glaucoma OR (3)  aged 48 and older OR (3)  American aged 72 and older  Osteoporosis Screening: covered every 2 years if you meet one of the following conditions: (1) Have a vertebral abnormality; (2) On glucocorticoid therapy for more than 3 months; (3) Have primary hyperparathyroidism; (4) On osteoporosis medications and need to assess response to drug therapy    HIV Screening: covered annually if you're between the age of 12-76  Also covered annually if you are younger than 13 and older than 72 with risk factors for HIV infection  For pregnant patients, it is covered up to 3 times per pregnancy  Immunizations:  Immunization Recommendations   Influenza Vaccine Annual influenza vaccination during flu season is recommended for all persons aged >= 6 months who do not have contraindications   Pneumococcal Vaccine   * Pneumococcal conjugate vaccine = PCV13 (Prevnar 13), PCV15 (Vaxneuvance), PCV20 (Prevnar 20)  * Pneumococcal polysaccharide vaccine = PPSV23 (Pneumovax) Adults 25-60 years old: 1-3 doses may be recommended based on certain risk factors  Adults 72 years old: 1-2 doses may be recommended based off what pneumonia vaccine you previously received   Hepatitis B Vaccine 3 dose series if at intermediate or high risk (ex: diabetes, end stage renal disease, liver disease)   Tetanus (Td) Vaccine - COST NOT COVERED BY MEDICARE PART B Following completion of primary series, a booster dose should be given every 10 years to maintain immunity against tetanus  Td may also be given as tetanus wound prophylaxis  Tdap Vaccine - COST NOT COVERED BY MEDICARE PART B Recommended at least once for all adults  For pregnant patients, recommended with each pregnancy  Shingles Vaccine (Shingrix) - COST NOT COVERED BY MEDICARE PART B  2 shot series recommended in those aged 48 and above     Health Maintenance Due:      Topic Date Due    Colorectal Cancer Screening  03/04/2024    Hepatitis C Screening  Completed     Immunizations Due:      Topic Date Due    Pneumococcal Vaccine: 65+ Years (2 - PCV) 12/07/2010    Influenza Vaccine (1) 09/01/2022     Advance Directives   What are advance directives? Advance directives are legal documents that state your wishes and plans for medical care  These plans are made ahead of time in case you lose your ability to make decisions for yourself   Advance directives can apply to any medical decision, such as the treatments you want, and if you want to donate organs  What are the types of advance directives? There are many types of advance directives, and each state has rules about how to use them  You may choose a combination of any of the following:  Living will: This is a written record of the treatment you want  You can also choose which treatments you do not want, which to limit, and which to stop at a certain time  This includes surgery, medicine, IV fluid, and tube feedings  Durable power of  for healthcare [facility]): This is a written record that states who you want to make healthcare choices for you when you are unable to make them for yourself  This person, called a proxy, is usually a family member or a friend  You may choose more than 1 proxy  Do not resuscitate (DNR) order:  A DNR order is used in case your heart stops beating or you stop breathing  It is a request not to have certain forms of treatment, such as CPR  A DNR order may be included in other types of advance directives  Medical directive: This covers the care that you want if you are in a coma, near death, or unable to make decisions for yourself  You can list the treatments you want for each condition  Treatment may include pain medicine, surgery, blood transfusions, dialysis, IV or tube feedings, and a ventilator (breathing machine)  Values history: This document has questions about your views, beliefs, and how you feel and think about life  This information can help others choose the care that you would choose  Why are advance directives important? An advance directive helps you control your care  Although spoken wishes may be used, it is better to have your wishes written down  Spoken wishes can be misunderstood, or not followed  Treatments may be given even if you do not want them  An advance directive may make it easier for your family to make difficult choices about your care     Cigarette Smoking and Your Health   Risks to your health if you smoke:  Nicotine and other chemicals found in tobacco damage every cell in your body  Even if you are a light smoker, you have an increased risk for cancer, heart disease, and lung disease  If you are pregnant or have diabetes, smoking increases your risk for complications  Benefits to your health if you stop smoking: You decrease respiratory symptoms such as coughing, wheezing, and shortness of breath  You reduce your risk for cancers of the lung, mouth, throat, kidney, bladder, pancreas, stomach, and cervix  If you already have cancer, you increase the benefits of chemotherapy  You also reduce your risk for cancer returning or a second cancer from developing  You reduce your risk for heart disease, blood clots, heart attack, and stroke  You reduce your risk for lung infections, and diseases such as pneumonia, asthma, chronic bronchitis, and emphysema  Your circulation improves  More oxygen can be delivered to your body  If you have diabetes, you lower your risk for complications, such as kidney, artery, and eye diseases  You also lower your risk for nerve damage  Nerve damage can lead to amputations, poor vision, and blindness  You improve your body's ability to heal and to fight infections  For more information and support to stop smoking:   Smokefree  [employer]  Phone: 5- [phone] - [phone]  Web Address: www [employer]  [address] [place] [address] 2018 Information is for End User's use only and may not be sold, redistributed or otherwise used for commercial purposes   All illustrations and images included in CareNotes® are the copyrighted property of [employer]  or [address] [place] [address]

## 2022-10-14 NOTE — PROGRESS NOTES
Assessment and Plan:      Diagnosis ICD-10-CM Associated Orders   1  Need for influenza vaccination  Z23 influenza vaccine, high-dose, PF 0 7 mL (FLUZONE HIGH-DOSE)   2  Malignant neoplasm of laryngeal cartilage (HCC)  C32 3 PSA, Total Screen   3  Screening for iron deficiency anemia  Z13 0 CBC and differential     CBC and differential   4  Screening for diabetes mellitus  Z13 1 Comprehensive metabolic panel     HEMOGLOBIN A1C W/ EAG ESTIMATION     Comprehensive metabolic panel     HEMOGLOBIN A1C W/ EAG ESTIMATION   5  IFG (impaired fasting glucose)  R73 01 Comprehensive metabolic panel     HEMOGLOBIN A1C W/ EAG ESTIMATION     Comprehensive metabolic panel     HEMOGLOBIN A1C W/ EAG ESTIMATION   6  Screening for hyperlipidemia  Z13 220 Lipid panel     Lipid panel   7  Screening for prostate cancer  Z12 5 PSA, Total Screen   8  Leukocytosis, unspecified type  D72 829 CBC and differential     CBC and differential     Screening & diagnostic bloodwork ordered, our office will reach out with results once obtained  Flu vaccine given today, still debating getting the pneumonia vaccine  Plan to increase physical activity with treadmill walks during the winter  Preventive health issues were discussed with patient, and age appropriate screening tests were ordered as noted in patient's After Visit Summary  Personalized health advice and appropriate referrals for health education or preventive services given if needed, as noted in patient's After Visit Summary  History of Present Illness:     Patient presents for a Medicare Wellness Visit    HPI   Feels overall well at this time  No acute new complaints  Patient Care Team:  Neftaly Jennings MD as PCP - General  Neftaly Jennings MD     Review of Systems:     Review of Systems   Constitutional: Negative for chills and fever  HENT: Negative for rhinorrhea and sore throat  Respiratory: Negative for cough and shortness of breath      Cardiovascular: Negative for chest pain and palpitations  Gastrointestinal: Negative for constipation and diarrhea  Genitourinary: Negative for dysuria and urgency  Neurological: Negative for light-headedness and headaches  Problem List:     Patient Active Problem List   Diagnosis   • Hypercholesterolemia   • Primary malignant neoplasm of laryngeal cartilage (HCC)   • Tobacco use disorder   • Bilateral carotid artery stenosis   • Hearing loss   • Cerebrovascular accident (CVA) due to embolism of precerebral artery ([place] [address] )   • History of colon polyps   • Screening for colon cancer   • History of cancer tonsil   • Centrilobular emphysema ([place] [address] )   • Compression fracture of body of thoracic vertebra [facility])   • Essential hypertension   • Elevated LFTs      Past Medical and Surgical History:     Past Medical History:   Diagnosis Date   • Cancer ([place] [address] )     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   • Colon polyp    • CVA (cerebral vascular accident) ([place] [address] ) 01/2020   • Hepatitis B     History of hepatitis-B exposure with positive surface and core antibodies with negative surface antigen   • Hyperlipidemia    • Hypertension    • Stroke [facility])      Past Surgical History:   Procedure Laterality Date   • APPENDECTOMY  2003   • COLONOSCOPY  08/02/2017     a single sessile 12 mm polyp in the midtransverse colon, 2 sessile nonbleeding polyps ranging from 4-5 mm in the sigmoid colon  Prior tattoo site from large polypectomy in the splenic flexure noted  Pathology of colon polyps were tubular adenomas  A 3 year recall advised     • SKULL FRACTURE ELEVATION  1984      Family History:     Family History   Problem Relation Age of Onset   • Heart disease Father    • Colon polyps Father    • Substance Abuse Neg Hx    • Mental illness Neg Hx    • Colon cancer Neg Hx       Social History:     Social History     Socioeconomic History   • Marital status: Single     Spouse name: None   • Number of children: None   • Years of education: None   • Highest education level: None   Occupational History   • Occupation: Retired   Tobacco Use   • Smoking status: Current Every Day Smoker     Packs/day: 1 00   • Smokeless tobacco: Never Used   Vaping Use   • Vaping Use: Never used   Substance and Sexual Activity   • Alcohol use: Not Currently   • Drug use: No   • Sexual activity: Not Currently   Other Topics Concern   • None   Social History Narrative    Wears seatbelt     Regular exercise    Regular dental care        Caffeine use:  Coffee daily    Living situation:  Supportive and safe    No advance directives     Social Determinants of Health     Financial Resource Strain: Low Risk    • Difficulty of Paying Living Expenses: Not hard at all   Food Insecurity: Not on file   Transportation Needs: No Transportation Needs   • Lack of Transportation (Medical): No   • Lack of Transportation (Non-Medical): No   Physical Activity: Not on file   Stress: Not on file   Social Connections: Not on file   Intimate Partner Violence: Not on file   Housing Stability: Not on file      Medications and Allergies:     Current Outpatient Medications   Medication Sig Dispense Refill   • amLODIPine (NORVASC) 2 5 mg tablet Take 1 tablet by mouth once daily 90 tablet 0   • amoxicillin (AMOXIL) 500 mg capsule TAKE FOUR CAPSULES BY MOUTH ONE HOUR BEFORE APPOINTMENT     • aspirin (ECOTRIN LOW STRENGTH) 81 mg EC tablet Take 1 tablet (81 mg total) by mouth daily     • atorvastatin (LIPITOR) 80 mg tablet Take 1 tablet (80 mg total) by mouth daily 90 tablet 3   • Eliquis 5 MG Take 1 tablet by mouth twice daily 180 tablet 0     No current facility-administered medications for this visit       No Known Allergies   Immunizations:     Immunization History   Administered Date(s) Administered   • COVID-19 MODERNA VACC 0 5 ML IM 03/05/2021, 04/02/2021, 11/30/2021, 04/02/2022   • COVID-19, unspecified 03/05/2021, 04/02/2021   • INFLUENZA 10/11/2021   • Influenza, high dose seasonal 0 7 mL 10/21/2019, 08/25/2020, 10/11/2021, 10/14/2022   • Pneumococcal Polysaccharide PPV23 12/07/2009   • Zoster Vaccine Recombinant 08/20/2019, 11/11/2019      Health Maintenance:         Topic Date Due   • Colorectal Cancer Screening  03/04/2026   • Hepatitis C Screening  Completed         Topic Date Due   • Pneumococcal Vaccine: 65+ Years (2 - PCV) 12/07/2010      Medicare Screening Tests and Risk Assessments:     Sunita Ramon is here for his Subsequent Wellness visit  Health Risk Assessment:   Patient rates overall health as good  Patient feels that their physical health rating is same  Patient is satisfied with their life  Eyesight was rated as slightly worse  Hearing was rated as slightly worse  Patient feels that their emotional and mental health rating is same  Patients states they are never, rarely angry  Patient states they are never, rarely unusually tired/fatigued  Pain experienced in the last 7 days has been none  Patient states that he has experienced no weight loss or gain in last 6 months  Depression Screening:   PHQ-2 Score: 0      Fall Risk Screening: In the past year, patient has experienced: no history of falling in past year      Home Safety:  Patient has trouble with stairs inside or outside of their home  Patient has working smoke alarms and has working carbon monoxide detector  Home safety hazards include: none  Nutrition:   Current diet is Regular  Medications:   Patient is currently taking over-the-counter supplements  OTC medications include: see medication list  Patient is able to manage medications  Activities of Daily Living (ADLs)/Instrumental Activities of Daily Living (IADLs):   Walk and transfer into and out of bed and chair?: Yes  Dress and groom yourself?: Yes    Bathe or shower yourself?: Yes    Feed yourself?  Yes  Do your laundry/housekeeping?: No  Manage your money, pay your bills and track your expenses?: Yes  Make your own meals?: Yes    Do your own shopping?: Yes    Previous Hospitalizations:   Any hospitalizations or ED visits within the last 12 months?: No      Advance Care Planning:   Living will: Yes    Durable POA for healthcare: Yes    Advanced directive: Yes      Comments: Cyn TSE    Cognitive Screening:   Provider or family/friend/caregiver concerned regarding cognition?: No    PREVENTIVE SCREENINGS      Cardiovascular Screening:    General: Screening Not Indicated and History Lipid Disorder      Diabetes Screening:     General: Screening Current      Colorectal Cancer Screening:     General: Screening Current      Prostate Cancer Screening:    General: Screening Current      Abdominal Aortic Aneurysm (AAA) Screening:    Risk factors include: age between 73-67 yo and tobacco use        General: Screening Current      Lung Cancer Screening:     General: Screening Not Indicated and Screening Current      Hepatitis C Screening:    General: Screening Current    Screening, Brief Intervention, and Referral to Treatment (SBIRT)    Screening      AUDIT-C Screenin) How often did you have a drink containing alcohol in the past year? never  2) How many drinks did you have on a typical day when you were drinking in the past year? 0  3) How often did you have 6 or more drinks on one occasion in the past year? never    AUDIT-C Score: 0  Interpretation: Score 0-3 (male): Negative screen for alcohol misuse    Single Item Drug Screening:  How often have you used an illegal drug (including marijuana) or a prescription medication for non-medical reasons in the past year? never    Single Item Drug Screen Score: 0  Interpretation: Negative screen for possible drug use disorder    Other Counseling Topics:   Car/seat belt/driving safety, skin self-exam, sunscreen and regular weightbearing exercise and calcium and vitamin D intake  Walking on treadmill        Physical Exam:     /64   Pulse 67   Ht 5' 6" (1 676 m)   Wt 65 8 kg (145 lb)   SpO2 95%   BMI 23 40 kg/m²     Physical Exam  Vitals reviewed  Constitutional:       General: He is not in acute distress  Appearance: Normal appearance  He is normal weight  He is not ill-appearing  HENT:      Head: Normocephalic and atraumatic  Right Ear: Tympanic membrane, ear canal and external ear normal  There is no impacted cerumen  Left Ear: Tympanic membrane, ear canal and external ear normal  There is no impacted cerumen  Nose: Nose normal  No congestion or rhinorrhea  Mouth/Throat:      Mouth: Mucous membranes are moist       Pharynx: Oropharynx is clear  No oropharyngeal exudate or posterior oropharyngeal erythema  Eyes:      General: No scleral icterus  Right eye: No discharge  Left eye: No discharge  Conjunctiva/sclera: Conjunctivae normal    Cardiovascular:      Rate and Rhythm: Normal rate and regular rhythm  Pulses: Normal pulses  Heart sounds: Normal heart sounds  No murmur heard  No friction rub  No gallop  Pulmonary:      Effort: Pulmonary effort is normal  No respiratory distress  Breath sounds: Normal breath sounds  No stridor  No wheezing  Musculoskeletal:         General: Normal range of motion  Cervical back: Normal range of motion and neck supple  No rigidity  Right lower leg: No edema  Left lower leg: No edema  Lymphadenopathy:      Cervical: No cervical adenopathy  Skin:     General: Skin is warm and dry  Capillary Refill: Capillary refill takes less than 2 seconds  Coloration: Skin is not jaundiced or pale  Neurological:      Mental Status: He is alert and oriented to person, place, and time  Gait: Gait normal    Psychiatric:         Mood and Affect: Mood normal          Behavior: Behavior normal          Thought Content:  Thought content normal          Judgment: Judgment normal           Barrera Harris DO

## 2023-01-24 ENCOUNTER — OFFICE VISIT (OUTPATIENT)
Dept: FAMILY MEDICINE CLINIC | Facility: HOSPITAL | Age: 73
End: 2023-01-24

## 2023-01-24 VITALS
HEIGHT: 67 IN | DIASTOLIC BLOOD PRESSURE: 88 MMHG | BODY MASS INDEX: 22.79 KG/M2 | WEIGHT: 145.2 LBS | HEART RATE: 40 BPM | OXYGEN SATURATION: 97 % | SYSTOLIC BLOOD PRESSURE: 150 MMHG

## 2023-01-24 DIAGNOSIS — C32.3 MALIGNANT NEOPLASM OF LARYNGEAL CARTILAGE (HCC): ICD-10-CM

## 2023-01-24 DIAGNOSIS — R19.09 GROIN LUMP: Primary | ICD-10-CM

## 2023-01-24 DIAGNOSIS — J43.2 CENTRILOBULAR EMPHYSEMA (HCC): ICD-10-CM

## 2023-01-24 NOTE — PROGRESS NOTES
[facility],     Assessment/Plan:      Diagnosis ICD-10-CM Associated Orders   1  Groin lump  R19 09 US inguinal area     CEA     Cancer antigen 19-9     CEA     Cancer antigen 19-9      2  Centrilobular emphysema ([address] )  J43 2       3  Malignant neoplasm of laryngeal cartilage (HCC)  C32 3 US inguinal area     CEA     Cancer antigen 19-9     CEA     Cancer antigen 19-9        • US ordered  BW added to prior  F/U with me in April, will call with results sooner  • Patient may call or return to office with any questions or concerns  ______________________________________________________________________  Subjective:     Patient ID: Moo Perry is a 71 y o  male  HPI  Moo Perry  Chief Complaint   Patient presents with   • Swollen Lymph nodes in hip for 3 months     Anterior Right hip  No hernia hx  No recent illness  Hx of laryngeal ca, post 2008  Noticed lump to start, had biopsies, no surgery  CT Lung - No nodules and/or definitely benign nodules  Mild to moderate emphysema  Lung-RADS1, negative  Continued annual screening with LDCT in 12 months  There is a borderline right axillary lymph node  Correlate clinically and for history of recent vaccination in the right upper arm  Wt Readings from Last 3 Encounters:   01/24/23 65 9 kg (145 lb 3 2 oz)   10/14/22 65 8 kg (145 lb)   07/14/22 67 6 kg (149 lb)   152 in April 2022, stable for pt  Decreased EtOH  The following portions of the patient's history were reviewed and updated as appropriate: allergies, current medications, past medical history, and problem list     Review of Systems   Constitutional: Negative for appetite change, chills, diaphoresis and fever  Respiratory: Negative for cough and shortness of breath  COVID 3 weeks ago, acute RENNER + only   Cardiovascular: Negative for chest pain and palpitations  Gastrointestinal: Negative for diarrhea, nausea and vomiting     Neurological: Negative for dizziness, light-headedness and headaches  Objective:      Vitals:    01/24/23 1553   BP: 150/88   Pulse: (!) 40   SpO2: 97%      Physical Exam  Vitals reviewed  Constitutional:       General: He is not in acute distress  Appearance: Normal appearance  He is well-developed and normal weight  He is not ill-appearing  HENT:      Head: Normocephalic and atraumatic  Eyes:      General: No scleral icterus  Right eye: No discharge  Left eye: No discharge  Cardiovascular:      Rate and Rhythm: Normal rate and regular rhythm  Pulses: Normal pulses  Heart sounds: Normal heart sounds  No murmur heard  No friction rub  Pulmonary:      Effort: Pulmonary effort is normal  No respiratory distress  Breath sounds: Normal breath sounds  No stridor  No wheezing  Musculoskeletal:      Cervical back: Normal range of motion  No rigidity or tenderness  Lymphadenopathy:      Head:      Right side of head: No submandibular, preauricular or posterior auricular adenopathy  Left side of head: No submandibular, preauricular or posterior auricular adenopathy  Cervical: No cervical adenopathy  Right cervical: No superficial cervical adenopathy  Left cervical: No superficial cervical adenopathy  Upper Body:      Right upper body: No supraclavicular, axillary or pectoral adenopathy  Left upper body: No supraclavicular, axillary or pectoral adenopathy  Lower Body: Right inguinal adenopathy present  No left inguinal adenopathy  Skin:     General: Skin is warm and dry  Neurological:      Mental Status: He is alert and oriented to person, place, and time  Gait: Gait normal    Psychiatric:         Mood and Affect: Mood normal          Behavior: Behavior normal          Thought Content: Thought content normal          Judgment: Judgment normal            Portions of the record may have been created with voice recognition software   Occasional wrong word or "sound alike" substitutions may have occurred due to the inherent limitations of voice recognition software  Please review the chart carefully and recognize, using context, where substitutions/typographical errors may have occurred

## 2023-01-26 ENCOUNTER — HOSPITAL ENCOUNTER (OUTPATIENT)
Dept: ULTRASOUND IMAGING | Facility: HOSPITAL | Age: 73
End: 2023-01-26
Attending: STUDENT IN AN ORGANIZED HEALTH CARE EDUCATION/TRAINING PROGRAM

## 2023-01-26 DIAGNOSIS — R19.09 GROIN LUMP: ICD-10-CM

## 2023-01-26 DIAGNOSIS — R19.09 GROIN LUMP: Primary | ICD-10-CM

## 2023-01-26 DIAGNOSIS — C32.3 MALIGNANT NEOPLASM OF LARYNGEAL CARTILAGE (HCC): ICD-10-CM

## 2023-01-29 LAB
ALBUMIN SERPL-MCNC: 4.8 G/DL (ref 3.7–4.7)
ALBUMIN/GLOB SERPL: 2 {RATIO} (ref 1.2–2.2)
ALP SERPL-CCNC: 101 IU/L (ref 44–121)
ALT SERPL-CCNC: 8 IU/L (ref 0–44)
AST SERPL-CCNC: 20 IU/L (ref 0–40)
BASOPHILS # BLD AUTO: 0.1 X10E3/UL (ref 0–0.2)
BASOPHILS NFR BLD AUTO: 1 %
BILIRUB SERPL-MCNC: 0.6 MG/DL (ref 0–1.2)
BUN SERPL-MCNC: 15 MG/DL (ref 8–27)
BUN/CREAT SERPL: 16 (ref 10–24)
CALCIUM SERPL-MCNC: 10.2 MG/DL (ref 8.6–10.2)
CANCER AG19-9 SERPL-ACNC: 12 U/ML (ref 0–35)
CEA SERPL-MCNC: 2.1 NG/ML (ref 0–4.7)
CHLORIDE SERPL-SCNC: 97 MMOL/L (ref 96–106)
CHOLEST SERPL-MCNC: 167 MG/DL (ref 100–199)
CHOLEST/HDLC SERPL: 3.4 RATIO (ref 0–5)
CO2 SERPL-SCNC: 23 MMOL/L (ref 20–29)
CREAT SERPL-MCNC: 0.96 MG/DL (ref 0.76–1.27)
EGFR: 85 ML/MIN/1.73
EOSINOPHIL # BLD AUTO: 0.2 X10E3/UL (ref 0–0.4)
EOSINOPHIL NFR BLD AUTO: 2 %
ERYTHROCYTE [DISTWIDTH] IN BLOOD BY AUTOMATED COUNT: 12.8 % (ref 11.6–15.4)
EST. AVERAGE GLUCOSE BLD GHB EST-MCNC: 126 MG/DL
GLOBULIN SER-MCNC: 2.4 G/DL (ref 1.5–4.5)
GLUCOSE SERPL-MCNC: 104 MG/DL (ref 70–99)
HBA1C MFR BLD: 6 % (ref 4.8–5.6)
HCT VFR BLD AUTO: 44.4 % (ref 37.5–51)
HDLC SERPL-MCNC: 49 MG/DL
HGB BLD-MCNC: 15.1 G/DL (ref 13–17.7)
IMM GRANULOCYTES # BLD: 0 X10E3/UL (ref 0–0.1)
IMM GRANULOCYTES NFR BLD: 0 %
LDLC SERPL CALC-MCNC: 97 MG/DL (ref 0–99)
LYMPHOCYTES # BLD AUTO: 2.1 X10E3/UL (ref 0.7–3.1)
LYMPHOCYTES NFR BLD AUTO: 22 %
MCH RBC QN AUTO: 32.4 PG (ref 26.6–33)
MCHC RBC AUTO-ENTMCNC: 34 G/DL (ref 31.5–35.7)
MCV RBC AUTO: 95 FL (ref 79–97)
MONOCYTES # BLD AUTO: 0.7 X10E3/UL (ref 0.1–0.9)
MONOCYTES NFR BLD AUTO: 7 %
NEUTROPHILS # BLD AUTO: 6.4 X10E3/UL (ref 1.4–7)
NEUTROPHILS NFR BLD AUTO: 68 %
PLATELET # BLD AUTO: 357 X10E3/UL (ref 150–450)
POTASSIUM SERPL-SCNC: 5.4 MMOL/L (ref 3.5–5.2)
PROT SERPL-MCNC: 7.2 G/DL (ref 6–8.5)
PSA SERPL-MCNC: 0.8 NG/ML (ref 0–4)
RBC # BLD AUTO: 4.66 X10E6/UL (ref 4.14–5.8)
SL AMB VLDL CHOLESTEROL CALC: 21 MG/DL (ref 5–40)
SODIUM SERPL-SCNC: 139 MMOL/L (ref 134–144)
TRIGL SERPL-MCNC: 120 MG/DL (ref 0–149)
WBC # BLD AUTO: 9.5 X10E3/UL (ref 3.4–10.8)

## 2023-01-30 ENCOUNTER — HOSPITAL ENCOUNTER (OUTPATIENT)
Dept: CT IMAGING | Facility: HOSPITAL | Age: 73
Discharge: HOME | End: 2023-01-30
Attending: STUDENT IN AN ORGANIZED HEALTH CARE EDUCATION/TRAINING PROGRAM

## 2023-01-30 DIAGNOSIS — C32.3: Primary | ICD-10-CM

## 2023-01-30 DIAGNOSIS — C32.3 MALIGNANT NEOPLASM OF LARYNGEAL CARTILAGE (HCC): ICD-10-CM

## 2023-01-30 DIAGNOSIS — R19.09 GROIN LUMP: ICD-10-CM

## 2023-01-30 RX ADMIN — IOHEXOL 100 ML: 350 INJECTION, SOLUTION INTRAVENOUS at 09:17

## 2023-02-01 ENCOUNTER — TELEPHONE (OUTPATIENT)
Dept: FAMILY MEDICINE CLINIC | Facility: HOSPITAL | Age: 73
End: 2023-02-01

## 2023-02-01 NOTE — TELEPHONE ENCOUNTER
Blood work results relayed to patient  Was also looking for CT results, they are not back yet and patient is aware

## 2023-02-07 ENCOUNTER — TELEPHONE (OUTPATIENT)
Dept: INTERVENTIONAL RADIOLOGY/VASCULAR | Facility: HOSPITAL | Age: 73
End: 2023-02-07

## 2023-02-15 ENCOUNTER — HOSPITAL ENCOUNTER (OUTPATIENT)
Dept: INTERVENTIONAL RADIOLOGY/VASCULAR | Facility: HOSPITAL | Age: 73
Discharge: HOME | End: 2023-02-15
Attending: RADIOLOGY

## 2023-02-15 DIAGNOSIS — C32.3: ICD-10-CM

## 2023-02-15 RX ORDER — LIDOCAINE HYDROCHLORIDE 10 MG/ML
INJECTION, SOLUTION EPIDURAL; INFILTRATION; INTRACAUDAL; PERINEURAL AS NEEDED
Status: COMPLETED | OUTPATIENT
Start: 2023-02-15 | End: 2023-02-15

## 2023-02-15 RX ADMIN — LIDOCAINE HYDROCHLORIDE 10 ML: 10 INJECTION, SOLUTION EPIDURAL; INFILTRATION; INTRACAUDAL; PERINEURAL at 11:03

## 2023-02-15 NOTE — SEDATION DOCUMENTATION
US guided right inguinal lymph node biopsy completed in IR by Dr Alla España  Bandaid to site CDI  AVS reviewed and given to patient  Discharged ambulatory without questions or complaints

## 2023-02-15 NOTE — BRIEF OP NOTE (RAD/CATH)
INTERVENTIONAL RADIOLOGY PROCEDURE NOTE    Date: 2/15/2023    Procedure:   Procedure Summary     Date: 02/15/23 Room / Location: [facility] [address] Interventional Radiology    Anesthesia Start:  Anesthesia Stop:     Procedure: IR BIOPSY INGUINAL LYMPH NODE Diagnosis:       Primary malignant neoplasm of laryngeal cartilage ([address] )      (node)    Scheduled Providers:  Responsible Provider:     Anesthesia Type: Not recorded ASA Status: Not recorded          Preoperative diagnosis:   1  Primary malignant neoplasm of laryngeal cartilage (HCC)         Postoperative diagnosis: Same  Surgeon: Mimi Pompa DO     Assistant: None  No qualified resident was available  Blood loss: minimal     Specimens: 5  18-gauge cores     Findings: "Lesional"    Complications: None immediate      Anesthesia: local

## 2023-02-15 NOTE — DISCHARGE INSTRUCTIONS
POST BIOPSY    Care after your procedure:    1  Limit your activities for 24 hours after your biopsy  2  No driving day of biopsy  3  Return to your normal diet  Small sips of flat soda will help with mild nausea  4  Remove band-aid or dressing 24 hours after procedure  Contact Interventional Radiology at [phone] Zane PATIENTS: Contact Interventional Radiology at [phone]) Sarthak Coker PATIENTS: Contact Interventional Radiology at [phone]) if:    1  Difficulty breathing, nausea or vomiting  2  Chills or fever above 101 degrees F      3  Pain at biopsy site not relieved by medication  4  Develop any redness, swelling, heat, unusual drainage, heavy bruising or bleeding from biopsy site

## 2023-02-15 NOTE — PROGRESS NOTES
Patient arrived to IR for image guided right inguinal lymph node biopsy    The procedure and risks were discussed with the patient  All questions were answered  Informed consent was obtained

## 2023-02-16 LAB — SCAN RESULT: NORMAL

## 2023-02-21 ENCOUNTER — TELEPHONE (OUTPATIENT)
Dept: FAMILY MEDICINE CLINIC | Facility: HOSPITAL | Age: 73
End: 2023-02-21

## 2023-02-21 ENCOUNTER — HOSPITAL ENCOUNTER (OUTPATIENT)
Dept: ULTRASOUND IMAGING | Facility: HOSPITAL | Age: 73
Discharge: HOME | End: 2023-02-21
Attending: STUDENT IN AN ORGANIZED HEALTH CARE EDUCATION/TRAINING PROGRAM

## 2023-02-21 DIAGNOSIS — C32.3 MALIGNANT NEOPLASM OF LARYNGEAL CARTILAGE (HCC): Primary | ICD-10-CM

## 2023-02-21 DIAGNOSIS — C85.95 LYMPHOMA OF LYMPH NODES OF INGUINAL REGION, UNSPECIFIED LYMPHOMA TYPE (HCC): ICD-10-CM

## 2023-02-21 DIAGNOSIS — R19.09 GROIN LUMP: ICD-10-CM

## 2023-02-21 NOTE — TELEPHONE ENCOUNTER
Pt would like to speak to Dr Codi Dickerson regarding swollen lymph node and her impression/advice of his recent biopsy   PCB

## 2023-02-22 ENCOUNTER — DOCUMENTATION (OUTPATIENT)
Dept: HEMATOLOGY ONCOLOGY | Facility: CLINIC | Age: 73
End: 2023-02-22

## 2023-02-22 NOTE — PROGRESS NOTES
Intake received/ Chart reviewed for services completed outside of [facility]    Pathology PARMBPDGZ:23/42/07, follicular lymphoma- [facility]    Imaging completed:US inguinal 01/26/23, CT abd/pelv 01/30/23[facility]     All records needed are in patients chart  No records retrieval needed at this time

## 2023-02-23 NOTE — TELEPHONE ENCOUNTER
Pt calling again, is very upset he has not gotten a phone call yet  Would like a call asap regarding biopsy   PCB

## 2023-03-03 ENCOUNTER — TELEPHONE (OUTPATIENT)
Dept: HEMATOLOGY ONCOLOGY | Facility: CLINIC | Age: 73
End: 2023-03-03

## 2023-03-03 NOTE — TELEPHONE ENCOUNTER
Spoke with patient to let him know that Dr Owen Newman can see him Wednesday 3/8 at 12 o'clock  Patient confirmed appointment and is aware of the new location of our office

## 2023-03-03 NOTE — TELEPHONE ENCOUNTER
Left message for patient that hematology will be reaching out to him and appointment will be moved to next week  Spoke to patient she will start today to not take the atorvastatin and call next Wednesday on how she is feeling

## 2023-03-08 ENCOUNTER — CONSULT (OUTPATIENT)
Age: 73
End: 2023-03-08
Attending: STUDENT IN AN ORGANIZED HEALTH CARE EDUCATION/TRAINING PROGRAM

## 2023-03-08 VITALS
DIASTOLIC BLOOD PRESSURE: 76 MMHG | WEIGHT: 141 LBS | HEIGHT: 67 IN | OXYGEN SATURATION: 98 % | RESPIRATION RATE: 14 BRPM | SYSTOLIC BLOOD PRESSURE: 124 MMHG | BODY MASS INDEX: 22.13 KG/M2 | TEMPERATURE: 98 F | HEART RATE: 74 BPM

## 2023-03-08 DIAGNOSIS — C32.3 MALIGNANT NEOPLASM OF LARYNGEAL CARTILAGE (HCC): ICD-10-CM

## 2023-03-08 DIAGNOSIS — C85.95 LYMPHOMA OF LYMPH NODES OF INGUINAL REGION, UNSPECIFIED LYMPHOMA TYPE (HCC): ICD-10-CM

## 2023-03-08 DIAGNOSIS — R19.09 GROIN LUMP: ICD-10-CM

## 2023-03-08 NOTE — PROGRESS NOTES
Oncology Outpatient Consult Note  Sherry Hooker 71 y o  male MRN: @ Encounter: [id number]        Date:  3/8/2023        CC:        HPI:  Sherry Hooker is seen for initial consultation 3/8/2023 regarding        ROS: As stated in history of present illness otherwise her 14 point review of systems today was negative  ECOG PS: 0    Cancer Staging:  Cancer Staging   No matching staging information was found for the patient  Molecular Testing:     Oncology History    No history exists  Test Results:    Imaging: IR biopsy inguinal lymph node    Result Date: 2/15/2023  Narrative: Ultrasound-guided right inguinal lymph node biopsy 2/15/2023 History: Right inguinal lymph node; history of laryngeal carcinoma Contrast: None Fluoroscopy time: None Number of images: 5 Radiation dose: 0 mGy Conscious sedation time: N/A Procedure: The patient was identified verbally and by wristband  Timeout was performed  Informed consent was obtained  Patient was prepped and draped in the usual sterile fashion  Using ultrasound guidance access was gained to the right inguinal lymph node using a 17-gauge Biopince coaxial needle  Five 18-gauge core biopsies were obtained  Specimens were handed to the on-site cytopathologist  Needle was removed with simultaneous injection of D-Stat thrombin solution  Patient tolerated the procedure well without apparent immediate complication  Patient left the IR department in unchanged condition  Dr Gloria De La Cruz performed the procedure Findings: There are successful ultrasound guided placement of a 17-gauge coaxial needle into the right inguinal lymph node  5 18-gauge core biopsies were obtained       Impression: Impression: Successful image guided core biopsy of right inguinal lymph node Workstation performed: [id number]       Labs:   Lab Results   Component Value Date    WBC 9 5 01/28/2023    HGB 15 1 01/28/2023    HCT 44 4 01/28/2023    MCV 95 01/28/2023     01/28/2023     Lab Results   Component Value Date     01/11/2018    K 5 4 (H) 01/28/2023    CL 97 01/28/2023    CO2 23 01/28/2023    BUN 15 01/28/2023    CREATININE 0 96 01/28/2023    GLUCOSE 101 (H) 01/11/2018    CALCIUM 8 6 12/18/2019    AST 20 01/28/2023    ALT 8 01/28/2023    ALKPHOS 85 12/16/2019    PROT 6 6 09/09/2016    BILITOT 0 3 09/09/2016    EGFR 85 01/28/2023         No results found for: SPEP, UPEP    Lab Results   Component Value Date    PSA 0 8 01/28/2023    PSA 1 5 04/04/2022    PSA 0 8 03/10/2021       Lab Results   Component Value Date    CEA 2 1 01/28/2023       No results found for: AFP    No results found for: IRON, TIBC, FERRITIN    No results found for: [id number]            Active Problems:   Patient Active Problem List   Diagnosis   • Hypercholesterolemia   • Primary malignant neoplasm of laryngeal cartilage (HCC)   • Tobacco use disorder   • Bilateral carotid artery stenosis   • Hearing loss   • Cerebrovascular accident (CVA) due to embolism of precerebral artery ([facility] )   • History of colon polyps   • Screening for colon cancer   • History of cancer tonsil   • Centrilobular emphysema (HCC)   • Compression fracture of body of thoracic vertebra (HCC)   • Essential hypertension   • Elevated LFTs       Past Medical History:   Past Medical History:   Diagnosis Date   • Cancer ([facility] )     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   • Colon polyp    • CVA (cerebral vascular accident) ([facility] ) 01/2020   • Hepatitis B     History of hepatitis-B exposure with positive surface and core antibodies with negative surface antigen   • Hyperlipidemia    • Hypertension    • Stroke [facility])        Surgical History:   Past Surgical History:   Procedure Laterality Date   • APPENDECTOMY  2003   • COLONOSCOPY  08/02/2017     a single sessile 12 mm polyp in the midtransverse colon, 2 sessile nonbleeding polyps ranging from 4-5 mm in the sigmoid colon    Prior tattoo site from large polypectomy in the splenic flexure noted  Pathology of colon polyps were tubular adenomas  A 3 year recall advised  • IR BIOPSY INGUINAL LYMPH NODE  2/15/2023   • SKULL FRACTURE ELEVATION  1984       Family History:    Family History   Problem Relation Age of Onset   • Heart disease Father    • Colon polyps Father    • Substance Abuse Neg Hx    • Mental illness Neg Hx    • Colon cancer Neg Hx        Cancer-related family history is negative for Colon cancer  Social History:   Social History     Socioeconomic History   • Marital status: Single     Spouse name: Not on file   • Number of children: Not on file   • Years of education: Not on file   • Highest education level: Not on file   Occupational History   • Occupation: Retired   Tobacco Use   • Smoking status: Every Day     Packs/day: 0 50     Types: Cigarettes     Start date: 1970   • Smokeless tobacco: Never   Vaping Use   • Vaping Use: Never used   Substance and Sexual Activity   • Alcohol use: Not Currently   • Drug use: Yes     Types: Marijuana     Comment: rare   • Sexual activity: Not Currently   Other Topics Concern   • Not on file   Social History Narrative    Wears seatbelt     Regular exercise    Regular dental care        Caffeine use:  Coffee daily    Living situation:  Supportive and safe    No advance directives     Social Determinants of Health     Financial Resource Strain: Low Risk    • Difficulty of Paying Living Expenses: Not hard at all   Food Insecurity: Not on file   Transportation Needs: No Transportation Needs   • Lack of Transportation (Medical): No   • Lack of Transportation (Non-Medical):  No   Physical Activity: Not on file   Stress: Not on file   Social Connections: Not on file   Intimate Partner Violence: Not on file   Housing Stability: Not on file       Current Medications:   Current Outpatient Medications   Medication Sig Dispense Refill   • amLODIPine (NORVASC) 2 5 mg tablet Take 1 tablet by mouth once daily 90 tablet 3   • amoxicillin (AMOXIL) 500 mg capsule TAKE FOUR CAPSULES BY MOUTH ONE HOUR BEFORE APPOINTMENT     • aspirin (ECOTRIN LOW STRENGTH) 81 mg EC tablet Take 1 tablet (81 mg total) by mouth daily     • atorvastatin (LIPITOR) 80 mg tablet Take 1 tablet by mouth once daily 90 tablet 3   • Eliquis 5 MG Take 1 tablet by mouth twice daily 180 tablet 3     No current facility-administered medications for this visit  Allergies: No Known Allergies      Physical Exam:    Body surface area is 1 74 meters squared  Wt Readings from Last 3 Encounters:   03/08/23 64 kg (141 lb)   01/24/23 65 9 kg (145 lb 3 2 oz)   10/14/22 65 8 kg (145 lb)        Temp Readings from Last 3 Encounters:   03/08/23 98 °F (36 7 °C) (Temporal)   03/04/21 98 4 °F (36 9 °C) (Temporal)   10/06/20 98 1 °F (36 7 °C) (Tympanic)        BP Readings from Last 3 Encounters:   03/08/23 124/76   01/24/23 150/88   10/14/22 110/64         Pulse Readings from Last 3 Encounters:   03/08/23 74   01/24/23 (!) 40   10/14/22 67     @LASTSAO2(3)@    Physical Exam     Constitutional   General appearance: No acute distress, well appearing and well nourished  Eyes   Conjunctiva and lids: No swelling, erythema or discharge  Pupils and irises: Equal, round and reactive to light  Ears, Nose, Mouth, and Throat   External inspection of ears and nose: Normal     Nasal mucosa, septum, and turbinates: Normal without edema or erythema  Oropharynx: Normal with no erythema, edema, exudate or lesions  Pulmonary   Respiratory effort: No increased work of breathing or signs of respiratory distress  Auscultation of lungs: Clear to auscultation  Cardiovascular   Palpation of heart: Normal PMI, no thrills  Auscultation of heart: Normal rate and rhythm, normal S1 and S2, without murmurs  Examination of extremities for edema and/or varicosities: Normal     Carotid pulses: Normal     Abdomen   Abdomen: Non-tender, no masses  Liver and spleen: No hepatomegaly or splenomegaly      Lymphatic Palpation of lymph nodes in neck: No lymphadenopathy  Musculoskeletal   Gait and station: Normal     Digits and nails: Normal without clubbing or cyanosis  Inspection/palpation of joints, bones, and muscles: Normal     Skin   Skin and subcutaneous tissue: Normal without rashes or lesions  Neurologic   Cranial nerves: Cranial nerves 2-12 intact  Sensation: No sensory loss  Psychiatric   Orientation to person, place, and time: Normal     Mood and affect: Normal           Assessment/ Plan:          I spent 45 minutes in chart review, face to face counseling, coordination of care, and documentation  to face counseling, coordination of care, and documentation

## 2023-03-10 ENCOUNTER — TELEPHONE (OUTPATIENT)
Dept: HEMATOLOGY ONCOLOGY | Facility: CLINIC | Age: 73
End: 2023-03-10

## 2023-03-10 NOTE — TELEPHONE ENCOUNTER
Patient left a message on the voicemail requesting a call back  He was looking for information to give the [place] regarding his diagnosis  I called him and left a message to give me a call back

## 2023-03-14 NOTE — TELEPHONE ENCOUNTER
Spoke to patient  Patient needs a detailed note to give to the [place] from his last office visit describing his diagnosis in detail  I told him we would print the note when its completed and let him know so he can pick it up in the [facility] office

## 2023-04-14 ENCOUNTER — OFFICE VISIT (OUTPATIENT)
Dept: FAMILY MEDICINE CLINIC | Facility: HOSPITAL | Age: 73
End: 2023-04-14

## 2023-04-14 VITALS
BODY MASS INDEX: 21.82 KG/M2 | OXYGEN SATURATION: 97 % | WEIGHT: 139 LBS | HEART RATE: 77 BPM | DIASTOLIC BLOOD PRESSURE: 82 MMHG | SYSTOLIC BLOOD PRESSURE: 130 MMHG | HEIGHT: 67 IN

## 2023-04-14 DIAGNOSIS — C32.3: ICD-10-CM

## 2023-04-14 DIAGNOSIS — C82.95 FOLLICULAR LYMPHOMA OF LYMPH NODES OF INGUINAL REGION, UNSPECIFIED FOLLICULAR LYMPHOMA TYPE (HCC): Primary | ICD-10-CM

## 2023-04-14 DIAGNOSIS — F17.200 TOBACCO USE DISORDER: ICD-10-CM

## 2023-04-14 DIAGNOSIS — E78.00 HYPERCHOLESTEROLEMIA: ICD-10-CM

## 2023-04-14 DIAGNOSIS — C85.95 NON-HODGKIN LYMPHOMA OF LYMPH NODES OF INGUINAL REGION, UNSPECIFIED NON-HODGKIN LYMPHOMA TYPE (HCC): ICD-10-CM

## 2023-04-14 DIAGNOSIS — R73.03 PREDIABETES: ICD-10-CM

## 2023-04-14 DIAGNOSIS — J43.2 CENTRILOBULAR EMPHYSEMA (HCC): ICD-10-CM

## 2023-04-14 NOTE — PROGRESS NOTES
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, unspecified follicular lymphoma type ([facility] )  C82 95       2  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, unspecified non-Hodgkin lymphoma type ([facility] )  C85 95       3  Hypercholesterolemia  E78 00       4  Primary malignant neoplasm of laryngeal cartilage (HCC)  C32 3       5  Tobacco use disorder  F17 200       6  Centrilobular emphysema ([facility] )  J43 2         • Continue seeing your specialists as scheduled  Their notes reviewed from prior visits  • Patient's medications were reviewed and reconciled  Ordered/Re-ordered as above  • Does not want cough meds at this time, feels it is getting better  F/U in 3 months or sooner as needed  • Patient may call or return to office with any questions or concerns  ______________________________________________________________________  Subjective:     Patient ID: Juan Willis is a 71 y o  male  HPI  Juan Willis  Chief Complaint   Patient presents with   • Follow-up     6      [employer] in [address] [place] [address]   Exposed to & affected by agent orange, likely presumptive in the case of NHL  Not in pain, swelling on right groin similar to prior  Dr Cheri Christian   \"78 yo male with at least a stage II low grade FL  We discussed the indications for treating low grade lymphomas like the one he has  He doesn't have any indications at this time  When I explained the rationale for observation, he was comfortable with it and we discussed concerning S&S that he should call me with  I am going to see him in 3 mos with a PET scan, cbc, cmp and LDH  I am also going to check a TSH because of his prior radiation exposure  If I would treat him, it would be with bendamustine and rituxan   \"     [address] [facility] to [address] [place] [address]     Wt " Readings from Last 3 Encounters:   04/14/23 63 kg (139 lb)   03/08/23 64 kg (141 lb)   01/24/23 65 9 kg (145 lb 3 2 oz)       Depression Screening and Follow-up Plan: Patient was screened for depression during today's encounter  They screened negative with a PHQ-2 score of 0  Tobacco Cessation Counseling: Tobacco cessation counseling was provided  The patient is sincerely urged to quit consumption of tobacco  He is not ready to quit tobacco        The following portions of the patient's history were reviewed and updated as appropriate: allergies, current medications, past medical history, and problem list     Review of Systems   Constitutional: Negative for appetite change, chills, diaphoresis, fatigue and fever  Respiratory: Positive for cough (Slight cough since travel last week)  Negative for shortness of breath  Cardiovascular: Negative for chest pain and palpitations  Neurological: Negative for dizziness, weakness, light-headedness and headaches  Objective:      Vitals:    04/14/23 1117   BP: 130/82   Pulse: 77   SpO2: 97%      Physical Exam  Vitals reviewed  Constitutional:       General: He is not in acute distress  Appearance: Normal appearance  He is well-developed  He is not ill-appearing  HENT:      Head: Normocephalic and atraumatic  Right Ear: Tympanic membrane, ear canal and external ear normal  There is no impacted cerumen  Left Ear: Tympanic membrane, ear canal and external ear normal  There is no impacted cerumen  Mouth/Throat:      Mouth: Mucous membranes are moist       Pharynx: Oropharynx is clear  No oropharyngeal exudate  Eyes:      General: No scleral icterus  Right eye: No discharge  Left eye: No discharge  Cardiovascular:      Rate and Rhythm: Normal rate and regular rhythm  Pulses: Normal pulses  Heart sounds: Normal heart sounds  No murmur heard  No friction rub     Pulmonary:      Effort: Pulmonary effort is normal  No "respiratory distress  Breath sounds: Normal breath sounds  No stridor  No wheezing  Musculoskeletal:      Cervical back: Normal range of motion  No rigidity  Right lower leg: No edema  Left lower leg: No edema  Skin:     General: Skin is warm and dry  Neurological:      Mental Status: He is alert and oriented to person, place, and time  Gait: Gait normal    Psychiatric:         Mood and Affect: Mood normal          Behavior: Behavior normal          Thought Content: Thought content normal          Judgment: Judgment normal            Portions of the record may have been created with voice recognition software  Occasional wrong word or \"sound alike\" substitutions may have occurred due to the inherent limitations of voice recognition software  Please review the chart carefully and recognize, using context, where substitutions/typographical errors may have occurred       "

## 2023-05-25 LAB
ALBUMIN SERPL-MCNC: 5 G/DL (ref 3.7–4.7)
ALBUMIN/GLOB SERPL: 2.1 {RATIO} (ref 1.2–2.2)
ALP SERPL-CCNC: 118 IU/L (ref 44–121)
ALT SERPL-CCNC: 7 IU/L (ref 0–44)
AST SERPL-CCNC: 19 IU/L (ref 0–40)
BASOPHILS # BLD AUTO: 0.1 X10E3/UL (ref 0–0.2)
BASOPHILS NFR BLD AUTO: 1 %
BILIRUB SERPL-MCNC: 0.5 MG/DL (ref 0–1.2)
BUN SERPL-MCNC: 16 MG/DL (ref 8–27)
BUN/CREAT SERPL: 16 (ref 10–24)
CALCIUM SERPL-MCNC: 10.6 MG/DL (ref 8.6–10.2)
CHLORIDE SERPL-SCNC: 98 MMOL/L (ref 96–106)
CO2 SERPL-SCNC: 24 MMOL/L (ref 20–29)
CREAT SERPL-MCNC: 1.03 MG/DL (ref 0.76–1.27)
EGFR: 77 ML/MIN/1.73
EOSINOPHIL # BLD AUTO: 0.4 X10E3/UL (ref 0–0.4)
EOSINOPHIL NFR BLD AUTO: 3 %
ERYTHROCYTE [DISTWIDTH] IN BLOOD BY AUTOMATED COUNT: 13 % (ref 11.6–15.4)
GLOBULIN SER-MCNC: 2.4 G/DL (ref 1.5–4.5)
GLUCOSE SERPL-MCNC: 102 MG/DL (ref 70–99)
HCT VFR BLD AUTO: 41.9 % (ref 37.5–51)
HGB BLD-MCNC: 14.3 G/DL (ref 13–17.7)
IMM GRANULOCYTES # BLD: 0 X10E3/UL (ref 0–0.1)
IMM GRANULOCYTES NFR BLD: 0 %
LDH SERPL-CCNC: 316 IU/L (ref 121–224)
LYMPHOCYTES # BLD AUTO: 2.3 X10E3/UL (ref 0.7–3.1)
LYMPHOCYTES NFR BLD AUTO: 19 %
MCH RBC QN AUTO: 32.3 PG (ref 26.6–33)
MCHC RBC AUTO-ENTMCNC: 34.1 G/DL (ref 31.5–35.7)
MCV RBC AUTO: 95 FL (ref 79–97)
MONOCYTES # BLD AUTO: 1 X10E3/UL (ref 0.1–0.9)
MONOCYTES NFR BLD AUTO: 8 %
NEUTROPHILS # BLD AUTO: 8.5 X10E3/UL (ref 1.4–7)
NEUTROPHILS NFR BLD AUTO: 69 %
PLATELET # BLD AUTO: 343 X10E3/UL (ref 150–450)
POTASSIUM SERPL-SCNC: 5.9 MMOL/L (ref 3.5–5.2)
PROT SERPL-MCNC: 7.4 G/DL (ref 6–8.5)
RBC # BLD AUTO: 4.43 X10E6/UL (ref 4.14–5.8)
SODIUM SERPL-SCNC: 137 MMOL/L (ref 134–144)
TSH SERPL DL<=0.005 MIU/L-ACNC: 5.88 UIU/ML (ref 0.45–4.5)
WBC # BLD AUTO: 12.4 X10E3/UL (ref 3.4–10.8)

## 2023-05-30 ENCOUNTER — HOSPITAL ENCOUNTER (OUTPATIENT)
Dept: RADIOLOGY | Age: 73
Discharge: HOME | End: 2023-05-30

## 2023-05-30 DIAGNOSIS — C85.95 LYMPHOMA OF LYMPH NODES OF INGUINAL REGION, UNSPECIFIED LYMPHOMA TYPE (HCC): ICD-10-CM

## 2023-05-30 LAB — GLUCOSE SERPL-MCNC: 87 MG/DL (ref 65–140)

## 2023-06-05 ENCOUNTER — TELEPHONE (OUTPATIENT)
Age: 73
End: 2023-06-05

## 2023-06-05 ENCOUNTER — OFFICE VISIT (OUTPATIENT)
Age: 73
End: 2023-06-05
Payer: COMMERCIAL

## 2023-06-05 VITALS
TEMPERATURE: 98 F | OXYGEN SATURATION: 98 % | RESPIRATION RATE: 16 BRPM | DIASTOLIC BLOOD PRESSURE: 79 MMHG | BODY MASS INDEX: 21.03 KG/M2 | HEART RATE: 73 BPM | HEIGHT: 67 IN | SYSTOLIC BLOOD PRESSURE: 119 MMHG | WEIGHT: 134 LBS

## 2023-06-05 DIAGNOSIS — C82.95 FOLLICULAR LYMPHOMA OF LYMPH NODES OF INGUINAL REGION, UNSPECIFIED FOLLICULAR LYMPHOMA TYPE (HCC): Primary | ICD-10-CM

## 2023-06-05 DIAGNOSIS — E46 PROTEIN-CALORIE MALNUTRITION, UNSPECIFIED SEVERITY (HCC): ICD-10-CM

## 2023-06-05 PROCEDURE — 99215 OFFICE O/P EST HI 40 MIN: CPT | Performed by: INTERNAL MEDICINE

## 2023-06-05 RX ORDER — ACETAMINOPHEN 325 MG/1
650 TABLET ORAL ONCE
OUTPATIENT
Start: 2023-06-23

## 2023-06-05 RX ORDER — ALLOPURINOL 300 MG/1
300 TABLET ORAL DAILY
Qty: 30 TABLET | Refills: 4 | Status: SHIPPED | OUTPATIENT
Start: 2023-06-05

## 2023-06-05 RX ORDER — ONDANSETRON HYDROCHLORIDE 8 MG/1
8 TABLET, FILM COATED ORAL EVERY 8 HOURS PRN
Qty: 30 TABLET | Refills: 2 | Status: SHIPPED | OUTPATIENT
Start: 2023-06-05

## 2023-06-05 RX ORDER — PROCHLORPERAZINE MALEATE 10 MG
10 TABLET ORAL EVERY 6 HOURS PRN
Qty: 30 TABLET | Refills: 3 | Status: SHIPPED | OUTPATIENT
Start: 2023-06-05

## 2023-06-05 RX ORDER — SODIUM CHLORIDE 9 MG/ML
20 INJECTION, SOLUTION INTRAVENOUS ONCE
OUTPATIENT
Start: 2023-06-22

## 2023-06-05 RX ORDER — SODIUM CHLORIDE 9 MG/ML
20 INJECTION, SOLUTION INTRAVENOUS ONCE
OUTPATIENT
Start: 2023-06-23

## 2023-06-05 NOTE — TELEPHONE ENCOUNTER
Please schedule chemo appointments  after port  Port placement is 6/19/2023  Please schedule patient to see Dr Chalino Mukherjee prior to cycle 2 at [facility]

## 2023-06-05 NOTE — TELEPHONE ENCOUNTER
PORT BEING PLACED ON 6/19 - PLEASE SCHEDULE PORT LABS BEFORE EACH TREATMENT BEGINNING AFTER 6/19    While we try to accommodate patient requests, our priority is to schedule treatment according to Doctor's orders and site availability  Does the Provider use the intake sheet or checkout note? What would be a preferred day of the week that would work best for your infusion appointment? Any  Do you prefer mornings or afternoons for your appointments? 10am or later  Are there any days or dates that do not work for your schedule, including any upcoming vacations? N/a  We are going to try our best to schedule you at the infusion center closest to your home  In the event that we are unable to what would be your next preferred infusion site or sites? [facility]    Do you have transportation to take you to all of your appointments?  yes  Would you like the infusion center to draw labs from your port? (disregard if patient doesn't have a port or need labs for infusion appointment) yes

## 2023-06-06 NOTE — H&P (VIEW-ONLY)
HEMATOLOGY / [address] FOLLOW UP NOTE    Primary Care Provider: Ira Manrique DO  Referring Provider:    MRN: [id number]  : 1950    Reason for Encounter: follow up follicular lymphoma       Oncology History Overview Note    - chemo/RT for cancel of the tonsil at [facility] (Tobi Wilhelm and Say Murray)    8014 - grade 1-2 follicular lymphoma - bx at right groin, at least stage II     Primary malignant neoplasm of laryngeal cartilage ([facility] )   2014 Initial Diagnosis    Primary malignant neoplasm of laryngeal cartilage ([facility] )     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 ([facility] )   2023 Initial Diagnosis    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 ([facility] )     2023 -  Chemotherapy    alteplase (CATHFLO), 2 mg, Intracatheter, Every 1 Minute as needed, 0 of 6 cycles  riTUXimab (RITUXAN) subsequent titrated chemo infusion, 375 mg/m2, Intravenous, Once, 0 of 5 cycles  riTUXimab (RITUXAN) first titrated chemo infusion, 375 mg/m2 = 641 2 mg, Intravenous, Once, 0 of 1 cycle  bendamustine HCL(BENDEKA) IVPB, 90 mg/m2 = 153 9 mg, Intravenous, Once, 0 of 6 cycles         Interval History: Patient presents for follow up of his grade 1-2 follicular lymphoma  He had a PET scan prior to this visit that did show pretty extensive disease in the chest abdomen and pelvis  There was also a small FDG avid focus in the pancreatic body  There is FDG uptake in the spleen suspicious for lymphoma  There is also a lesion that looks like it is extending from the retrocaval area directly into the L3 vertebral body  He denies any back pain  No loss of bladder or bowel control  He is having fatigue and weight loss  He is lost 9 pounds since our last visit  He denies any night sweats no fever, no fevers, no neuropathy, no bleeding  Labs prior to this visit show an LDH of 316  His calcium is slightly up at 10 6  He also has inform you of a history of prior hepatitis B exposure           REVIEW OF SYSTEMS:  Please note that a 14-point review of systems was performed to include Constitutional, HEENT, Respiratory, CVS, GI, , Musculoskeletal, Integumentary, Neurologic, Rheumatologic, Endocrinologic, Psychiatric, Lymphatic, and Hematologic/Oncologic systems were reviewed and are negative unless otherwise stated in HPI  Positive and negative findings pertinent to this evaluation are incorporated into the history of present illness  ECOG PS: 0    PROBLEM LIST:  Patient Active Problem List   Diagnosis   • Hypercholesterolemia   • Primary malignant neoplasm of laryngeal cartilage (HCC)   • Tobacco use disorder   • Bilateral carotid artery stenosis   • Hearing loss   • Cerebrovascular accident (CVA) due to embolism of precerebral artery (HCC)   • History of colon polyps   • Screening for colon cancer   • History of cancer tonsil   • Centrilobular emphysema (HCC)   • Compression fracture of body of thoracic vertebra (HCC)   • Essential hypertension   • Elevated LFTs   • 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 [facility])   • 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 [facility])   • Prediabetes   • Protein-calorie malnutrition, unspecified severity (HCC)       Assessment / Plan: 71-year-old male with at least H3 grade 1-2 follicular lymphoma  He is having weight loss and fatigue  I am also concerned about the lesion that is starting to extend into his L3 vertebra  I consider all of these symptoms and findings on imaging indications for active treatment of his follicular lymphoma  I talked to him about a course of 4 to 6 months of Bendamustine and Rituxan depending on disease response  Prior to giving Rituxan can check a hepatitis panel  Risks and benefits of bendamustine and rituxan were reviewed and consent was signed  I think the risk of tumor lysis syndrome is low but I did start him on allopurinol just to be on the safe side    I am also going to give day 1 for cycle 1 is Bendamustine only and day 2 with Bendamustine and Rituxan  I like to get some chemotherapy in the system ahead of the Rituxan to decrease the risk of allergic reaction from Rituxan  I will only add growth factor if I see his [address] getting closer to 1 0  I will plan on seeing him prior to cycle 2 of BR to check on tolerance after cycle 1  he knows to call in the interim with any questions or concerns  I spent 45 minutes on chart review, face to face counseling time, coordination of care and documentation  Past Medical History:   has a past medical history of Cancer [facility]), Colon polyp, CVA (cerebral vascular accident) ([facility] ) (01/2020), Hepatitis B, Hyperlipidemia, Hypertension, and Stroke ([facility] )  PAST SURGICAL HISTORY:   has a past surgical history that includes Appendectomy (2003); Skull fracture elevation (1984); Colonoscopy (08/02/2017); and IR biopsy inguinal lymph node (2/15/2023)  CURRENT MEDICATIONS  Current Outpatient Medications   Medication Sig Dispense Refill   • allopurinol (ZYLOPRIM) 300 mg tablet Take 1 tablet (300 mg total) by mouth daily 30 tablet 4   • amLODIPine (NORVASC) 2 5 mg tablet Take 1 tablet by mouth once daily 90 tablet 3   • amoxicillin (AMOXIL) 500 mg capsule if needed     • aspirin (ECOTRIN LOW STRENGTH) 81 mg EC tablet Take 1 tablet (81 mg total) by mouth daily     • atorvastatin (LIPITOR) 80 mg tablet Take 1 tablet by mouth once daily 90 tablet 3   • Eliquis 5 MG Take 1 tablet by mouth twice daily 180 tablet 3   • ondansetron (ZOFRAN) 8 mg tablet Take 1 tablet (8 mg total) by mouth every 8 (eight) hours as needed for nausea or vomiting 30 tablet 2   • prochlorperazine (COMPAZINE) 10 mg tablet Take 1 tablet (10 mg total) by mouth every 6 (six) hours as needed for nausea or vomiting 30 tablet 3     No current facility-administered medications for this visit  [unfilled]    SOCIAL HISTORY:   reports that he has been smoking cigarettes  He started smoking about 53 years ago   He has been "smoking an average of  5 packs per day  He has never used smokeless tobacco  He reports that he does not currently use alcohol  He reports current drug use  Drug: Marijuana  FAMILY HISTORY:  family history includes Colon polyps in his father; Heart disease in his father  ALLERGIES:  has No Known Allergies  Physical Exam:  Vital Signs:   Visit Vitals  /79 (BP Location: Left arm, Patient Position: Sitting, Cuff Size: Adult)   Pulse 73   Temp 98 °F (36 7 °C)   Resp 16   Ht 5' 7\" (1 702 m)   Wt 60 8 kg (134 lb)   SpO2 98%   BMI 20 99 kg/m²   Smoking Status Every Day   BSA 1 71 m²     Body mass index is 20 99 kg/m²  Body surface area is 1 71 meters squared  GEN: Alert, awake oriented x3, in no acute distress  HEENT- No pallor, icterus, cyanosis, no oral mucosal lesions,   LAD - no palpable cervical, clavicle, axillary, inguinal LAD  Heart- normal S1 S2, regular rate and rhythm, No murmur, rubs     Lungs- clear breathing sound bilateral    Abdomen- soft, Non tender, bowel sounds present  Extremities- No cyanosis, clubbing, edema  Neuro- No focal neurological deficit    Labs:  Lab Results   Component Value Date    HCT 41 9 05/24/2023    HGB 14 3 05/24/2023    MCV 95 05/24/2023     05/24/2023    WBC 12 4 (H) 05/24/2023     Lab Results   Component Value Date    AGAP 8 12/18/2019    ALKPHOS 85 12/16/2019    ALT 7 05/24/2023    AST 19 05/24/2023    BILITOT 0 3 09/09/2016    BUN 16 05/24/2023    CALCIUM 8 6 12/18/2019    CL 98 05/24/2023    CO2 24 05/24/2023    CREATININE 1 03 05/24/2023    EGFR 77 05/24/2023    GLUC 102 (H) 05/24/2023    K 5 9 (H) 05/24/2023     01/11/2018    PROT 6 6 09/09/2016    SODIUM 137 05/24/2023    TBILI 0 5 05/24/2023    TP 7 4 05/24/2023       "

## 2023-06-06 NOTE — PROGRESS NOTES
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 - bx at right groin, at least stage II     Primary malignant neoplasm of laryngeal cartilage ([facility] )   2014 Initial Diagnosis    Primary malignant neoplasm of laryngeal cartilage ([facility] )     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 ([facility] )   2023 Initial Diagnosis    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 ([facility] )     2023 -  Chemotherapy    alteplase (CATHFLO), 2 mg, Intracatheter, Every 1 Minute as needed, 0 of 6 cycles  riTUXimab (RITUXAN) subsequent titrated chemo infusion, 375 mg/m2, Intravenous, Once, 0 of 5 cycles  riTUXimab (RITUXAN) first titrated chemo infusion, 375 mg/m2 = 641 2 mg, Intravenous, Once, 0 of 1 cycle  bendamustine HCL(BENDEKA) IVPB, 90 mg/m2 = 153 9 mg, Intravenous, Once, 0 of 6 cycles         Interval History: Patient presents for follow up of          REVIEW OF SYSTEMS:  Please note that a 14-point review of systems was performed to include Constitutional, HEENT, Respiratory, CVS, GI, , Musculoskeletal, Integumentary, Neurologic, Rheumatologic, Endocrinologic, Psychiatric, Lymphatic, and Hematologic/Oncologic systems were reviewed and are negative unless otherwise stated in HPI  Positive and negative findings pertinent to this evaluation are incorporated into the history of present illness        ECOG PS: 0    PROBLEM LIST:  Patient Active Problem List   Diagnosis   • Hypercholesterolemia   • Primary malignant neoplasm of laryngeal cartilage (HCC)   • Tobacco use disorder   • Bilateral carotid artery stenosis   • Hearing loss   • Cerebrovascular accident (CVA) due to embolism of precerebral artery ([facility] )   • History of colon polyps   • Screening for colon cancer   • History of cancer tonsil   • Centrilobular emphysema ([facility] )   • Compression fracture of body of thoracic vertebra (HCC)   • Essential hypertension   • Elevated LFTs   • 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 [facility])   • 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 [facility])   • Prediabetes   • Protein-calorie malnutrition, unspecified severity ([place] [address] )       Assessment / Plan:       I spent 45 minutes on chart review, face to face counseling time, coordination of care and documentation  Past Medical History:   has a past medical history of Cancer [facility]), Colon polyp, CVA (cerebral vascular accident) ([place] [address] ) (01/2020), Hepatitis B, Hyperlipidemia, Hypertension, and Stroke ([place] [address] )  PAST SURGICAL HISTORY:   has a past surgical history that includes Appendectomy (2003); Skull fracture elevation (1984); Colonoscopy (08/02/2017); and IR biopsy inguinal lymph node (2/15/2023)  CURRENT MEDICATIONS  Current Outpatient Medications   Medication Sig Dispense Refill   • allopurinol (ZYLOPRIM) 300 mg tablet Take 1 tablet (300 mg total) by mouth daily 30 tablet 4   • amLODIPine (NORVASC) 2 5 mg tablet Take 1 tablet by mouth once daily 90 tablet 3   • amoxicillin (AMOXIL) 500 mg capsule if needed     • aspirin (ECOTRIN LOW STRENGTH) 81 mg EC tablet Take 1 tablet (81 mg total) by mouth daily     • atorvastatin (LIPITOR) 80 mg tablet Take 1 tablet by mouth once daily 90 tablet 3   • Eliquis 5 MG Take 1 tablet by mouth twice daily 180 tablet 3   • ondansetron (ZOFRAN) 8 mg tablet Take 1 tablet (8 mg total) by mouth every 8 (eight) hours as needed for nausea or vomiting 30 tablet 2   • prochlorperazine (COMPAZINE) 10 mg tablet Take 1 tablet (10 mg total) by mouth every 6 (six) hours as needed for nausea or vomiting 30 tablet 3     No current facility-administered medications for this visit  [unfilled]    SOCIAL HISTORY:   reports that he has been smoking cigarettes  "He started smoking about 53 years ago  He has been smoking an average of  5 packs per day  He has never used smokeless tobacco  He reports that he does not currently use alcohol  He reports current drug use  Drug: Marijuana  FAMILY HISTORY:  family history includes Colon polyps in his father; Heart disease in his father  ALLERGIES:  has No Known Allergies  Physical Exam:  Vital Signs:   Visit Vitals  /79 (BP Location: Left arm, Patient Position: Sitting, Cuff Size: Adult)   Pulse 73   Temp 98 °F (36 7 °C)   Resp 16   Ht 5' 7\" (1 702 m)   Wt 60 8 kg (134 lb)   SpO2 98%   BMI 20 99 kg/m²   Smoking Status Every Day   BSA 1 71 m²     Body mass index is 20 99 kg/m²  Body surface area is 1 71 meters squared  GEN: Alert, awake oriented x3, in no acute distress  HEENT- No pallor, icterus, cyanosis, no oral mucosal lesions,   LAD - no palpable cervical, clavicle, axillary, inguinal LAD  Heart- normal S1 S2, regular rate and rhythm, No murmur, rubs     Lungs- clear breathing sound bilateral    Abdomen- soft, Non tender, bowel sounds present  Extremities- No cyanosis, clubbing, edema  Neuro- No focal neurological deficit    Labs:  Lab Results   Component Value Date    HCT 41 9 05/24/2023    HGB 14 3 05/24/2023    MCV 95 05/24/2023     05/24/2023    WBC 12 4 (H) 05/24/2023     Lab Results   Component Value Date    AGAP 8 12/18/2019    ALKPHOS 85 12/16/2019    ALT 7 05/24/2023    AST 19 05/24/2023    BILITOT 0 3 09/09/2016    BUN 16 05/24/2023    CALCIUM 8 6 12/18/2019    CL 98 05/24/2023    CO2 24 05/24/2023    CREATININE 1 03 05/24/2023    EGFR 77 05/24/2023    GLUC 102 (H) 05/24/2023    K 5 9 (H) 05/24/2023     01/11/2018    PROT 6 6 09/09/2016    SODIUM 137 05/24/2023    TBILI 0 5 05/24/2023    TP 7 4 05/24/2023       " "smoking an average of  5 packs per day  He has never used smokeless tobacco  He reports that he does not currently use alcohol  He reports current drug use  Drug: Marijuana  FAMILY HISTORY:  family history includes Colon polyps in his father; Heart disease in his father  ALLERGIES:  has No Known Allergies  Physical Exam:  Vital Signs:   Visit Vitals  /79 (BP Location: Left arm, Patient Position: Sitting, Cuff Size: Adult)   Pulse 73   Temp 98 °F (36 7 °C)   Resp 16   Ht 5' 7\" (1 702 m)   Wt 60 8 kg (134 lb)   SpO2 98%   BMI 20 99 kg/m²   Smoking Status Every Day   BSA 1 71 m²     Body mass index is 20 99 kg/m²  Body surface area is 1 71 meters squared  GEN: Alert, awake oriented x3, in no acute distress  HEENT- No pallor, icterus, cyanosis, no oral mucosal lesions,   LAD - no palpable cervical, clavicle, axillary, inguinal LAD  Heart- normal S1 S2, regular rate and rhythm, No murmur, rubs     Lungs- clear breathing sound bilateral    Abdomen- soft, Non tender, bowel sounds present  Extremities- No cyanosis, clubbing, edema  Neuro- No focal neurological deficit    Labs:  Lab Results   Component Value Date    HCT 41 9 05/24/2023    HGB 14 3 05/24/2023    MCV 95 05/24/2023     05/24/2023    WBC 12 4 (H) 05/24/2023     Lab Results   Component Value Date    AGAP 8 12/18/2019    ALKPHOS 85 12/16/2019    ALT 7 05/24/2023    AST 19 05/24/2023    BILITOT 0 3 09/09/2016    BUN 16 05/24/2023    CALCIUM 8 6 12/18/2019    CL 98 05/24/2023    CO2 24 05/24/2023    CREATININE 1 03 05/24/2023    EGFR 77 05/24/2023    GLUC 102 (H) 05/24/2023    K 5 9 (H) 05/24/2023     01/11/2018    PROT 6 6 09/09/2016    SODIUM 137 05/24/2023    TBILI 0 5 05/24/2023    TP 7 4 05/24/2023       "

## 2023-06-08 RX ORDER — SODIUM CHLORIDE 9 MG/ML
75 INJECTION, SOLUTION INTRAVENOUS CONTINUOUS
Status: CANCELLED | OUTPATIENT
Start: 2023-06-19

## 2023-06-08 RX ORDER — CEFAZOLIN SODIUM 1 G/50ML
1000 SOLUTION INTRAVENOUS ONCE
Status: CANCELLED | OUTPATIENT
Start: 2023-06-19

## 2023-06-12 ENCOUNTER — TELEPHONE (OUTPATIENT)
Age: 73
End: 2023-06-12

## 2023-06-12 NOTE — TELEPHONE ENCOUNTER
Rec'd call from patient stating that he needs a calendar printed out for the [place]  He will pick this up tomorrow

## 2023-06-13 ENCOUNTER — TELEPHONE (OUTPATIENT)
Dept: INTERVENTIONAL RADIOLOGY/VASCULAR | Facility: HOSPITAL | Age: 73
End: 2023-06-13

## 2023-06-16 ENCOUNTER — TELEPHONE (OUTPATIENT)
Dept: HEMATOLOGY ONCOLOGY | Facility: CLINIC | Age: 73
End: 2023-06-16

## 2023-06-16 NOTE — TELEPHONE ENCOUNTER
Spoke with patient and let him know that I reached out to Aniya Ying RN from [employer] and she states that he can continue his eliquis and baby aspirin as usual  Patient verbalized understanding

## 2023-06-16 NOTE — TELEPHONE ENCOUNTER
Rec'd call from patient asking if he can take his eliquis and baby aspirin the day of his port placement  I informed him that I will reach out to IR and let him know asap  Patient verbalized understanding

## 2023-06-19 ENCOUNTER — HOSPITAL ENCOUNTER (OUTPATIENT)
Dept: INTERVENTIONAL RADIOLOGY/VASCULAR | Facility: HOSPITAL | Age: 73
Discharge: HOME | End: 2023-06-19
Attending: INTERNAL MEDICINE
Payer: COMMERCIAL

## 2023-06-19 ENCOUNTER — TELEPHONE (OUTPATIENT)
Age: 73
End: 2023-06-19

## 2023-06-19 VITALS
HEART RATE: 78 BPM | RESPIRATION RATE: 12 BRPM | TEMPERATURE: 98 F | SYSTOLIC BLOOD PRESSURE: 135 MMHG | BODY MASS INDEX: 21.19 KG/M2 | OXYGEN SATURATION: 100 % | DIASTOLIC BLOOD PRESSURE: 70 MMHG | HEIGHT: 67 IN | WEIGHT: 135 LBS

## 2023-06-19 DIAGNOSIS — C82.95 FOLLICULAR LYMPHOMA OF LYMPH NODES OF INGUINAL REGION, UNSPECIFIED FOLLICULAR LYMPHOMA TYPE (HCC): ICD-10-CM

## 2023-06-19 PROCEDURE — 76937 US GUIDE VASCULAR ACCESS: CPT

## 2023-06-19 PROCEDURE — 99152 MOD SED SAME PHYS/QHP 5/>YRS: CPT

## 2023-06-19 PROCEDURE — 36561 INSERT TUNNELED CV CATH: CPT

## 2023-06-19 RX ORDER — MIDAZOLAM HYDROCHLORIDE 2 MG/2ML
INJECTION, SOLUTION INTRAMUSCULAR; INTRAVENOUS AS NEEDED
Status: COMPLETED | OUTPATIENT
Start: 2023-06-19 | End: 2023-06-19

## 2023-06-19 RX ORDER — LIDOCAINE HYDROCHLORIDE AND EPINEPHRINE 10; 10 MG/ML; UG/ML
INJECTION, SOLUTION INFILTRATION; PERINEURAL AS NEEDED
Status: COMPLETED | OUTPATIENT
Start: 2023-06-19 | End: 2023-06-19

## 2023-06-19 RX ORDER — SODIUM CHLORIDE 9 MG/ML
75 INJECTION, SOLUTION INTRAVENOUS CONTINUOUS
Status: DISCONTINUED | OUTPATIENT
Start: 2023-06-19 | End: 2023-06-20 | Stop reason: HOSPADM

## 2023-06-19 RX ORDER — FENTANYL CITRATE 50 UG/ML
INJECTION, SOLUTION INTRAMUSCULAR; INTRAVENOUS AS NEEDED
Status: COMPLETED | OUTPATIENT
Start: 2023-06-19 | End: 2023-06-19

## 2023-06-19 RX ORDER — CEFAZOLIN SODIUM 1 G/50ML
1000 SOLUTION INTRAVENOUS ONCE
Status: COMPLETED | OUTPATIENT
Start: 2023-06-19 | End: 2023-06-19

## 2023-06-19 RX ADMIN — FENTANYL CITRATE 100 MCG: 50 INJECTION, SOLUTION INTRAMUSCULAR; INTRAVENOUS at 08:50

## 2023-06-19 RX ADMIN — MIDAZOLAM 2 MG: 1 INJECTION INTRAMUSCULAR; INTRAVENOUS at 08:50

## 2023-06-19 RX ADMIN — CEFAZOLIN SODIUM 1000 MG: 1 SOLUTION INTRAVENOUS at 08:30

## 2023-06-19 RX ADMIN — LIDOCAINE HYDROCHLORIDE,EPINEPHRINE BITARTRATE 15 ML: 10; .01 INJECTION, SOLUTION INFILTRATION; PERINEURAL at 08:50

## 2023-06-19 NOTE — SEDATION DOCUMENTATION
Port successfully placed  Patient tolerated well, awake and hemodynamically stable for transfer to PACU  Report and care given to primary RN

## 2023-06-19 NOTE — TELEPHONE ENCOUNTER
Rec'd call from patient asking when he is to take the allopurinol  I told him that he can start taking it now  Patient verbalized understanding  Patient also asked if they zev his labs today at his port placement  I told him that there are no labs pending and I will see if [facility] can see him tomorrow to get labs  Patient verbalized understanding

## 2023-06-19 NOTE — BRIEF OP NOTE (RAD/CATH)
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

## 2023-06-19 NOTE — INTERVAL H&P NOTE
Update: (This section must be completed if the H&P was completed greater than 24 hrs to procedure or admission)    H&P reviewed  After examining the patient, I find no changed to the H&P since it had been written  Patient re-evaluated   Accept as history and physical     Sarah España MD/June 19, 2023/9:11 AM

## 2023-06-19 NOTE — DISCHARGE INSTRUCTIONS
Implanted Venous Access Port     WHAT YOU NEED TO KNOW:   An implanted venous access port is a device used to give treatments and take blood  It may also be called a central venous access device (CVAD)  The port is a small container that is placed under your skin, usually in your upper chest  The port is attached to a catheter that enters a large vein  DISCHARGE INSTRUCTIONS:   Resume your normal diet  Small sips of flat soda will help with mild nausea  Prevent an infection:   Wash your hands often  Use soap and water  Clean your hands before and after you care for your port  Remind everyone who cares for your port to wash their hands  Check your skin for infection every day  Look for redness, swelling, or fluid oozing from the port site  Care for your port:   1  You may shower beginning 48 hours after procedure  2   Leave glue in place  3  It is normal for some bruising to occur  4  Use Tylenol for pain  5  Limit use of arm on the side that your port was placed  Lift nothing heavier than 5 pounds for 1 week, and then gradually increase activity as tolerated  6  DO NOT apply ointment, lotion or cream to port site until incision is healed  Allow glue to fall off  DO NOT attempt to peel glue from skin even it it begins to flake  7  After the port incision is healed you may swim, bathe  Notify the Interventional Radiologist if you have any of the followin  Fever above 101 F    2  Increased redness or swelling after 1st day  3  Increased pain after 1st day  4  Any sign of infection (drainage from port site, skin separation, hot to touch)  5  Persistent nausea or vomiting  Contact Interventional Radiology at [phone] [facility] PATIENTS: Contact Interventional Radiology at [phone]) ([address] [place] [address]: Contact Interventional Radiology at [phone])

## 2023-06-20 ENCOUNTER — HOSPITAL ENCOUNTER (OUTPATIENT)
Dept: INFUSION CENTER | Facility: HOSPITAL | Age: 73
Discharge: HOME | End: 2023-06-20
Payer: COMMERCIAL

## 2023-06-20 DIAGNOSIS — C32.3: ICD-10-CM

## 2023-06-20 DIAGNOSIS — C32.3 MALIGNANT NEOPLASM OF LARYNGEAL CARTILAGE (HCC): ICD-10-CM

## 2023-06-20 DIAGNOSIS — C32.3: Primary | ICD-10-CM

## 2023-06-20 DIAGNOSIS — R19.09 GROIN LUMP: Primary | ICD-10-CM

## 2023-06-20 DIAGNOSIS — C85.95 NON-HODGKIN LYMPHOMA OF LYMPH NODES OF INGUINAL REGION, UNSPECIFIED NON-HODGKIN LYMPHOMA TYPE (HCC): ICD-10-CM

## 2023-06-20 LAB
ALBUMIN SERPL BCP-MCNC: 4.2 G/DL (ref 3.5–5)
ALP SERPL-CCNC: 92 U/L (ref 34–104)
ALT SERPL W P-5'-P-CCNC: 6 U/L (ref 7–52)
ANION GAP SERPL CALCULATED.3IONS-SCNC: 6 MMOL/L
AST SERPL W P-5'-P-CCNC: 15 U/L (ref 13–39)
BASOPHILS # BLD MANUAL: 0 THOUSAND/UL (ref 0–0.1)
BASOPHILS NFR MAR MANUAL: 0 % (ref 0–1)
BILIRUB SERPL-MCNC: 0.64 MG/DL (ref 0.2–1)
BUN SERPL-MCNC: 14 MG/DL (ref 5–25)
CALCIUM SERPL-MCNC: 9.4 MG/DL (ref 8.4–10.2)
CHLORIDE SERPL-SCNC: 101 MMOL/L (ref 96–108)
CO2 SERPL-SCNC: 28 MMOL/L (ref 21–32)
CREAT SERPL-MCNC: 0.8 MG/DL (ref 0.6–1.3)
EOSINOPHIL # BLD MANUAL: 0.38 THOUSAND/UL (ref 0–0.4)
EOSINOPHIL NFR BLD MANUAL: 3 % (ref 0–6)
ERYTHROCYTE [DISTWIDTH] IN BLOOD BY AUTOMATED COUNT: 14.6 % (ref 11.6–15.1)
GFR SERPL CREATININE-BSD FRML MDRD: 89 ML/MIN/1.73SQ M
GLUCOSE SERPL-MCNC: 98 MG/DL (ref 65–140)
HCT VFR BLD AUTO: 37.7 % (ref 36.5–49.3)
HGB BLD-MCNC: 12.8 G/DL (ref 12–17)
LYMPHOCYTES # BLD AUTO: 26 % (ref 14–44)
LYMPHOCYTES # BLD AUTO: 3.29 THOUSAND/UL (ref 0.6–4.47)
MCH RBC QN AUTO: 32.3 PG (ref 26.8–34.3)
MCHC RBC AUTO-ENTMCNC: 34 G/DL (ref 31.4–37.4)
MCV RBC AUTO: 95 FL (ref 82–98)
MONOCYTES # BLD AUTO: 0.76 THOUSAND/UL (ref 0–1.22)
MONOCYTES NFR BLD: 6 % (ref 4–12)
NEUTROPHILS # BLD MANUAL: 8.09 THOUSAND/UL (ref 1.85–7.62)
NEUTS SEG NFR BLD AUTO: 64 % (ref 43–75)
PLATELET # BLD AUTO: 317 THOUSANDS/UL (ref 149–390)
PLATELET BLD QL SMEAR: ADEQUATE
PMV BLD AUTO: 10.3 FL (ref 8.9–12.7)
POTASSIUM SERPL-SCNC: 3.9 MMOL/L (ref 3.5–5.3)
PROT SERPL-MCNC: 6.9 G/DL (ref 6.4–8.4)
RBC # BLD AUTO: 3.96 MILLION/UL (ref 3.88–5.62)
SODIUM SERPL-SCNC: 135 MMOL/L (ref 135–147)
VARIANT LYMPHS # BLD AUTO: 1 %
WBC # BLD AUTO: 12.64 THOUSAND/UL (ref 4.31–10.16)

## 2023-06-20 PROCEDURE — 80053 COMPREHEN METABOLIC PANEL: CPT | Performed by: INTERNAL MEDICINE

## 2023-06-20 PROCEDURE — 85027 COMPLETE CBC AUTOMATED: CPT | Performed by: INTERNAL MEDICINE

## 2023-06-20 PROCEDURE — 87517 HEPATITIS B DNA QUANT: CPT | Performed by: INTERNAL MEDICINE

## 2023-06-20 PROCEDURE — 85007 BL SMEAR W/DIFF WBC COUNT: CPT | Performed by: INTERNAL MEDICINE

## 2023-06-22 ENCOUNTER — HOSPITAL ENCOUNTER (OUTPATIENT)
Dept: INFUSION CENTER | Facility: HOSPITAL | Age: 73
Discharge: HOME | End: 2023-06-22
Attending: INTERNAL MEDICINE
Payer: COMMERCIAL

## 2023-06-22 VITALS
HEIGHT: 67 IN | BODY MASS INDEX: 21.31 KG/M2 | SYSTOLIC BLOOD PRESSURE: 135 MMHG | OXYGEN SATURATION: 98 % | HEART RATE: 75 BPM | RESPIRATION RATE: 14 BRPM | TEMPERATURE: 97.5 F | WEIGHT: 135.8 LBS | DIASTOLIC BLOOD PRESSURE: 84 MMHG

## 2023-06-22 DIAGNOSIS — C82.95 FOLLICULAR LYMPHOMA OF LYMPH NODES OF INGUINAL REGION, UNSPECIFIED FOLLICULAR LYMPHOMA TYPE (HCC): Primary | ICD-10-CM

## 2023-06-22 LAB — HBV DNA SERPL NAA+PROBE-ACNC: NOT DETECTED [IU]/ML

## 2023-06-22 PROCEDURE — 96413 CHEMO IV INFUSION 1 HR: CPT

## 2023-06-22 PROCEDURE — 96367 TX/PROPH/DG ADDL SEQ IV INF: CPT

## 2023-06-22 RX ORDER — SODIUM CHLORIDE 9 MG/ML
20 INJECTION, SOLUTION INTRAVENOUS ONCE
Status: COMPLETED | OUTPATIENT
Start: 2023-06-22 | End: 2023-06-22

## 2023-06-22 RX ADMIN — SODIUM CHLORIDE 1000 ML: 0.9 INJECTION, SOLUTION INTRAVENOUS at 10:17

## 2023-06-22 RX ADMIN — DEXAMETHASONE SODIUM PHOSPHATE: 10 INJECTION, SOLUTION INTRAMUSCULAR; INTRAVENOUS at 10:22

## 2023-06-22 RX ADMIN — SODIUM CHLORIDE 20 ML/HR: 0.9 INJECTION, SOLUTION INTRAVENOUS at 10:16

## 2023-06-22 RX ADMIN — BENDAMUSTINE HYDROCHLORIDE 153.9 MG: 25 INJECTION, SOLUTION INTRAVENOUS at 11:03

## 2023-06-22 NOTE — PROGRESS NOTES
Pt here for chemo and hydration tolerated well no adverse reactions AVS provided and pt left ambulatory with steady gait

## 2023-06-23 ENCOUNTER — HOSPITAL ENCOUNTER (OUTPATIENT)
Dept: INFUSION CENTER | Facility: HOSPITAL | Age: 73
Discharge: HOME | End: 2023-06-23
Attending: INTERNAL MEDICINE

## 2023-06-23 ENCOUNTER — HOSPITAL ENCOUNTER (OUTPATIENT)
Dept: INFUSION CENTER | Facility: HOSPITAL | Age: 73
End: 2023-06-23
Attending: INTERNAL MEDICINE
Payer: COMMERCIAL

## 2023-06-23 ENCOUNTER — HOSPITAL ENCOUNTER (OUTPATIENT)
Dept: INFUSION CENTER | Facility: HOSPITAL | Age: 73
End: 2023-06-23
Attending: INTERNAL MEDICINE

## 2023-06-23 VITALS
HEIGHT: 67 IN | BODY MASS INDEX: 21.31 KG/M2 | HEART RATE: 67 BPM | WEIGHT: 135.8 LBS | SYSTOLIC BLOOD PRESSURE: 135 MMHG | TEMPERATURE: 98.2 F | RESPIRATION RATE: 16 BRPM | OXYGEN SATURATION: 97 % | DIASTOLIC BLOOD PRESSURE: 76 MMHG

## 2023-06-23 DIAGNOSIS — C82.95 FOLLICULAR LYMPHOMA OF LYMPH NODES OF INGUINAL REGION, UNSPECIFIED FOLLICULAR LYMPHOMA TYPE (HCC): Primary | ICD-10-CM

## 2023-06-23 PROCEDURE — 96415 CHEMO IV INFUSION ADDL HR: CPT

## 2023-06-23 PROCEDURE — 96411 CHEMO IV PUSH ADDL DRUG: CPT

## 2023-06-23 PROCEDURE — 96413 CHEMO IV INFUSION 1 HR: CPT

## 2023-06-23 PROCEDURE — 96367 TX/PROPH/DG ADDL SEQ IV INF: CPT

## 2023-06-23 RX ORDER — SODIUM CHLORIDE 9 MG/ML
20 INJECTION, SOLUTION INTRAVENOUS ONCE
Status: COMPLETED | OUTPATIENT
Start: 2023-06-23 | End: 2023-06-23

## 2023-06-23 RX ORDER — FAMOTIDINE 10 MG/ML
INJECTION, SOLUTION INTRAVENOUS
Status: DISPENSED
Start: 2023-06-23 | End: 2023-06-23

## 2023-06-23 RX ORDER — ACETAMINOPHEN 325 MG/1
650 TABLET ORAL ONCE
Status: COMPLETED | OUTPATIENT
Start: 2023-06-23 | End: 2023-06-23

## 2023-06-23 RX ADMIN — BENDAMUSTINE HYDROCHLORIDE 153.9 MG: 25 INJECTION, SOLUTION INTRAVENOUS at 13:49

## 2023-06-23 RX ADMIN — ACETAMINOPHEN 650 MG: 325 TABLET, FILM COATED ORAL at 09:07

## 2023-06-23 RX ADMIN — SODIUM CHLORIDE 641.2 MG: 0.9 INJECTION, SOLUTION INTRAVENOUS at 10:10

## 2023-06-23 RX ADMIN — SODIUM CHLORIDE 1000 ML: 0.9 INJECTION, SOLUTION INTRAVENOUS at 09:05

## 2023-06-23 RX ADMIN — SODIUM CHLORIDE 20 ML/HR: 9 INJECTION, SOLUTION INTRAVENOUS at 09:08

## 2023-06-23 RX ADMIN — DIPHENHYDRAMINE HYDROCHLORIDE 25 MG: 50 INJECTION, SOLUTION INTRAMUSCULAR; INTRAVENOUS at 09:07

## 2023-06-23 RX ADMIN — DEXAMETHASONE SODIUM PHOSPHATE: 10 INJECTION, SOLUTION INTRAMUSCULAR; INTRAVENOUS at 09:34

## 2023-07-12 ENCOUNTER — OFFICE VISIT (OUTPATIENT)
Age: 73
End: 2023-07-12
Payer: COMMERCIAL

## 2023-07-12 VITALS
DIASTOLIC BLOOD PRESSURE: 80 MMHG | WEIGHT: 132.6 LBS | HEART RATE: 63 BPM | SYSTOLIC BLOOD PRESSURE: 130 MMHG | HEIGHT: 67 IN | RESPIRATION RATE: 16 BRPM | OXYGEN SATURATION: 96 % | BODY MASS INDEX: 20.81 KG/M2 | TEMPERATURE: 97 F

## 2023-07-12 DIAGNOSIS — C82.95 FOLLICULAR LYMPHOMA OF LYMPH NODES OF INGUINAL REGION, UNSPECIFIED FOLLICULAR LYMPHOMA TYPE (HCC): Primary | ICD-10-CM

## 2023-07-12 PROCEDURE — 99213 OFFICE O/P EST LOW 20 MIN: CPT | Performed by: INTERNAL MEDICINE

## 2023-07-12 RX ORDER — SODIUM CHLORIDE 9 MG/ML
20 INJECTION, SOLUTION INTRAVENOUS ONCE
Status: CANCELLED | OUTPATIENT
Start: 2023-07-20

## 2023-07-12 RX ORDER — ACETAMINOPHEN 325 MG/1
650 TABLET ORAL ONCE
OUTPATIENT
Start: 2023-08-17

## 2023-07-12 RX ORDER — SODIUM CHLORIDE 9 MG/ML
20 INJECTION, SOLUTION INTRAVENOUS ONCE
Status: CANCELLED | OUTPATIENT
Start: 2023-07-21

## 2023-07-12 RX ORDER — SODIUM CHLORIDE 9 MG/ML
20 INJECTION, SOLUTION INTRAVENOUS ONCE
OUTPATIENT
Start: 2023-08-17

## 2023-07-12 RX ORDER — SODIUM CHLORIDE 9 MG/ML
20 INJECTION, SOLUTION INTRAVENOUS ONCE
OUTPATIENT
Start: 2023-08-18

## 2023-07-12 RX ORDER — ACETAMINOPHEN 325 MG/1
650 TABLET ORAL ONCE
Status: CANCELLED | OUTPATIENT
Start: 2023-07-20

## 2023-07-14 ENCOUNTER — TELEPHONE (OUTPATIENT)
Dept: HEMATOLOGY ONCOLOGY | Facility: CLINIC | Age: 73
End: 2023-07-14

## 2023-07-14 NOTE — TELEPHONE ENCOUNTER
Informed patient that he will be getting labs drawn on Tuesday at the [facility] center. Patient verbalized understanding.

## 2023-07-15 NOTE — PROGRESS NOTES
HEMATOLOGY / [address] [facility] FOLLOW UP NOTE    Primary Care Provider: Cristopher Martin DO  Referring Provider:    MRN: [id number]  : 1950    Reason for Encounter: follow up folicular lymphoma       Oncology History Overview Note    - chemo/RT for cancel of the tonsil at 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 ([address])     2023 -  Cancer Staged    Staging form: Hodgkin and Non-Hodgkin Lymphoma, AJCC 8th Edition  - Clinical stage from : Stage III (Follicular lymphoma) - Signed by Andreina Rivas DO on 7/15/2023       2023 -  Chemotherapy    alteplase (CATHFLO), 2 mg, Intracatheter, Every 1 Minute as needed, 1 of 6 cycles  bendamustine HCL(BENDEKA) IVPB, 90 mg/m2 = 153.9 mg, Intravenous, Once, 1 of 6 cycles  Administration: 153.9 mg (2023), 153.9 mg (2023)  riTUXimab-pvvr (Don Roslyn) first titrated chemo infusion, 375 mg/m2 = 641.2 mg (100 % of original dose 375 mg/m2), Intravenous, Once, 1 of 1 cycle  Dose modification: 375 mg/m2 (original dose 375 mg/m2, Cycle 1)  Administration: 641.2 mg (2023)  riTUXimab (RUXIENCE) subsequent titrated chemo infusion, 375 mg/m2 = 641.2 mg (100 % of original dose 375 mg/m2), Intravenous, Once, 0 of 5 cycles  Dose modification: 375 mg/m2 (original dose 375 mg/m2, Cycle 2)         Interval History: Patient presents for follow up of his follicular lymphoma grade 1-2. He had his first cycle of Bendamustine and Rituxan on . He had some very mild nausea that was controlled with home medications. No diarrhea.   He did have an itchy rash developed about a week ago. His weight is stable. He has not started any other new medications. He denies any pain. He does feel like his energy is slightly better and night sweats are slightly better since starting treatment. REVIEW OF SYSTEMS:  Please note that a 14-point review of systems was performed to include Constitutional, HEENT, Respiratory, CVS, GI, , Musculoskeletal, Integumentary, Neurologic, Rheumatologic, Endocrinologic, Psychiatric, Lymphatic, and Hematologic/Oncologic systems were reviewed and are negative unless otherwise stated in HPI. Positive and negative findings pertinent to this evaluation are incorporated into the history of present illness. 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 He tolerated first cycle of Bendamustine and Rituxan well. We will proceed with cycle 2 without any dose reductions. He can try little topical hydrocortisone for his rash. Bendamustine is known for causing rashes but this happened a couple weeks after he had the Bendamustine. I cannot say for sure that is what it is from. I will plan on being him see my nurse practitioner prior to cycle 3.   I will see him prior to cycle 4 with a repeat PET scan. He knows to call in the interim with any questions or concerns. I spent 20 minutes on chart review, face to face counseling time, coordination of care and documentation. Past Medical History:   has a past medical history of Cancer [facility]), Colon polyp, CVA (cerebral vascular accident) ([address]) (01/2020), Hepatitis B, Hyperlipidemia, Hypertension, and Stroke ([facility]). PAST SURGICAL HISTORY:   has a past surgical history that includes Appendectomy (2003); Skull fracture elevation (1984); Colonoscopy (08/02/2017); and IR biopsy inguinal lymph node (2/15/2023). CURRENT MEDICATIONS  Current Outpatient Medications   Medication Sig Dispense Refill   • allopurinol (ZYLOPRIM) 300 mg tablet Take 1 tablet (300 mg total) by mouth daily 30 tablet 4   • amLODIPine (NORVASC) 2.5 mg tablet Take 1 tablet by mouth once daily 90 tablet 3   • amoxicillin (AMOXIL) 500 mg capsule if needed     • aspirin (ECOTRIN LOW STRENGTH) 81 mg EC tablet Take 1 tablet (81 mg total) by mouth daily     • atorvastatin (LIPITOR) 80 mg tablet Take 1 tablet by mouth once daily 90 tablet 3   • Eliquis 5 MG Take 1 tablet by mouth twice daily 180 tablet 3   • ondansetron (ZOFRAN) 8 mg tablet Take 1 tablet (8 mg total) by mouth every 8 (eight) hours as needed for nausea or vomiting 30 tablet 2   • prochlorperazine (COMPAZINE) 10 mg tablet Take 1 tablet (10 mg total) by mouth every 6 (six) hours as needed for nausea or vomiting 30 tablet 3     No current facility-administered medications for this visit. [unfilled]    SOCIAL HISTORY:   reports that he has been smoking cigarettes. He started smoking about 53 years ago. He has been smoking an average of .5 packs per day. He has never used smokeless tobacco. He reports that he does not currently use alcohol. He reports current drug use. Drug: Marijuana. FAMILY HISTORY:  family history includes Colon polyps in his father; Heart disease in his father.      ALLERGIES:  has No Known Allergies. Physical Exam:  Vital Signs:   Visit Vitals  /80 (BP Location: Left arm, Patient Position: Sitting, Cuff Size: Adult)   Pulse 63   Temp (!) 97 °F (36.1 °C)   Resp 16   Ht 5' 7" (1.702 m)   Wt 60.1 kg (132 lb 9.6 oz)   SpO2 96%   BMI 20.77 kg/m²   Smoking Status Every Day   BSA 1.7 m²     Body mass index is 20.77 kg/m². Body surface area is 1.7 meters squared. GEN: Alert, awake oriented x3, in no acute distress  HEENT- No pallor, icterus, cyanosis, no oral mucosal lesions,   LAD - no palpable cervical, clavicle, axillary, inguinal LAD  Heart- normal S1 S2, regular rate and rhythm, No murmur, rubs.    Lungs- clear breathing sound bilateral.   Abdomen- soft, Non tender, bowel sounds present  Extremities- No cyanosis, clubbing, edema  Neuro- No focal neurological deficit    Labs:  Lab Results   Component Value Date    WBC 12.64 (H) 06/20/2023    HGB 12.8 06/20/2023    HCT 37.7 06/20/2023    MCV 95 06/20/2023     06/20/2023     Lab Results   Component Value Date     01/11/2018    SODIUM 135 06/20/2023    K 3.9 06/20/2023     06/20/2023    CO2 28 06/20/2023    AGAP 6 06/20/2023    BUN 14 06/20/2023    CREATININE 0.80 06/20/2023    GLUC 98 06/20/2023    CALCIUM 9.4 06/20/2023    AST 15 06/20/2023    ALT 6 (L) 06/20/2023    ALKPHOS 92 06/20/2023    PROT 6.6 09/09/2016    TP 6.9 06/20/2023    BILITOT 0.3 09/09/2016    TBILI 0.64 06/20/2023    EGFR 89 06/20/2023

## 2023-07-18 ENCOUNTER — HOSPITAL ENCOUNTER (OUTPATIENT)
Dept: INFUSION CENTER | Facility: HOSPITAL | Age: 73
Discharge: HOME | End: 2023-07-18
Payer: COMMERCIAL

## 2023-07-18 DIAGNOSIS — C32.3: Primary | ICD-10-CM

## 2023-07-18 DIAGNOSIS — C85.95 NON-HODGKIN LYMPHOMA OF LYMPH NODES OF INGUINAL REGION, UNSPECIFIED NON-HODGKIN LYMPHOMA TYPE (HCC): ICD-10-CM

## 2023-07-18 LAB
ALBUMIN SERPL BCP-MCNC: 4 G/DL (ref 3.5–5)
ALP SERPL-CCNC: 97 U/L (ref 34–104)
ALT SERPL W P-5'-P-CCNC: 6 U/L (ref 7–52)
ANION GAP SERPL CALCULATED.3IONS-SCNC: 3 MMOL/L
AST SERPL W P-5'-P-CCNC: 12 U/L (ref 13–39)
BASOPHILS # BLD AUTO: 0.18 THOUSANDS/ÂΜL (ref 0–0.1)
BASOPHILS NFR BLD AUTO: 2 % (ref 0–1)
BILIRUB SERPL-MCNC: 0.54 MG/DL (ref 0.2–1)
BUN SERPL-MCNC: 14 MG/DL (ref 5–25)
CALCIUM SERPL-MCNC: 9 MG/DL (ref 8.4–10.2)
CHLORIDE SERPL-SCNC: 105 MMOL/L (ref 96–108)
CO2 SERPL-SCNC: 26 MMOL/L (ref 21–32)
CREAT SERPL-MCNC: 0.68 MG/DL (ref 0.6–1.3)
EOSINOPHIL # BLD AUTO: 1.03 THOUSAND/ÂΜL (ref 0–0.61)
EOSINOPHIL NFR BLD AUTO: 9 % (ref 0–6)
ERYTHROCYTE [DISTWIDTH] IN BLOOD BY AUTOMATED COUNT: 16 % (ref 11.6–15.1)
GFR SERPL CREATININE-BSD FRML MDRD: 95 ML/MIN/1.73SQ M
GLUCOSE SERPL-MCNC: 98 MG/DL (ref 65–140)
HCT VFR BLD AUTO: 37.1 % (ref 36.5–49.3)
HGB BLD-MCNC: 12.4 G/DL (ref 12–17)
IMM GRANULOCYTES # BLD AUTO: 0.07 THOUSAND/UL (ref 0–0.2)
IMM GRANULOCYTES NFR BLD AUTO: 1 % (ref 0–2)
LYMPHOCYTES # BLD AUTO: 0.87 THOUSANDS/ÂΜL (ref 0.6–4.47)
LYMPHOCYTES NFR BLD AUTO: 8 % (ref 14–44)
MCH RBC QN AUTO: 32.2 PG (ref 26.8–34.3)
MCHC RBC AUTO-ENTMCNC: 33.4 G/DL (ref 31.4–37.4)
MCV RBC AUTO: 96 FL (ref 82–98)
MONOCYTES # BLD AUTO: 1.29 THOUSAND/ÂΜL (ref 0.17–1.22)
MONOCYTES NFR BLD AUTO: 11 % (ref 4–12)
NEUTROPHILS # BLD AUTO: 7.91 THOUSANDS/ÂΜL (ref 1.85–7.62)
NEUTS SEG NFR BLD AUTO: 69 % (ref 43–75)
NRBC BLD AUTO-RTO: 0 /100 WBCS
PLATELET # BLD AUTO: 340 THOUSANDS/UL (ref 149–390)
PMV BLD AUTO: 10 FL (ref 8.9–12.7)
POTASSIUM SERPL-SCNC: 4.1 MMOL/L (ref 3.5–5.3)
PROT SERPL-MCNC: 6.1 G/DL (ref 6.4–8.4)
RBC # BLD AUTO: 3.85 MILLION/UL (ref 3.88–5.62)
SODIUM SERPL-SCNC: 134 MMOL/L (ref 135–147)
WBC # BLD AUTO: 11.35 THOUSAND/UL (ref 4.31–10.16)

## 2023-07-18 PROCEDURE — 85025 COMPLETE CBC W/AUTO DIFF WBC: CPT | Performed by: INTERNAL MEDICINE

## 2023-07-18 PROCEDURE — 80053 COMPREHEN METABOLIC PANEL: CPT | Performed by: INTERNAL MEDICINE

## 2023-07-18 NOTE — PROGRESS NOTES
Patient in infusion center today for central line labs. Pt tolerated port access and deaccess with no adverse reactions. Pt then d/c'd home ambulatory with steady gait.

## 2023-07-18 NOTE — PLAN OF CARE
Problem: Knowledge Deficit  Goal: Patient/family/caregiver demonstrates understanding of disease process, treatment plan, medications, and discharge instructions  Description: Complete learning assessment and assess knowledge base. Interventions:  - Provide teaching at level of understanding  - Provide teaching via preferred learning methods  Outcome: Progressing     Problem: Knowledge Deficit  Goal: Patient/family/caregiver demonstrates understanding of disease process, treatment plan, medications, and discharge instructions  Description: Complete learning assessment and assess knowledge base.   Interventions:  - Provide teaching at level of understanding  - Provide teaching via preferred learning methods  Outcome: Progressing

## 2023-07-20 ENCOUNTER — HOSPITAL ENCOUNTER (OUTPATIENT)
Dept: INFUSION CENTER | Facility: HOSPITAL | Age: 73
End: 2023-07-20
Attending: INTERNAL MEDICINE

## 2023-07-20 ENCOUNTER — HOSPITAL ENCOUNTER (OUTPATIENT)
Dept: INFUSION CENTER | Facility: HOSPITAL | Age: 73
Discharge: HOME | End: 2023-07-20
Attending: INTERNAL MEDICINE
Payer: COMMERCIAL

## 2023-07-20 VITALS
HEIGHT: 67 IN | OXYGEN SATURATION: 95 % | TEMPERATURE: 97.6 F | DIASTOLIC BLOOD PRESSURE: 59 MMHG | RESPIRATION RATE: 14 BRPM | BODY MASS INDEX: 20.83 KG/M2 | WEIGHT: 132.72 LBS | HEART RATE: 62 BPM | SYSTOLIC BLOOD PRESSURE: 101 MMHG

## 2023-07-20 DIAGNOSIS — C82.95 FOLLICULAR LYMPHOMA OF LYMPH NODES OF INGUINAL REGION, UNSPECIFIED FOLLICULAR LYMPHOMA TYPE (HCC): Primary | ICD-10-CM

## 2023-07-20 PROCEDURE — 96417 CHEMO IV INFUS EACH ADDL SEQ: CPT

## 2023-07-20 PROCEDURE — 96367 TX/PROPH/DG ADDL SEQ IV INF: CPT

## 2023-07-20 PROCEDURE — 96415 CHEMO IV INFUSION ADDL HR: CPT

## 2023-07-20 PROCEDURE — 96413 CHEMO IV INFUSION 1 HR: CPT

## 2023-07-20 RX ORDER — ACETAMINOPHEN 325 MG/1
650 TABLET ORAL ONCE
Status: COMPLETED | OUTPATIENT
Start: 2023-07-20 | End: 2023-07-20

## 2023-07-20 RX ORDER — SODIUM CHLORIDE 9 MG/ML
20 INJECTION, SOLUTION INTRAVENOUS ONCE
Status: COMPLETED | OUTPATIENT
Start: 2023-07-20 | End: 2023-07-20

## 2023-07-20 RX ADMIN — SODIUM CHLORIDE 20 ML/HR: 0.9 INJECTION, SOLUTION INTRAVENOUS at 09:33

## 2023-07-20 RX ADMIN — SODIUM CHLORIDE 641.2 MG: 0.9 INJECTION, SOLUTION INTRAVENOUS at 10:33

## 2023-07-20 RX ADMIN — DEXAMETHASONE SODIUM PHOSPHATE: 10 INJECTION, SOLUTION INTRAMUSCULAR; INTRAVENOUS at 09:56

## 2023-07-20 RX ADMIN — DIPHENHYDRAMINE HYDROCHLORIDE 25 MG: 50 INJECTION, SOLUTION INTRAMUSCULAR; INTRAVENOUS at 09:32

## 2023-07-20 RX ADMIN — BENDAMUSTINE HYDROCHLORIDE 153.9 MG: 25 INJECTION, SOLUTION INTRAVENOUS at 13:08

## 2023-07-20 RX ADMIN — ACETAMINOPHEN 650 MG: 325 TABLET, FILM COATED ORAL at 09:33

## 2023-07-20 NOTE — PROGRESS NOTES
Patient tolerated treatment today with no adverse reactions. Pt educated to notify MD with s/s of reactions including SOB, Rash, and hives Patient gave verbal understanding. Patient then d/cd home ambulatory with steady gait. An After Visit Summary was printed and given to the patient.

## 2023-07-21 ENCOUNTER — HOSPITAL ENCOUNTER (OUTPATIENT)
Dept: INFUSION CENTER | Facility: HOSPITAL | Age: 73
End: 2023-07-21
Attending: INTERNAL MEDICINE
Payer: COMMERCIAL

## 2023-07-21 VITALS
BODY MASS INDEX: 20.83 KG/M2 | TEMPERATURE: 98 F | SYSTOLIC BLOOD PRESSURE: 121 MMHG | DIASTOLIC BLOOD PRESSURE: 75 MMHG | WEIGHT: 132.72 LBS | HEIGHT: 67 IN | RESPIRATION RATE: 14 BRPM | OXYGEN SATURATION: 97 % | HEART RATE: 73 BPM

## 2023-07-21 DIAGNOSIS — C82.95 FOLLICULAR LYMPHOMA OF LYMPH NODES OF INGUINAL REGION, UNSPECIFIED FOLLICULAR LYMPHOMA TYPE (HCC): Primary | ICD-10-CM

## 2023-07-21 PROCEDURE — 96413 CHEMO IV INFUSION 1 HR: CPT

## 2023-07-21 PROCEDURE — 96367 TX/PROPH/DG ADDL SEQ IV INF: CPT

## 2023-07-21 RX ORDER — SODIUM CHLORIDE 9 MG/ML
20 INJECTION, SOLUTION INTRAVENOUS ONCE
Status: COMPLETED | OUTPATIENT
Start: 2023-07-21 | End: 2023-07-21

## 2023-07-21 RX ADMIN — SODIUM CHLORIDE 20 ML/HR: 9 INJECTION, SOLUTION INTRAVENOUS at 10:43

## 2023-07-21 RX ADMIN — BENDAMUSTINE HYDROCHLORIDE 153.9 MG: 25 INJECTION, SOLUTION INTRAVENOUS at 11:10

## 2023-07-21 RX ADMIN — DEXAMETHASONE SODIUM PHOSPHATE: 10 INJECTION, SOLUTION INTRAMUSCULAR; INTRAVENOUS at 10:43

## 2023-08-04 NOTE — PROGRESS NOTES
HEMATOLOGY / [facility] FOLLOW UP NOTE    Primary Care Provider: Vivek Toussaint DO  Referring Provider:    MRN: [id number]  : 1950    Reason for Encounter: Follow-up for follicular lymphoma       Oncology History Overview Note    - chemo/RT for cancel of the tonsil at [facility] (Tahmina Willard and Jose Elizabeth)    1084 - grade 1-2 follicular lymphoma - bx at right groin, Stage IIIS    2023 - start bendamustine/rituxan     Primary malignant neoplasm of laryngeal cartilage ([address])   2014 Initial Diagnosis    Primary malignant neoplasm of laryngeal cartilage ([address])     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 ([address])   2023 Initial Diagnosis    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 ([address])     2023 -  Cancer Staged    Staging form: Hodgkin and Non-Hodgkin Lymphoma, AJCC 8th Edition  - Clinical stage from 2439: Stage III (Follicular lymphoma) - Signed by Jean High DO on 7/15/2023       2023 -  Chemotherapy    alteplase (CATHFLO), 2 mg, Intracatheter, Every 1 Minute as needed, 2 of 6 cycles  bendamustine HCL(BENDEKA) IVPB, 90 mg/m2 = 153.9 mg, Intravenous, Once, 2 of 6 cycles  Administration: 153.9 mg (2023), 153.9 mg (2023), 153.9 mg (2023), 153.9 mg (2023)  riTUXimab-pvvr ([place]) first titrated chemo infusion, 375 mg/m2 = 641.2 mg (100 % of original dose 375 mg/m2), Intravenous, Once, 1 of 1 cycle  Dose modification: 375 mg/m2 (original dose 375 mg/m2, Cycle 1)  Administration: 641.2 mg (2023)  riTUXimab (RUXIENCE) subsequent titrated chemo infusion, 375 mg/m2 = 641.2 mg (100 % of original dose 375 mg/m2), Intravenous, Once, 1 of 5 cycles  Dose modification: 375 mg/m2 (original dose 375 mg/m2, Cycle 2)  Administration: 641.2 mg (2023)         Interval History: Patient presents prior to cycle 3 of Jeremie Rituxan. He reports he is tolerating treatment well without any significant treatment related side effects.   He does feel fatigued but [facility]  Denies any B symptoms. No night sweats. Previous rash resolved. Blood work has remained in acceptable range         REVIEW OF SYSTEMS:  Please note that a 14-point review of systems was performed to include Constitutional, HEENT, Respiratory, CVS, GI, , Musculoskeletal, Integumentary, Neurologic, Rheumatologic, Endocrinologic, Psychiatric, Lymphatic, and Hematologic/Oncologic systems were reviewed and are negative unless otherwise stated in HPI. Positive and negative findings pertinent to this evaluation are incorporated into the history of present illness. 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)   • 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 [facility])   • Prediabetes   • Protein-calorie malnutrition, unspecified severity ([address])       Assessment / Plan:  1. 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 Lymphoma of lymph nodes of inguinal region, unspecified lymphoma type [facility])      Patient is a very pleasant 69-year-old gentleman with grade 1-2 follicular lymphoma on treatment with Bendamustine and Rituxan. He has completed 2 cycles without any dose reductions and has tolerated well. Overall, patient is doing well and clinically there are no concerns on exam today. He will proceed with cycle 3 without any change.   I have placed orders for PET CT scan to be completed prior to cycle 4 to assess disease response to treatment. He will follow-up with Dr. Leon Valle prior to cycle 4 to review pet imaging. Patient is in agreement with this plan of care. He knows to call anytime with questions or concerns    I spent 30 minutes on chart review, face to face counseling time, coordination of care and documentation. Past Medical History:   has a past medical history of Cancer [facility]), Colon polyp, CVA (cerebral vascular accident) ([address]) (01/2020), Hepatitis B, Hyperlipidemia, Hypertension, and Stroke ([address] PAST SURGICAL HISTORY:   has a past surgical history that includes Appendectomy (2003); Skull fracture elevation (1984); Colonoscopy (08/02/2017); IR biopsy inguinal lymph node (2/15/2023); and IR port placement (6/19/2023). CURRENT MEDICATIONS  Current Outpatient Medications   Medication Sig Dispense Refill   • allopurinol (ZYLOPRIM) 300 mg tablet Take 1 tablet (300 mg total) by mouth daily 30 tablet 4   • amLODIPine (NORVASC) 2.5 mg tablet Take 1 tablet by mouth once daily 90 tablet 3   • amoxicillin (AMOXIL) 500 mg capsule if needed     • aspirin (ECOTRIN LOW STRENGTH) 81 mg EC tablet Take 1 tablet (81 mg total) by mouth daily     • atorvastatin (LIPITOR) 80 mg tablet Take 1 tablet by mouth once daily 90 tablet 3   • Eliquis 5 MG Take 1 tablet by mouth twice daily 180 tablet 3   • ondansetron (ZOFRAN) 8 mg tablet Take 1 tablet (8 mg total) by mouth every 8 (eight) hours as needed for nausea or vomiting 30 tablet 2   • prochlorperazine (COMPAZINE) 10 mg tablet Take 1 tablet (10 mg total) by mouth every 6 (six) hours as needed for nausea or vomiting 30 tablet 3     No current facility-administered medications for this visit. [unfilled]    SOCIAL HISTORY:   reports that he has been smoking cigarettes. He started smoking about 53 years ago. He has been smoking an average of .5 packs per day.  He has never used smokeless tobacco. He reports that he does not currently use alcohol. He reports current drug use. Drug: Marijuana. FAMILY HISTORY:  family history includes Colon polyps in his father; Heart disease in his father. ALLERGIES:  has No Known Allergies. Physical Exam:  Vital Signs:   Visit Vitals  /74   Pulse 75   Resp 16   Ht 5' 7.01" (1.702 m)   Wt 59.9 kg (132 lb)   SpO2 100%   BMI 20.67 kg/m²   Smoking Status Every Day   BSA 1.7 m²     Body mass index is 20.67 kg/m². Body surface area is 1.7 meters squared. Physical Exam  Constitutional:       General: He is not in acute distress. Appearance: Normal appearance. HENT:      Head: Normocephalic and atraumatic. Eyes:      General: No scleral icterus. Right eye: No discharge. Left eye: No discharge. Conjunctiva/sclera: Conjunctivae normal.   Cardiovascular:      Rate and Rhythm: Normal rate and regular rhythm. Pulmonary:      Effort: Pulmonary effort is normal. No respiratory distress. Breath sounds: Normal breath sounds. Abdominal:      General: Bowel sounds are normal. There is no distension. Palpations: Abdomen is soft. There is no mass. Tenderness: There is no abdominal tenderness. Musculoskeletal:         General: Normal range of motion. Lymphadenopathy:      Cervical: No cervical adenopathy. Upper Body:      Right upper body: No supraclavicular, axillary or pectoral adenopathy. Left upper body: No supraclavicular, axillary or pectoral adenopathy. Skin:     General: Skin is warm and dry. Neurological:      General: No focal deficit present. Mental Status: He is alert and oriented to person, place, and time.    Psychiatric:         Mood and Affect: Mood normal.         Behavior: Behavior normal.         Labs:  Lab Results   Component Value Date    WBC 11.35 (H) 07/18/2023    HGB 12.4 07/18/2023    HCT 37.1 07/18/2023    MCV 96 07/18/2023     07/18/2023     Lab Results   Component Value Date     01/11/2018    SODIUM 134 (L) 07/18/2023    K 4.1 07/18/2023     07/18/2023    CO2 26 07/18/2023    AGAP 3 07/18/2023    BUN 14 07/18/2023    CREATININE 0.68 07/18/2023    GLUC 98 07/18/2023    CALCIUM 9.0 07/18/2023    AST 12 (L) 07/18/2023    ALT 6 (L) 07/18/2023    ALKPHOS 97 07/18/2023    PROT 6.6 09/09/2016    TP 6.1 (L) 07/18/2023    BILITOT 0.3 09/09/2016    TBILI 0.54 07/18/2023    EGFR 95 07/18/2023

## 2023-08-08 ENCOUNTER — OFFICE VISIT (OUTPATIENT)
Age: 73
End: 2023-08-08
Payer: COMMERCIAL

## 2023-08-08 VITALS
HEART RATE: 75 BPM | RESPIRATION RATE: 16 BRPM | OXYGEN SATURATION: 100 % | DIASTOLIC BLOOD PRESSURE: 74 MMHG | WEIGHT: 132 LBS | SYSTOLIC BLOOD PRESSURE: 122 MMHG | BODY MASS INDEX: 20.72 KG/M2 | HEIGHT: 67 IN

## 2023-08-08 DIAGNOSIS — C82.95 FOLLICULAR LYMPHOMA OF LYMPH NODES OF INGUINAL REGION, UNSPECIFIED FOLLICULAR LYMPHOMA TYPE (HCC): Primary | ICD-10-CM

## 2023-08-08 DIAGNOSIS — C85.95 LYMPHOMA OF LYMPH NODES OF INGUINAL REGION, UNSPECIFIED LYMPHOMA TYPE (HCC): ICD-10-CM

## 2023-08-08 PROCEDURE — 99214 OFFICE O/P EST MOD 30 MIN: CPT | Performed by: NURSE PRACTITIONER

## 2023-08-15 ENCOUNTER — HOSPITAL ENCOUNTER (OUTPATIENT)
Dept: INFUSION CENTER | Facility: HOSPITAL | Age: 73
Discharge: HOME | End: 2023-08-15
Payer: COMMERCIAL

## 2023-08-15 DIAGNOSIS — C32.3: Primary | ICD-10-CM

## 2023-08-15 DIAGNOSIS — C85.95 NON-HODGKIN LYMPHOMA OF LYMPH NODES OF INGUINAL REGION, UNSPECIFIED NON-HODGKIN LYMPHOMA TYPE (HCC): ICD-10-CM

## 2023-08-15 LAB
ALBUMIN SERPL BCP-MCNC: 4.2 G/DL (ref 3.5–5)
ALP SERPL-CCNC: 81 U/L (ref 34–104)
ALT SERPL W P-5'-P-CCNC: 5 U/L (ref 7–52)
ANION GAP SERPL CALCULATED.3IONS-SCNC: 7 MMOL/L
AST SERPL W P-5'-P-CCNC: 14 U/L (ref 13–39)
BASOPHILS # BLD AUTO: 0.1 THOUSANDS/ÂΜL (ref 0–0.1)
BASOPHILS NFR BLD AUTO: 1 % (ref 0–1)
BILIRUB SERPL-MCNC: 0.38 MG/DL (ref 0.2–1)
BUN SERPL-MCNC: 14 MG/DL (ref 5–25)
CALCIUM SERPL-MCNC: 9.2 MG/DL (ref 8.4–10.2)
CHLORIDE SERPL-SCNC: 105 MMOL/L (ref 96–108)
CO2 SERPL-SCNC: 24 MMOL/L (ref 21–32)
CREAT SERPL-MCNC: 0.73 MG/DL (ref 0.6–1.3)
EOSINOPHIL # BLD AUTO: 0.94 THOUSAND/ÂΜL (ref 0–0.61)
EOSINOPHIL NFR BLD AUTO: 11 % (ref 0–6)
ERYTHROCYTE [DISTWIDTH] IN BLOOD BY AUTOMATED COUNT: 16.4 % (ref 11.6–15.1)
GFR SERPL CREATININE-BSD FRML MDRD: 92 ML/MIN/1.73SQ M
GLUCOSE SERPL-MCNC: 98 MG/DL (ref 65–140)
HCT VFR BLD AUTO: 36.6 % (ref 36.5–49.3)
HGB BLD-MCNC: 12.3 G/DL (ref 12–17)
IMM GRANULOCYTES # BLD AUTO: 0.03 THOUSAND/UL (ref 0–0.2)
IMM GRANULOCYTES NFR BLD AUTO: 0 % (ref 0–2)
LYMPHOCYTES # BLD AUTO: 0.63 THOUSANDS/ÂΜL (ref 0.6–4.47)
LYMPHOCYTES NFR BLD AUTO: 8 % (ref 14–44)
MCH RBC QN AUTO: 32.5 PG (ref 26.8–34.3)
MCHC RBC AUTO-ENTMCNC: 33.6 G/DL (ref 31.4–37.4)
MCV RBC AUTO: 97 FL (ref 82–98)
MONOCYTES # BLD AUTO: 0.97 THOUSAND/ÂΜL (ref 0.17–1.22)
MONOCYTES NFR BLD AUTO: 12 % (ref 4–12)
NEUTROPHILS # BLD AUTO: 5.54 THOUSANDS/ÂΜL (ref 1.85–7.62)
NEUTS SEG NFR BLD AUTO: 68 % (ref 43–75)
NRBC BLD AUTO-RTO: 0 /100 WBCS
PLATELET # BLD AUTO: 259 THOUSANDS/UL (ref 149–390)
PMV BLD AUTO: 9.8 FL (ref 8.9–12.7)
POTASSIUM SERPL-SCNC: 3.8 MMOL/L (ref 3.5–5.3)
PROT SERPL-MCNC: 6.6 G/DL (ref 6.4–8.4)
RBC # BLD AUTO: 3.78 MILLION/UL (ref 3.88–5.62)
SODIUM SERPL-SCNC: 136 MMOL/L (ref 135–147)
WBC # BLD AUTO: 8.21 THOUSAND/UL (ref 4.31–10.16)

## 2023-08-15 PROCEDURE — 80053 COMPREHEN METABOLIC PANEL: CPT | Performed by: INTERNAL MEDICINE

## 2023-08-15 PROCEDURE — 85025 COMPLETE CBC W/AUTO DIFF WBC: CPT | Performed by: INTERNAL MEDICINE

## 2023-08-15 NOTE — PROGRESS NOTES
Pt arrived on unit for lab draw. Chest port accessed, labs drawn, port deaccessed. Pt exited unit with steady gait for dc home.

## 2023-08-17 ENCOUNTER — HOSPITAL ENCOUNTER (OUTPATIENT)
Dept: INFUSION CENTER | Facility: HOSPITAL | Age: 73
Discharge: HOME | End: 2023-08-17
Attending: INTERNAL MEDICINE
Payer: COMMERCIAL

## 2023-08-17 VITALS
TEMPERATURE: 97.2 F | OXYGEN SATURATION: 98 % | SYSTOLIC BLOOD PRESSURE: 141 MMHG | HEIGHT: 67 IN | DIASTOLIC BLOOD PRESSURE: 77 MMHG | BODY MASS INDEX: 20.97 KG/M2 | RESPIRATION RATE: 16 BRPM | HEART RATE: 73 BPM | WEIGHT: 133.6 LBS

## 2023-08-17 DIAGNOSIS — C82.95 FOLLICULAR LYMPHOMA OF LYMPH NODES OF INGUINAL REGION, UNSPECIFIED FOLLICULAR LYMPHOMA TYPE (HCC): Primary | ICD-10-CM

## 2023-08-17 PROCEDURE — 96367 TX/PROPH/DG ADDL SEQ IV INF: CPT

## 2023-08-17 PROCEDURE — 96411 CHEMO IV PUSH ADDL DRUG: CPT

## 2023-08-17 PROCEDURE — 96413 CHEMO IV INFUSION 1 HR: CPT

## 2023-08-17 PROCEDURE — 96415 CHEMO IV INFUSION ADDL HR: CPT

## 2023-08-17 RX ORDER — ACETAMINOPHEN 325 MG/1
650 TABLET ORAL ONCE
Status: COMPLETED | OUTPATIENT
Start: 2023-08-17 | End: 2023-08-17

## 2023-08-17 RX ORDER — SODIUM CHLORIDE 9 MG/ML
20 INJECTION, SOLUTION INTRAVENOUS ONCE
Status: COMPLETED | OUTPATIENT
Start: 2023-08-17 | End: 2023-08-17

## 2023-08-17 RX ADMIN — ACETAMINOPHEN 650 MG: 325 TABLET, FILM COATED ORAL at 09:12

## 2023-08-17 RX ADMIN — SODIUM CHLORIDE 20 ML/HR: 0.9 INJECTION, SOLUTION INTRAVENOUS at 09:09

## 2023-08-17 RX ADMIN — DEXAMETHASONE SODIUM PHOSPHATE: 10 INJECTION, SOLUTION INTRAMUSCULAR; INTRAVENOUS at 09:09

## 2023-08-17 RX ADMIN — SODIUM CHLORIDE 641.2 MG: 0.9 INJECTION, SOLUTION INTRAVENOUS at 10:40

## 2023-08-17 RX ADMIN — DIPHENHYDRAMINE HYDROCHLORIDE 25 MG: 50 INJECTION, SOLUTION INTRAMUSCULAR; INTRAVENOUS at 09:34

## 2023-08-17 RX ADMIN — BENDAMUSTINE HYDROCHLORIDE 153.9 MG: 25 INJECTION, SOLUTION INTRAVENOUS at 13:21

## 2023-08-18 ENCOUNTER — HOSPITAL ENCOUNTER (OUTPATIENT)
Dept: INFUSION CENTER | Facility: HOSPITAL | Age: 73
End: 2023-08-18
Attending: INTERNAL MEDICINE
Payer: COMMERCIAL

## 2023-08-18 VITALS
TEMPERATURE: 97.5 F | BODY MASS INDEX: 20.97 KG/M2 | OXYGEN SATURATION: 97 % | RESPIRATION RATE: 16 BRPM | HEIGHT: 67 IN | HEART RATE: 68 BPM | DIASTOLIC BLOOD PRESSURE: 77 MMHG | SYSTOLIC BLOOD PRESSURE: 133 MMHG | WEIGHT: 133.6 LBS

## 2023-08-18 DIAGNOSIS — C82.95 FOLLICULAR LYMPHOMA OF LYMPH NODES OF INGUINAL REGION, UNSPECIFIED FOLLICULAR LYMPHOMA TYPE (HCC): Primary | ICD-10-CM

## 2023-08-18 PROCEDURE — 96367 TX/PROPH/DG ADDL SEQ IV INF: CPT

## 2023-08-18 PROCEDURE — 96409 CHEMO IV PUSH SNGL DRUG: CPT

## 2023-08-18 RX ORDER — SODIUM CHLORIDE 9 MG/ML
20 INJECTION, SOLUTION INTRAVENOUS ONCE
Status: COMPLETED | OUTPATIENT
Start: 2023-08-18 | End: 2023-08-18

## 2023-08-18 RX ADMIN — DEXAMETHASONE SODIUM PHOSPHATE: 10 INJECTION, SOLUTION INTRAMUSCULAR; INTRAVENOUS at 10:16

## 2023-08-18 RX ADMIN — SODIUM CHLORIDE 20 ML/HR: 9 INJECTION, SOLUTION INTRAVENOUS at 10:16

## 2023-08-18 RX ADMIN — BENDAMUSTINE HYDROCHLORIDE 153.9 MG: 25 INJECTION, SOLUTION INTRAVENOUS at 10:51

## 2023-08-30 ENCOUNTER — HOSPITAL ENCOUNTER (OUTPATIENT)
Dept: RADIOLOGY | Age: 73
Discharge: HOME | End: 2023-08-30
Payer: COMMERCIAL

## 2023-08-30 DIAGNOSIS — C85.95 LYMPHOMA OF LYMPH NODES OF INGUINAL REGION, UNSPECIFIED LYMPHOMA TYPE (HCC): ICD-10-CM

## 2023-08-30 DIAGNOSIS — C82.95 FOLLICULAR LYMPHOMA OF LYMPH NODES OF INGUINAL REGION, UNSPECIFIED FOLLICULAR LYMPHOMA TYPE (HCC): ICD-10-CM

## 2023-08-30 LAB — GLUCOSE SERPL-MCNC: 106 MG/DL (ref 65–140)

## 2023-08-30 PROCEDURE — A9552 F18 FDG: HCPCS

## 2023-08-30 PROCEDURE — 78815 PET IMAGE W/CT SKULL-THIGH: CPT

## 2023-08-30 PROCEDURE — G1004 CDSM NDSC: HCPCS

## 2023-08-30 PROCEDURE — 82948 REAGENT STRIP/BLOOD GLUCOSE: CPT

## 2023-09-08 RX ORDER — ACETAMINOPHEN 325 MG/1
650 TABLET ORAL ONCE
Status: CANCELLED | OUTPATIENT
Start: 2023-09-14

## 2023-09-08 RX ORDER — SODIUM CHLORIDE 9 MG/ML
20 INJECTION, SOLUTION INTRAVENOUS ONCE
Status: CANCELLED | OUTPATIENT
Start: 2023-09-15

## 2023-09-08 RX ORDER — SODIUM CHLORIDE 9 MG/ML
20 INJECTION, SOLUTION INTRAVENOUS ONCE
Status: CANCELLED | OUTPATIENT
Start: 2023-09-14

## 2023-09-11 ENCOUNTER — OFFICE VISIT (OUTPATIENT)
Age: 73
End: 2023-09-11
Payer: COMMERCIAL

## 2023-09-11 VITALS
WEIGHT: 130 LBS | TEMPERATURE: 97.9 F | OXYGEN SATURATION: 97 % | DIASTOLIC BLOOD PRESSURE: 80 MMHG | HEART RATE: 86 BPM | HEIGHT: 67 IN | RESPIRATION RATE: 16 BRPM | BODY MASS INDEX: 20.4 KG/M2 | SYSTOLIC BLOOD PRESSURE: 130 MMHG

## 2023-09-11 DIAGNOSIS — C82.95 FOLLICULAR LYMPHOMA OF LYMPH NODES OF INGUINAL REGION, UNSPECIFIED FOLLICULAR LYMPHOMA TYPE (HCC): Primary | ICD-10-CM

## 2023-09-11 PROCEDURE — 99214 OFFICE O/P EST MOD 30 MIN: CPT | Performed by: INTERNAL MEDICINE

## 2023-09-11 RX ORDER — SODIUM CHLORIDE 9 MG/ML
20 INJECTION, SOLUTION INTRAVENOUS ONCE
Status: CANCELLED | OUTPATIENT
Start: 2023-10-13

## 2023-09-11 RX ORDER — ACETAMINOPHEN 325 MG/1
650 TABLET ORAL ONCE
Status: CANCELLED | OUTPATIENT
Start: 2023-10-12

## 2023-09-11 RX ORDER — SODIUM CHLORIDE 9 MG/ML
20 INJECTION, SOLUTION INTRAVENOUS ONCE
Status: CANCELLED | OUTPATIENT
Start: 2023-10-12

## 2023-09-12 ENCOUNTER — TELEPHONE (OUTPATIENT)
Age: 73
End: 2023-09-12

## 2023-09-12 ENCOUNTER — TELEPHONE (OUTPATIENT)
Dept: HEMATOLOGY ONCOLOGY | Facility: CLINIC | Age: 73
End: 2023-09-12

## 2023-09-12 NOTE — TELEPHONE ENCOUNTER
Appointment Schedule   Who are you speaking with? Patient   If it is not the patient, are they listed on an active communication consent form? N/A   Which provider is the appointment scheduled with? Dr. Ulises Howard   At which location is the appointment scheduled for? [place]   When is the appointment scheduled? Please list date and time 12/13/23 10:40am   What is the reason for this appointment? 3 month follow up    Did patient voice understanding of the details of this appointment? Yes   Was the no show policy reviewed with patient?  Yes

## 2023-09-12 NOTE — TELEPHONE ENCOUNTER
Patient is doing cycle 4, then stopping. Please schedule port flush every 2 mos with labs.     Thanks

## 2023-09-12 NOTE — TELEPHONE ENCOUNTER
Called patient to schedule 3 mo f/u w/ Dr Ulises Howard. HOPELINE:  PLEASE SCHEDULE 3 MO F/U with Dr Ulises Howard.     Thank you

## 2023-09-13 NOTE — PROGRESS NOTES
HEMATOLOGY / [facility] FOLLOW UP NOTE    Primary Care Provider: Kira Quiñones DO  Referring Provider:    MRN: [id number]  : 1950    Reason for Encounter: follow up [place]       Oncology History Overview Note    - chemo/RT for cancel of the tonsil at [facility] (Tahmina Robison and Francis Reese)    4305 - grade 1-2 follicular lymphoma - bx at right groin, Stage IIIS    2023 - start bendamustine/rituxan    2023 - stop BR after 4 cycles due to good disease control     Primary malignant neoplasm of laryngeal cartilage ([address])   2014 Initial Diagnosis    Primary malignant neoplasm of laryngeal cartilage (HCC)     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 ([address])   2023 Initial Diagnosis    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 ([address])     2023 -  Cancer Staged    Staging form: Hodgkin and Non-Hodgkin Lymphoma, AJCC 8th Edition  - Clinical stage from 0/3/7966: Stage III (Follicular lymphoma) - Signed by Kate Zimmerman DO on 7/15/2023       2023 -  Chemotherapy    alteplase (CATHFLO), 2 mg, Intracatheter, Every 1 Minute as needed, 3 of 6 cycles  bendamustine HCL(BENDEKA) IVPB, 90 mg/m2 = 153.9 mg, Intravenous, Once, 3 of 6 cycles  Administration: 153.9 mg (2023), 153.9 mg (2023), 153.9 mg (2023), 153.9 mg (2023), 153.9 mg (2023), 153.9 mg (2023)  riTUXimab-pvvr (Areatha Diss) first titrated chemo infusion, 375 mg/m2 = 641.2 mg (100 % of original dose 375 mg/m2), Intravenous, Once, 1 of 1 cycle  Dose modification: 375 mg/m2 (original dose 375 mg/m2, Cycle 1)  Administration: 641.2 mg (2023)  riTUXimab (RUXIENCE) subsequent titrated chemo infusion, 375 mg/m2 = 641.2 mg (100 % of original dose 375 mg/m2), Intravenous, Once, 2 of 5 cycles  Dose modification: 375 mg/m2 (original dose 375 mg/m2, Cycle 2)  Administration: 641.2 mg (2023), 641.2 mg (2023)         Interval History: Patient presents for follow up of his follicular lymphoma. He is status post 3 cycles of Bendamustine Rituxan and had a PET scan prior to this visit that shows a complete response. He still does have some fatigue. He has lost 2 pounds since her last visit. His appetite is okay and he is eating. He denies any fevers, night sweats, infection. No nausea or vomiting. No rash. REVIEW OF SYSTEMS:  Please note that a 14-point review of systems was performed to include Constitutional, HEENT, Respiratory, CVS, GI, , Musculoskeletal, Integumentary, Neurologic, Rheumatologic, Endocrinologic, Psychiatric, Lymphatic, and Hematologic/Oncologic systems were reviewed and are negative unless otherwise stated in HPI. Positive and negative findings pertinent to this evaluation are incorporated into the history of present illness. 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 I usually like to give at least 4 cycles and we will proceed with cycle 4 without dose reduction. We will not pursue anything past cycle 4 as he understands that his disease is not curable.   Since he is already in a complete remission we will just monitor him going forward. We discussed the possibility of maintenance Rituxan. It does lead to a longer progression free survival without a survival benefit and there can be increased risk of infection. I usually do not give maintenance Rituxan unless I have a compelling reason to do so. We will continue to flush his port every 2 months. I will see him back in 3 months with a CBC CMP and LDH prior. He knows to call in the interim with any questions or concerns. I spent 30 minutes on chart review, face to face counseling time, coordination of care and documentation. Past Medical History:   has a past medical history of Cancer [facility]), Colon polyp, CVA (cerebral vascular accident) ([address] (01/2020), Hepatitis B, Hyperlipidemia, Hypertension, and Stroke ([facility]). PAST SURGICAL HISTORY:   has a past surgical history that includes Appendectomy (2003); Skull fracture elevation (1984); Colonoscopy (08/02/2017); IR biopsy inguinal lymph node (2/15/2023); and IR port placement (6/19/2023). CURRENT MEDICATIONS  Current Outpatient Medications   Medication Sig Dispense Refill   • allopurinol (ZYLOPRIM) 300 mg tablet Take 1 tablet (300 mg total) by mouth daily 30 tablet 4   • amLODIPine (NORVASC) 2.5 mg tablet Take 1 tablet by mouth once daily 90 tablet 3   • amoxicillin (AMOXIL) 500 mg capsule if needed     • aspirin (ECOTRIN LOW STRENGTH) 81 mg EC tablet Take 1 tablet (81 mg total) by mouth daily     • atorvastatin (LIPITOR) 80 mg tablet Take 1 tablet by mouth once daily 90 tablet 3   • Eliquis 5 MG Take 1 tablet by mouth twice daily 180 tablet 3   • ondansetron (ZOFRAN) 8 mg tablet Take 1 tablet (8 mg total) by mouth every 8 (eight) hours as needed for nausea or vomiting 30 tablet 2   • prochlorperazine (COMPAZINE) 10 mg tablet Take 1 tablet (10 mg total) by mouth every 6 (six) hours as needed for nausea or vomiting 30 tablet 3     No current facility-administered medications for this visit.      [unfilled]    SOCIAL HISTORY:   reports that he has been smoking cigarettes. He started smoking about 53 years ago. He has been smoking an average of .5 packs per day. He has never used smokeless tobacco. He reports that he does not currently use alcohol. He reports current drug use. Drug: Marijuana. FAMILY HISTORY:  family history includes Colon polyps in his father; Heart disease in his father. ALLERGIES:  has No Known Allergies. Physical Exam:  Vital Signs:   Visit Vitals  /80 (BP Location: Left arm, Patient Position: Sitting, Cuff Size: Standard)   Pulse 86   Temp 97.9 °F (36.6 °C) (Temporal)   Resp 16   Ht 5' 7.01" (1.702 m)   Wt 59 kg (130 lb)   SpO2 97%   BMI 20.35 kg/m²   Smoking Status Every Day   BSA 1.68 m²     Body mass index is 20.35 kg/m². Body surface area is 1.68 meters squared. GEN: Alert, awake oriented x3, in no acute distress  HEENT- No pallor, icterus, cyanosis, no oral mucosal lesions,   LAD - no palpable cervical, clavicle, axillary, inguinal LAD  Heart- normal S1 S2, regular rate and rhythm, No murmur, rubs.    Lungs- clear breathing sound bilateral.   Abdomen- soft, Non tender, bowel sounds present  Extremities- No cyanosis, clubbing, edema  Neuro- No focal neurological deficit    Labs:  Lab Results   Component Value Date    WBC 8.21 08/15/2023    HGB 12.3 08/15/2023    HCT 36.6 08/15/2023    MCV 97 08/15/2023     08/15/2023     Lab Results   Component Value Date     01/11/2018    SODIUM 136 08/15/2023    K 3.8 08/15/2023     08/15/2023    CO2 24 08/15/2023    AGAP 7 08/15/2023    BUN 14 08/15/2023    CREATININE 0.73 08/15/2023    GLUC 98 08/15/2023    CALCIUM 9.2 08/15/2023    AST 14 08/15/2023    ALT 5 (L) 08/15/2023    ALKPHOS 81 08/15/2023    PROT 6.6 09/09/2016    TP 6.6 08/15/2023    BILITOT 0.3 09/09/2016    TBILI 0.38 08/15/2023    EGFR 92 08/15/2023

## 2023-09-14 ENCOUNTER — HOSPITAL ENCOUNTER (OUTPATIENT)
Dept: INFUSION CENTER | Facility: HOSPITAL | Age: 73
Discharge: HOME | End: 2023-09-14
Attending: INTERNAL MEDICINE
Payer: COMMERCIAL

## 2023-09-14 VITALS
BODY MASS INDEX: 20.62 KG/M2 | WEIGHT: 131.39 LBS | DIASTOLIC BLOOD PRESSURE: 75 MMHG | HEART RATE: 77 BPM | TEMPERATURE: 97.2 F | HEIGHT: 67 IN | SYSTOLIC BLOOD PRESSURE: 115 MMHG | RESPIRATION RATE: 17 BRPM

## 2023-09-14 DIAGNOSIS — C85.95 NON-HODGKIN LYMPHOMA OF LYMPH NODES OF INGUINAL REGION, UNSPECIFIED NON-HODGKIN LYMPHOMA TYPE (HCC): ICD-10-CM

## 2023-09-14 DIAGNOSIS — C32.3: ICD-10-CM

## 2023-09-14 DIAGNOSIS — C82.95 FOLLICULAR LYMPHOMA OF LYMPH NODES OF INGUINAL REGION, UNSPECIFIED FOLLICULAR LYMPHOMA TYPE (HCC): Primary | ICD-10-CM

## 2023-09-14 LAB
ALBUMIN SERPL BCP-MCNC: 4.2 G/DL (ref 3.5–5)
ALP SERPL-CCNC: 90 U/L (ref 34–104)
ALT SERPL W P-5'-P-CCNC: 5 U/L (ref 7–52)
ANION GAP SERPL CALCULATED.3IONS-SCNC: 5 MMOL/L
AST SERPL W P-5'-P-CCNC: 12 U/L (ref 13–39)
BASOPHILS # BLD AUTO: 0.07 THOUSANDS/ÂΜL (ref 0–0.1)
BASOPHILS NFR BLD AUTO: 1 % (ref 0–1)
BILIRUB SERPL-MCNC: 0.37 MG/DL (ref 0.2–1)
BUN SERPL-MCNC: 13 MG/DL (ref 5–25)
CALCIUM SERPL-MCNC: 9.4 MG/DL (ref 8.4–10.2)
CHLORIDE SERPL-SCNC: 103 MMOL/L (ref 96–108)
CO2 SERPL-SCNC: 29 MMOL/L (ref 21–32)
CREAT SERPL-MCNC: 0.61 MG/DL (ref 0.6–1.3)
EOSINOPHIL # BLD AUTO: 0.48 THOUSAND/ÂΜL (ref 0–0.61)
EOSINOPHIL NFR BLD AUTO: 6 % (ref 0–6)
ERYTHROCYTE [DISTWIDTH] IN BLOOD BY AUTOMATED COUNT: 17 % (ref 11.6–15.1)
GFR SERPL CREATININE-BSD FRML MDRD: 99 ML/MIN/1.73SQ M
GLUCOSE SERPL-MCNC: 101 MG/DL (ref 65–140)
HCT VFR BLD AUTO: 38.3 % (ref 36.5–49.3)
HGB BLD-MCNC: 12.7 G/DL (ref 12–17)
IMM GRANULOCYTES # BLD AUTO: 0.06 THOUSAND/UL (ref 0–0.2)
IMM GRANULOCYTES NFR BLD AUTO: 1 % (ref 0–2)
LYMPHOCYTES # BLD AUTO: 0.62 THOUSANDS/ÂΜL (ref 0.6–4.47)
LYMPHOCYTES NFR BLD AUTO: 8 % (ref 14–44)
MCH RBC QN AUTO: 32.8 PG (ref 26.8–34.3)
MCHC RBC AUTO-ENTMCNC: 33.2 G/DL (ref 31.4–37.4)
MCV RBC AUTO: 99 FL (ref 82–98)
MONOCYTES # BLD AUTO: 0.91 THOUSAND/ÂΜL (ref 0.17–1.22)
MONOCYTES NFR BLD AUTO: 12 % (ref 4–12)
NEUTROPHILS # BLD AUTO: 5.41 THOUSANDS/ÂΜL (ref 1.85–7.62)
NEUTS SEG NFR BLD AUTO: 72 % (ref 43–75)
NRBC BLD AUTO-RTO: 0 /100 WBCS
PLATELET # BLD AUTO: 282 THOUSANDS/UL (ref 149–390)
PMV BLD AUTO: 10.3 FL (ref 8.9–12.7)
POTASSIUM SERPL-SCNC: 3.6 MMOL/L (ref 3.5–5.3)
PROT SERPL-MCNC: 6.5 G/DL (ref 6.4–8.4)
RBC # BLD AUTO: 3.87 MILLION/UL (ref 3.88–5.62)
SODIUM SERPL-SCNC: 137 MMOL/L (ref 135–147)
WBC # BLD AUTO: 7.55 THOUSAND/UL (ref 4.31–10.16)

## 2023-09-14 PROCEDURE — 96415 CHEMO IV INFUSION ADDL HR: CPT

## 2023-09-14 PROCEDURE — 96417 CHEMO IV INFUS EACH ADDL SEQ: CPT

## 2023-09-14 PROCEDURE — 96367 TX/PROPH/DG ADDL SEQ IV INF: CPT

## 2023-09-14 PROCEDURE — 80053 COMPREHEN METABOLIC PANEL: CPT | Performed by: INTERNAL MEDICINE

## 2023-09-14 PROCEDURE — 96413 CHEMO IV INFUSION 1 HR: CPT

## 2023-09-14 PROCEDURE — 85025 COMPLETE CBC W/AUTO DIFF WBC: CPT | Performed by: INTERNAL MEDICINE

## 2023-09-14 RX ORDER — ACETAMINOPHEN 325 MG/1
650 TABLET ORAL ONCE
Status: COMPLETED | OUTPATIENT
Start: 2023-09-14 | End: 2023-09-14

## 2023-09-14 RX ORDER — SODIUM CHLORIDE 9 MG/ML
20 INJECTION, SOLUTION INTRAVENOUS ONCE
Status: COMPLETED | OUTPATIENT
Start: 2023-09-14 | End: 2023-09-14

## 2023-09-14 RX ADMIN — SODIUM CHLORIDE 641.2 MG: 0.9 INJECTION, SOLUTION INTRAVENOUS at 10:34

## 2023-09-14 RX ADMIN — BENDAMUSTINE HYDROCHLORIDE 153.9 MG: 25 INJECTION, SOLUTION INTRAVENOUS at 13:11

## 2023-09-14 RX ADMIN — ACETAMINOPHEN 650 MG: 325 TABLET, FILM COATED ORAL at 09:22

## 2023-09-14 RX ADMIN — SODIUM CHLORIDE 20 ML/HR: 0.9 INJECTION, SOLUTION INTRAVENOUS at 08:59

## 2023-09-14 RX ADMIN — DIPHENHYDRAMINE HYDROCHLORIDE 25 MG: 50 INJECTION, SOLUTION INTRAMUSCULAR; INTRAVENOUS at 09:21

## 2023-09-14 RX ADMIN — DEXAMETHASONE SODIUM PHOSPHATE: 10 INJECTION, SOLUTION INTRAMUSCULAR; INTRAVENOUS at 08:59

## 2023-09-14 NOTE — PROGRESS NOTES
Patient completed chemotherapy infusion with no adverse reactions. Dressing CD&I. Patient returns tomorrow for [employer]. AVS provided, left unit ambulatory with steady gait.

## 2023-09-15 ENCOUNTER — HOSPITAL ENCOUNTER (OUTPATIENT)
Dept: INFUSION CENTER | Facility: HOSPITAL | Age: 73
End: 2023-09-15
Attending: INTERNAL MEDICINE
Payer: COMMERCIAL

## 2023-09-15 VITALS
RESPIRATION RATE: 16 BRPM | DIASTOLIC BLOOD PRESSURE: 63 MMHG | TEMPERATURE: 97.1 F | OXYGEN SATURATION: 96 % | SYSTOLIC BLOOD PRESSURE: 133 MMHG | HEART RATE: 80 BPM | HEIGHT: 67 IN | BODY MASS INDEX: 20.62 KG/M2 | WEIGHT: 131.39 LBS

## 2023-09-15 DIAGNOSIS — C82.95 FOLLICULAR LYMPHOMA OF LYMPH NODES OF INGUINAL REGION, UNSPECIFIED FOLLICULAR LYMPHOMA TYPE (HCC): Primary | ICD-10-CM

## 2023-09-15 PROCEDURE — 96413 CHEMO IV INFUSION 1 HR: CPT

## 2023-09-15 PROCEDURE — 96367 TX/PROPH/DG ADDL SEQ IV INF: CPT

## 2023-09-15 RX ORDER — SODIUM CHLORIDE 9 MG/ML
20 INJECTION, SOLUTION INTRAVENOUS ONCE
Status: COMPLETED | OUTPATIENT
Start: 2023-09-15 | End: 2023-09-15

## 2023-09-15 RX ADMIN — DEXAMETHASONE SODIUM PHOSPHATE: 10 INJECTION, SOLUTION INTRAMUSCULAR; INTRAVENOUS at 09:18

## 2023-09-15 RX ADMIN — SODIUM CHLORIDE 20 ML/HR: 0.9 INJECTION, SOLUTION INTRAVENOUS at 09:17

## 2023-09-15 RX ADMIN — BENDAMUSTINE HYDROCHLORIDE 153.9 MG: 25 INJECTION, SOLUTION INTRAVENOUS at 09:50

## 2023-09-22 ENCOUNTER — TELEPHONE (OUTPATIENT)
Age: 73
End: 2023-09-22

## 2023-09-22 ENCOUNTER — TELEPHONE (OUTPATIENT)
Dept: HEMATOLOGY ONCOLOGY | Facility: CLINIC | Age: 73
End: 2023-09-22

## 2023-09-22 NOTE — TELEPHONE ENCOUNTER
Rec'd message from patient:    "Callie Dire My name. My name is Tamia Melchor and I'm a doctor. Hollie's patient. My YOB: 1950. I'm at [phone]. My question is like I'm taking, I've gone through a chemotherapy and I'm taking this allopurinol drug and I'm wondering if I can stop taking that. It's, I mean my treatment has ended for now. And also my question is, do I have to have my port main maintenance done and do I need a blood test before I see Doctor Miguel Ángel Carrera? I think it's December 13th. OK, that's it. Thank you. Goodbye. Get out, Milk. How did you want to go?  I need to take it help, so I guess picking up."    Left  for patient with direct line to call me back

## 2023-09-22 NOTE — TELEPHONE ENCOUNTER
Please reschedule port flush to be ~1week before his appt with Dr. Shashi Knott (12/13/23). He is requesting that we get his labs that Dr. Shashi Knott ordered along with his port flush. Thanks!

## 2023-09-22 NOTE — TELEPHONE ENCOUNTER
Spoke to patient. I let him know he can stop the allopurinol. He would like to have his port flush scheduled when he is due for labs right before his appointment with Dr. Aranza Craft. Will reach out to infusion schedulers to get this set up. No other needs at this time.

## 2023-10-09 DIAGNOSIS — I63.9 EMBOLIC STROKE (HCC): ICD-10-CM

## 2023-10-09 RX ORDER — AMLODIPINE BESYLATE 2.5 MG/1
TABLET ORAL
Qty: 90 TABLET | Refills: 0 | Status: SHIPPED | OUTPATIENT
Start: 2023-10-09

## 2023-10-30 NOTE — PROGRESS NOTES
[address] [facility] [address]    NAME: Shana Peña  AGE: 68 y.o. SEX: male  : 1950   DATE: 10/31/2023    History of Present Illness:     Shana Peña is a 68 y.o. male who presents to the office today for a preoperative consultation at the request of surgeon, Dr Matteo Shen, who plans on performing bilateral cataract extraction with insertion of intraocular lens implant with possible vitrectomy on right eye 23 and left eye 23. Planned anesthesia is  TIVA/topical  . Patient has a bleeding risk of: no recent abnormal bleeding, no remote history of abnormal bleeding, and use of Ca-channel blockers (see med list). Patient does not have objections to receiving blood products if needed. Current anti-platelet/anti-coagulation medications that the patient is prescribed includes: Apixaban (Eliquis). Assessment of Chronic Conditions:   HTN well controlled, history of CVA, current tobacco use, emphysema, lymphoma. Assessment of Cardiac Risk:  Denies unstable or severe angina or MI in the last 6 weeks or history of stent placement in the last year   Denies decompensated heart failure (e.g. New onset heart failure, NYHA functional class IV heart failure, or worsening existing heart failure)  Denies significant arrhythmias such as high grade AV block, symptomatic ventricular arrhythmia, newly recognized ventricular tachycardia, supraventricular tachycardia with resting heart rate >100, or symptomatic bradycardia  Denies severe heart valve disease including aortic stenosis or symptomatic mitral stenosis     Exercise Capacity:  Able to walk 4 blocks without symptoms?: Yes  Able to walk 2 flights without symptoms?: Yes    Prior Anesthesia Reactions: No  Personal history of venous thromboembolic disease? Yes  History of steroid use for >2 weeks within last year?  No      Depression Screening and Follow-up Plan: Patient was screened for depression during today's encounter. They screened negative with a PHQ-2 score of 0. Review of Systems:     Review of Systems   Constitutional: Negative. Negative for activity change, appetite change, chills, fatigue and fever. HENT:  Positive for hearing loss. Negative for congestion, ear pain, postnasal drip and sinus pain. Eyes: Negative. Respiratory: Negative. Negative for cough and shortness of breath. Cardiovascular: Negative. Negative for chest pain and leg swelling. Gastrointestinal: Negative. Negative for constipation and diarrhea. Endocrine: Negative. Genitourinary: Negative. Negative for dysuria. Musculoskeletal:  Positive for gait problem. Skin: Negative. Allergic/Immunologic: Negative. Negative for immunocompromised state. Neurological:  Negative for dizziness and light-headedness. Hematological: Negative. Psychiatric/Behavioral: Negative.          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 ([address])    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 ([address])    Prediabetes    Protein-calorie malnutrition, unspecified severity (HCC)       Allergies:     No Known Allergies    Current Medications:       Current Outpatient Medications:     allopurinol (ZYLOPRIM) 300 mg tablet, Take 1 tablet (300 mg total) by mouth daily, Disp: 30 tablet, Rfl: 4    amLODIPine (NORVASC) 2.5 mg tablet, Take 1 tablet by mouth once daily, Disp: 90 tablet, Rfl: 0    amoxicillin (AMOXIL) 500 mg capsule, if needed, Disp: , Rfl:     aspirin (ECOTRIN LOW STRENGTH) 81 mg EC tablet, Take 1 tablet (81 mg total) by mouth daily, Disp: , Rfl:     atorvastatin (LIPITOR) 80 mg tablet, Take 1 tablet by mouth once daily, Disp: 90 tablet, Rfl: 3    Eliquis 5 MG, Take 1 tablet by mouth twice daily, Disp: 180 tablet, Rfl: 3    ondansetron (ZOFRAN) 8 mg tablet, Take 1 tablet (8 mg total) by mouth every 8 (eight) hours as needed for nausea or vomiting, Disp: 30 tablet, Rfl: 2    prochlorperazine (COMPAZINE) 10 mg tablet, Take 1 tablet (10 mg total) by mouth every 6 (six) hours as needed for nausea or vomiting, Disp: 30 tablet, Rfl: 3    Past Medical History:       Past Medical History:   Diagnosis Date    Cancer ([facility])     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    Colon polyp     CVA (cerebral vascular accident) ([facility]) 01/2020    Hepatitis B     History of hepatitis-B exposure with positive surface and core antibodies with negative surface antigen    Hyperlipidemia     Hypertension     Stroke [facility])         Past Surgical History:   Procedure Laterality Date    APPENDECTOMY  2003    COLONOSCOPY  08/02/2017     a single sessile 12 mm polyp in the midtransverse colon, 2 sessile nonbleeding polyps ranging from 4-5 mm in the sigmoid colon. Prior tattoo site from large polypectomy in the splenic flexure noted. Pathology of colon polyps were tubular adenomas. A 3 year recall advised.     IR BIOPSY INGUINAL LYMPH NODE  2/15/2023    IR PORT PLACEMENT  6/19/2023    SKULL FRACTURE ELEVATION  1984        Family History   Problem Relation Age of Onset    Heart disease Father     Colon polyps Father     Substance Abuse Neg Hx     Mental illness Neg Hx     Colon cancer Neg Hx         Social History     Socioeconomic History    Marital status: Single     Spouse name: Not on file    Number of children: Not on file    Years of education: Not on file    Highest education level: Not on file   Occupational History    Occupation: Retired   Tobacco Use    Smoking status: Every Day     Packs/day: 0.50     Types: Cigarettes     Start date: 1970    Smokeless tobacco: Never   Vaping Use    Vaping Use: Never used   Substance and Sexual Activity    Alcohol use: Not Currently    Drug use: Yes     Types: Marijuana     Comment: rare    Sexual activity: Not Currently   Other Topics Concern    Not on file   Social History Narrative    Wears seatbelt     Regular exercise    Regular dental care        Caffeine use:  Coffee daily    Living situation:  Supportive and safe    No advance directives     Social Determinants of Health     Financial Resource Strain: Low Risk  (10/14/2022)    Overall Financial Resource Strain (CARDIA)     Difficulty of Paying Living Expenses: Not hard at all   Food Insecurity: Not on file   Transportation Needs: No Transportation Needs (10/14/2022)    PRAPARE - Transportation     Lack of Transportation (Medical): No     Lack of Transportation (Non-Medical): No   Physical Activity: Not on file   Stress: Not on file   Social Connections: Not on file   Intimate Partner Violence: Not on file   Housing Stability: Not on file        Physical Exam:     /62   Pulse 76   Ht 5' 7" (1.702 m)   Wt 60.3 kg (133 lb)   SpO2 96%   BMI 20.83 kg/m²     Physical Exam  Vitals and nursing note reviewed. Constitutional:       Appearance: Normal appearance. HENT:      Head: Normocephalic and atraumatic. Right Ear: Tympanic membrane, ear canal and external ear normal. Decreased hearing noted. Left Ear: Tympanic membrane, ear canal and external ear normal. Decreased hearing noted. Ears:      Comments: Left ear canal with moderate cerumen. Has debrox drops at home. Will start use and have possible ear lavage at follow up appt.       Nose: Nose normal.      Mouth/Throat:      Mouth: Mucous membranes are moist.      Pharynx: Oropharynx is clear. Eyes:      Extraocular Movements: Extraocular movements intact. Conjunctiva/sclera: Conjunctivae normal.      Pupils: Pupils are equal, round, and reactive to light. Cardiovascular:      Rate and Rhythm: Normal rate and regular rhythm. Pulses: Normal pulses. Heart sounds: Normal heart sounds. Pulmonary:      Effort: Pulmonary effort is normal.      Breath sounds: Normal breath sounds. Comments: Diminished   Abdominal:      General: Bowel sounds are normal.      Palpations: Abdomen is soft. Musculoskeletal:         General: Normal range of motion. Cervical back: Normal range of motion and neck supple. Skin:     General: Skin is warm and dry. Neurological:      General: No focal deficit present. Mental Status: He is alert and oriented to person, place, and time. Gait: Gait abnormal.   Psychiatric:         Mood and Affect: Mood normal.         Behavior: Behavior normal.         Thought Content: Thought content normal.         Judgment: Judgment normal.        Data:     Pre-operative work-up    Laboratory Results: I have personally reviewed the pertinent laboratory results/reports      EKG: N/A    Chest x-ray: I have personally reviewed pertinent reports. Previous cardiopulmonary studies within the past year:  Echocardiogram: N/A  Cardiac Catheterization: N/A  Stress Test: N/A  Pulmonary Function Testing: N/A      Assessment & Recommendations:     1. Pre-op examination        2. Age-related cataract of both eyes, unspecified age-related cataract type        3. Cerebrovascular accident (CVA) due to embolism of precerebral artery ([address])        4. Bilateral carotid artery stenosis        5. Centrilobular emphysema ([address])        6. Essential hypertension        7.  Sensorineural hearing loss (SNHL) of both ears            Pre-Op Evaluation Assessment  68 y.o. male with planned surgery: bilateral cataract extraction with insertion of intraocular lens implant with possible vitrectomy. Known risk factors for perioperative complications: Chronic pulmonary disease, hx CVA, lymphoma. Current medications which may produce withdrawal symptoms if withheld perioperatively: N/A.    Pre-Op Evaluation Plan  1. Further preoperative workup as follows:   - None; no further preoperative work-up is required    2. Medication Management/Recommendations:   - None, continue medication regimen including morning of surgery, with sip of water    3. Prophylaxis for cardiac events with perioperative beta-blockers: not indicated. 4. Patient requires further consultation with: None    Clearance  Patient is CLEARED for surgery without any additional cardiac testing.      ANTONIO Benavidez  [facility] PRIMARY CARE [address] [address]  Phone#  [phone]  Fax#  [phone]

## 2023-10-31 ENCOUNTER — OFFICE VISIT (OUTPATIENT)
Dept: FAMILY MEDICINE CLINIC | Facility: HOSPITAL | Age: 73
End: 2023-10-31
Payer: COMMERCIAL

## 2023-10-31 VITALS
OXYGEN SATURATION: 96 % | DIASTOLIC BLOOD PRESSURE: 62 MMHG | BODY MASS INDEX: 20.88 KG/M2 | WEIGHT: 133 LBS | HEART RATE: 76 BPM | HEIGHT: 67 IN | SYSTOLIC BLOOD PRESSURE: 114 MMHG

## 2023-10-31 DIAGNOSIS — I63.10 CEREBROVASCULAR ACCIDENT (CVA) DUE TO EMBOLISM OF PRECEREBRAL ARTERY (HCC): ICD-10-CM

## 2023-10-31 DIAGNOSIS — I10 ESSENTIAL HYPERTENSION: ICD-10-CM

## 2023-10-31 DIAGNOSIS — J43.2 CENTRILOBULAR EMPHYSEMA (HCC): ICD-10-CM

## 2023-10-31 DIAGNOSIS — H25.9 AGE-RELATED CATARACT OF BOTH EYES, UNSPECIFIED AGE-RELATED CATARACT TYPE: ICD-10-CM

## 2023-10-31 DIAGNOSIS — Z01.818 PRE-OP EXAMINATION: Primary | ICD-10-CM

## 2023-10-31 DIAGNOSIS — I65.23 BILATERAL CAROTID ARTERY STENOSIS: ICD-10-CM

## 2023-10-31 DIAGNOSIS — H90.3 SENSORINEURAL HEARING LOSS (SNHL) OF BOTH EARS: ICD-10-CM

## 2023-10-31 PROCEDURE — 99214 OFFICE O/P EST MOD 30 MIN: CPT | Performed by: NURSE PRACTITIONER

## 2023-11-02 DIAGNOSIS — E78.00 PURE HYPERCHOLESTEROLEMIA: ICD-10-CM

## 2023-11-02 DIAGNOSIS — I63.9 EMBOLIC STROKE (HCC): ICD-10-CM

## 2023-11-02 RX ORDER — AMLODIPINE BESYLATE 2.5 MG/1
TABLET ORAL
Qty: 90 TABLET | Refills: 0 | Status: SHIPPED | OUTPATIENT
Start: 2023-11-02

## 2023-11-02 RX ORDER — APIXABAN 5 MG/1
TABLET, FILM COATED ORAL
Qty: 180 TABLET | Refills: 0 | Status: SHIPPED | OUTPATIENT
Start: 2023-11-02

## 2023-11-02 RX ORDER — ATORVASTATIN CALCIUM 80 MG/1
TABLET, FILM COATED ORAL
Qty: 90 TABLET | Refills: 0 | Status: SHIPPED | OUTPATIENT
Start: 2023-11-02

## 2023-12-04 ENCOUNTER — TELEPHONE (OUTPATIENT)
Dept: HEMATOLOGY ONCOLOGY | Facility: CLINIC | Age: 73
End: 2023-12-04

## 2023-12-04 NOTE — TELEPHONE ENCOUNTER
Spoke with patient and notified him that he will be getting labs drawn at the [facility] center on 12/7. Patient verbalized understanding.

## 2023-12-07 ENCOUNTER — HOSPITAL ENCOUNTER (OUTPATIENT)
Dept: INFUSION CENTER | Facility: HOSPITAL | Age: 73
Discharge: HOME | End: 2023-12-07
Payer: COMMERCIAL

## 2023-12-07 DIAGNOSIS — C32.3: ICD-10-CM

## 2023-12-07 DIAGNOSIS — C85.95 NON-HODGKIN LYMPHOMA OF LYMPH NODES OF INGUINAL REGION, UNSPECIFIED NON-HODGKIN LYMPHOMA TYPE (HCC): Primary | ICD-10-CM

## 2023-12-07 PROCEDURE — 96523 IRRIG DRUG DELIVERY DEVICE: CPT

## 2023-12-07 NOTE — PROGRESS NOTES
Lab called and requesting a re-draw due to lab stickers not being on tubes. Pt called twice and voice messages left.

## 2023-12-08 ENCOUNTER — HOSPITAL ENCOUNTER (OUTPATIENT)
Dept: INFUSION CENTER | Facility: HOSPITAL | Age: 73
End: 2023-12-08
Payer: COMMERCIAL

## 2023-12-08 DIAGNOSIS — C85.95 NON-HODGKIN LYMPHOMA OF LYMPH NODES OF INGUINAL REGION, UNSPECIFIED NON-HODGKIN LYMPHOMA TYPE (HCC): Primary | ICD-10-CM

## 2023-12-08 DIAGNOSIS — C32.3: ICD-10-CM

## 2023-12-08 LAB
ALBUMIN SERPL BCP-MCNC: 4.3 G/DL (ref 3.5–5)
ALP SERPL-CCNC: 82 U/L (ref 34–104)
ALT SERPL W P-5'-P-CCNC: 6 U/L (ref 7–52)
ANION GAP SERPL CALCULATED.3IONS-SCNC: 6 MMOL/L
AST SERPL W P-5'-P-CCNC: 15 U/L (ref 13–39)
BASOPHILS # BLD AUTO: 0.06 THOUSANDS/ÂΜL (ref 0–0.1)
BASOPHILS NFR BLD AUTO: 1 % (ref 0–1)
BILIRUB SERPL-MCNC: 0.57 MG/DL (ref 0.2–1)
BUN SERPL-MCNC: 12 MG/DL (ref 5–25)
CALCIUM SERPL-MCNC: 9.3 MG/DL (ref 8.4–10.2)
CHLORIDE SERPL-SCNC: 103 MMOL/L (ref 96–108)
CO2 SERPL-SCNC: 27 MMOL/L (ref 21–32)
CREAT SERPL-MCNC: 0.69 MG/DL (ref 0.6–1.3)
EOSINOPHIL # BLD AUTO: 0.58 THOUSAND/ÂΜL (ref 0–0.61)
EOSINOPHIL NFR BLD AUTO: 7 % (ref 0–6)
ERYTHROCYTE [DISTWIDTH] IN BLOOD BY AUTOMATED COUNT: 14.5 % (ref 11.6–15.1)
GFR SERPL CREATININE-BSD FRML MDRD: 94 ML/MIN/1.73SQ M
GLUCOSE SERPL-MCNC: 105 MG/DL (ref 65–140)
HCT VFR BLD AUTO: 38.1 % (ref 36.5–49.3)
HGB BLD-MCNC: 12.7 G/DL (ref 12–17)
IMM GRANULOCYTES # BLD AUTO: 0.04 THOUSAND/UL (ref 0–0.2)
IMM GRANULOCYTES NFR BLD AUTO: 1 % (ref 0–2)
LYMPHOCYTES # BLD AUTO: 0.81 THOUSANDS/ÂΜL (ref 0.6–4.47)
LYMPHOCYTES NFR BLD AUTO: 10 % (ref 14–44)
MCH RBC QN AUTO: 34 PG (ref 26.8–34.3)
MCHC RBC AUTO-ENTMCNC: 33.3 G/DL (ref 31.4–37.4)
MCV RBC AUTO: 102 FL (ref 82–98)
MONOCYTES # BLD AUTO: 0.87 THOUSAND/ÂΜL (ref 0.17–1.22)
MONOCYTES NFR BLD AUTO: 10 % (ref 4–12)
NEUTROPHILS # BLD AUTO: 6.1 THOUSANDS/ÂΜL (ref 1.85–7.62)
NEUTS SEG NFR BLD AUTO: 71 % (ref 43–75)
NRBC BLD AUTO-RTO: 0 /100 WBCS
PLATELET # BLD AUTO: 272 THOUSANDS/UL (ref 149–390)
PMV BLD AUTO: 10.3 FL (ref 8.9–12.7)
POTASSIUM SERPL-SCNC: 3.9 MMOL/L (ref 3.5–5.3)
PROT SERPL-MCNC: 6.8 G/DL (ref 6.4–8.4)
RBC # BLD AUTO: 3.74 MILLION/UL (ref 3.88–5.62)
SODIUM SERPL-SCNC: 136 MMOL/L (ref 135–147)
WBC # BLD AUTO: 8.46 THOUSAND/UL (ref 4.31–10.16)

## 2023-12-08 PROCEDURE — 80053 COMPREHEN METABOLIC PANEL: CPT | Performed by: INTERNAL MEDICINE

## 2023-12-08 PROCEDURE — 85025 COMPLETE CBC W/AUTO DIFF WBC: CPT | Performed by: INTERNAL MEDICINE

## 2023-12-11 ENCOUNTER — TELEPHONE (OUTPATIENT)
Age: 73
End: 2023-12-11

## 2023-12-11 ENCOUNTER — OFFICE VISIT (OUTPATIENT)
Age: 73
End: 2023-12-11
Payer: COMMERCIAL

## 2023-12-11 VITALS
DIASTOLIC BLOOD PRESSURE: 80 MMHG | TEMPERATURE: 97.4 F | WEIGHT: 132 LBS | HEIGHT: 67 IN | RESPIRATION RATE: 17 BRPM | OXYGEN SATURATION: 100 % | HEART RATE: 78 BPM | BODY MASS INDEX: 20.72 KG/M2 | SYSTOLIC BLOOD PRESSURE: 146 MMHG

## 2023-12-11 DIAGNOSIS — C82.95 FOLLICULAR LYMPHOMA OF LYMPH NODES OF INGUINAL REGION, UNSPECIFIED FOLLICULAR LYMPHOMA TYPE (HCC): Primary | ICD-10-CM

## 2023-12-11 PROCEDURE — 99213 OFFICE O/P EST LOW 20 MIN: CPT | Performed by: INTERNAL MEDICINE

## 2023-12-11 NOTE — PROGRESS NOTES
HEMATOLOGY / [address] [facility] FOLLOW UP NOTE    Primary Care Provider: John Senior DO  Referring Provider:    MRN: [id number]  : 1950    Reason for Encounter: follow up follicular lymphoma       Oncology History Overview Note    - chemo/RT for cancel of the tonsil at [facility] (Tahmina Abad and Violette Leon)    8405 - grade 1-2 follicular lymphoma - bx at right groin, Stage IIIS    2023 - start bendamustine/rituxan    2023 - stop BR after 4 cycles due to good disease control     Primary malignant neoplasm of laryngeal cartilage ([address])   2014 Initial Diagnosis    Primary malignant neoplasm of laryngeal cartilage (HCC)     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 ([address])   2023 Initial Diagnosis    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 ([address])     2023 -  Cancer Staged    Staging form: Hodgkin and Non-Hodgkin Lymphoma, AJCC 8th Edition  - Clinical stage from 3806: Stage III (Follicular lymphoma) - Signed by Zaida Baker DO on 7/15/2023       2023 -  Chemotherapy    alteplase (CATHFLO), 2 mg, Intracatheter, Every 1 Minute as needed, 4 of 4 cycles  bendamustine HCL(BENDEKA) IVPB, 90 mg/m2 = 153.9 mg, Intravenous, Once, 4 of 4 cycles  Administration: 153.9 mg (2023), 153.9 mg (2023), 153.9 mg (2023), 153.9 mg (2023), 153.9 mg (2023), 153.9 mg (2023), 153.9 mg (2023), 153.9 mg (9/15/2023)  riTUXimab-pvmike (Rosana Ashton) first titrated chemo infusion, 375 mg/m2 = 641.2 mg (100 % of original dose 375 mg/m2), Intravenous, Once, 1 of 1 cycle  Dose modification: 375 mg/m2 (original dose 375 mg/m2, Cycle 1)  Administration: 641.2 mg (2023)  riTUXimab (RUXIENCE) subsequent titrated chemo infusion, 375 mg/m2 = 641.2 mg (100 % of original dose 375 mg/m2), Intravenous, Once, 3 of 3 cycles  Dose modification: 375 mg/m2 (original dose 375 mg/m2, Cycle 2)  Administration: 641.2 mg (2023), 641.2 mg (2023), 641.2 mg (9/14/2023)         Interval History: Patient presents for follow up of his follicular lymphoma. He has about the same level of fatigue as he did before he started treatment for it. He denies any fevers, night sweats, infections. He has not palpated any new lymphadenopathy. He has gained 2 pounds since the end of treatment. He has had recent cataract surgery and recovering well from this. REVIEW OF SYSTEMS:  Please note that a 14-point review of systems was performed to include Constitutional, HEENT, Respiratory, CVS, GI, , Musculoskeletal, Integumentary, Neurologic, Rheumatologic, Endocrinologic, Psychiatric, Lymphatic, and Hematologic/Oncologic systems were reviewed and are negative unless otherwise stated in HPI. Positive and negative findings pertinent to this evaluation are incorporated into the history of present illness. 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 He still has residual fatigue but is otherwise doing well. He will follow-up in 6 months with my nurse practitioner in 6 months after that I will see him with another CT scan.   We will do regular CT scans for lung cancer screening purposes as well. He knows to call in the interim with any concerning symptoms or questions or concerns. I spent 20 minutes on chart review, face to face counseling time, coordination of care and documentation. Past Medical History:   has a past medical history of Cancer [facility]), Colon polyp, CVA (cerebral vascular accident) ([address]) (01/2020), Hepatitis B, Hyperlipidemia, Hypertension, and Stroke ([address] [facility]). PAST SURGICAL HISTORY:   has a past surgical history that includes Appendectomy (2003); Skull fracture elevation (1984); Colonoscopy (08/02/2017); IR biopsy inguinal lymph node (2/15/2023); and IR port placement (6/19/2023). CURRENT MEDICATIONS  Current Outpatient Medications   Medication Sig Dispense Refill    allopurinol (ZYLOPRIM) 300 mg tablet Take 1 tablet (300 mg total) by mouth daily 30 tablet 4    amLODIPine (NORVASC) 2.5 mg tablet Take 1 tablet by mouth once daily 90 tablet 0    amoxicillin (AMOXIL) 500 mg capsule if needed      aspirin (ECOTRIN LOW STRENGTH) 81 mg EC tablet Take 1 tablet (81 mg total) by mouth daily      atorvastatin (LIPITOR) 80 mg tablet Take 1 tablet by mouth once daily 90 tablet 0    Eliquis 5 MG Take 1 tablet by mouth twice daily 180 tablet 0    ondansetron (ZOFRAN) 8 mg tablet Take 1 tablet (8 mg total) by mouth every 8 (eight) hours as needed for nausea or vomiting 30 tablet 2    prochlorperazine (COMPAZINE) 10 mg tablet Take 1 tablet (10 mg total) by mouth every 6 (six) hours as needed for nausea or vomiting 30 tablet 3     No current facility-administered medications for this visit. [unfilled]    SOCIAL HISTORY:   reports that he has been smoking cigarettes. He started smoking about 53 years ago. He has been smoking an average of .5 packs per day. He has never used smokeless tobacco. He reports that he does not currently use alcohol. He reports current drug use. Drug: Marijuana.      FAMILY HISTORY:  family history includes Colon polyps in his father; Heart disease in his father. ALLERGIES:  has No Known Allergies. Physical Exam:  Vital Signs:   Visit Vitals  /80 (BP Location: Left arm, Patient Position: Sitting, Cuff Size: Adult)   Pulse 78   Temp (!) 97.4 °F (36.3 °C)   Resp 17   Ht 5' 7" (1.702 m)   Wt 59.9 kg (132 lb)   SpO2 100%   BMI 20.67 kg/m²   Smoking Status Every Day   BSA 1.7 m²     Body mass index is 20.67 kg/m². Body surface area is 1.7 meters squared. GEN: Alert, awake oriented x3, in no acute distress  HEENT- No pallor, icterus, cyanosis, no oral mucosal lesions,   LAD - no palpable cervical, clavicle, axillary, inguinal LAD  Heart- normal S1 S2, regular rate and rhythm, No murmur, rubs.    Lungs- clear breathing sound bilateral.   Abdomen- soft, Non tender, bowel sounds present  Extremities- No cyanosis, clubbing, edema  Neuro- No focal neurological deficit    Labs:  Lab Results   Component Value Date    WBC 8.46 12/08/2023    HGB 12.7 12/08/2023    HCT 38.1 12/08/2023     (H) 12/08/2023     12/08/2023     Lab Results   Component Value Date     01/11/2018    SODIUM 136 12/08/2023    K 3.9 12/08/2023     12/08/2023    CO2 27 12/08/2023    AGAP 6 12/08/2023    BUN 12 12/08/2023    CREATININE 0.69 12/08/2023    GLUC 105 12/08/2023    CALCIUM 9.3 12/08/2023    AST 15 12/08/2023    ALT 6 (L) 12/08/2023    ALKPHOS 82 12/08/2023    PROT 6.6 09/09/2016    TP 6.8 12/08/2023    BILITOT 0.3 09/09/2016    TBILI 0.57 12/08/2023    EGFR 94 12/08/2023

## 2024-01-12 ENCOUNTER — OFFICE VISIT (OUTPATIENT)
Dept: FAMILY MEDICINE CLINIC | Facility: HOSPITAL | Age: 74
End: 2024-01-12
Payer: COMMERCIAL

## 2024-01-12 VITALS
OXYGEN SATURATION: 95 % | SYSTOLIC BLOOD PRESSURE: 146 MMHG | HEART RATE: 85 BPM | HEIGHT: 66 IN | BODY MASS INDEX: 21.38 KG/M2 | DIASTOLIC BLOOD PRESSURE: 84 MMHG | WEIGHT: 133 LBS

## 2024-01-12 DIAGNOSIS — E46 PROTEIN-CALORIE MALNUTRITION, UNSPECIFIED SEVERITY (HCC): ICD-10-CM

## 2024-01-12 DIAGNOSIS — E78.00 PURE HYPERCHOLESTEROLEMIA: ICD-10-CM

## 2024-01-12 DIAGNOSIS — J43.2 CENTRILOBULAR EMPHYSEMA (HCC): ICD-10-CM

## 2024-01-12 DIAGNOSIS — R73.03 PREDIABETES: ICD-10-CM

## 2024-01-12 DIAGNOSIS — Z12.5 SCREENING FOR PROSTATE CANCER: ICD-10-CM

## 2024-01-12 DIAGNOSIS — I63.9 EMBOLIC STROKE (HCC): ICD-10-CM

## 2024-01-12 DIAGNOSIS — C82.95 FOLLICULAR LYMPHOMA OF LYMPH NODES OF INGUINAL REGION, UNSPECIFIED FOLLICULAR LYMPHOMA TYPE (HCC): Primary | ICD-10-CM

## 2024-01-12 PROCEDURE — 99214 OFFICE O/P EST MOD 30 MIN: CPT | Performed by: STUDENT IN AN ORGANIZED HEALTH CARE EDUCATION/TRAINING PROGRAM

## 2024-01-12 PROCEDURE — G0439 PPPS, SUBSEQ VISIT: HCPCS | Performed by: STUDENT IN AN ORGANIZED HEALTH CARE EDUCATION/TRAINING PROGRAM

## 2024-01-12 PROCEDURE — G0444 DEPRESSION SCREEN ANNUAL: HCPCS | Performed by: STUDENT IN AN ORGANIZED HEALTH CARE EDUCATION/TRAINING PROGRAM

## 2024-01-12 RX ORDER — ATORVASTATIN CALCIUM 80 MG/1
80 TABLET, FILM COATED ORAL DAILY
Qty: 90 TABLET | Refills: 2 | Status: SHIPPED | OUTPATIENT
Start: 2024-01-12

## 2024-01-12 RX ORDER — AMLODIPINE BESYLATE 2.5 MG/1
2.5 TABLET ORAL DAILY
Qty: 90 TABLET | Refills: 2 | Status: SHIPPED | OUTPATIENT
Start: 2024-01-12

## 2024-01-12 NOTE — PROGRESS NOTES
Assessment and Plan:      Diagnosis ICD-10-CM Associated Orders   1. 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 Protein-calorie malnutrition, unspecified severity (HCC)  E46       3. Centrilobular emphysema (HCC)  J43.2       4. Embolic stroke (HCC)  I63.9 apixaban (Eliquis) 5 mg     amLODIPine (NORVASC) 2.5 mg tablet      5. Pure hypercholesterolemia  E78.00 atorvastatin (LIPITOR) 80 mg tablet     Lipid panel     Lipid panel      6. Prediabetes  R73.03 HEMOGLOBIN A1C W/ EAG ESTIMATION     HEMOGLOBIN A1C W/ EAG ESTIMATION      7. Screening for prostate cancer  Z12.5 PSA Total (Reflex To Free)     PSA Total (Reflex To Free)        Down to half pack per day.   Patient's medications were reviewed and reconciled.  Ordered/Re-ordered as above.  [facility].     MSG sent to hematology about allopurinol med.      Preventive health issues were discussed with patient, and age appropriate screening tests were ordered as noted in patient's After Visit Summary.  Personalized health advice and appropriate referrals for health education or preventive services given if needed, as noted in patient's After Visit Summary.     History of Present Illness:     Patient presents for a Medicare Wellness Visit    HPI   Feeling okay, told CA is in remission.   Not needing nausea meds.     Wt Readings from Last 3 Encounters:   01/12/24 60.3 kg (133 lb)   12/11/23 59.9 kg (132 lb)   10/31/23 60.3 kg (133 lb)     Temp Readings from Last 3 Encounters:   12/11/23 (!) 97.4 °F (36.3 °C)   09/15/23 (!) 97.1 °F (36.2 °C) (Temporal)   09/14/23 (!) 97.2 °F (36.2 °C)     BP Readings from Last 3 Encounters:   01/12/24 146/84   12/11/23 146/80   10/31/23 114/62     Pulse Readings from Last 3 Encounters:   01/12/24 85   12/11/23 78   10/31/23 76     Still going to the [facility] 5x a wk. Walking on treadmill.     Patient Care  Team:  Katherine Dominguez DO as PCP - General (Family Medicine)  Pamela Browne DO (Oncology)     Review of Systems:     Review of Systems   Constitutional:  Negative for chills and fever.   Eyes:  Negative for photophobia and visual disturbance.   Respiratory:  Negative for cough and shortness of breath.    Cardiovascular:  Negative for chest pain and palpitations.   Neurological:  Negative for dizziness, light-headedness and headaches.        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)    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)    Prediabetes    Protein-calorie malnutrition, unspecified severity (HCC)      Past Medical and Surgical History:     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  Prior tattoo site from large polypectomy in the splenic flexure noted.  Pathology of colon polyps were  tubular adenomas.  A 3 year recall advised.    IR BIOPSY INGUINAL LYMPH NODE  2/15/2023    IR PORT PLACEMENT  6/19/2023    SKULL FRACTURE ELEVATION  1984      Family History:     Family History   Problem Relation Age of Onset    Heart disease Father     Colon polyps Father     Substance Abuse Neg Hx     Mental illness Neg Hx     Colon cancer Neg Hx       Social History:     Social History     Socioeconomic History    Marital status: Single     Spouse name: None    Number of children: None    Years of education: None    Highest education level: None   Occupational History    Occupation: Retired   Tobacco Use    Smoking status: Every Day     Current packs/day: 0.50     Average packs/day: 0.5 packs/day for 54.0 years (27.0 ttl pk-yrs)     Types: Cigarettes     Start date: 1970    Smokeless tobacco: Never   Vaping Use    Vaping status: Never Used   Substance and Sexual Activity    Alcohol use: Not Currently    Drug use: Yes     Types: Marijuana     Comment: rare    Sexual activity: Not Currently   Other Topics Concern    None   Social History Narrative    Wears seatbelt     Regular exercise    Regular dental care        Caffeine use:  Coffee daily    Living situation:  Supportive and safe    No advance directives     Social Determinants of Health     Financial Resource Strain: Low Risk  (1/12/2024)    Overall Financial Resource Strain (CARDIA)     Difficulty of Paying Living Expenses: Not hard at all   Food Insecurity: Not on file   Transportation Needs: No Transportation Needs (1/12/2024)    PRAPARE - Transportation     Lack of Transportation (Medical): No     Lack of Transportation (Non-Medical): No   Physical Activity: Not on file   Stress: Not on file   Social Connections: Not on file   Intimate Partner Violence: Not on file   Housing Stability: Not on file      Medications and Allergies:     Current Outpatient Medications   Medication Sig Dispense Refill    allopurinol (ZYLOPRIM) 300 mg tablet Take 1 tablet (300 mg  total) by mouth daily 30 tablet 4    amLODIPine (NORVASC) 2.5 mg tablet Take 1 tablet (2.5 mg total) by mouth daily 90 tablet 2    amoxicillin (AMOXIL) 500 mg capsule if needed      apixaban (Eliquis) 5 mg Take 1 tablet (5 mg total) by mouth 2 (two) times a day 180 tablet 2    aspirin (ECOTRIN LOW STRENGTH) 81 mg EC tablet Take 1 tablet (81 mg total) by mouth daily      atorvastatin (LIPITOR) 80 mg tablet Take 1 tablet (80 mg total) by mouth daily 90 tablet 2     No current facility-administered medications for this visit.     No Known Allergies   Immunizations:     Immunization History   Administered Date(s) Administered    COVID-19 MODERNA VACC 0.5 ML IM 03/05/2021, 04/02/2021, 11/30/2021, 04/02/2022    COVID-19 Moderna Vac BIVALENT 12 Yr+ IM 0.5 ML 01/04/2023    INFLUENZA 10/11/2021, 10/14/2022, 11/21/2023    Influenza, high dose seasonal 0.7 mL 10/21/2019, 08/25/2020, 10/11/2021, 10/14/2022    Pneumococcal Polysaccharide PPV23 12/07/2009    Zoster Vaccine Recombinant 08/20/2019, 11/11/2019      Health Maintenance:         Topic Date Due    Colorectal Cancer Screening  03/04/2026    Hepatitis C Screening  Completed    Lung Cancer Screening  Discontinued         Topic Date Due    Pneumococcal Vaccine: 65+ Years (2 - PCV) 12/07/2010    COVID-19 Vaccine (6 - 2023-24 season) 09/01/2023      Medicare Screening Tests and Risk Assessments:     No recent BP check at home.   But does have a cuff.     Jere is here for his Subsequent Wellness visit.     Health Risk Assessment:   Patient rates overall health as fair. Patient feels that their physical health rating is same. Patient is very satisfied with their life. Eyesight was rated as same. Hearing was rated as slightly worse. Patient feels that their emotional and mental health rating is same. Patients states they are never, rarely angry. Patient states they are never, rarely unusually tired/fatigued. Pain experienced in the last 7 days has been none. Patient states  that he has experienced no weight loss or gain in last 6 months.     Depression Screening:   PHQ-2 Score: 0      Fall Risk Screening:   In the past year, patient has experienced: no history of falling in past year      Home Safety:  Patient has trouble with stairs inside or outside of their home. Patient has working smoke alarms and has no working carbon monoxide detector. Home safety hazards include: none.     Nutrition:   Current diet is Regular.     Medications:   Patient is currently taking over-the-counter supplements. OTC medications include: see medication list. Patient is able to manage medications.     Activities of Daily Living (ADLs)/Instrumental Activities of Daily Living (IADLs):   Walk and transfer into and out of bed and chair?: Yes  Dress and groom yourself?: Yes    Bathe or shower yourself?: Yes    Feed yourself? Yes  Do your laundry/housekeeping?: Yes  Manage your money, pay your bills and track your expenses?: Yes  Make your own meals?: Yes    Do your own shopping?: Yes    Previous Hospitalizations:   Any hospitalizations or ED visits within the last 12 months?: No      Advance Care Planning:   Living will: Yes    Durable POA for healthcare: Yes    Advanced directive: Yes      Cognitive Screening:   Provider or family/friend/caregiver concerned regarding cognition?: No    PREVENTIVE SCREENINGS      Cardiovascular Screening:    General: History Lipid Disorder      Diabetes Screening:     General: Screening Current      Colorectal Cancer Screening:     General: Screening Current      Prostate Cancer Screening:    General: Screening Current      Abdominal Aortic Aneurysm (AAA) Screening:    Risk factors include: age between 65-74 yo and tobacco use        Lung Cancer Screening:     General: Screening Current      Hepatitis C Screening:    General: Screening Current    Screening, Brief Intervention, and Referral to Treatment (SBIRT)    Screening      AUDIT-C Screenin) How often did you have a  "drink containing alcohol in the past year? monthly or less  2) How many drinks did you have on a typical day when you were drinking in the past year? 1 to 2  3) How often did you have 6 or more drinks on one occasion in the past year? never    AUDIT-C Score: 1  Interpretation: Score 0-3 (male): Negative screen for alcohol misuse    Single Item Drug Screening:  How often have you used an illegal drug (including marijuana) or a prescription medication for non-medical reasons in the past year? never    Single Item Drug Screen Score: 0  Interpretation: Negative screen for possible drug use disorder    Annual Depression Screening  Time spent screening and evaluating the patient for depression during today's encounter was 5 minutes.    Other Counseling Topics:   Car/seat belt/driving safety, skin self-exam, sunscreen and regular weightbearing exercise and calcium and vitamin D intake.      Physical Exam:     /84   Pulse 85   Ht 5' 5.5\" (1.664 m)   Wt 60.3 kg (133 lb)   SpO2 95%   BMI 21.80 kg/m²     Physical Exam  Vitals reviewed.   Constitutional:       General: He is not in acute distress.     Appearance: Normal appearance. He is not ill-appearing.   HENT:      Head: Normocephalic and atraumatic.   Eyes:      General: No scleral icterus.        Right eye: No discharge.         Left eye: No discharge.   Neck:      Comments: Port in place R chest   Cardiovascular:      Rate and Rhythm: Normal rate and regular rhythm.      Pulses: Normal pulses.      Heart sounds: Normal heart sounds. No murmur heard.  Pulmonary:      Effort: Pulmonary effort is normal. No respiratory distress.      Breath sounds: Normal breath sounds. No wheezing.   Musculoskeletal:      Cervical back: Normal range of motion and neck supple.      Right lower leg: No edema.      Left lower leg: No edema.   Neurological:      Mental Status: He is alert and oriented to person, place, and time.      Gait: Gait normal.   Psychiatric:         Mood " and Affect: Mood normal.         Behavior: Behavior normal.         Thought Content: Thought content normal.         Judgment: Judgment normal.        Katherine Dominguez DO

## 2024-01-18 ENCOUNTER — HOSPITAL ENCOUNTER (OUTPATIENT)
Dept: INFUSION CENTER | Facility: HOSPITAL | Age: 74
Discharge: HOME | End: 2024-01-18
Payer: COMMERCIAL

## 2024-01-18 DIAGNOSIS — C85.95 NON-HODGKIN LYMPHOMA OF LYMPH NODES OF INGUINAL REGION, UNSPECIFIED NON-HODGKIN LYMPHOMA TYPE (HCC): Primary | ICD-10-CM

## 2024-01-18 DIAGNOSIS — C32.3: ICD-10-CM

## 2024-01-18 PROCEDURE — 96523 IRRIG DRUG DELIVERY DEVICE: CPT

## 2024-01-18 NOTE — PROGRESS NOTES
Jere Lepe  tolerated treatment well with no complications.      Jere Lepe is aware of future appt on 3/14 at 1300.     AVS printed and given to Jere Lepe:  No (Declined by Jere Lepe)

## 2024-01-29 DIAGNOSIS — I63.9 EMBOLIC STROKE (HCC): ICD-10-CM

## 2024-01-29 DIAGNOSIS — E78.00 PURE HYPERCHOLESTEROLEMIA: ICD-10-CM

## 2024-01-29 RX ORDER — APIXABAN 5 MG/1
5 TABLET, FILM COATED ORAL 2 TIMES DAILY
Qty: 180 TABLET | Refills: 0 | Status: SHIPPED | OUTPATIENT
Start: 2024-01-29

## 2024-01-29 RX ORDER — ATORVASTATIN CALCIUM 80 MG/1
80 TABLET, FILM COATED ORAL DAILY
Qty: 90 TABLET | Refills: 0 | Status: SHIPPED | OUTPATIENT
Start: 2024-01-29

## 2024-02-21 PROBLEM — Z12.11 SCREENING FOR COLON CANCER: Status: RESOLVED | Noted: 2020-08-12 | Resolved: 2024-02-21

## 2024-03-14 ENCOUNTER — HOSPITAL ENCOUNTER (OUTPATIENT)
Dept: INFUSION CENTER | Facility: HOSPITAL | Age: 74
Discharge: HOME | End: 2024-03-14
Payer: COMMERCIAL

## 2024-03-14 DIAGNOSIS — C85.95 NON-HODGKIN LYMPHOMA OF LYMPH NODES OF INGUINAL REGION, UNSPECIFIED NON-HODGKIN LYMPHOMA TYPE (HCC): Primary | ICD-10-CM

## 2024-03-14 DIAGNOSIS — C32.3: ICD-10-CM

## 2024-03-14 PROCEDURE — 96523 IRRIG DRUG DELIVERY DEVICE: CPT

## 2024-03-14 NOTE — PROGRESS NOTES
Patient Port Flush completed. Port accessed and de-accessed without complication.   Patient declined AVS at this time and confirmed next appointment. Patient discharged in stable condition.

## 2024-05-09 ENCOUNTER — HOSPITAL ENCOUNTER (OUTPATIENT)
Dept: INFUSION CENTER | Facility: HOSPITAL | Age: 74
Discharge: HOME | End: 2024-05-09
Payer: COMMERCIAL

## 2024-05-09 DIAGNOSIS — C85.95 NON-HODGKIN LYMPHOMA OF LYMPH NODES OF INGUINAL REGION, UNSPECIFIED NON-HODGKIN LYMPHOMA TYPE (HCC): Primary | ICD-10-CM

## 2024-05-09 PROCEDURE — 96523 IRRIG DRUG DELIVERY DEVICE: CPT

## 2024-05-09 NOTE — PROGRESS NOTES
Jere Lepe  tolerated treatment well with no complications.      Jere Lepe is aware of future appt on 7/3 at 1130am.     AVS printed and given to Jere Lepe:  No (Declined by Jere Lepe)

## 2024-05-29 DIAGNOSIS — E78.00 PURE HYPERCHOLESTEROLEMIA: ICD-10-CM

## 2024-05-31 RX ORDER — ATORVASTATIN CALCIUM 80 MG/1
80 TABLET, FILM COATED ORAL DAILY
Qty: 30 TABLET | Refills: 0 | Status: SHIPPED | OUTPATIENT
Start: 2024-05-31

## 2024-05-31 NOTE — TELEPHONE ENCOUNTER
Patient needs updated blood work and has previously placed orders. Please contact patient to go for labs. Courtesy refill provided.  Lipid Panel    I sent pt a [employer] message. PLEASE F/U WITH PT AND MAKE SURE HE GETS BLOOD WORK DONE PRIOR TO NEXT REFILL.

## 2024-06-01 PROBLEM — H90.3 SENSORINEURAL HEARING LOSS, BILATERAL: Status: ACTIVE | Noted: 2024-06-01

## 2024-06-01 PROBLEM — I69.312: Status: ACTIVE | Noted: 2024-06-01

## 2024-06-01 PROBLEM — S22.000A COMPRESSION FRACTURE OF BODY OF THORACIC VERTEBRA (HCC): Status: RESOLVED | Noted: 2021-10-11 | Resolved: 2024-06-01

## 2024-06-01 PROBLEM — I69.393 ATAXIA DUE TO OLD CEREBROVASCULAR ACCIDENT (CVA): Status: ACTIVE | Noted: 2019-12-16

## 2024-06-01 PROBLEM — H90.3 SENSORINEURAL HEARING LOSS, BILATERAL: Status: ACTIVE | Noted: 2019-11-18

## 2024-06-01 PROBLEM — Z85.21 PERSONAL HISTORY OF MALIGNANT NEOPLASM OF LARYNX: Status: ACTIVE | Noted: 2024-06-01

## 2024-06-01 PROBLEM — I69.393 ATAXIA DUE TO OLD CEREBROVASCULAR ACCIDENT (CVA): Status: ACTIVE | Noted: 2024-06-01

## 2024-06-25 NOTE — PROGRESS NOTES
HEMATOLOGY / ONCOLOGY CLINIC FOLLOW UP NOTE    Primary Care Provider: Katherine Dominguez DO  Referring Provider:    MRN: [id number]  : 1950    Reason for Encounter: Follow-up for follicular lymphoma       Oncology History Overview Note    - chemo/RT for cancel of the tonsil at [facility] (Tahmina Ruiz and Gerald)    2023 - grade 1-2 follicular lymphoma - bx at right groin, Stage IIIS    2023 - start bendamustine/rituxan    2023 - stop BR after 4 cycles due to good disease control     Primary malignant neoplasm of laryngeal cartilage (HCC) (Resolved)   2014 Initial Diagnosis    Primary malignant neoplasm of laryngeal cartilage (HCC)     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)   2023 Initial Diagnosis    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)     2023 -  Cancer Staged    Staging form: Hodgkin and Non-Hodgkin Lymphoma, AJCC 8th Edition  - Clinical stage from 2023: Stage III (Follicular lymphoma) - Signed by Pamela Browne DO on 7/15/2023       2023 - 9/15/2023 Chemotherapy    alteplase (CATHFLO), 2 mg, Intracatheter, Every 1 Minute as needed, 4 of 4 cycles  bendamustine HCL(BENDEKA) IVPB, 90 mg/m2 = 153.9 mg, Intravenous, Once, 4 of 4 cycles  Administration: 153.9 mg (2023), 153.9 mg (2023), 153.9 mg (2023), 153.9 mg (2023), 153.9 mg (2023), 153.9 mg (2023), 153.9 mg (2023), 153.9 mg (9/15/2023)  riTUXimab-pvvr (RUXIENCE) first titrated chemo infusion, 375 mg/m2 = 641.2 mg (100 % of original dose 375 mg/m2), Intravenous, Once, 1 of 1 cycle  Dose modification: 375 mg/m2 (original dose 375 mg/m2, Cycle 1)  Administration: 641.2 mg (2023)  riTUXimab (RUXIENCE) subsequent titrated chemo infusion, 375 mg/m2 = 641.2 mg (100 % of original dose 375 mg/m2), Intravenous, Once, 3 of 3 cycles  Dose modification: 375 mg/m2 (original dose 375 mg/m2, Cycle 2)  Administration: 641.2 mg (2023), 641.2 mg  (8/17/2023), 641.2 mg (9/14/2023)         Interval History: Patient presents for follow-up for his history of follicular lymphoma.  He is on active surveillance.  Did not have his blood work done prior to today's visit.  He states he will have this done tomorrow.  Denies any B symptoms.  No constitutional symptoms.  He does report some chest congestion that has been lingering over the past month.  He does continue to smoke approximately 12 cigarettes a day.  Weight is stable.  Denies any new adenopathy.  [facility]           REVIEW OF SYSTEMS:  Please note that a 14-point review of systems was performed to include Constitutional, HEENT, Respiratory, CVS, GI, , Musculoskeletal, Integumentary, Neurologic, Rheumatologic, Endocrinologic, Psychiatric, Lymphatic, and Hematologic/Oncologic systems were reviewed and are negative unless otherwise stated in HPI. Positive and negative findings pertinent to this evaluation are incorporated into the history of present illness.      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 Tobacco use disorder    3. History of tobacco abuse        Patient is a very pleasant 74-year-old gentleman  with a history of grade 1-2 follicular lymphoma status post treatment with Bendamustine and Rituxan.  He has been on observation.  He denies any B symptoms.  Clinically without any concern on exam today.  He is reminded to have blood work done prior to each visit.  He will have labs done tomorrow and I will call him with results.  He continues to smoke.  We did discuss importance of smoking cessation.  I will check annual CT lung screening, he is overdue for this.  He will also be due for PET/CT imaging in 6 months for surveillance purposes and history of lymphoma.  He will return for a follow-up visit with Dr. Browne in 6 months to review imaging and blood work.  He is in agreement with this plan of care.  He is instructed to call with any questions or concerning symptoms in the meantime    I spent 30 minutes on chart review, face to face counseling time, coordination of care and documentation.    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    PAST SURGICAL HISTORY:   has a past surgical history that includes Appendectomy (2003); Skull fracture elevation (1984); Colonoscopy (08/02/2017); IR biopsy inguinal lymph node (2/15/2023); and IR port placement (6/19/2023).    CURRENT MEDICATIONS  Current Outpatient Medications   Medication Sig Dispense Refill    allopurinol (ZYLOPRIM) 300 mg tablet Take 1 tablet (300 mg total) by mouth daily 30 tablet 4    amLODIPine (NORVASC) 2.5 mg tablet Take 1 tablet (2.5 mg total) by mouth daily 90 tablet 2    amoxicillin (AMOXIL) 500 mg capsule if needed      aspirin (ECOTRIN LOW STRENGTH) 81 mg EC tablet Take 1 tablet (81 mg total) by mouth daily      atorvastatin  "(LIPITOR) 80 mg tablet Take 1 tablet by mouth once daily 30 tablet 0    Eliquis 5 MG Take 1 tablet by mouth twice daily 180 tablet 0     No current facility-administered medications for this visit.     [unfilled]    SOCIAL HISTORY:   reports that he has been smoking cigarettes. He started smoking about 54 years ago. He has a 27.3 pack-year smoking history. He has never used smokeless tobacco. He reports that he does not currently use alcohol. He reports current drug use. Drug: Marijuana.     FAMILY HISTORY:  family history includes Colon polyps in his father; Heart disease in his father.     ALLERGIES:  has No Known Allergies.      Physical Exam:  Vital Signs:   Visit Vitals  /78 (BP Location: Left arm, Patient Position: Sitting, Cuff Size: Standard)   Pulse 64   Temp 98.4 °F (36.9 °C) (Temporal)   Resp 17   Ht 5' 5.5\" (1.664 m)   Wt 59 kg (130 lb)   SpO2 100%   BMI 21.30 kg/m²   Smoking Status Every Day   BSA 1.66 m²     Body mass index is 21.3 kg/m².  Body surface area is 1.66 meters squared.    Physical Exam  Constitutional:       General: He is not in acute distress.     Appearance: Normal appearance.   HENT:      Head: Normocephalic and atraumatic.   Eyes:      General: No scleral icterus.        Right eye: No discharge.         Left eye: No discharge.      Conjunctiva/sclera: Conjunctivae normal.   Cardiovascular:      Rate and Rhythm: Normal rate and regular rhythm.   Pulmonary:      Effort: Pulmonary effort is normal. No respiratory distress.      Breath sounds: Normal breath sounds.   Abdominal:      General: Bowel sounds are normal. There is no distension.      Palpations: Abdomen is soft. There is no mass.      Tenderness: There is no abdominal tenderness.   Musculoskeletal:         General: Normal range of motion.   Lymphadenopathy:      Cervical: No cervical adenopathy.      Upper Body:      Right upper body: No supraclavicular, axillary or pectoral adenopathy.      Left upper body: No " supraclavicular, axillary or pectoral adenopathy.   Skin:     General: Skin is warm and dry.   Neurological:      General: No focal deficit present.      Mental Status: He is alert and oriented to person, place, and time.   Psychiatric:         Mood and Affect: Mood normal.         Behavior: Behavior normal.         Labs:  Lab Results   Component Value Date    WBC 8.46 12/08/2023    HGB 12.7 12/08/2023    HCT 38.1 12/08/2023     (H) 12/08/2023     12/08/2023     Lab Results   Component Value Date     01/11/2018    SODIUM 136 12/08/2023    K 3.9 12/08/2023     12/08/2023    CO2 27 12/08/2023    AGAP 6 12/08/2023    BUN 12 12/08/2023    CREATININE 0.69 12/08/2023    GLUC 105 12/08/2023    CALCIUM 9.3 12/08/2023    AST 15 12/08/2023    ALT 6 (L) 12/08/2023    ALKPHOS 82 12/08/2023    PROT 6.6 09/09/2016    TP 6.8 12/08/2023    BILITOT 0.3 09/09/2016    TBILI 0.57 12/08/2023    EGFR 94 12/08/2023

## 2024-06-28 ENCOUNTER — TELEPHONE (OUTPATIENT)
Age: 74
End: 2024-06-28

## 2024-06-28 NOTE — TELEPHONE ENCOUNTER
Left message reminding patient to have labs completed for upcoming appointment 7/2/24. Provided call back number for any questions/concerns [phone]

## 2024-07-02 ENCOUNTER — OFFICE VISIT (OUTPATIENT)
Age: 74
End: 2024-07-02
Payer: COMMERCIAL

## 2024-07-02 VITALS
SYSTOLIC BLOOD PRESSURE: 130 MMHG | TEMPERATURE: 98.4 F | WEIGHT: 130 LBS | OXYGEN SATURATION: 100 % | HEART RATE: 64 BPM | HEIGHT: 66 IN | RESPIRATION RATE: 17 BRPM | BODY MASS INDEX: 20.89 KG/M2 | DIASTOLIC BLOOD PRESSURE: 78 MMHG

## 2024-07-02 DIAGNOSIS — C82.95 FOLLICULAR LYMPHOMA OF LYMPH NODES OF INGUINAL REGION, UNSPECIFIED FOLLICULAR LYMPHOMA TYPE (HCC): Primary | ICD-10-CM

## 2024-07-02 DIAGNOSIS — Z87.891 HISTORY OF TOBACCO ABUSE: ICD-10-CM

## 2024-07-02 DIAGNOSIS — F17.200 TOBACCO USE DISORDER: ICD-10-CM

## 2024-07-02 PROCEDURE — 99214 OFFICE O/P EST MOD 30 MIN: CPT | Performed by: NURSE PRACTITIONER

## 2024-07-03 ENCOUNTER — HOSPITAL ENCOUNTER (OUTPATIENT)
Dept: INFUSION CENTER | Facility: HOSPITAL | Age: 74
Discharge: HOME | End: 2024-07-03
Payer: COMMERCIAL

## 2024-07-03 DIAGNOSIS — C82.95 FOLLICULAR LYMPHOMA OF LYMPH NODES OF INGUINAL REGION, UNSPECIFIED FOLLICULAR LYMPHOMA TYPE (HCC): ICD-10-CM

## 2024-07-03 DIAGNOSIS — C82.95 FOLLICULAR LYMPHOMA OF LYMPH NODES OF INGUINAL REGION, UNSPECIFIED FOLLICULAR LYMPHOMA TYPE (HCC): Primary | ICD-10-CM

## 2024-07-03 DIAGNOSIS — C85.95 NON-HODGKIN LYMPHOMA OF LYMPH NODES OF INGUINAL REGION, UNSPECIFIED NON-HODGKIN LYMPHOMA TYPE (HCC): Primary | ICD-10-CM

## 2024-07-03 LAB
ALBUMIN SERPL BCG-MCNC: 4.3 G/DL (ref 3.5–5)
ALP SERPL-CCNC: 76 U/L (ref 34–104)
ALT SERPL W P-5'-P-CCNC: 6 U/L (ref 7–52)
ANION GAP SERPL CALCULATED.3IONS-SCNC: 5 MMOL/L (ref 4–13)
AST SERPL W P-5'-P-CCNC: 14 U/L (ref 13–39)
BASOPHILS # BLD AUTO: 0.07 THOUSANDS/ÂΜL (ref 0–0.1)
BASOPHILS NFR BLD AUTO: 1 % (ref 0–1)
BILIRUB SERPL-MCNC: 0.52 MG/DL (ref 0.2–1)
BUN SERPL-MCNC: 15 MG/DL (ref 5–25)
CALCIUM SERPL-MCNC: 9.2 MG/DL (ref 8.4–10.2)
CHLORIDE SERPL-SCNC: 104 MMOL/L (ref 96–108)
CO2 SERPL-SCNC: 28 MMOL/L (ref 21–32)
CREAT SERPL-MCNC: 0.67 MG/DL (ref 0.6–1.3)
EOSINOPHIL # BLD AUTO: 0.37 THOUSAND/ÂΜL (ref 0–0.61)
EOSINOPHIL NFR BLD AUTO: 4 % (ref 0–6)
ERYTHROCYTE [DISTWIDTH] IN BLOOD BY AUTOMATED COUNT: 14.4 % (ref 11.6–15.1)
GFR SERPL CREATININE-BSD FRML MDRD: 95 ML/MIN/1.73SQ M
GLUCOSE SERPL-MCNC: 103 MG/DL (ref 65–140)
HCT VFR BLD AUTO: 36.9 % (ref 36.5–49.3)
HGB BLD-MCNC: 12.5 G/DL (ref 12–17)
IMM GRANULOCYTES # BLD AUTO: 0.05 THOUSAND/UL (ref 0–0.2)
IMM GRANULOCYTES NFR BLD AUTO: 1 % (ref 0–2)
LDH SERPL-CCNC: 110 U/L (ref 140–271)
LYMPHOCYTES # BLD AUTO: 1.04 THOUSANDS/ÂΜL (ref 0.6–4.47)
LYMPHOCYTES NFR BLD AUTO: 13 % (ref 14–44)
MCH RBC QN AUTO: 33.4 PG (ref 26.8–34.3)
MCHC RBC AUTO-ENTMCNC: 33.9 G/DL (ref 31.4–37.4)
MCV RBC AUTO: 99 FL (ref 82–98)
MONOCYTES # BLD AUTO: 0.83 THOUSAND/ÂΜL (ref 0.17–1.22)
MONOCYTES NFR BLD AUTO: 10 % (ref 4–12)
NEUTROPHILS # BLD AUTO: 5.97 THOUSANDS/ÂΜL (ref 1.85–7.62)
NEUTS SEG NFR BLD AUTO: 71 % (ref 43–75)
NRBC BLD AUTO-RTO: 0 /100 WBCS
PLATELET # BLD AUTO: 270 THOUSANDS/UL (ref 149–390)
PMV BLD AUTO: 10.3 FL (ref 8.9–12.7)
POTASSIUM SERPL-SCNC: 3.9 MMOL/L (ref 3.5–5.3)
PROT SERPL-MCNC: 6.5 G/DL (ref 6.4–8.4)
RBC # BLD AUTO: 3.74 MILLION/UL (ref 3.88–5.62)
SODIUM SERPL-SCNC: 137 MMOL/L (ref 135–147)
WBC # BLD AUTO: 8.33 THOUSAND/UL (ref 4.31–10.16)

## 2024-07-03 PROCEDURE — 83615 LACTATE (LD) (LDH) ENZYME: CPT | Performed by: INTERNAL MEDICINE

## 2024-07-03 PROCEDURE — 80053 COMPREHEN METABOLIC PANEL: CPT | Performed by: INTERNAL MEDICINE

## 2024-07-03 PROCEDURE — 85025 COMPLETE CBC W/AUTO DIFF WBC: CPT | Performed by: INTERNAL MEDICINE

## 2024-07-03 NOTE — PROGRESS NOTES
Jere Lepe  tolerated treatment well with no complications.      Jere Lepe made next appt with the front deck    AVS printed and given to Jere Lepe:   No (Declined by Jere Lepe)

## 2024-07-17 ENCOUNTER — OFFICE VISIT (OUTPATIENT)
Dept: FAMILY MEDICINE CLINIC | Facility: HOSPITAL | Age: 74
End: 2024-07-17
Payer: COMMERCIAL

## 2024-07-17 VITALS
HEART RATE: 78 BPM | HEIGHT: 66 IN | WEIGHT: 128 LBS | SYSTOLIC BLOOD PRESSURE: 120 MMHG | DIASTOLIC BLOOD PRESSURE: 74 MMHG | OXYGEN SATURATION: 97 % | BODY MASS INDEX: 20.57 KG/M2

## 2024-07-17 DIAGNOSIS — R05.2 SUBACUTE COUGH: ICD-10-CM

## 2024-07-17 DIAGNOSIS — J06.9 URI WITH COUGH AND CONGESTION: ICD-10-CM

## 2024-07-17 DIAGNOSIS — H61.23 CERUMEN DEBRIS ON TYMPANIC MEMBRANE OF BOTH EARS: ICD-10-CM

## 2024-07-17 DIAGNOSIS — J43.2 CENTRILOBULAR EMPHYSEMA (HCC): Primary | ICD-10-CM

## 2024-07-17 PROBLEM — Z77.29 EXPOSURE TO POTENTIALLY HAZARDOUS SUBSTANCE: Status: ACTIVE | Noted: 2024-07-17

## 2024-07-17 PROCEDURE — 99214 OFFICE O/P EST MOD 30 MIN: CPT | Performed by: STUDENT IN AN ORGANIZED HEALTH CARE EDUCATION/TRAINING PROGRAM

## 2024-07-17 PROCEDURE — 69210 REMOVE IMPACTED EAR WAX UNI: CPT | Performed by: STUDENT IN AN ORGANIZED HEALTH CARE EDUCATION/TRAINING PROGRAM

## 2024-07-17 RX ORDER — AZITHROMYCIN 250 MG/1
TABLET, FILM COATED ORAL
Qty: 6 TABLET | Refills: 0 | Status: SHIPPED | OUTPATIENT
Start: 2024-07-17 | End: 2024-07-22

## 2024-07-17 RX ORDER — METHYLPREDNISOLONE 4 MG/1
TABLET ORAL
Qty: 21 EACH | Refills: 0 | Status: SHIPPED | OUTPATIENT
Start: 2024-07-17

## 2024-07-17 NOTE — PATIENT INSTRUCTIONS
Call to schedule pulmonary breathing test once feeling better/back to normal.     Ask [facility] about emphysema, shortness of breath, wheezing? Should you be on a daily inhaler like advair or a rescue inhaler like albuterol or both?

## 2024-07-17 NOTE — PROGRESS NOTES
[facility]   Katherine Dominguez DO    Assessment/Plan:      Diagnosis ICD-10-CM Associated Orders   1. Centrilobular emphysema (HCC)  J43.2 Complete PFT with post bronchodilator     methylPREDNISolone 4 MG tablet therapy pack     azithromycin (Zithromax) 250 mg tablet      2. URI with cough and congestion  J06.9 methylPREDNISolone 4 MG tablet therapy pack     azithromycin (Zithromax) 250 mg tablet      3. Subacute cough  R05.2       4. Cerumen debris on tympanic membrane of both ears  H61.23 Ear cerumen removal        Meds as above. If not helping pls let us know, could be smoker's cough instead. Sudden onset however.   See AVS. Have PFT's done, Consider dual inhalers.   Return in about 6 months (around 1/13/2025) for Annual Medicare Wellness.  Patient may call or return to office with any questions or concerns.   ______________________________________________________________________  Subjective:     Patient ID: Jere Lepe is a 74 y.o. male.  Jere Lepe  Chief Complaint   Patient presents with   • Follow-up     6 month fu   • Cough     5-6 weeks of cough, not worsening, but not going away.   Seems thick, not clear. Colorblind.   Cough, interrupting things.   Can be all day, but overnight eventually sleeps. Wakes with it.   No new pets, no known allergies.   Last img in August 23.     Tobacco Use: High Risk (7/17/2024)    Patient History    • Smoking Tobacco Use: Every Day    • Smokeless Tobacco Use: Never    • Passive Exposure: Not on file     CT Lung 22 - LUNGS: Mild to moderate emphysema.     Chemo's done - asked to stop allopurinol.   PET scheduled.   Heme appt in Jan.          Oncology History Overview Note    2008 - chemo/RT for cancel of the tonsil at [facility] (Tahmina Ruiz and Gerald)     2/2023 - grade 1-2 follicular lymphoma - bx at right groin, Stage IIIS     6/22/2023 - start bendamustine/rituxan     9/2023 - stop BR after 4 cycles due to good disease control     Wt Readings from  Last 3 Encounters:   07/17/24 58.1 kg (128 lb)   07/02/24 59 kg (130 lb)   01/12/24 60.3 kg (133 lb)     Temp Readings from Last 3 Encounters:   07/02/24 98.4 °F (36.9 °C) (Temporal)   12/11/23 (!) 97.4 °F (36.3 °C)   09/15/23 (!) 97.1 °F (36.2 °C) (Temporal)     BP Readings from Last 3 Encounters:   07/17/24 120/74   07/02/24 130/78   01/12/24 146/84     Pulse Readings from Last 3 Encounters:   07/17/24 78   07/02/24 64   01/12/24 85           Tobacco Cessation Counseling: Tobacco cessation counseling was provided. The patient is sincerely urged to quit consumption of tobacco. He is not ready to quit tobacco.         The following portions of the patient's history were reviewed and updated as appropriate: allergies, current medications, past medical history, and problem list.    Review of Systems   Constitutional:  Negative for chills and fever.   HENT:  Negative for ear pain, rhinorrhea, sinus pressure, sinus pain, sneezing, sore throat and trouble swallowing.    Eyes:  Negative for pain and redness.   Respiratory:  Positive for cough and wheezing (At times). Negative for chest tightness and shortness of breath.    Cardiovascular:  Negative for chest pain and palpitations.   Gastrointestinal:  Negative for diarrhea, nausea and vomiting.   Neurological:  Negative for dizziness, light-headedness and headaches.       Objective:      Vitals:    07/17/24 1346   BP: 120/74   Pulse: 78   SpO2: 97%      Physical Exam  Vitals and nursing note reviewed.   Constitutional:       General: He is not in acute distress.     Appearance: Normal appearance. He is normal weight. He is not ill-appearing.   HENT:      Head: Normocephalic and atraumatic.      Right Ear: Tympanic membrane, ear canal and external ear normal. There is no impacted cerumen.      Left Ear: Ear canal normal. There is impacted cerumen.      Nose: Congestion present.      Mouth/Throat:      Mouth: Mucous membranes are moist.      Pharynx: Oropharynx is clear. No  "oropharyngeal exudate.      Comments: Mucoid appearance, yellow/off white  Eyes:      General: No scleral icterus.        Right eye: No discharge.         Left eye: No discharge.   Cardiovascular:      Rate and Rhythm: Normal rate and regular rhythm.      Pulses: Normal pulses.      Heart sounds: Normal heart sounds. No murmur heard.  Pulmonary:      Effort: Pulmonary effort is normal. No respiratory distress.      Breath sounds: No stridor. Wheezing (exp) present.      Comments: Wet cough, upper airway   Musculoskeletal:      Cervical back: Normal range of motion and neck supple. No rigidity.      Right lower leg: No edema.      Left lower leg: No edema.   Neurological:      Mental Status: He is alert and oriented to person, place, and time.      Gait: Gait normal.   Psychiatric:         Mood and Affect: Mood normal.         Behavior: Behavior normal.         Thought Content: Thought content normal.         Judgment: Judgment normal.           Ear cerumen removal    Date/Time: 7/17/2024 1:40 PM    Performed by: Katherine Dominguez DO  Authorized by: Katherine Dominguez DO  Universal Protocol:  Consent: Verbal consent obtained.  Risks and benefits: risks, benefits and alternatives were discussed  Consent given by: patient  Time out: Immediately prior to procedure a \"time out\" was called to verify the correct patient, procedure, equipment, support staff and site/side marked as required.  Patient understanding: patient states understanding of the procedure being performed  Patient consent: the patient's understanding of the procedure matches consent given  Procedure consent: procedure consent matches procedure scheduled  Patient identity confirmed: verbally with patient    Patient location:  Clinic  Indications / Diagnosis:  Cerumen Impaction  Procedure details:     Location:  L ear and R ear    Procedure type: curette      Approach:  External  Post-procedure details:     Complication:  None    Hearing quality:  Improved    " "Patient tolerance of procedure:  Tolerated well, no immediate complications        Portions of the record may have been created with voice recognition software. Occasional wrong word or \"sound alike\" substitutions may have occurred due to the inherent limitations of voice recognition software. Please review the chart carefully and recognize, using context, where substitutions/typographical errors may have occurred.     "

## 2024-08-14 ENCOUNTER — HOSPITAL ENCOUNTER (OUTPATIENT)
Dept: INFUSION CENTER | Facility: HOSPITAL | Age: 74
Discharge: HOME | End: 2024-08-14
Payer: COMMERCIAL

## 2024-08-14 DIAGNOSIS — C85.95 NON-HODGKIN LYMPHOMA OF LYMPH NODES OF INGUINAL REGION, UNSPECIFIED NON-HODGKIN LYMPHOMA TYPE (HCC): Primary | ICD-10-CM

## 2024-08-14 PROCEDURE — 96523 IRRIG DRUG DELIVERY DEVICE: CPT

## 2024-08-14 NOTE — PROGRESS NOTES
Jere Lepe  tolerated treatment well with no complications.      Jere Lepe is aware of future appt on 10/2 at 1130.     AVS printed and given to Jere Lepe:  No (Declined by Jere Lepe)

## 2024-10-02 ENCOUNTER — HOSPITAL ENCOUNTER (OUTPATIENT)
Dept: INFUSION CENTER | Facility: HOSPITAL | Age: 74
Discharge: HOME | End: 2024-10-02
Payer: COMMERCIAL

## 2024-10-02 DIAGNOSIS — C85.95 NON-HODGKIN LYMPHOMA OF LYMPH NODES OF INGUINAL REGION, UNSPECIFIED NON-HODGKIN LYMPHOMA TYPE (HCC): Primary | ICD-10-CM

## 2024-10-02 PROCEDURE — 96523 IRRIG DRUG DELIVERY DEVICE: CPT

## 2024-10-02 NOTE — PROGRESS NOTES
Jere Lepe  tolerated treatment well with no complications.      Jere Lepe is aware of future appt on 11/13 at 1200.     AVS printed and given to Jere Lepe:    No (Declined by Jere Lepe)

## 2024-11-13 ENCOUNTER — HOSPITAL ENCOUNTER (OUTPATIENT)
Dept: INFUSION CENTER | Facility: HOSPITAL | Age: 74
Discharge: HOME | End: 2024-11-13

## 2024-11-13 DIAGNOSIS — C85.95 NON-HODGKIN LYMPHOMA OF LYMPH NODES OF INGUINAL REGION, UNSPECIFIED NON-HODGKIN LYMPHOMA TYPE (HCC): Primary | ICD-10-CM

## 2024-11-13 NOTE — PROGRESS NOTES
Jere Lepe  tolerated treatment well with no complications.      Jere Lepe is aware of future appt on 1/3/25 at 1200.     AVS printed and given to Jere Lepe:  Yes

## 2024-12-18 ENCOUNTER — HOSPITAL ENCOUNTER (OUTPATIENT)
Dept: RADIOLOGY | Age: 74
Discharge: HOME | End: 2024-12-18
Payer: COMMERCIAL

## 2024-12-18 DIAGNOSIS — C82.95 FOLLICULAR LYMPHOMA OF LYMPH NODES OF INGUINAL REGION, UNSPECIFIED FOLLICULAR LYMPHOMA TYPE (HCC): ICD-10-CM

## 2024-12-18 LAB — GLUCOSE SERPL-MCNC: 84 MG/DL (ref 65–140)

## 2024-12-18 PROCEDURE — 82948 REAGENT STRIP/BLOOD GLUCOSE: CPT

## 2024-12-18 PROCEDURE — A9552 F18 FDG: HCPCS

## 2024-12-18 PROCEDURE — 78815 PET IMAGE W/CT SKULL-THIGH: CPT

## 2024-12-18 NOTE — LETTER
[facility]  [address] [place] [address]      December 23, 2024    MRN: [id number]     Phone: [phone]     Dear Mr. Lepe,    You recently had a(n) Nuclear Medicine performed on 12/18/2024 at  [facility] that was requested by ANTONIO Ruiz. The study was reviewed by a radiologist, which is a physician who specializes in medical imaging. The radiologist issued a report describing his or her findings. In that report there was a finding that the radiologist felt warranted further discussion with your health care provider and that discussion would be beneficial to you.      The results were sent to ANTONIO Ruiz on 12/18/2024  4:12 PM. We recommend that you contact ANTONIO Ruiz at [phone] or set up an appointment to discuss the results of the imaging test. If you have already heard from ANTONIO Ruiz regarding the results of your study, you can disregard this letter.     This letter is not meant to alarm you, but intended to encourage you to follow-up on your results with the provider that sent you for the imaging study. In addition, we have enclosed answers to frequently asked questions by other patients who have also received a letter to review results with their health care provider (see page two).      Thank you for choosing [facility] for your medical imaging needs.                                                                                                                                                        FREQUENTLY ASKED QUESTIONS    Why am I receiving this letter?  [place] State Law requires us to notify patients who have findings on imaging exams that may require more testing or follow-up with a health professional within the next 3 months.        How serious is the finding on the imaging test?  This letter is sent to all patients who may need follow-up or more  testing within the next 3 months.  Receiving this letter does not necessarily mean you have a life-threatening imaging finding or disease.  Recommendations in the radiologist’s imaging report are general in nature and it is up to your healthcare provider to say whether those recommendations make sense for your situation.  You are strongly encouraged to talk to your health care provider about the results and ask whether additional steps need to be taken.    Where can I get a copy of the final report for my recent radiology exam?  To get a full copy of the report you can access your records online at [email or web address] or please contact [facility] Medical Records Department at [phone] Monday through Friday between 8 am and 6 pm.         What do I need to do now?           Please contact your health care provider who requested the imaging study to discuss what further actions (if any) are needed.  You may have already heard from (your ordering provider) in regard to this test in which case you can disregard this letter.        NOTICE IN ACCORDANCE WITH THE [place] STATE “PATIENT TEST RESULT INFORMATION ACT OF 2018”    You are receiving this notice as a result of a determination by your diagnostic imaging service that further discussions of your test results are warranted and would be beneficial to you.    The complete results of your test or tests have been or will be sent to the health care practitioner that ordered the test or tests. It is recommended that you contact your health care practitioner to discuss your results as soon as possible.

## 2024-12-23 ENCOUNTER — TELEPHONE (OUTPATIENT)
Age: 74
End: 2024-12-23

## 2024-12-23 DIAGNOSIS — C32.3 MALIGNANT NEOPLASM OF LARYNGEAL CARTILAGE (HCC): ICD-10-CM

## 2024-12-23 DIAGNOSIS — C82.95 FOLLICULAR LYMPHOMA OF LYMPH NODES OF INGUINAL REGION, UNSPECIFIED FOLLICULAR LYMPHOMA TYPE (HCC): Primary | ICD-10-CM

## 2024-12-23 NOTE — TELEPHONE ENCOUNTER
Spoke with patient about completing lab work prior to appointment with Dr. Browne on 12/30. Patient uses [facility] lab, orders have been placed. Patient verbalized understanding.

## 2024-12-23 NOTE — TELEPHONE ENCOUNTER
Spoke with patient and moved up his appt to 12/30 at 2pm to review PET CT results. Patient in agreement with new appt.

## 2024-12-24 ENCOUNTER — HOSPITAL ENCOUNTER (OUTPATIENT)
Dept: INFUSION CENTER | Facility: HOSPITAL | Age: 74
Discharge: HOME | End: 2024-12-24
Payer: COMMERCIAL

## 2024-12-24 DIAGNOSIS — C82.95 FOLLICULAR LYMPHOMA OF LYMPH NODES OF INGUINAL REGION, UNSPECIFIED FOLLICULAR LYMPHOMA TYPE (HCC): ICD-10-CM

## 2024-12-24 DIAGNOSIS — C32.3 MALIGNANT NEOPLASM OF LARYNGEAL CARTILAGE (HCC): ICD-10-CM

## 2024-12-24 DIAGNOSIS — C85.95 NON-HODGKIN LYMPHOMA OF LYMPH NODES OF INGUINAL REGION, UNSPECIFIED NON-HODGKIN LYMPHOMA TYPE (HCC): Primary | ICD-10-CM

## 2024-12-24 LAB
ALBUMIN SERPL BCG-MCNC: 4.5 G/DL (ref 3.5–5)
ALP SERPL-CCNC: 75 U/L (ref 34–104)
ALT SERPL W P-5'-P-CCNC: 7 U/L (ref 7–52)
ANION GAP SERPL CALCULATED.3IONS-SCNC: 7 MMOL/L (ref 4–13)
AST SERPL W P-5'-P-CCNC: 16 U/L (ref 13–39)
BASOPHILS # BLD AUTO: 0.07 THOUSANDS/ÂΜL (ref 0–0.1)
BASOPHILS NFR BLD AUTO: 1 % (ref 0–1)
BILIRUB SERPL-MCNC: 0.63 MG/DL (ref 0.2–1)
BUN SERPL-MCNC: 12 MG/DL (ref 5–25)
CALCIUM SERPL-MCNC: 8.7 MG/DL (ref 8.4–10.2)
CHLORIDE SERPL-SCNC: 102 MMOL/L (ref 96–108)
CO2 SERPL-SCNC: 29 MMOL/L (ref 21–32)
CREAT SERPL-MCNC: 0.68 MG/DL (ref 0.6–1.3)
EOSINOPHIL # BLD AUTO: 0.38 THOUSAND/ÂΜL (ref 0–0.61)
EOSINOPHIL NFR BLD AUTO: 4 % (ref 0–6)
ERYTHROCYTE [DISTWIDTH] IN BLOOD BY AUTOMATED COUNT: 14.3 % (ref 11.6–15.1)
GFR SERPL CREATININE-BSD FRML MDRD: 94 ML/MIN/1.73SQ M
GLUCOSE SERPL-MCNC: 107 MG/DL (ref 65–140)
HCT VFR BLD AUTO: 40.3 % (ref 36.5–49.3)
HGB BLD-MCNC: 13.4 G/DL (ref 12–17)
IMM GRANULOCYTES # BLD AUTO: 0.06 THOUSAND/UL (ref 0–0.2)
IMM GRANULOCYTES NFR BLD AUTO: 1 % (ref 0–2)
LDH SERPL-CCNC: 132 U/L (ref 140–271)
LYMPHOCYTES # BLD AUTO: 1.24 THOUSANDS/ÂΜL (ref 0.6–4.47)
LYMPHOCYTES NFR BLD AUTO: 15 % (ref 14–44)
MCH RBC QN AUTO: 33.6 PG (ref 26.8–34.3)
MCHC RBC AUTO-ENTMCNC: 33.3 G/DL (ref 31.4–37.4)
MCV RBC AUTO: 101 FL (ref 82–98)
MONOCYTES # BLD AUTO: 0.86 THOUSAND/ÂΜL (ref 0.17–1.22)
MONOCYTES NFR BLD AUTO: 10 % (ref 4–12)
NEUTROPHILS # BLD AUTO: 5.93 THOUSANDS/ÂΜL (ref 1.85–7.62)
NEUTS SEG NFR BLD AUTO: 69 % (ref 43–75)
NRBC BLD AUTO-RTO: 0 /100 WBCS
PLATELET # BLD AUTO: 278 THOUSANDS/UL (ref 149–390)
PMV BLD AUTO: 10.4 FL (ref 8.9–12.7)
POTASSIUM SERPL-SCNC: 4 MMOL/L (ref 3.5–5.3)
PROT SERPL-MCNC: 6.6 G/DL (ref 6.4–8.4)
RBC # BLD AUTO: 3.99 MILLION/UL (ref 3.88–5.62)
SODIUM SERPL-SCNC: 138 MMOL/L (ref 135–147)
WBC # BLD AUTO: 8.54 THOUSAND/UL (ref 4.31–10.16)

## 2024-12-24 PROCEDURE — 85025 COMPLETE CBC W/AUTO DIFF WBC: CPT | Performed by: INTERNAL MEDICINE

## 2024-12-24 PROCEDURE — 83615 LACTATE (LD) (LDH) ENZYME: CPT | Performed by: INTERNAL MEDICINE

## 2024-12-24 PROCEDURE — 80053 COMPREHEN METABOLIC PANEL: CPT | Performed by: INTERNAL MEDICINE

## 2024-12-24 NOTE — PROGRESS NOTES
Jere Lepe  tolerated treatment well with no complications.      Jere Lepe is aware of future appt on 12/30/24 at 1400.     AVS printed and given to Jere Lepe:  Yes

## 2024-12-30 ENCOUNTER — OFFICE VISIT (OUTPATIENT)
Age: 74
End: 2024-12-30
Payer: COMMERCIAL

## 2024-12-30 VITALS
HEART RATE: 92 BPM | SYSTOLIC BLOOD PRESSURE: 138 MMHG | RESPIRATION RATE: 16 BRPM | HEIGHT: 66 IN | OXYGEN SATURATION: 95 % | TEMPERATURE: 98.1 F | WEIGHT: 128 LBS | DIASTOLIC BLOOD PRESSURE: 80 MMHG | BODY MASS INDEX: 20.57 KG/M2

## 2024-12-30 DIAGNOSIS — C82.90 FOLLICULAR LYMPHOMA, UNSPECIFIED GRADE, UNSPECIFIED BODY REGION (HCC): Primary | ICD-10-CM

## 2024-12-30 PROCEDURE — 99213 OFFICE O/P EST LOW 20 MIN: CPT | Performed by: INTERNAL MEDICINE

## 2024-12-30 PROCEDURE — G2211 COMPLEX E/M VISIT ADD ON: HCPCS | Performed by: INTERNAL MEDICINE

## 2024-12-30 NOTE — PROGRESS NOTES
HEMATOLOGY / ONCOLOGY CLINIC FOLLOW UP NOTE    Primary Care Provider: Katherine Dominguez DO  Referring Provider:    MRN: [id number]  : 1950    Reason for Encounter: Follow-up for follicular lymphoma     Oncology History Overview Note    - chemo/RT for cancel of the tonsil at [facility] (Tahmina Ruiz and Gerald)    2023 - grade 1-2 follicular lymphoma - bx at right groin, Stage IIIS    2023 - start bendamustine/rituxan    2023 - stop BR after 4 cycles due to good disease control     Primary malignant neoplasm of laryngeal cartilage (HCC) (Resolved)   2014 Initial Diagnosis    Primary malignant neoplasm of laryngeal cartilage (HCC)     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)   2023 Initial Diagnosis    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)     2023 -  Cancer Staged    Staging form: Hodgkin and Non-Hodgkin Lymphoma, AJCC 8th Edition  - Clinical stage from 2023: Stage III (Follicular lymphoma) - Signed by Pamela Browne DO on 7/15/2023       2023 - 9/15/2023 Chemotherapy    alteplase (CATHFLO), 2 mg, Intracatheter, Every 1 Minute as needed, 4 of 4 cycles  bendamustine HCL(BENDEKA) IVPB, 90 mg/m2 = 153.9 mg, Intravenous, Once, 4 of 4 cycles  Administration: 153.9 mg (2023), 153.9 mg (2023), 153.9 mg (2023), 153.9 mg (2023), 153.9 mg (2023), 153.9 mg (2023), 153.9 mg (2023), 153.9 mg (9/15/2023)  riTUXimab-pvvr (RUXIENCE) first titrated chemo infusion, 375 mg/m2 = 641.2 mg (100 % of original dose 375 mg/m2), Intravenous, Once, 1 of 1 cycle  Dose modification: 375 mg/m2 (original dose 375 mg/m2, Cycle 1)  Administration: 641.2 mg (2023)  riTUXimab (RUXIENCE) subsequent titrated chemo infusion, 375 mg/m2 = 641.2 mg (100 % of original dose 375 mg/m2), Intravenous, Once, 3 of 3 cycles  Dose modification: 375 mg/m2 (original dose 375 mg/m2, Cycle 2)  Administration: 641.2 mg (2023), 641.2 mg  (8/17/2023), 641.2 mg (9/14/2023)         Interval History:     Mr. Lepe is a 74 year old male with hx of FL. He was diagnosed with stage III FL, WHO grade 1-2, in February 2023.  He underwent treatment with 4 cycles of BR regimen (C4 on 9/14/23) with significant decrease in size and FDG uptake of adenopathy in the chest, abdomen, and pelvis, after which he has been on surveillance.  Most recent PET scan from December 2024 showed new FDG avid left para-aortic lymph node, measuring 3.2 x 2.2 cm, Deauville score 5, concerning for disease recurrence.     Patient presented to the office today to review PET scan findings.  Patient denied any recent fevers, night sweats, unintentional weight loss, or palpable lymphadenopathy.     Review of Systems   Constitutional:  Negative for appetite change, chills, diaphoresis, fatigue, fever and unexpected weight change.   HENT:   Negative for hearing loss, mouth sores, nosebleeds and sore throat.    Eyes:  Negative for eye problems and icterus.   Respiratory:  Negative for chest tightness, cough, shortness of breath and wheezing.    Cardiovascular:  Negative for chest pain and palpitations.   Gastrointestinal:  Negative for abdominal pain, blood in stool, constipation, diarrhea, nausea and vomiting.   Genitourinary:  Negative for dysuria, frequency and hematuria.    Musculoskeletal:  Negative for back pain, flank pain, gait problem, myalgias and neck pain.   Skin:  Negative for itching and rash.   Neurological:  Negative for dizziness, gait problem, headaches, light-headedness and speech difficulty.   Hematological:  Does not bruise/bleed easily.   Psychiatric/Behavioral:  Negative for confusion. The patient is not nervous/anxious.        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 Follicular lymphoma, unspecified grade, unspecified body region (HCC)      Mr. Lepe is a 74 year old male with hx of FL. He was diagnosed with stage III FL, WHO grade 1-2, in February 2023.  He underwent treatment with 4 cycles of BR regimen (C4 on 9/14/23) with significant decrease in size and FDG uptake of adenopathy in the chest, abdomen, and pelvis, after which he has been on surveillance.  Most recent PET scan from December 2024 showed new FDG avid left para-aortic lymph node, measuring 3.2 x 2.2 cm, Deauville score 5, concerning for disease recurrence.     Patient presented to the office today to review PET scan findings.  Patient denied any recent fevers, night sweats, unintentional weight loss, or palpable lymphadenopathy. We had an extensive discussion regarding the possibilities of the FDG avid lesion being FL recurrence vs transformation to more aggressive lymphoma like DLBCL vs another malignant process altogether. The only way to know the pathology for sure would be with a biopsy. Given asymptomatic clinical status, patient would like to hold off on a biopsy and prefers close observation with repeat imaging and blood work in 3 months at this time, which is reasonable.  Patient knows to inform us if he were to develop any concerning clinical symptoms in the interim.    Summary of recommendations:   Follow-up in office in 3 months with repeat CT CAP and bloodwork (CBC, CMP, LD) prior to  "follow-up.     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    PAST SURGICAL HISTORY:   has a past surgical history that includes Appendectomy (2003); Skull fracture elevation (1984); Colonoscopy (08/02/2017); IR biopsy inguinal lymph node (2/15/2023); and IR port placement (6/19/2023).    CURRENT MEDICATIONS  Current Outpatient Medications   Medication Sig Dispense Refill    amLODIPine (NORVASC) 2.5 mg tablet Take 1 tablet (2.5 mg total) by mouth daily 90 tablet 2    amoxicillin (AMOXIL) 500 mg capsule if needed      aspirin (ECOTRIN LOW STRENGTH) 81 mg EC tablet Take 1 tablet (81 mg total) by mouth daily      atorvastatin (LIPITOR) 80 mg tablet Take 1 tablet by mouth once daily 30 tablet 0    Eliquis 5 MG Take 1 tablet by mouth twice daily 180 tablet 0    methylPREDNISolone 4 MG tablet therapy pack Use as directed on package 21 each 0     No current facility-administered medications for this visit.     @[email or web address]@    SOCIAL HISTORY:   reports that he has been smoking cigarettes. He started smoking about 55 years ago. He has a 27.5 pack-year smoking history. He has never used smokeless tobacco. He reports that he does not currently use alcohol. He reports current drug use. Drug: Marijuana.     FAMILY HISTORY:  family history includes Colon polyps in his father; Heart disease in his father.     ALLERGIES:  has no known allergies.      Physical Exam:  Vital Signs:   Visit Vitals  /80 (BP Location: Right arm, Patient Position: Sitting, Cuff Size: Adult)   Pulse 92   Temp 98.1 °F (36.7 °C) (Temporal)   Resp 16   Ht 5' 5.5\" (1.664 m)   Wt 58.1 kg (128 lb)   SpO2 95%   BMI 20.98 kg/m²   Smoking Status Every " Day   BSA 1.65 m²     Body mass index is 20.98 kg/m².  Body surface area is 1.65 meters squared.    Physical Exam  Vitals reviewed.   Constitutional:       General: He is not in acute distress.     Appearance: Normal appearance. He is not ill-appearing, toxic-appearing or diaphoretic.   HENT:      Head: Normocephalic and atraumatic.      Mouth/Throat:      Mouth: Mucous membranes are moist.      Pharynx: No oropharyngeal exudate or posterior oropharyngeal erythema.   Eyes:      General: No scleral icterus.        Right eye: No discharge.         Left eye: No discharge.      Extraocular Movements: Extraocular movements intact.      Conjunctiva/sclera: Conjunctivae normal.   Cardiovascular:      Rate and Rhythm: Normal rate and regular rhythm.      Heart sounds: No murmur heard.     No gallop.   Pulmonary:      Effort: Pulmonary effort is normal. No respiratory distress.      Breath sounds: Normal breath sounds. No wheezing, rhonchi or rales.   Abdominal:      General: Bowel sounds are normal. There is no distension.      Palpations: Abdomen is soft. There is no mass.      Tenderness: There is no abdominal tenderness.   Musculoskeletal:         General: No swelling, tenderness or deformity. Normal range of motion.      Cervical back: Normal range of motion. No rigidity.      Right lower leg: No edema.      Left lower leg: No edema.   Lymphadenopathy:      Cervical: No cervical adenopathy.      Upper Body:      Right upper body: No supraclavicular, axillary or pectoral adenopathy.      Left upper body: No supraclavicular, axillary or pectoral adenopathy.   Skin:     General: Skin is warm and dry.      Findings: No erythema, lesion or rash.   Neurological:      General: No focal deficit present.      Mental Status: He is alert and oriented to person, place, and time.   Psychiatric:         Mood and Affect: Mood normal.         Behavior: Behavior normal.         Judgment: Judgment normal.       Labs:  Lab Results    Component Value Date    WBC 8.54 12/24/2024    HGB 13.4 12/24/2024    HCT 40.3 12/24/2024     (H) 12/24/2024     12/24/2024     Lab Results   Component Value Date     01/11/2018    SODIUM 138 12/24/2024    K 4.0 12/24/2024     12/24/2024    CO2 29 12/24/2024    AGAP 7 12/24/2024    BUN 12 12/24/2024    CREATININE 0.68 12/24/2024    GLUC 107 12/24/2024    CALCIUM 8.7 12/24/2024    AST 16 12/24/2024    ALT 7 12/24/2024    ALKPHOS 75 12/24/2024    PROT 6.6 09/09/2016    TP 6.6 12/24/2024    BILITOT 0.3 09/09/2016    TBILI 0.63 12/24/2024    EGFR 94 12/24/2024

## 2025-01-01 ENCOUNTER — APPOINTMENT (EMERGENCY)
Dept: CT IMAGING | Facility: HOSPITAL | Age: 75
DRG: 189 | End: 2025-01-01
Payer: COMMERCIAL

## 2025-01-01 ENCOUNTER — RESULTS FOLLOW-UP (OUTPATIENT)
Dept: FAMILY MEDICINE CLINIC | Facility: HOSPITAL | Age: 75
End: 2025-01-01

## 2025-01-01 ENCOUNTER — HOSPITAL ENCOUNTER (OUTPATIENT)
Dept: INFUSION CENTER | Facility: HOSPITAL | Age: 75
Discharge: HOME | End: 2025-02-25

## 2025-01-01 ENCOUNTER — HOSPITAL ENCOUNTER (OUTPATIENT)
Dept: RADIOLOGY | Facility: HOSPITAL | Age: 75
Discharge: HOME | DRG: 189 | End: 2025-02-24
Payer: COMMERCIAL

## 2025-01-01 ENCOUNTER — HOSPITAL ENCOUNTER (INPATIENT)
Facility: HOSPITAL | Age: 75
LOS: 5 days | DRG: 189 | End: 2025-03-02
Attending: EMERGENCY MEDICINE | Admitting: INTERNAL MEDICINE
Payer: COMMERCIAL

## 2025-01-01 ENCOUNTER — APPOINTMENT (INPATIENT)
Dept: NON INVASIVE DIAGNOSTICS | Facility: HOSPITAL | Age: 75
DRG: 189 | End: 2025-01-01
Payer: COMMERCIAL

## 2025-01-01 ENCOUNTER — APPOINTMENT (INPATIENT)
Dept: RADIOLOGY | Facility: HOSPITAL | Age: 75
DRG: 189 | End: 2025-01-01
Payer: COMMERCIAL

## 2025-01-01 ENCOUNTER — APPOINTMENT (EMERGENCY)
Dept: RADIOLOGY | Facility: HOSPITAL | Age: 75
DRG: 189 | End: 2025-01-01
Payer: COMMERCIAL

## 2025-01-01 VITALS
TEMPERATURE: 98.2 F | SYSTOLIC BLOOD PRESSURE: 124 MMHG | BODY MASS INDEX: 18.44 KG/M2 | HEIGHT: 67 IN | HEART RATE: 81 BPM | RESPIRATION RATE: 40 BRPM | WEIGHT: 117.5 LBS | DIASTOLIC BLOOD PRESSURE: 70 MMHG | OXYGEN SATURATION: 38 %

## 2025-01-01 DIAGNOSIS — J44.1 COPD EXACERBATION (HCC): ICD-10-CM

## 2025-01-01 DIAGNOSIS — J96.01 ACUTE RESPIRATORY FAILURE WITH HYPOXIA AND HYPERCAPNIA (HCC): Primary | ICD-10-CM

## 2025-01-01 DIAGNOSIS — R45.851 SUICIDAL IDEATION: ICD-10-CM

## 2025-01-01 DIAGNOSIS — J44.1 OBSTRUCTIVE CHRONIC BRONCHITIS WITH EXACERBATION (HCC): ICD-10-CM

## 2025-01-01 DIAGNOSIS — J96.02 ACUTE RESPIRATORY FAILURE WITH HYPOXIA AND HYPERCAPNIA (HCC): Primary | ICD-10-CM

## 2025-01-01 LAB
2HR DELTA HS TROPONIN: 16 NG/L
4HR DELTA HS TROPONIN: 15 NG/L
ABO GROUP BLD: NORMAL
ABO GROUP BLD: NORMAL
ALBUMIN SERPL BCG-MCNC: 3.1 G/DL (ref 3.5–5)
ALBUMIN SERPL BCG-MCNC: 3.1 G/DL (ref 3.5–5)
ALBUMIN SERPL BCG-MCNC: 3.5 G/DL (ref 3.5–5)
ALP SERPL-CCNC: 67 U/L (ref 34–104)
ALP SERPL-CCNC: 70 U/L (ref 34–104)
ALP SERPL-CCNC: 86 U/L (ref 34–104)
ALT SERPL W P-5'-P-CCNC: 15 U/L (ref 7–52)
ALT SERPL W P-5'-P-CCNC: 26 U/L (ref 7–52)
ALT SERPL W P-5'-P-CCNC: 39 U/L (ref 7–52)
ANION GAP SERPL CALCULATED.3IONS-SCNC: 1 MMOL/L (ref 4–13)
ANION GAP SERPL CALCULATED.3IONS-SCNC: 3 MMOL/L (ref 4–13)
ANION GAP SERPL CALCULATED.3IONS-SCNC: 6 MMOL/L (ref 4–13)
ANION GAP SERPL CALCULATED.3IONS-SCNC: 9 MMOL/L (ref 4–13)
ANISOCYTOSIS BLD QL SMEAR: PRESENT
ANISOCYTOSIS BLD QL SMEAR: PRESENT
AORTIC VALVE MEAN VELOCITY: 15.2 M/S
APTT PPP: 28 SECONDS (ref 23–34)
ARTERIAL PATENCY WRIST A: NO
ARTERIAL PATENCY WRIST A: YES
ARTERIAL PATENCY WRIST A: YES
AST SERPL W P-5'-P-CCNC: 14 U/L (ref 13–39)
AST SERPL W P-5'-P-CCNC: 28 U/L (ref 13–39)
AST SERPL W P-5'-P-CCNC: 68 U/L (ref 13–39)
ATRIAL RATE: 104 BPM
ATRIAL RATE: 106 BPM
ATRIAL RATE: 88 BPM
AV AREA BY CONTINUOUS VTI: 1.3 CM2
AV AREA PEAK VELOCITY: 1.2 CM2
AV LVOT MEAN GRADIENT: 2 MMHG
AV LVOT PEAK GRADIENT: 4 MMHG
AV MEAN PRESS GRAD SYS DOP V1V2: 11 MMHG
AV ORIFICE AREA US: 1.29 CM2
AV PEAK GRADIENT: 25 MMHG
AV VELOCITY RATIO: 0.41
AV VMAX SYS DOP: 2.48 M/S
B PARAP IS1001 DNA NPH QL NAA+NON-PROBE: NOT DETECTED
B PERT.PT PRMT NPH QL NAA+NON-PROBE: NOT DETECTED
BACTERIA BLD CULT: NORMAL
BACTERIA BLD CULT: NORMAL
BASE EX.OXY STD BLDV CALC-SCNC: 74 % (ref 60–80)
BASE EX.OXY STD BLDV CALC-SCNC: 75.8 % (ref 60–80)
BASE EXCESS BLDA CALC-SCNC: 2 MMOL/L (ref -2–3)
BASE EXCESS BLDA CALC-SCNC: 4.3 MMOL/L
BASE EXCESS BLDA CALC-SCNC: 4.7 MMOL/L
BASE EXCESS BLDV CALC-SCNC: 5.6 MMOL/L
BASE EXCESS BLDV CALC-SCNC: 6 MMOL/L
BASOPHILS # BLD MANUAL: 0 THOUSAND/UL (ref 0–0.1)
BASOPHILS # BLD MANUAL: 0 THOUSAND/UL (ref 0–0.1)
BASOPHILS NFR MAR MANUAL: 0 % (ref 0–1)
BASOPHILS NFR MAR MANUAL: 0 % (ref 0–1)
BILIRUB DIRECT SERPL-MCNC: 0.1 MG/DL (ref 0–0.2)
BILIRUB SERPL-MCNC: 0.47 MG/DL (ref 0.2–1)
BILIRUB SERPL-MCNC: 0.48 MG/DL (ref 0.2–1)
BILIRUB SERPL-MCNC: 0.63 MG/DL (ref 0.2–1)
BLD GP AB SCN SERPL QL: NEGATIVE
BNP SERPL-MCNC: 326 PG/ML (ref 0–100)
BODY TEMPERATURE: 98.5 DEGREES FEHRENHEIT
BSA FOR ECHO PROCEDURE: 1.62 M2
BUN SERPL-MCNC: 23 MG/DL (ref 5–25)
BUN SERPL-MCNC: 23 MG/DL (ref 5–25)
BUN SERPL-MCNC: 28 MG/DL (ref 5–25)
BUN SERPL-MCNC: 30 MG/DL (ref 5–25)
BURR CELLS BLD QL SMEAR: PRESENT
C PNEUM DNA NPH QL NAA+NON-PROBE: NOT DETECTED
CA-I BLD-SCNC: 1.15 MMOL/L (ref 1.12–1.32)
CA-I BLD-SCNC: 1.21 MMOL/L (ref 1.12–1.32)
CALCIUM ALBUM COR SERPL-MCNC: 9.3 MG/DL (ref 8.3–10.1)
CALCIUM SERPL-MCNC: 8 MG/DL (ref 8.4–10.2)
CALCIUM SERPL-MCNC: 8.6 MG/DL (ref 8.4–10.2)
CARDIAC TROPONIN I PNL SERPL HS: 34 NG/L (ref ?–50)
CARDIAC TROPONIN I PNL SERPL HS: 49 NG/L (ref ?–50)
CARDIAC TROPONIN I PNL SERPL HS: 50 NG/L (ref ?–50)
CHLORIDE SERPL-SCNC: 101 MMOL/L (ref 96–108)
CHLORIDE SERPL-SCNC: 106 MMOL/L (ref 96–108)
CHLORIDE SERPL-SCNC: 96 MMOL/L (ref 96–108)
CHLORIDE SERPL-SCNC: 99 MMOL/L (ref 96–108)
CO2 SERPL-SCNC: 33 MMOL/L (ref 21–32)
CO2 SERPL-SCNC: 35 MMOL/L (ref 21–32)
CO2 SERPL-SCNC: 39 MMOL/L (ref 21–32)
CO2 SERPL-SCNC: 40 MMOL/L (ref 21–32)
CREAT SERPL-MCNC: 0.62 MG/DL (ref 0.6–1.3)
CREAT SERPL-MCNC: 0.62 MG/DL (ref 0.6–1.3)
CREAT SERPL-MCNC: 0.73 MG/DL (ref 0.6–1.3)
CREAT SERPL-MCNC: 0.93 MG/DL (ref 0.6–1.3)
DOP CALC AO VTI: 42.68 CM
DOP CALC LVOT AREA: 3.14 CM2
DOP CALC LVOT CARDIAC INDEX: 2.41 L/MIN/M2
DOP CALC LVOT CARDIAC OUTPUT: 3.9 L/MIN
DOP CALC LVOT DIAMETER: 2 CM
DOP CALC LVOT PEAK VEL VTI: 17.48 CM
DOP CALC LVOT PEAK VEL: 0.95 M/S
DOP CALC LVOT STROKE INDEX: 34 ML/M2
DOP CALC LVOT STROKE VOLUME: 54.89
EOSINOPHIL # BLD MANUAL: 0 THOUSAND/UL (ref 0–0.4)
EOSINOPHIL # BLD MANUAL: 0 THOUSAND/UL (ref 0–0.4)
EOSINOPHIL NFR BLD MANUAL: 0 % (ref 0–6)
EOSINOPHIL NFR BLD MANUAL: 0 % (ref 0–6)
ERYTHROCYTE [DISTWIDTH] IN BLOOD BY AUTOMATED COUNT: 14.8 % (ref 11.6–15.1)
ERYTHROCYTE [DISTWIDTH] IN BLOOD BY AUTOMATED COUNT: 15.1 % (ref 11.6–15.1)
ERYTHROCYTE [DISTWIDTH] IN BLOOD BY AUTOMATED COUNT: 15.2 % (ref 11.6–15.1)
ERYTHROCYTE [DISTWIDTH] IN BLOOD BY AUTOMATED COUNT: 15.3 % (ref 11.6–15.1)
EST. AVERAGE GLUCOSE BLD GHB EST-MCNC: 143 MG/DL
FLUAV RNA NPH QL NAA+NON-PROBE: NOT DETECTED
FLUAV RNA RESP QL NAA+PROBE: NEGATIVE
FLUBV RNA NPH QL NAA+NON-PROBE: NOT DETECTED
FLUBV RNA RESP QL NAA+PROBE: NEGATIVE
GAMMA INTERFERON BACKGROUND BLD IA-ACNC: 0.02 IU/ML
GFR SERPL CREATININE-BSD FRML MDRD: 81 ML/MIN/1.73SQ M
GFR SERPL CREATININE-BSD FRML MDRD: 92 ML/MIN/1.73SQ M
GFR SERPL CREATININE-BSD FRML MDRD: 98 ML/MIN/1.73SQ M
GFR SERPL CREATININE-BSD FRML MDRD: 98 ML/MIN/1.73SQ M
GLUCOSE SERPL-MCNC: 126 MG/DL (ref 65–140)
GLUCOSE SERPL-MCNC: 129 MG/DL (ref 65–140)
GLUCOSE SERPL-MCNC: 130 MG/DL (ref 65–140)
GLUCOSE SERPL-MCNC: 133 MG/DL (ref 65–140)
GLUCOSE SERPL-MCNC: 133 MG/DL (ref 65–140)
GLUCOSE SERPL-MCNC: 134 MG/DL (ref 65–140)
GLUCOSE SERPL-MCNC: 135 MG/DL (ref 65–140)
GLUCOSE SERPL-MCNC: 136 MG/DL (ref 65–140)
GLUCOSE SERPL-MCNC: 136 MG/DL (ref 65–140)
GLUCOSE SERPL-MCNC: 137 MG/DL (ref 65–140)
GLUCOSE SERPL-MCNC: 140 MG/DL (ref 65–140)
GLUCOSE SERPL-MCNC: 151 MG/DL (ref 65–140)
GLUCOSE SERPL-MCNC: 152 MG/DL (ref 65–140)
GLUCOSE SERPL-MCNC: 158 MG/DL (ref 65–140)
GLUCOSE SERPL-MCNC: 169 MG/DL (ref 65–140)
GLUCOSE SERPL-MCNC: 170 MG/DL (ref 65–140)
GLUCOSE SERPL-MCNC: 258 MG/DL (ref 65–140)
GLUCOSE SERPL-MCNC: 69 MG/DL (ref 65–140)
GLUCOSE SERPL-MCNC: 91 MG/DL (ref 65–140)
GLUCOSE SERPL-MCNC: 95 MG/DL (ref 65–140)
HADV DNA NPH QL NAA+NON-PROBE: NOT DETECTED
HBA1C MFR BLD: 6.6 %
HCO3 BLDA-SCNC: 31.9 MMOL/L (ref 22–28)
HCO3 BLDA-SCNC: 32.6 MMOL/L (ref 22–28)
HCO3 BLDA-SCNC: 33.3 MMOL/L (ref 22–28)
HCO3 BLDV-SCNC: 33.3 MMOL/L (ref 24–30)
HCO3 BLDV-SCNC: 33.5 MMOL/L (ref 24–30)
HCOV 229E RNA NPH QL NAA+NON-PROBE: NOT DETECTED
HCOV HKU1 RNA NPH QL NAA+NON-PROBE: NOT DETECTED
HCOV NL63 RNA NPH QL NAA+NON-PROBE: NOT DETECTED
HCOV OC43 RNA NPH QL NAA+NON-PROBE: NOT DETECTED
HCT VFR BLD AUTO: 34.4 % (ref 36.5–49.3)
HCT VFR BLD AUTO: 35.4 % (ref 36.5–49.3)
HCT VFR BLD AUTO: 35.7 % (ref 36.5–49.3)
HCT VFR BLD AUTO: 45.1 % (ref 36.5–49.3)
HCT VFR BLD CALC: 43 % (ref 36.5–49.3)
HGB BLD-MCNC: 10.9 G/DL (ref 12–17)
HGB BLD-MCNC: 11.2 G/DL (ref 12–17)
HGB BLD-MCNC: 11.4 G/DL (ref 12–17)
HGB BLD-MCNC: 14.6 G/DL (ref 12–17)
HGB BLDA-MCNC: 14.6 G/DL (ref 12–17)
HMPV RNA NPH QL NAA+NON-PROBE: NOT DETECTED
HPIV1 RNA NPH QL NAA+NON-PROBE: NOT DETECTED
HPIV2 RNA NPH QL NAA+NON-PROBE: NOT DETECTED
HPIV3 RNA NPH QL NAA+NON-PROBE: NOT DETECTED
HPIV4 RNA NPH QL NAA+NON-PROBE: NOT DETECTED
INR PPP: 1.5 (ref 0.85–1.19)
IPAP: 14
IPAP: 18
IPAP: 18
L PNEUMO1 AG UR QL IA.RAPID: NEGATIVE
LACTATE SERPL-SCNC: 1.9 MMOL/L (ref 0.5–2)
LYMPHOCYTES # BLD AUTO: 0.62 THOUSAND/UL (ref 0.6–4.47)
LYMPHOCYTES # BLD AUTO: 0.85 THOUSAND/UL (ref 0.6–4.47)
LYMPHOCYTES # BLD AUTO: 1 % (ref 14–44)
LYMPHOCYTES # BLD AUTO: 3 % (ref 14–44)
M PNEUMO DNA NPH QL NAA+NON-PROBE: NOT DETECTED
M TB IFN-G BLD-IMP: ABNORMAL
M TB IFN-G CD4+ BCKGRND COR BLD-ACNC: -0.01 IU/ML
M TB IFN-G CD4+ BCKGRND COR BLD-ACNC: 0.02 IU/ML
MACROCYTES BLD QL AUTO: PRESENT
MACROCYTES BLD QL AUTO: PRESENT
MAGNESIUM SERPL-MCNC: 2.2 MG/DL (ref 1.9–2.7)
MCH RBC QN AUTO: 32.3 PG (ref 26.8–34.3)
MCH RBC QN AUTO: 32.6 PG (ref 26.8–34.3)
MCH RBC QN AUTO: 32.8 PG (ref 26.8–34.3)
MCH RBC QN AUTO: 33.6 PG (ref 26.8–34.3)
MCHC RBC AUTO-ENTMCNC: 31.4 G/DL (ref 31.4–37.4)
MCHC RBC AUTO-ENTMCNC: 31.7 G/DL (ref 31.4–37.4)
MCHC RBC AUTO-ENTMCNC: 32.2 G/DL (ref 31.4–37.4)
MCHC RBC AUTO-ENTMCNC: 32.4 G/DL (ref 31.4–37.4)
MCV RBC AUTO: 102 FL (ref 82–98)
MCV RBC AUTO: 103 FL (ref 82–98)
MCV RBC AUTO: 103 FL (ref 82–98)
MCV RBC AUTO: 104 FL (ref 82–98)
MITOGEN IGNF BCKGRD COR BLD-ACNC: <0.1 IU/ML
MONOCYTES # BLD AUTO: 0.85 THOUSAND/UL (ref 0–1.22)
MONOCYTES # BLD AUTO: 2.28 THOUSAND/UL (ref 0–1.22)
MONOCYTES NFR BLD: 11 % (ref 4–12)
MONOCYTES NFR BLD: 3 % (ref 4–12)
MRSA NOSE QL CULT: NORMAL
NEUTROPHILS # BLD MANUAL: 17.79 THOUSAND/UL (ref 1.85–7.62)
NEUTROPHILS # BLD MANUAL: 26.58 THOUSAND/UL (ref 1.85–7.62)
NEUTS BAND NFR BLD MANUAL: 11 % (ref 0–8)
NEUTS SEG NFR BLD AUTO: 75 % (ref 43–75)
NEUTS SEG NFR BLD AUTO: 94 % (ref 43–75)
NON VENT- BIPAP: ABNORMAL
NRBC BLD AUTO-RTO: 0 /100 WBCS
O2 CT BLDA-SCNC: 16.9 ML/DL (ref 16–23)
O2 CT BLDA-SCNC: 18.8 ML/DL (ref 16–23)
O2 CT BLDV-SCNC: 13.1 ML/DL
O2 CT BLDV-SCNC: 13.4 ML/DL
OXYHGB MFR BLDA: 93.6 % (ref 94–97)
OXYHGB MFR BLDA: 97.8 % (ref 94–97)
P AXIS: 90 DEGREES
P AXIS: 91 DEGREES
P AXIS: 91 DEGREES
PCO2 BLD: 34 MMOL/L (ref 21–32)
PCO2 BLD: 77.5 MM HG (ref 36–44)
PCO2 BLDA: 64.9 MM HG (ref 36–44)
PCO2 BLDA: 72.7 MM HG (ref 36–44)
PCO2 BLDV: 63.1 MM HG (ref 42–50)
PCO2 BLDV: 64.5 MM HG (ref 42–50)
PEEP MAX SETTING VENT: 6 CM[H2O]
PH BLD: 7.22 [PH] (ref 7.35–7.45)
PH BLDA: 7.28 [PH] (ref 7.35–7.45)
PH BLDA: 7.32 [PH] (ref 7.35–7.45)
PH BLDV: 7.33 [PH] (ref 7.3–7.4)
PH BLDV: 7.34 [PH] (ref 7.3–7.4)
PHOSPHATE SERPL-MCNC: 5.9 MG/DL (ref 2.3–4.1)
PLATELET # BLD AUTO: 224 THOUSANDS/UL (ref 149–390)
PLATELET # BLD AUTO: 283 THOUSANDS/UL (ref 149–390)
PLATELET # BLD AUTO: 350 THOUSANDS/UL (ref 149–390)
PLATELET # BLD AUTO: 466 THOUSANDS/UL (ref 149–390)
PLATELET BLD QL SMEAR: ABNORMAL
PLATELET BLD QL SMEAR: ADEQUATE
PMV BLD AUTO: 10.4 FL (ref 8.9–12.7)
PMV BLD AUTO: 10.5 FL (ref 8.9–12.7)
PMV BLD AUTO: 10.7 FL (ref 8.9–12.7)
PMV BLD AUTO: 11 FL (ref 8.9–12.7)
PO2 BLD: 62 MM HG (ref 75–129)
PO2 BLDA: 191.3 MM HG (ref 75–129)
PO2 BLDA: 82.3 MM HG (ref 75–129)
PO2 BLDV: 42.3 MM HG (ref 35–45)
PO2 BLDV: 43.6 MM HG (ref 35–45)
POIKILOCYTOSIS BLD QL SMEAR: PRESENT
POLYCHROMASIA BLD QL SMEAR: PRESENT
POTASSIUM BLD-SCNC: 3.3 MMOL/L (ref 3.5–5.3)
POTASSIUM SERPL-SCNC: 4 MMOL/L (ref 3.5–5.3)
POTASSIUM SERPL-SCNC: 4.1 MMOL/L (ref 3.5–5.3)
POTASSIUM SERPL-SCNC: 4.4 MMOL/L (ref 3.5–5.3)
POTASSIUM SERPL-SCNC: 4.9 MMOL/L (ref 3.5–5.3)
PR INTERVAL: 120 MS
PROCALCITONIN SERPL-MCNC: 0.19 NG/ML
PROCALCITONIN SERPL-MCNC: 0.24 NG/ML
PROT SERPL-MCNC: 5.1 G/DL (ref 6.4–8.4)
PROT SERPL-MCNC: 5.6 G/DL (ref 6.4–8.4)
PROT SERPL-MCNC: 6.2 G/DL (ref 6.4–8.4)
PROTHROMBIN TIME: 18.5 SECONDS (ref 12.3–15)
QRS AXIS: 78 DEGREES
QRS AXIS: 78 DEGREES
QRS AXIS: 81 DEGREES
QRSD INTERVAL: 72 MS
QRSD INTERVAL: 76 MS
QRSD INTERVAL: 78 MS
QT INTERVAL: 358 MS
QT INTERVAL: 360 MS
QT INTERVAL: 368 MS
QTC INTERVAL: 445 MS
QTC INTERVAL: 470 MS
QTC INTERVAL: 478 MS
RA PRESSURE ESTIMATED: 8 MMHG
RBC # BLD AUTO: 3.34 MILLION/UL (ref 3.88–5.62)
RBC # BLD AUTO: 3.47 MILLION/UL (ref 3.88–5.62)
RBC # BLD AUTO: 3.48 MILLION/UL (ref 3.88–5.62)
RBC # BLD AUTO: 4.35 MILLION/UL (ref 3.88–5.62)
RBC MORPH BLD: PRESENT
RBC MORPH BLD: PRESENT
RH BLD: POSITIVE
RH BLD: POSITIVE
RHODAMINE-AURAMINE STN SPEC: NORMAL
RSV RNA NPH QL NAA+NON-PROBE: NOT DETECTED
RSV RNA RESP QL NAA+PROBE: NEGATIVE
RV+EV RNA NPH QL NAA+NON-PROBE: DETECTED
S PNEUM AG UR QL: NEGATIVE
SAO2 % BLD FROM PO2: 85 % (ref 60–85)
SARS-COV-2 RNA NPH QL NAA+NON-PROBE: NOT DETECTED
SARS-COV-2 RNA RESP QL NAA+PROBE: NEGATIVE
SL CV LV EF: 65
SODIUM BLD-SCNC: 144 MMOL/L (ref 136–145)
SODIUM SERPL-SCNC: 138 MMOL/L (ref 135–147)
SODIUM SERPL-SCNC: 140 MMOL/L (ref 135–147)
SODIUM SERPL-SCNC: 143 MMOL/L (ref 135–147)
SODIUM SERPL-SCNC: 147 MMOL/L (ref 135–147)
SPECIMEN EXPIRATION DATE: NORMAL
SPECIMEN SOURCE: ABNORMAL
T WAVE AXIS: 81 DEGREES
T WAVE AXIS: 83 DEGREES
T WAVE AXIS: 90 DEGREES
TARGETS BLD QL SMEAR: PRESENT
VANCOMYCIN SERPL-MCNC: 5 UG/ML (ref 10–20)
VARIANT LYMPHS # BLD AUTO: 2 %
VENT BIPAP FIO2: 70 %
VENT BIPAP FIO2: 70 %
VENT BIPAP FIO2: 90 %
VENTRICULAR RATE: 104 BPM
VENTRICULAR RATE: 106 BPM
VENTRICULAR RATE: 88 BPM
WBC # BLD AUTO: 20.69 THOUSAND/UL (ref 4.31–10.16)
WBC # BLD AUTO: 22.13 THOUSAND/UL (ref 4.31–10.16)
WBC # BLD AUTO: 28.28 THOUSAND/UL (ref 4.31–10.16)
WBC # BLD AUTO: 30.33 THOUSAND/UL (ref 4.31–10.16)

## 2025-01-01 PROCEDURE — 99223 1ST HOSP IP/OBS HIGH 75: CPT | Performed by: SURGERY

## 2025-01-01 PROCEDURE — 99285 EMERGENCY DEPT VISIT HI MDM: CPT

## 2025-01-01 PROCEDURE — 86900 BLOOD TYPING SEROLOGIC ABO: CPT | Performed by: EMERGENCY MEDICINE

## 2025-01-01 PROCEDURE — 94002 VENT MGMT INPAT INIT DAY: CPT

## 2025-01-01 PROCEDURE — 94640 AIRWAY INHALATION TREATMENT: CPT

## 2025-01-01 PROCEDURE — 84484 ASSAY OF TROPONIN QUANT: CPT | Performed by: EMERGENCY MEDICINE

## 2025-01-01 PROCEDURE — 85014 HEMATOCRIT: CPT

## 2025-01-01 PROCEDURE — 85730 THROMBOPLASTIN TIME PARTIAL: CPT | Performed by: EMERGENCY MEDICINE

## 2025-01-01 PROCEDURE — 84132 ASSAY OF SERUM POTASSIUM: CPT

## 2025-01-01 PROCEDURE — 80048 BASIC METABOLIC PNL TOTAL CA: CPT | Performed by: INTERNAL MEDICINE

## 2025-01-01 PROCEDURE — 74177 CT ABD & PELVIS W/CONTRAST: CPT

## 2025-01-01 PROCEDURE — 71045 X-RAY EXAM CHEST 1 VIEW: CPT

## 2025-01-01 PROCEDURE — 99223 1ST HOSP IP/OBS HIGH 75: CPT

## 2025-01-01 PROCEDURE — 87081 CULTURE SCREEN ONLY: CPT | Performed by: NURSE PRACTITIONER

## 2025-01-01 PROCEDURE — 93010 ELECTROCARDIOGRAM REPORT: CPT | Performed by: INTERNAL MEDICINE

## 2025-01-01 PROCEDURE — 85027 COMPLETE CBC AUTOMATED: CPT | Performed by: NURSE PRACTITIONER

## 2025-01-01 PROCEDURE — 99291 CRITICAL CARE FIRST HOUR: CPT | Performed by: EMERGENCY MEDICINE

## 2025-01-01 PROCEDURE — 86901 BLOOD TYPING SEROLOGIC RH(D): CPT | Performed by: EMERGENCY MEDICINE

## 2025-01-01 PROCEDURE — 94760 N-INVAS EAR/PLS OXIMETRY 1: CPT

## 2025-01-01 PROCEDURE — 82948 REAGENT STRIP/BLOOD GLUCOSE: CPT

## 2025-01-01 PROCEDURE — 94644 CONT INHLJ TX 1ST HOUR: CPT

## 2025-01-01 PROCEDURE — 93306 TTE W/DOPPLER COMPLETE: CPT

## 2025-01-01 PROCEDURE — 92610 EVALUATE SWALLOWING FUNCTION: CPT

## 2025-01-01 PROCEDURE — 87040 BLOOD CULTURE FOR BACTERIA: CPT | Performed by: EMERGENCY MEDICINE

## 2025-01-01 PROCEDURE — 80053 COMPREHEN METABOLIC PANEL: CPT | Performed by: EMERGENCY MEDICINE

## 2025-01-01 PROCEDURE — 96374 THER/PROPH/DIAG INJ IV PUSH: CPT

## 2025-01-01 PROCEDURE — 99291 CRITICAL CARE FIRST HOUR: CPT | Performed by: INTERNAL MEDICINE

## 2025-01-01 PROCEDURE — 90677 PCV20 VACCINE IM: CPT | Performed by: INTERNAL MEDICINE

## 2025-01-01 PROCEDURE — 82803 BLOOD GASES ANY COMBINATION: CPT

## 2025-01-01 PROCEDURE — 72125 CT NECK SPINE W/O DYE: CPT

## 2025-01-01 PROCEDURE — 36600 WITHDRAWAL OF ARTERIAL BLOOD: CPT

## 2025-01-01 PROCEDURE — 36415 COLL VENOUS BLD VENIPUNCTURE: CPT | Performed by: EMERGENCY MEDICINE

## 2025-01-01 PROCEDURE — 87206 SMEAR FLUORESCENT/ACID STAI: CPT | Performed by: NURSE PRACTITIONER

## 2025-01-01 PROCEDURE — 71046 X-RAY EXAM CHEST 2 VIEWS: CPT

## 2025-01-01 PROCEDURE — 93005 ELECTROCARDIOGRAM TRACING: CPT

## 2025-01-01 PROCEDURE — 80053 COMPREHEN METABOLIC PANEL: CPT | Performed by: NURSE PRACTITIONER

## 2025-01-01 PROCEDURE — 80202 ASSAY OF VANCOMYCIN: CPT | Performed by: INTERNAL MEDICINE

## 2025-01-01 PROCEDURE — 84145 PROCALCITONIN (PCT): CPT | Performed by: INTERNAL MEDICINE

## 2025-01-01 PROCEDURE — 85007 BL SMEAR W/DIFF WBC COUNT: CPT | Performed by: INTERNAL MEDICINE

## 2025-01-01 PROCEDURE — NC001 PR NO CHARGE

## 2025-01-01 PROCEDURE — NC001 PR NO CHARGE: Performed by: INTERNAL MEDICINE

## 2025-01-01 PROCEDURE — 0202U NFCT DS 22 TRGT SARS-COV-2: CPT | Performed by: NURSE PRACTITIONER

## 2025-01-01 PROCEDURE — 82947 ASSAY GLUCOSE BLOOD QUANT: CPT

## 2025-01-01 PROCEDURE — 99222 1ST HOSP IP/OBS MODERATE 55: CPT | Performed by: STUDENT IN AN ORGANIZED HEALTH CARE EDUCATION/TRAINING PROGRAM

## 2025-01-01 PROCEDURE — 99233 SBSQ HOSP IP/OBS HIGH 50: CPT | Performed by: INTERNAL MEDICINE

## 2025-01-01 PROCEDURE — 70450 CT HEAD/BRAIN W/O DYE: CPT

## 2025-01-01 PROCEDURE — 94660 CPAP INITIATION&MGMT: CPT

## 2025-01-01 PROCEDURE — 82330 ASSAY OF CALCIUM: CPT

## 2025-01-01 PROCEDURE — 96375 TX/PRO/DX INJ NEW DRUG ADDON: CPT

## 2025-01-01 PROCEDURE — 86850 RBC ANTIBODY SCREEN: CPT | Performed by: EMERGENCY MEDICINE

## 2025-01-01 PROCEDURE — 83880 ASSAY OF NATRIURETIC PEPTIDE: CPT | Performed by: EMERGENCY MEDICINE

## 2025-01-01 PROCEDURE — 84145 PROCALCITONIN (PCT): CPT | Performed by: EMERGENCY MEDICINE

## 2025-01-01 PROCEDURE — 94003 VENT MGMT INPAT SUBQ DAY: CPT

## 2025-01-01 PROCEDURE — 85007 BL SMEAR W/DIFF WBC COUNT: CPT | Performed by: EMERGENCY MEDICINE

## 2025-01-01 PROCEDURE — 99232 SBSQ HOSP IP/OBS MODERATE 35: CPT | Performed by: INTERNAL MEDICINE

## 2025-01-01 PROCEDURE — 84295 ASSAY OF SERUM SODIUM: CPT

## 2025-01-01 PROCEDURE — 94664 DEMO&/EVAL PT USE INHALER: CPT

## 2025-01-01 PROCEDURE — 83605 ASSAY OF LACTIC ACID: CPT | Performed by: EMERGENCY MEDICINE

## 2025-01-01 PROCEDURE — 99223 1ST HOSP IP/OBS HIGH 75: CPT | Performed by: INTERNAL MEDICINE

## 2025-01-01 PROCEDURE — 87449 NOS EACH ORGANISM AG IA: CPT | Performed by: NURSE PRACTITIONER

## 2025-01-01 PROCEDURE — 85027 COMPLETE CBC AUTOMATED: CPT | Performed by: INTERNAL MEDICINE

## 2025-01-01 PROCEDURE — 71275 CT ANGIOGRAPHY CHEST: CPT

## 2025-01-01 PROCEDURE — 86480 TB TEST CELL IMMUN MEASURE: CPT | Performed by: NURSE PRACTITIONER

## 2025-01-01 PROCEDURE — 85027 COMPLETE CBC AUTOMATED: CPT | Performed by: EMERGENCY MEDICINE

## 2025-01-01 PROCEDURE — 85610 PROTHROMBIN TIME: CPT | Performed by: EMERGENCY MEDICINE

## 2025-01-01 PROCEDURE — 82805 BLOOD GASES W/O2 SATURATION: CPT | Performed by: NURSE PRACTITIONER

## 2025-01-01 PROCEDURE — 87116 MYCOBACTERIA CULTURE: CPT | Performed by: NURSE PRACTITIONER

## 2025-01-01 PROCEDURE — 99497 ADVNCD CARE PLAN 30 MIN: CPT | Performed by: INTERNAL MEDICINE

## 2025-01-01 PROCEDURE — 82330 ASSAY OF CALCIUM: CPT | Performed by: NURSE PRACTITIONER

## 2025-01-01 PROCEDURE — 80076 HEPATIC FUNCTION PANEL: CPT | Performed by: PHYSICIAN ASSISTANT

## 2025-01-01 PROCEDURE — 84100 ASSAY OF PHOSPHORUS: CPT | Performed by: NURSE PRACTITIONER

## 2025-01-01 PROCEDURE — 99238 HOSP IP/OBS DSCHRG MGMT 30/<: CPT

## 2025-01-01 PROCEDURE — 0241U HB NFCT DS VIR RESP RNA 4 TRGT: CPT | Performed by: EMERGENCY MEDICINE

## 2025-01-01 PROCEDURE — 93306 TTE W/DOPPLER COMPLETE: CPT | Performed by: INTERNAL MEDICINE

## 2025-01-01 PROCEDURE — 83036 HEMOGLOBIN GLYCOSYLATED A1C: CPT | Performed by: NURSE PRACTITIONER

## 2025-01-01 PROCEDURE — G0009 ADMIN PNEUMOCOCCAL VACCINE: HCPCS | Performed by: INTERNAL MEDICINE

## 2025-01-01 PROCEDURE — 85027 COMPLETE CBC AUTOMATED: CPT | Performed by: PHYSICIAN ASSISTANT

## 2025-01-01 PROCEDURE — 83735 ASSAY OF MAGNESIUM: CPT | Performed by: NURSE PRACTITIONER

## 2025-01-01 RX ORDER — INSULIN LISPRO 100 [IU]/ML
1-6 INJECTION, SOLUTION INTRAVENOUS; SUBCUTANEOUS EVERY 6 HOURS SCHEDULED
Status: DISCONTINUED | OUTPATIENT
Start: 2025-01-01 | End: 2025-01-01

## 2025-01-01 RX ORDER — ALBUTEROL SULFATE 5 MG/ML
10 SOLUTION RESPIRATORY (INHALATION) ONCE
Status: COMPLETED | OUTPATIENT
Start: 2025-01-01 | End: 2025-01-01

## 2025-01-01 RX ORDER — ENOXAPARIN SODIUM 100 MG/ML
40 INJECTION SUBCUTANEOUS
Status: DISCONTINUED | OUTPATIENT
Start: 2025-01-01 | End: 2025-01-01

## 2025-01-01 RX ORDER — METHYLPREDNISOLONE SODIUM SUCCINATE 125 MG/2ML
80 INJECTION, POWDER, LYOPHILIZED, FOR SOLUTION INTRAMUSCULAR; INTRAVENOUS ONCE
Status: COMPLETED | OUTPATIENT
Start: 2025-01-01 | End: 2025-01-01

## 2025-01-01 RX ORDER — CEFTRIAXONE 1 G/50ML
1000 INJECTION, SOLUTION INTRAVENOUS EVERY 24 HOURS
Status: DISCONTINUED | OUTPATIENT
Start: 2025-01-01 | End: 2025-01-01

## 2025-01-01 RX ORDER — METHYLPREDNISOLONE SODIUM SUCCINATE 40 MG/ML
40 INJECTION, POWDER, LYOPHILIZED, FOR SOLUTION INTRAMUSCULAR; INTRAVENOUS EVERY 8 HOURS SCHEDULED
Status: DISCONTINUED | OUTPATIENT
Start: 2025-01-01 | End: 2025-01-01

## 2025-01-01 RX ORDER — LEVALBUTEROL INHALATION SOLUTION 1.25 MG/3ML
1.25 SOLUTION RESPIRATORY (INHALATION)
Status: DISCONTINUED | OUTPATIENT
Start: 2025-01-01 | End: 2025-01-01

## 2025-01-01 RX ORDER — ATORVASTATIN CALCIUM 40 MG/1
80 TABLET, FILM COATED ORAL
Status: DISCONTINUED | OUTPATIENT
Start: 2025-01-01 | End: 2025-01-01

## 2025-01-01 RX ORDER — ASPIRIN 81 MG/1
81 TABLET ORAL DAILY
Status: DISCONTINUED | OUTPATIENT
Start: 2025-01-01 | End: 2025-01-01

## 2025-01-01 RX ORDER — MIRTAZAPINE 15 MG/1
7.5 TABLET, FILM COATED ORAL
Status: DISCONTINUED | OUTPATIENT
Start: 2025-01-01 | End: 2025-01-01

## 2025-01-01 RX ORDER — HALOPERIDOL 5 MG/ML
2 INJECTION INTRAMUSCULAR EVERY 2 HOUR PRN
Status: DISCONTINUED | OUTPATIENT
Start: 2025-01-01 | End: 2025-01-01

## 2025-01-01 RX ORDER — LORAZEPAM 2 MG/ML
1 INJECTION INTRAMUSCULAR
Status: DISCONTINUED | OUTPATIENT
Start: 2025-01-01 | End: 2025-01-01 | Stop reason: HOSPADM

## 2025-01-01 RX ORDER — PREDNISONE 20 MG/1
40 TABLET ORAL DAILY
Status: DISCONTINUED | OUTPATIENT
Start: 2025-01-01 | End: 2025-01-01

## 2025-01-01 RX ORDER — HALOPERIDOL 5 MG/ML
1 INJECTION INTRAMUSCULAR
Status: DISCONTINUED | OUTPATIENT
Start: 2025-01-01 | End: 2025-01-01 | Stop reason: HOSPADM

## 2025-01-01 RX ORDER — SODIUM CHLORIDE, SODIUM GLUCONATE, SODIUM ACETATE, POTASSIUM CHLORIDE, MAGNESIUM CHLORIDE, SODIUM PHOSPHATE, DIBASIC, AND POTASSIUM PHOSPHATE .53; .5; .37; .037; .03; .012; .00082 G/100ML; G/100ML; G/100ML; G/100ML; G/100ML; G/100ML; G/100ML
500 INJECTION, SOLUTION INTRAVENOUS ONCE
Status: COMPLETED | OUTPATIENT
Start: 2025-01-01 | End: 2025-01-01

## 2025-01-01 RX ORDER — HALOPERIDOL 5 MG/ML
0.5 INJECTION INTRAMUSCULAR EVERY 2 HOUR PRN
Status: DISCONTINUED | OUTPATIENT
Start: 2025-01-01 | End: 2025-01-01

## 2025-01-01 RX ORDER — MAGNESIUM SULFATE HEPTAHYDRATE 40 MG/ML
2 INJECTION, SOLUTION INTRAVENOUS ONCE
Status: COMPLETED | OUTPATIENT
Start: 2025-01-01 | End: 2025-01-01

## 2025-01-01 RX ORDER — BUDESONIDE 0.5 MG/2ML
0.5 INHALANT ORAL
Status: DISCONTINUED | OUTPATIENT
Start: 2025-01-01 | End: 2025-01-01

## 2025-01-01 RX ORDER — LEVOFLOXACIN 5 MG/ML
750 INJECTION, SOLUTION INTRAVENOUS EVERY 24 HOURS
Status: DISCONTINUED | OUTPATIENT
Start: 2025-01-01 | End: 2025-01-01

## 2025-01-01 RX ORDER — GLYCOPYRROLATE 0.2 MG/ML
0.1 INJECTION INTRAMUSCULAR; INTRAVENOUS EVERY 4 HOURS PRN
Status: DISCONTINUED | OUTPATIENT
Start: 2025-01-01 | End: 2025-01-01 | Stop reason: HOSPADM

## 2025-01-01 RX ORDER — CEFEPIME HYDROCHLORIDE 2 G/50ML
2000 INJECTION, SOLUTION INTRAVENOUS EVERY 12 HOURS
Status: DISCONTINUED | OUTPATIENT
Start: 2025-01-01 | End: 2025-01-01

## 2025-01-01 RX ORDER — LEVALBUTEROL INHALATION SOLUTION 1.25 MG/3ML
1.25 SOLUTION RESPIRATORY (INHALATION) 4 TIMES DAILY PRN
Status: DISCONTINUED | OUTPATIENT
Start: 2025-01-01 | End: 2025-01-01

## 2025-01-01 RX ORDER — DEXTROSE MONOHYDRATE 25 G/50ML
25 INJECTION, SOLUTION INTRAVENOUS ONCE
Status: COMPLETED | OUTPATIENT
Start: 2025-01-01 | End: 2025-01-01

## 2025-01-01 RX ORDER — FORMOTEROL FUMARATE 20 UG/2ML
20 SOLUTION RESPIRATORY (INHALATION)
Status: DISCONTINUED | OUTPATIENT
Start: 2025-01-01 | End: 2025-01-01

## 2025-01-01 RX ORDER — CEFEPIME HYDROCHLORIDE 2 G/50ML
2000 INJECTION, SOLUTION INTRAVENOUS ONCE
Status: COMPLETED | OUTPATIENT
Start: 2025-01-01 | End: 2025-01-01

## 2025-01-01 RX ORDER — CALCIUM GLUCONATE 20 MG/ML
2 INJECTION, SOLUTION INTRAVENOUS ONCE
Status: DISCONTINUED | OUTPATIENT
Start: 2025-01-01 | End: 2025-01-01

## 2025-01-01 RX ORDER — MORPHINE SULFATE 4 MG/ML
4 INJECTION, SOLUTION INTRAMUSCULAR; INTRAVENOUS ONCE
Status: COMPLETED | OUTPATIENT
Start: 2025-01-01 | End: 2025-01-01

## 2025-01-01 RX ORDER — PANTOPRAZOLE SODIUM 40 MG/10ML
40 INJECTION, POWDER, LYOPHILIZED, FOR SOLUTION INTRAVENOUS DAILY
Status: DISCONTINUED | OUTPATIENT
Start: 2025-01-01 | End: 2025-01-01

## 2025-01-01 RX ORDER — SODIUM CHLORIDE 9 MG/ML
10 INJECTION, SOLUTION INTRAVENOUS CONTINUOUS
Status: DISCONTINUED | OUTPATIENT
Start: 2025-01-01 | End: 2025-01-01 | Stop reason: HOSPADM

## 2025-01-01 RX ORDER — VANCOMYCIN HYDROCHLORIDE 750 MG/150ML
750 INJECTION, SOLUTION INTRAVENOUS EVERY 12 HOURS
Status: DISCONTINUED | OUTPATIENT
Start: 2025-01-01 | End: 2025-01-01

## 2025-01-01 RX ORDER — BISACODYL 10 MG
10 SUPPOSITORY, RECTAL RECTAL DAILY PRN
Status: DISCONTINUED | OUTPATIENT
Start: 2025-01-01 | End: 2025-01-01 | Stop reason: HOSPADM

## 2025-01-01 RX ORDER — CEFEPIME HYDROCHLORIDE 2 G/50ML
2000 INJECTION, SOLUTION INTRAVENOUS EVERY 8 HOURS
Status: DISCONTINUED | OUTPATIENT
Start: 2025-01-01 | End: 2025-01-01

## 2025-01-01 RX ORDER — HALOPERIDOL 5 MG/ML
2 INJECTION INTRAMUSCULAR ONCE
Status: COMPLETED | OUTPATIENT
Start: 2025-01-01 | End: 2025-01-01

## 2025-01-01 RX ORDER — SODIUM CHLORIDE FOR INHALATION 0.9 %
12 VIAL, NEBULIZER (ML) INHALATION ONCE
Status: COMPLETED | OUTPATIENT
Start: 2025-01-01 | End: 2025-01-01

## 2025-01-01 RX ORDER — CHLORHEXIDINE GLUCONATE ORAL RINSE 1.2 MG/ML
15 SOLUTION DENTAL EVERY 12 HOURS SCHEDULED
Status: DISCONTINUED | OUTPATIENT
Start: 2025-01-01 | End: 2025-01-01

## 2025-01-01 RX ADMIN — CEFEPIME HYDROCHLORIDE 2000 MG: 2 INJECTION, SOLUTION INTRAVENOUS at 05:31

## 2025-01-01 RX ADMIN — LEVALBUTEROL HYDROCHLORIDE 1.25 MG: 1.25 SOLUTION RESPIRATORY (INHALATION) at 02:11

## 2025-01-01 RX ADMIN — LEVALBUTEROL HYDROCHLORIDE 1.25 MG: 1.25 SOLUTION RESPIRATORY (INHALATION) at 08:01

## 2025-01-01 RX ADMIN — METHYLPREDNISOLONE SODIUM SUCCINATE 40 MG: 40 INJECTION, POWDER, FOR SOLUTION INTRAMUSCULAR; INTRAVENOUS at 00:11

## 2025-01-01 RX ADMIN — CEFEPIME HYDROCHLORIDE 2000 MG: 2 INJECTION, SOLUTION INTRAVENOUS at 21:19

## 2025-01-01 RX ADMIN — LEVOFLOXACIN 750 MG: 750 INJECTION, SOLUTION INTRAVENOUS at 11:11

## 2025-01-01 RX ADMIN — METHYLPREDNISOLONE SODIUM SUCCINATE 40 MG: 40 INJECTION, POWDER, FOR SOLUTION INTRAMUSCULAR; INTRAVENOUS at 05:31

## 2025-01-01 RX ADMIN — IPRATROPIUM BROMIDE 0.5 MG: 0.5 SOLUTION RESPIRATORY (INHALATION) at 14:14

## 2025-01-01 RX ADMIN — LEVALBUTEROL HYDROCHLORIDE 1.25 MG: 1.25 SOLUTION RESPIRATORY (INHALATION) at 19:49

## 2025-01-01 RX ADMIN — CHLORHEXIDINE GLUCONATE 15 ML: 1.2 SOLUTION ORAL at 09:50

## 2025-01-01 RX ADMIN — MORPHINE SULFATE 2 MG: 2 INJECTION, SOLUTION INTRAMUSCULAR; INTRAVENOUS at 19:33

## 2025-01-01 RX ADMIN — LEVALBUTEROL HYDROCHLORIDE 1.25 MG: 1.25 SOLUTION RESPIRATORY (INHALATION) at 19:13

## 2025-01-01 RX ADMIN — IPRATROPIUM BROMIDE 0.5 MG: 0.5 SOLUTION RESPIRATORY (INHALATION) at 19:49

## 2025-01-01 RX ADMIN — CEFEPIME HYDROCHLORIDE 2000 MG: 2 INJECTION, SOLUTION INTRAVENOUS at 00:00

## 2025-01-01 RX ADMIN — LORAZEPAM 1 MG: 2 INJECTION INTRAMUSCULAR; INTRAVENOUS at 10:01

## 2025-01-01 RX ADMIN — LEVALBUTEROL HYDROCHLORIDE 1.25 MG: 1.25 SOLUTION RESPIRATORY (INHALATION) at 07:42

## 2025-01-01 RX ADMIN — METHYLPREDNISOLONE SODIUM SUCCINATE 80 MG: 125 INJECTION, POWDER, FOR SOLUTION INTRAMUSCULAR; INTRAVENOUS at 07:16

## 2025-01-01 RX ADMIN — CEFEPIME HYDROCHLORIDE 2000 MG: 2 INJECTION, SOLUTION INTRAVENOUS at 06:01

## 2025-01-01 RX ADMIN — BUDESONIDE 0.5 MG: 0.5 INHALANT RESPIRATORY (INHALATION) at 13:41

## 2025-01-01 RX ADMIN — APIXABAN 5 MG: 5 TABLET, FILM COATED ORAL at 21:09

## 2025-01-01 RX ADMIN — CEFEPIME HYDROCHLORIDE 2000 MG: 2 INJECTION, SOLUTION INTRAVENOUS at 17:41

## 2025-01-01 RX ADMIN — IPRATROPIUM BROMIDE 0.5 MG: 0.5 SOLUTION RESPIRATORY (INHALATION) at 08:02

## 2025-01-01 RX ADMIN — CHLORHEXIDINE GLUCONATE 15 ML: 1.2 SOLUTION ORAL at 00:08

## 2025-01-01 RX ADMIN — FORMOTEROL FUMARATE 20 MCG: 20 SOLUTION RESPIRATORY (INHALATION) at 13:41

## 2025-01-01 RX ADMIN — LORAZEPAM 1 MG: 2 INJECTION INTRAMUSCULAR; INTRAVENOUS at 09:29

## 2025-01-01 RX ADMIN — METHYLPREDNISOLONE SODIUM SUCCINATE 40 MG: 40 INJECTION, POWDER, FOR SOLUTION INTRAMUSCULAR; INTRAVENOUS at 21:04

## 2025-01-01 RX ADMIN — MORPHINE SULFATE 2 MG: 2 INJECTION, SOLUTION INTRAMUSCULAR; INTRAVENOUS at 11:56

## 2025-01-01 RX ADMIN — LORAZEPAM 1 MG: 2 INJECTION INTRAMUSCULAR; INTRAVENOUS at 06:38

## 2025-01-01 RX ADMIN — ISODIUM CHLORIDE 12 ML: 0.03 SOLUTION RESPIRATORY (INHALATION) at 05:53

## 2025-01-01 RX ADMIN — CHLORHEXIDINE GLUCONATE 15 ML: 1.2 SOLUTION ORAL at 21:20

## 2025-01-01 RX ADMIN — APIXABAN 5 MG: 5 TABLET, FILM COATED ORAL at 12:06

## 2025-01-01 RX ADMIN — LEVALBUTEROL HYDROCHLORIDE 1.25 MG: 1.25 SOLUTION RESPIRATORY (INHALATION) at 08:20

## 2025-01-01 RX ADMIN — FORMOTEROL FUMARATE 20 MCG: 20 SOLUTION RESPIRATORY (INHALATION) at 19:22

## 2025-01-01 RX ADMIN — IPRATROPIUM BROMIDE 0.5 MG: 0.5 SOLUTION RESPIRATORY (INHALATION) at 13:01

## 2025-01-01 RX ADMIN — METHYLPREDNISOLONE SODIUM SUCCINATE 40 MG: 40 INJECTION, POWDER, FOR SOLUTION INTRAMUSCULAR; INTRAVENOUS at 06:01

## 2025-01-01 RX ADMIN — CHLORHEXIDINE GLUCONATE 15 ML: 1.2 SOLUTION ORAL at 10:40

## 2025-01-01 RX ADMIN — CHLORHEXIDINE GLUCONATE 15 ML: 1.2 SOLUTION ORAL at 08:31

## 2025-01-01 RX ADMIN — IOHEXOL 100 ML: 350 INJECTION, SOLUTION INTRAVENOUS at 06:28

## 2025-01-01 RX ADMIN — SODIUM CHLORIDE 1 MG/HR: 9 INJECTION INTRAMUSCULAR; INTRAVENOUS; SUBCUTANEOUS at 06:38

## 2025-01-01 RX ADMIN — VANCOMYCIN HYDROCHLORIDE 750 MG: 750 INJECTION, SOLUTION INTRAVENOUS at 02:32

## 2025-01-01 RX ADMIN — MORPHINE SULFATE 2 MG: 2 INJECTION, SOLUTION INTRAMUSCULAR; INTRAVENOUS at 05:35

## 2025-01-01 RX ADMIN — MORPHINE SULFATE 2 MG: 2 INJECTION, SOLUTION INTRAMUSCULAR; INTRAVENOUS at 03:34

## 2025-01-01 RX ADMIN — MAGNESIUM SULFATE HEPTAHYDRATE 2 G: 40 INJECTION, SOLUTION INTRAVENOUS at 07:19

## 2025-01-01 RX ADMIN — CHLORHEXIDINE GLUCONATE 15 ML: 1.2 SOLUTION ORAL at 08:27

## 2025-01-01 RX ADMIN — IPRATROPIUM BROMIDE 0.5 MG: 0.5 SOLUTION RESPIRATORY (INHALATION) at 07:42

## 2025-01-01 RX ADMIN — LEVALBUTEROL HYDROCHLORIDE 1.25 MG: 1.25 SOLUTION RESPIRATORY (INHALATION) at 14:14

## 2025-01-01 RX ADMIN — IPRATROPIUM BROMIDE 0.5 MG: 0.5 SOLUTION RESPIRATORY (INHALATION) at 19:13

## 2025-01-01 RX ADMIN — BUDESONIDE 0.5 MG: 0.5 INHALANT RESPIRATORY (INHALATION) at 08:01

## 2025-01-01 RX ADMIN — INSULIN LISPRO 1 UNITS: 100 INJECTION, SOLUTION INTRAVENOUS; SUBCUTANEOUS at 12:07

## 2025-01-01 RX ADMIN — CHLORHEXIDINE GLUCONATE 15 ML: 1.2 SOLUTION ORAL at 21:09

## 2025-01-01 RX ADMIN — MORPHINE SULFATE 2 MG: 2 INJECTION, SOLUTION INTRAMUSCULAR; INTRAVENOUS at 00:49

## 2025-01-01 RX ADMIN — PANTOPRAZOLE SODIUM 40 MG: 40 INJECTION, POWDER, FOR SOLUTION INTRAVENOUS at 08:27

## 2025-01-01 RX ADMIN — MIRTAZAPINE 7.5 MG: 15 TABLET, FILM COATED ORAL at 21:09

## 2025-01-01 RX ADMIN — METHYLPREDNISOLONE SODIUM SUCCINATE 40 MG: 40 INJECTION, POWDER, FOR SOLUTION INTRAMUSCULAR; INTRAVENOUS at 13:37

## 2025-01-01 RX ADMIN — DEXTROSE MONOHYDRATE 25 ML: 25 INJECTION, SOLUTION INTRAVENOUS at 05:52

## 2025-01-01 RX ADMIN — LEVALBUTEROL HYDROCHLORIDE 1.25 MG: 1.25 SOLUTION RESPIRATORY (INHALATION) at 13:01

## 2025-01-01 RX ADMIN — LORAZEPAM 1 MG: 2 INJECTION INTRAMUSCULAR; INTRAVENOUS at 16:45

## 2025-01-01 RX ADMIN — MORPHINE SULFATE 2 MG: 2 INJECTION, SOLUTION INTRAMUSCULAR; INTRAVENOUS at 21:07

## 2025-01-01 RX ADMIN — MORPHINE SULFATE 4 MG: 4 INJECTION, SOLUTION INTRAMUSCULAR; INTRAVENOUS at 09:21

## 2025-01-01 RX ADMIN — MORPHINE SULFATE 2 MG: 2 INJECTION, SOLUTION INTRAMUSCULAR; INTRAVENOUS at 23:11

## 2025-01-01 RX ADMIN — VANCOMYCIN HYDROCHLORIDE 1500 MG: 1 INJECTION, POWDER, LYOPHILIZED, FOR SOLUTION INTRAVENOUS at 11:27

## 2025-01-01 RX ADMIN — PNEUMOCOCCAL 20-VALENT CONJUGATE VACCINE 0.5 ML
2.2; 2.2; 2.2; 2.2; 2.2; 2.2; 2.2; 2.2; 2.2; 2.2; 2.2; 2.2; 2.2; 2.2; 2.2; 2.2; 4.4; 2.2; 2.2; 2.2 INJECTION, SUSPENSION INTRAMUSCULAR at 18:39

## 2025-01-01 RX ADMIN — BUDESONIDE 0.5 MG: 0.5 INHALANT RESPIRATORY (INHALATION) at 19:22

## 2025-01-01 RX ADMIN — MORPHINE SULFATE 2 MG: 2 INJECTION, SOLUTION INTRAMUSCULAR; INTRAVENOUS at 16:34

## 2025-01-01 RX ADMIN — CEFEPIME HYDROCHLORIDE 2000 MG: 2 INJECTION, SOLUTION INTRAVENOUS at 14:15

## 2025-01-01 RX ADMIN — IPRATROPIUM BROMIDE 0.5 MG: 0.5 SOLUTION RESPIRATORY (INHALATION) at 08:21

## 2025-01-01 RX ADMIN — HALOPERIDOL LACTATE 1 MG: 5 INJECTION, SOLUTION INTRAMUSCULAR at 16:34

## 2025-01-01 RX ADMIN — METHYLPREDNISOLONE SODIUM SUCCINATE 40 MG: 40 INJECTION, POWDER, FOR SOLUTION INTRAMUSCULAR; INTRAVENOUS at 14:15

## 2025-01-01 RX ADMIN — INSULIN LISPRO 1 UNITS: 100 INJECTION, SOLUTION INTRAVENOUS; SUBCUTANEOUS at 00:11

## 2025-01-01 RX ADMIN — MORPHINE SULFATE 2 MG: 2 INJECTION, SOLUTION INTRAMUSCULAR; INTRAVENOUS at 09:56

## 2025-01-01 RX ADMIN — SODIUM CHLORIDE 10 ML/HR: 9 INJECTION, SOLUTION INTRAVENOUS at 06:50

## 2025-01-01 RX ADMIN — LEVOFLOXACIN 750 MG: 750 INJECTION, SOLUTION INTRAVENOUS at 12:57

## 2025-01-01 RX ADMIN — VANCOMYCIN HYDROCHLORIDE 1250 MG: 5 INJECTION, POWDER, LYOPHILIZED, FOR SOLUTION INTRAVENOUS at 10:41

## 2025-01-01 RX ADMIN — HALOPERIDOL LACTATE 2 MG: 5 INJECTION, SOLUTION INTRAMUSCULAR at 09:21

## 2025-01-01 RX ADMIN — ENOXAPARIN SODIUM 40 MG: 40 INJECTION SUBCUTANEOUS at 09:57

## 2025-01-01 RX ADMIN — LEVALBUTEROL HYDROCHLORIDE 1.25 MG: 1.25 SOLUTION RESPIRATORY (INHALATION) at 13:41

## 2025-01-01 RX ADMIN — LORAZEPAM 1 MG: 2 INJECTION INTRAMUSCULAR; INTRAVENOUS at 20:30

## 2025-01-01 RX ADMIN — CEFEPIME HYDROCHLORIDE 2000 MG: 2 INJECTION, SOLUTION INTRAVENOUS at 15:02

## 2025-01-01 RX ADMIN — ATORVASTATIN CALCIUM 80 MG: 40 TABLET, FILM COATED ORAL at 17:18

## 2025-01-01 RX ADMIN — IPRATROPIUM BROMIDE 0.5 MG: 0.5 SOLUTION RESPIRATORY (INHALATION) at 19:22

## 2025-01-01 RX ADMIN — PANTOPRAZOLE SODIUM 40 MG: 40 INJECTION, POWDER, FOR SOLUTION INTRAVENOUS at 08:31

## 2025-01-01 RX ADMIN — LEVALBUTEROL HYDROCHLORIDE 1.25 MG: 1.25 SOLUTION RESPIRATORY (INHALATION) at 01:16

## 2025-01-01 RX ADMIN — IPRATROPIUM BROMIDE 0.5 MG: 0.5 SOLUTION RESPIRATORY (INHALATION) at 01:16

## 2025-01-01 RX ADMIN — LORAZEPAM 1 MG: 2 INJECTION INTRAMUSCULAR; INTRAVENOUS at 03:34

## 2025-01-01 RX ADMIN — HALOPERIDOL LACTATE 1 MG: 5 INJECTION, SOLUTION INTRAMUSCULAR at 23:11

## 2025-01-01 RX ADMIN — AZITHROMYCIN MONOHYDRATE 500 MG: 500 INJECTION, POWDER, LYOPHILIZED, FOR SOLUTION INTRAVENOUS at 08:18

## 2025-01-01 RX ADMIN — MORPHINE SULFATE 2 MG: 2 INJECTION, SOLUTION INTRAMUSCULAR; INTRAVENOUS at 13:43

## 2025-01-01 RX ADMIN — SODIUM CHLORIDE 1000 ML: 0.9 INJECTION, SOLUTION INTRAVENOUS at 07:16

## 2025-01-01 RX ADMIN — SODIUM CHLORIDE, SODIUM GLUCONATE, SODIUM ACETATE, POTASSIUM CHLORIDE, MAGNESIUM CHLORIDE, SODIUM PHOSPHATE, DIBASIC, AND POTASSIUM PHOSPHATE 500 ML: .53; .5; .37; .037; .03; .012; .00082 INJECTION, SOLUTION INTRAVENOUS at 13:40

## 2025-01-01 RX ADMIN — ASPIRIN 81 MG: 81 TABLET ORAL at 09:57

## 2025-01-01 RX ADMIN — LEVALBUTEROL HYDROCHLORIDE 1.25 MG: 1.25 SOLUTION RESPIRATORY (INHALATION) at 19:22

## 2025-01-01 RX ADMIN — PANTOPRAZOLE SODIUM 40 MG: 40 INJECTION, POWDER, FOR SOLUTION INTRAVENOUS at 10:40

## 2025-01-01 RX ADMIN — IPRATROPIUM BROMIDE 0.5 MG: 0.5 SOLUTION RESPIRATORY (INHALATION) at 13:41

## 2025-01-01 RX ADMIN — METHYLPREDNISOLONE SODIUM SUCCINATE 40 MG: 40 INJECTION, POWDER, FOR SOLUTION INTRAMUSCULAR; INTRAVENOUS at 15:02

## 2025-01-01 RX ADMIN — MORPHINE SULFATE 2 MG: 2 INJECTION, SOLUTION INTRAMUSCULAR; INTRAVENOUS at 18:10

## 2025-01-01 RX ADMIN — INSULIN LISPRO 1 UNITS: 100 INJECTION, SOLUTION INTRAVENOUS; SUBCUTANEOUS at 12:43

## 2025-01-01 RX ADMIN — PANTOPRAZOLE SODIUM 40 MG: 40 INJECTION, POWDER, FOR SOLUTION INTRAVENOUS at 09:50

## 2025-01-01 RX ADMIN — FORMOTEROL FUMARATE 20 MCG: 20 SOLUTION RESPIRATORY (INHALATION) at 08:02

## 2025-01-01 RX ADMIN — METHYLPREDNISOLONE SODIUM SUCCINATE 40 MG: 40 INJECTION, POWDER, FOR SOLUTION INTRAMUSCULAR; INTRAVENOUS at 21:20

## 2025-01-01 RX ADMIN — IPRATROPIUM BROMIDE 1 MG: 0.5 SOLUTION RESPIRATORY (INHALATION) at 05:53

## 2025-01-01 RX ADMIN — CEFEPIME HYDROCHLORIDE 2000 MG: 2 INJECTION, SOLUTION INTRAVENOUS at 07:31

## 2025-01-01 RX ADMIN — ALBUTEROL SULFATE 10 MG: 2.5 SOLUTION RESPIRATORY (INHALATION) at 05:53

## 2025-01-01 RX ADMIN — IPRATROPIUM BROMIDE 0.5 MG: 0.5 SOLUTION RESPIRATORY (INHALATION) at 02:11

## 2025-01-14 ENCOUNTER — OFFICE VISIT (OUTPATIENT)
Dept: FAMILY MEDICINE CLINIC | Facility: HOSPITAL | Age: 75
End: 2025-01-14
Payer: COMMERCIAL

## 2025-01-14 VITALS
HEART RATE: 64 BPM | BODY MASS INDEX: 21.05 KG/M2 | OXYGEN SATURATION: 99 % | WEIGHT: 131 LBS | DIASTOLIC BLOOD PRESSURE: 90 MMHG | SYSTOLIC BLOOD PRESSURE: 140 MMHG | HEIGHT: 66 IN

## 2025-01-14 DIAGNOSIS — J06.9 URI WITH COUGH AND CONGESTION: ICD-10-CM

## 2025-01-14 DIAGNOSIS — Z13.220 SCREENING FOR HYPERLIPIDEMIA: ICD-10-CM

## 2025-01-14 DIAGNOSIS — J43.2 CENTRILOBULAR EMPHYSEMA (HCC): ICD-10-CM

## 2025-01-14 DIAGNOSIS — R94.6 ABNORMAL RESULTS OF THYROID FUNCTION STUDIES: ICD-10-CM

## 2025-01-14 DIAGNOSIS — Z12.5 SCREENING FOR PROSTATE CANCER: ICD-10-CM

## 2025-01-14 DIAGNOSIS — Z00.00 MEDICARE ANNUAL WELLNESS VISIT, SUBSEQUENT: Primary | ICD-10-CM

## 2025-01-14 DIAGNOSIS — C82.90 FOLLICULAR LYMPHOMA, UNSPECIFIED GRADE, UNSPECIFIED BODY REGION (HCC): ICD-10-CM

## 2025-01-14 PROBLEM — E46 PROTEIN-CALORIE MALNUTRITION, UNSPECIFIED SEVERITY (HCC): Status: RESOLVED | Noted: 2023-06-05 | Resolved: 2025-01-14

## 2025-01-14 PROCEDURE — G0444 DEPRESSION SCREEN ANNUAL: HCPCS | Performed by: STUDENT IN AN ORGANIZED HEALTH CARE EDUCATION/TRAINING PROGRAM

## 2025-01-14 PROCEDURE — 99214 OFFICE O/P EST MOD 30 MIN: CPT | Performed by: STUDENT IN AN ORGANIZED HEALTH CARE EDUCATION/TRAINING PROGRAM

## 2025-01-14 PROCEDURE — G0439 PPPS, SUBSEQ VISIT: HCPCS | Performed by: STUDENT IN AN ORGANIZED HEALTH CARE EDUCATION/TRAINING PROGRAM

## 2025-01-14 RX ORDER — METHYLPREDNISOLONE 4 MG/1
TABLET ORAL
Qty: 21 EACH | Refills: 0 | Status: SHIPPED | OUTPATIENT
Start: 2025-01-14

## 2025-01-14 RX ORDER — DOXYCYCLINE HYCLATE 100 MG
100 TABLET ORAL 2 TIMES DAILY
Qty: 14 TABLET | Refills: 0 | Status: SHIPPED | OUTPATIENT
Start: 2025-01-14 | End: 2025-01-21

## 2025-01-14 NOTE — PROGRESS NOTES
Name: Jere Lepe      : 1950      MRN: [id number]  Encounter Provider: Katherine Dominguez DO  Encounter Date: 2025   Encounter department: [facility] PRIMARY CARE [address]    Assessment & Plan  Medicare annual wellness visit, subsequent  Topics below.        Centrilobular emphysema (HCC)  Can try steroid pack in 4-5 days if not feeling abx are helping & prior to traveling, or can bring with him on trip in case breathing flares up while gone for  2 weeks.   Orders:  •  methylPREDNISolone 4 MG tablet therapy pack; Use as directed on package    Follicular lymphoma, unspecified grade, unspecified body region (HCC)  F/W Heme / onc, next CT & appts are scheduled.        URI with cough and congestion  OTC options reviewed with patient. Adequate rest, proper water hydration, Vit C, D, Zinc, tea with honey, mucinex, cough drops, steam shower, nasal rinses, etc.   May help to bring mucinex or cough drops on trip.   Orders:  •  methylPREDNISolone 4 MG tablet therapy pack; Use as directed on package  •  doxycycline hyclate (VIBRA-TABS) 100 mg tablet; Take 1 tablet (100 mg total) by mouth 2 (two) times a day for 7 days    Screening for hyperlipidemia  Routine labs ordered for March.   Orders:  •  Lipid Panel with Direct LDL reflex; Future    Screening for prostate cancer  Orders:  •  PSA, Total Screen; Future    Abnormal results of thyroid function studies  Elevated in the past, but will recheck.   Orders:  •  TSH, 3rd generation; Future  •  T4, free; Future      Depression Screening and Follow-up Plan: Patient was screened for depression during today's encounter. They screened negative with a PHQ-2 score of 0.    Return in about 1 year (around 2026) for Annual Medicare Wellness.    Preventive health issues were discussed with patient, and age appropriate screening tests were ordered as noted in patient's After Visit Summary. Personalized health advice and appropriate referrals for health  education or preventive services given if needed, as noted in patient's After Visit Summary.    History of Present Illness     HPI   Noticing a little congestion in his chest - started > 2 weeks ago.   GF also sick. Given doxy yest.     [facility]   [facility]     Wt Readings from Last 3 Encounters:   01/14/25 59.4 kg (131 lb)   12/30/24 58.1 kg (128 lb)   07/17/24 58.1 kg (128 lb)     Temp Readings from Last 3 Encounters:   12/30/24 98.1 °F (36.7 °C) (Temporal)   07/02/24 98.4 °F (36.9 °C) (Temporal)   12/11/23 (!) 97.4 °F (36.3 °C)     BP Readings from Last 3 Encounters:   01/14/25 140/90   12/30/24 138/80   07/17/24 120/74     Pulse Readings from Last 3 Encounters:   01/14/25 64   12/30/24 92   07/17/24 78     Patient Care Team:  Katherine Dominguez DO as PCP - General (Family Medicine)  Pamela Browne DO (Oncology)  ANTONIO Ruiz (Hematology and Oncology)    Review of Systems   Constitutional:  Negative for chills and fever.   HENT:  Positive for postnasal drip, rhinorrhea (green, yellow & coughing up too) and sneezing. Negative for ear pain, sinus pressure, sinus pain, sore throat and voice change.    Eyes:  Negative for pain and redness.   Respiratory:  Positive for cough (throat clearing). Negative for shortness of breath.    Cardiovascular:  Negative for chest pain and palpitations.   Gastrointestinal:  Negative for constipation and diarrhea.   Genitourinary:  Positive for difficulty urinating (gradual, not as good of a stream). Negative for dysuria and frequency.   Musculoskeletal:  Negative for arthralgias and myalgias.   Neurological:  Negative for dizziness, light-headedness and headaches.     Medical History Reviewed by provider this encounter:       Annual Wellness Visit Questionnaire   Jere is here for his Subsequent Wellness visit.     Health Risk Assessment:   Patient rates overall health as fair. Patient feels that their physical health rating is same.  Patient is very satisfied with their life. Eyesight was rated as same. Hearing was rated as same. Patient feels that their emotional and mental health rating is same. Patients states they are never, rarely angry. Patient states they are never, rarely unusually tired/fatigued. Pain experienced in the last 7 days has been none. Patient states that he has experienced no weight loss or gain in last 6 months.     Depression Screening:   PHQ-2 Score: 0      Fall Risk Screening:   In the past year, patient has experienced: no history of falling in past year      Home Safety:  Patient has trouble with stairs inside or outside of their home. Patient has working smoke alarms and has no working carbon monoxide detector. Home safety hazards include: none.     Nutrition:   Current diet is Regular.     Medications:   Patient is currently taking over-the-counter supplements. OTC medications include: see medication list. Patient is able to manage medications.     Activities of Daily Living (ADLs)/Instrumental Activities of Daily Living (IADLs):   Walk and transfer into and out of bed and chair?: Yes  Dress and groom yourself?: Yes    Bathe or shower yourself?: Yes    Feed yourself? Yes  Do your laundry/housekeeping?: Yes  Manage your money, pay your bills and track your expenses?: Yes  Make your own meals?: Yes    Do your own shopping?: Yes    Previous Hospitalizations:   Any hospitalizations or ED visits within the last 12 months?: No      Advance Care Planning:   Living will: Yes    Advanced directive: Yes      Cognitive Screening:   Provider or family/friend/caregiver concerned regarding cognition?: No    PREVENTIVE SCREENINGS      Cardiovascular Screening:    General: Screening Not Indicated and History Lipid Disorder    Due for: Lipid Panel      Diabetes Screening:     General: Screening Current    Due for: Blood Glucose      Colorectal Cancer Screening:     General: Screening Current      Prostate Cancer Screening:     General: Risks and Benefits Discussed    Due for: PSA      Abdominal Aortic Aneurysm (AAA) Screening:    Risk factors include: age between 65-74 yo and tobacco use        General: Screening Current      Lung Cancer Screening:     General: Screening Current      Hepatitis C Screening:    General: Screening Current      Preventive Screening Comments: Pt getting CT Chest/ abd /pelv, no prior AAA & CT Lung not needed    Screening, Brief Intervention, and Referral to Treatment (SBIRT)    Screening      AUDIT-C Screenin) How often did you have a drink containing alcohol in the past year? monthly or less  2) How many drinks did you have on a typical day when you were drinking in the past year? 1 to 2  3) How often did you have 6 or more drinks on one occasion in the past year? never    AUDIT-C Score: 1  Interpretation: Score 0-3 (male): Negative screen for alcohol misuse    Single Item Drug Screening:  How often have you used an illegal drug (including marijuana) or a prescription medication for non-medical reasons in the past year? never    Single Item Drug Screen Score: 0  Interpretation: Negative screen for possible drug use disorder    Referral to Treatment  Patient referred to treatment due to their substance use disorder.    Annual Depression Screening  Time spent screening and evaluating the patient for depression during today's encounter was 5 minutes.    Other Counseling Topics:   Car/seat belt/driving safety, skin self-exam, sunscreen and calcium and vitamin D intake and regular weightbearing exercise.     Social Drivers of Health     Financial Resource Strain: Low Risk  (2024)    Overall Financial Resource Strain (CARDIA)    • Difficulty of Paying Living Expenses: Not hard at all   Food Insecurity: No Food Insecurity (2025)    Hunger Vital Sign    • Worried About Running Out of Food in the Last Year: Never true    • Ran Out of Food in the Last Year: Never true   Transportation Needs: No  "Transportation Needs (1/14/2025)    PRAPARE - Transportation    • Lack of Transportation (Medical): No    • Lack of Transportation (Non-Medical): No   Housing Stability: Low Risk  (1/14/2025)    Housing Stability Vital Sign    • Unable to Pay for Housing in the Last Year: No    • Number of Times Moved in the Last Year: 1    • Homeless in the Last Year: No   Utilities: Not At Risk (1/14/2025)    [facility] Utilities    • Threatened with loss of utilities: No     Objective   /90   Pulse 64   Ht 5' 5.5\" (1.664 m)   Wt 59.4 kg (131 lb)   SpO2 99%   BMI 21.47 kg/m²     Physical Exam  Vitals reviewed.   Constitutional:       General: He is not in acute distress.     Appearance: Normal appearance. He is normal weight. He is not ill-appearing.   HENT:      Head: Normocephalic and atraumatic.      Right Ear: Tympanic membrane, ear canal and external ear normal.      Left Ear: Tympanic membrane, ear canal and external ear normal.      Nose: Congestion present.      Mouth/Throat:      Mouth: Mucous membranes are moist.      Pharynx: Oropharynx is clear. No oropharyngeal exudate or posterior oropharyngeal erythema.   Eyes:      General: No scleral icterus.        Right eye: No discharge.         Left eye: No discharge.   Cardiovascular:      Rate and Rhythm: Normal rate and regular rhythm.      Pulses: Normal pulses.      Heart sounds: Normal heart sounds. No murmur heard.  Pulmonary:      Effort: Pulmonary effort is normal. No respiratory distress.      Breath sounds: No wheezing.      Comments: Dec BS b/l, hoarse voice mild  Musculoskeletal:      Cervical back: Normal range of motion and neck supple. No rigidity or tenderness.      Right lower leg: No edema.      Left lower leg: No edema.   Lymphadenopathy:      Cervical: No cervical adenopathy.   Neurological:      Mental Status: He is alert and oriented to person, place, and time.      Gait: Gait normal.   Psychiatric:         Mood and Affect: Mood normal.         " Behavior: Behavior normal.         Thought Content: Thought content normal.         Judgment: Judgment normal.

## 2025-01-14 NOTE — ASSESSMENT & PLAN NOTE
Can try steroid pack in 4-5 days if not feeling abx are helping & prior to traveling, or can bring with him on trip in case breathing flares up while gone for  2 weeks.   Orders:  •  methylPREDNISolone 4 MG tablet therapy pack; Use as directed on package  
ankle pain/injury

## 2025-01-14 NOTE — PATIENT INSTRUCTIONS
Medicare Preventive Visit Patient Instructions  Thank you for completing your Welcome to Medicare Visit or Medicare Annual Wellness Visit today. Your next wellness visit will be due in one year (1/15/2026).  The screening/preventive services that you may require over the next 5-10 years are detailed below. Some tests may not apply to you based off risk factors and/or age. Screening tests ordered at today's visit but not completed yet may show as past due. Also, please note that scanned in results may not display below.  Preventive Screenings:  Service Recommendations Previous Testing/Comments   Colorectal Cancer Screening  Colonoscopy    Fecal Occult Blood Test (FOBT)/Fecal Immunochemical Test (FIT)  Fecal DNA/Cologuard Test  Flexible Sigmoidoscopy Age: 45-75 years old   Colonoscopy: every 10 years (May be performed more frequently if at higher risk)  OR  FOBT/FIT: every 1 year  OR  Cologuard: every 3 years  OR  Sigmoidoscopy: every 5 years  Screening may be recommended earlier than age 45 if at higher risk for colorectal cancer. Also, an individualized decision between you and your healthcare provider will decide whether screening between the ages of 76-85 would be appropriate. Colonoscopy: 03/04/2021  FOBT/FIT: Not on file  Cologuard: Not on file  Sigmoidoscopy: Not on file    Screening Current     Prostate Cancer Screening Individualized decision between patient and health care provider in men between ages of 55-69   Medicare will cover every 12 months beginning on the day after your 50th birthday PSA: 0.8 ng/mL           Hepatitis C Screening Once for adults born between 1945 and 1965  More frequently in patients at high risk for Hepatitis C Hep C Antibody: 09/22/2016    Screening Current   Diabetes Screening 1-2 times per year if you're at risk for diabetes or have pre-diabetes Fasting glucose: No results in last 5 years (No results in last 5 years)  A1C: 6.0 % (1/28/2023)  Screening Current   Cholesterol  Screening Once every 5 years if you don't have a lipid disorder. May order more often based on risk factors. Lipid panel: 01/28/2023  Screening Not Indicated  History Lipid Disorder      Other Preventive Screenings Covered by Medicare:  Abdominal Aortic Aneurysm (AAA) Screening: covered once if your at risk. You're considered to be at risk if you have a family history of AAA or a male between the age of 65-75 who smoking at least 100 cigarettes in your lifetime.  Lung Cancer Screening: covers low dose CT scan once per year if you meet all of the following conditions: (1) Age 55-77; (2) No signs or symptoms of lung cancer; (3) Current smoker or have quit smoking within the last 15 years; (4) You have a tobacco smoking history of at least 20 pack years (packs per day x number of years you smoked); (5) You get a written order from a healthcare provider.  Glaucoma Screening: covered annually if you're considered high risk: (1) You have diabetes OR (2) Family history of glaucoma OR (3)  aged 50 and older OR (4)  American aged 65 and older  Osteoporosis Screening: covered every 2 years if you meet one of the following conditions: (1) Have a vertebral abnormality; (2) On glucocorticoid therapy for more than 3 months; (3) Have primary hyperparathyroidism; (4) On osteoporosis medications and need to assess response to drug therapy.  HIV Screening: covered annually if you're between the age of 15-65. Also covered annually if you are younger than 15 and older than 65 with risk factors for HIV infection. For pregnant patients, it is covered up to 3 times per pregnancy.    Immunizations:  Immunization Recommendations   Influenza Vaccine Annual influenza vaccination during flu season is recommended for all persons aged >= 6 months who do not have contraindications   Pneumococcal Vaccine   * Pneumococcal conjugate vaccine = PCV13 (Prevnar 13), PCV15 (Vaxneuvance), PCV20 (Prevnar 20)  * Pneumococcal  polysaccharide vaccine = PPSV23 (Pneumovax) Adults 19-63 yo with certain risk factors or if 65+ yo  If never received any pneumonia vaccine: recommend Prevnar 20 (PCV20)  Give PCV20 if previously received 1 dose of PCV13 or PPSV23   Hepatitis B Vaccine 3 dose series if at intermediate or high risk (ex: diabetes, end stage renal disease, liver disease)   Respiratory syncytial virus (RSV) Vaccine - COVERED BY MEDICARE PART D  * RSVPreF3 (Arexvy) CDC recommends that adults 60 years of age and older may receive a single dose of RSV vaccine using shared clinical decision-making (SCDM)   Tetanus (Td) Vaccine - COST NOT COVERED BY MEDICARE PART B Following completion of primary series, a booster dose should be given every 10 years to maintain immunity against tetanus. Td may also be given as tetanus wound prophylaxis.   Tdap Vaccine - COST NOT COVERED BY MEDICARE PART B Recommended at least once for all adults. For pregnant patients, recommended with each pregnancy.   Shingles Vaccine (Shingrix) - COST NOT COVERED BY MEDICARE PART B  2 shot series recommended in those 19 years and older who have or will have weakened immune systems or those 50 years and older     Health Maintenance Due:      Topic Date Due   • Lung Cancer Screening  07/06/2023   • Colorectal Cancer Screening  03/04/2026   • Hepatitis C Screening  Completed     Immunizations Due:      Topic Date Due   • Pneumococcal Vaccine: 65+ Years (2 of 2 - PCV) 12/07/2010   • COVID-19 Vaccine (8 - 2024-25 season) 12/30/2024     Advance Directives   What are advance directives?  Advance directives are legal documents that state your wishes and plans for medical care. These plans are made ahead of time in case you lose your ability to make decisions for yourself. Advance directives can apply to any medical decision, such as the treatments you want, and if you want to donate organs.   What are the types of advance directives?  There are many types of advance directives,  and each state has rules about how to use them. You may choose a combination of any of the following:  Living will:  This is a written record of the treatment you want. You can also choose which treatments you do not want, which to limit, and which to stop at a certain time. This includes surgery, medicine, IV fluid, and tube feedings.   Durable power of  for healthcare (DPAHC):  This is a written record that states who you want to make healthcare choices for you when you are unable to make them for yourself. This person, called a proxy, is usually a family member or a friend. You may choose more than 1 proxy.  Do not resuscitate (DNR) order:  A DNR order is used in case your heart stops beating or you stop breathing. It is a request not to have certain forms of treatment, such as CPR. A DNR order may be included in other types of advance directives.  Medical directive:  This covers the care that you want if you are in a coma, near death, or unable to make decisions for yourself. You can list the treatments you want for each condition. Treatment may include pain medicine, surgery, blood transfusions, dialysis, IV or tube feedings, and a ventilator (breathing machine).  Values history:  This document has questions about your views, beliefs, and how you feel and think about life. This information can help others choose the care that you would choose.  Why are advance directives important?  An advance directive helps you control your care. Although spoken wishes may be used, it is better to have your wishes written down. Spoken wishes can be misunderstood, or not followed. Treatments may be given even if you do not want them. An advance directive may make it easier for your family to make difficult choices about your care.   Cigarette Smoking and Your Health   Risks to your health if you smoke:  Nicotine and other chemicals found in tobacco damage every cell in your body. Even if you are a light smoker, you  have an increased risk for cancer, heart disease, and lung disease. If you are pregnant or have diabetes, smoking increases your risk for complications.   Benefits to your health if you stop smoking:   You decrease respiratory symptoms such as coughing, wheezing, and shortness of breath.   You reduce your risk for cancers of the lung, mouth, throat, kidney, bladder, pancreas, stomach, and cervix. If you already have cancer, you increase the benefits of chemotherapy. You also reduce your risk for cancer returning or a second cancer from developing.   You reduce your risk for heart disease, blood clots, heart attack, and stroke.   You reduce your risk for lung infections, and diseases such as pneumonia, asthma, chronic bronchitis, and emphysema.  Your circulation improves. More oxygen can be delivered to your body. If you have diabetes, you lower your risk for complications, such as kidney, artery, and eye diseases. You also lower your risk for nerve damage. Nerve damage can lead to amputations, poor vision, and blindness.  You improve your body's ability to heal and to fight infections.  For more information and support to stop smoking:   Smokefree.gov  Phone: 0- 212 - 171-7872  Web Address: www.smokefree.gov     © Copyright [employer] 2018 Information is for End User's use only and may not be sold, redistributed or otherwise used for commercial purposes. All illustrations and images included in CareNotes® are the copyrighted property of [employer] Inc[employer] or [employer]

## 2025-02-20 ENCOUNTER — OFFICE VISIT (OUTPATIENT)
Dept: FAMILY MEDICINE CLINIC | Facility: HOSPITAL | Age: 75
End: 2025-02-20
Payer: COMMERCIAL

## 2025-02-20 VITALS
DIASTOLIC BLOOD PRESSURE: 72 MMHG | TEMPERATURE: 97.6 F | SYSTOLIC BLOOD PRESSURE: 146 MMHG | BODY MASS INDEX: 19.61 KG/M2 | HEIGHT: 66 IN | OXYGEN SATURATION: 95 % | RESPIRATION RATE: 20 BRPM | WEIGHT: 122 LBS | HEART RATE: 72 BPM

## 2025-02-20 DIAGNOSIS — J44.1 OBSTRUCTIVE CHRONIC BRONCHITIS WITH EXACERBATION (HCC): Primary | ICD-10-CM

## 2025-02-20 PROCEDURE — 99214 OFFICE O/P EST MOD 30 MIN: CPT | Performed by: INTERNAL MEDICINE

## 2025-02-20 RX ORDER — AZITHROMYCIN 250 MG/1
TABLET, FILM COATED ORAL
Qty: 6 TABLET | Refills: 0 | Status: SHIPPED | OUTPATIENT
Start: 2025-02-20 | End: 2025-02-25

## 2025-02-20 RX ORDER — IPRATROPIUM BROMIDE AND ALBUTEROL 20; 100 UG/1; UG/1
1 SPRAY, METERED RESPIRATORY (INHALATION) 4 TIMES DAILY
Qty: 4 G | Refills: 1 | Status: SHIPPED | OUTPATIENT
Start: 2025-02-20

## 2025-02-20 NOTE — PROGRESS NOTES
Name: Jere Lepe      : 1950      MRN: [id number]  Encounter Provider: Mana Mancia MD  Encounter Date: 2025   Encounter department: [facility] CARE [address]  :  Assessment & Plan  Obstructive chronic bronchitis with exacerbation (HCC)  Patient presents with chief complaint of persistent cough, chest congestion, dyspnea with slight exertion such as walking short distances, intermittent wheezing which have have persisted for 4 weeks.  He was prescribed doxycycline and Medrol Dosepak on .  He took doxycycline for a week but it did not help.  He was also prescribed Medrol Dosepak that he has not started taking.  He does not have any inhalers at home.    He denies fevers, chills, chest pain, pleurisy, hemoptysis, palpitation, lower extremity edema or weight gain.  He denies coughing or choking when swallowing.    He is trying to cut down on smoking on his own and he smokes 2 cigarettes a day    CAT scan of the chest in  showed mild to moderate emphysema, PET scan in December last year showed no pulmonary metastases or lymphadenopathy    Patient had no pharyngeal erythema, he had no JVD, he had decreased breath sounds bilaterally without wheezing, rhonchi, crackles.  Heart was regular rhythm with extra heartbeats.  He had no lower extremity edema    He has acute exacerbation of his chronic bronchitis/emphysema.  I doubt acute CHF, he has no evidence of volume overload.  I doubt venous thromboembolism, he has been on Eliquis.    I ordered a chest x-ray to look for infiltrates.  I advised him to start using Medrol Dosepak that he has at home, I will treat him with azithromycin, Combivent inhaler.    I advised him to go to the ER if despite all this his breathing is getting worse  I advised him to quit smoking.    Orders:  •  ipratropium-albuterol (Combivent Respimat) inhaler; Inhale 1 puff 4 (four) times a day  •  XR chest pa and lateral; Future  •  azithromycin  "(Zithromax) 250 mg tablet; Take 2 tablets (500 mg total) by mouth daily for 1 day, THEN 1 tablet (250 mg total) daily for 4 days.           History of Present Illness   HPI  Review of Systems    Objective   /72   Pulse 72   Temp 97.6 °F (36.4 °C) (Tympanic)   Resp 20   Ht 5' 5.5\" (1.664 m)   Wt 55.3 kg (122 lb)   SpO2 95%   BMI 19.99 kg/m²      Physical Exam  Constitutional:       Appearance: He is well-developed. He is not toxic-appearing.   HENT:      Head: Normocephalic.   Eyes:      Conjunctiva/sclera: Conjunctivae normal.   Cardiovascular:      Rate and Rhythm: Normal rate and regular rhythm.   Pulmonary:      Breath sounds: No wheezing, rhonchi or rales.      Comments: He appears mildly short of breath when talking  Abdominal:      General: Bowel sounds are normal.      Palpations: Abdomen is soft.      Tenderness: There is no abdominal tenderness.   Musculoskeletal:      Cervical back: Neck supple.   Skin:     General: Skin is warm and dry.   Neurological:      Mental Status: He is alert and oriented to person, place, and time.      Cranial Nerves: No cranial nerve deficit.      Motor: No weakness.   Psychiatric:         Mood and Affect: Mood normal.         "

## 2025-02-25 PROBLEM — J96.02 ACUTE RESPIRATORY FAILURE WITH HYPOXIA AND HYPERCAPNIA (HCC): Status: ACTIVE | Noted: 2025-01-01

## 2025-02-25 PROBLEM — J44.1 COPD WITH ACUTE EXACERBATION (HCC): Status: ACTIVE | Noted: 2025-01-01
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
## 2025-02-25 PROBLEM — R65.10 SIRS (SYSTEMIC INFLAMMATORY RESPONSE SYNDROME) (HCC): Status: ACTIVE | Noted: 2025-01-01

## 2025-02-25 PROBLEM — J96.01 ACUTE RESPIRATORY FAILURE WITH HYPOXIA AND HYPERCAPNIA (HCC): Status: ACTIVE | Noted: 2025-01-01

## 2025-02-25 NOTE — H&P
H&P - Critical Care/ICU   Name: Jere Lepe 74 y.o. male I MRN: [id number]  Unit/Bed#: -01 SDU I Date of Admission: 2/25/2025   Date of Service: 2/25/2025 I Hospital Day: 0       Assessment & Plan  COPD with acute exacerbation (HCC)  Patient with a history of COPD/emphysema current tobacco smoker.  Does not follow with pulmonary service and is not on inhalers at home.  He was recently ordered Combivent and Medrol pack by his primary care practice on 2/20/2025  Per patient and chart review patient has had a cough, and shortness of breath for the last 2 months.  Over the last few weeks shortness of breath and cough have increased and he has had decreased p.o. intake and increased weakness.  Denies fevers, nausea, vomiting, diarrhea  Patient with recent travel to far [place], was on a cruise but he had to delay his trip short due to feeling unwell.  He returned home on 2/16.    Patient short of breath and with an SpO2 of 68 on room air in the ER.  He was placed on BiPAP for work of breathing and hypoxia  Initial ABG 7.22/7.5/62/31  Given Solu-Medrol 80 mg in the ER and started on empiric antibiotics  Continue BiPAP and admit to stepdown 1 status  Plan to repeat ABG later today  Continue Solu-Medrol 40 3 times daily  Start Xopenex and Atrovent 4 times daily  Continue empiric antibiotics as discussed below  SpO2 goal greater than 88%  SIRS (systemic inflammatory response syndrome) (HCC)  Sirs: tachycardia, tachypnea, and leukocytosis.   CAP classification 3 minor: tachypnea, multilobar infiltrates, elevated BUN; DRIP score 5: Abx within 60 days, COPD, poor function status, gastric acid suppression  CTA CAP: No PE; B/L multilobar tree-in-bud infiltrates; COPD  Given Azithromycin, Vanc and Cefepime in ED   PCT 0.24, trend  Patient had been on azithromycin as outpatient and completed a course just prior to admission  Continue with cefepime and Vanco  Follow-up blood cultures, sputum culture,  Legionella/strep/MRSA/COVID/flu/RSV  Hypercholesterolemia  Holding home statin due to n.p.o.  Tobacco use disorder  Patient reports smoking 10 cigarettes a day  NRT offered but declined by patient  Continue to encourage cessation    Centrilobular emphysema (HCC)  Patient with a history of emphysema/COPD seen on imaging.  Not on any chronic inhalers as outpatient and does not see pulmonary medicine.  He is a current tobacco smoker, reports 10 cigarettes a day  Now admitted with COPD exacerbation, see plan above  Would benefit from establishing care with pulmonary services as outpatient and obtaining PFTs  Essential hypertension  Home amlodipine held due to n.p.o.  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  He was diagnosed with non-Hodgkin's lymphoma in 2023 status postchemotherapy.  Follows with [facility]  Prediabetes  History of prediabetes with A1c of 6  On systemic steroids for COPD exacerbation  Check A1c  Start sliding scale insulin algorithm 3 for glycemic control  Currently n.p.o. 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  As residual balance and visual issues  PT OT consulted  Fall  Patient reports increasing weakness over the last 4 weeks, significantly over the last week.  Reports needing a wheelchair to get through the airport recently.  Prior to admission patient fell from sitting on toilet due to weakness  CT head/neck negative for acute findings  PT/OT consulted  Disposition: Stepdown Level 1    History of Present Illness   Jere Lepe is a 74 y.o. who presents with complaint of increasing weakness, cough, shortness of breath over the last 4 weeks.  Patient fell off his toilet this morning prompting him to come to the hospital.   Patient with past medical history significant for COPD/emphysema, CVA in 2019 on Eliquis, hypertension, hyperlipidemia, non-Hodgkin's lymphoma in 2023 status post chemo, tonsillar cancer in 2008 status post chemo and radiation.  In January patient saw his PCP for complaints above and was treated with doxycycline.  He went on a cruise in far [facility], but he had to cut his trip short due to increasing weakness/cough/shortness of breath.  The cruise without a [place] and after getting off the boot they traveled [place] through [facility] and into [place] 5-7 days ago.  He saw his PCP on 2/20 and was started on a Medrol pack, nebulizer, and azithromycin.   Trauma workup negative for acute findings.  In the ER he was found to be 60% on room air.  He was placed on BiPAP.  Initial ABG 7.22/77.5/62.  CT chest abdomen pelvis negative for PE but did show COPD and bilateral multilobular tree-in-bud infiltrates.  In the ER patient was cultured, started on Solu-Medrol and empiric antibiotics.    History obtained from chart review, the patient, and roommate.  Review of Systems: See HPI for Review of Systems    Historical Information   Past Medical History:  No date: Cancer (HCC)      Comment:  History of tonsillar cancer status post chemo and                radiation in 2008  No date: Colon polyp  10/11/2021: Compression fracture of body of thoracic vertebra (HCC)      Comment:  T9    01/2020: CVA (cerebral vascular accident) (HCC)  09/16/2016: Elevated LFTs  No date: Hepatitis B      Comment:  History of hepatitis-B exposure with positive surface                and core antibodies with negative surface antigen  No date: Hyperlipidemia  No date: Hypertension  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  Prior tattoo site from large                polypectomy in the splenic flexure noted.  Pathology of                colon polyps were tubular adenomas.  A 3 year recall                advised.  2/15/2023: IR BIOPSY INGUINAL LYMPH NODE  6/19/2023: IR PORT PLACEMENT  1984: SKULL FRACTURE ELEVATION   Current Outpatient Medications   Medication Instructions    amLODIPine (NORVASC) 2.5 mg, Oral, Daily    amoxicillin (AMOXIL) 500 mg capsule As needed    aspirin (ECOTRIN LOW STRENGTH) 81 mg, Oral, Daily    atorvastatin (LIPITOR) 80 mg, Oral, Daily    Eliquis 5 mg, Oral, 2 times daily    ipratropium-albuterol (Combivent Respimat) inhaler 1 puff, Inhalation, 4 times daily    methylPREDNISolone 4 MG tablet therapy pack Use as directed on package    Omeprazole 20 mg, Daily    No Known Allergies   Social History     Tobacco Use    Smoking status: Every Day     Current packs/day: 0.50     Average packs/day: 0.5 packs/day for 55.2 years (27.6 ttl pk-yrs)     Types: Cigarettes     Start date: 1970    Smokeless tobacco: Never   Vaping Use    Vaping status: Never Used   Substance Use Topics    Alcohol use: Not Currently    Drug use: Yes     Types: Marijuana     Comment: rare    Family History   Problem Relation Age of Onset    Heart disease Father     Colon polyps Father     Substance Abuse Neg Hx     Mental illness Neg Hx     Colon cancer Neg Hx           Objective :                   Vitals I/O      Most Recent Min/Max in 24hrs   Temp 98.8 °F (37.1 °C) Temp  Min: 97.8 °F (36.6 °C)  Max: 98.8 °F (37.1 °C)   Pulse 105 Pulse  Min: 102  Max: 118   Resp (!) 34 Resp  Min: 30  Max: 34   /88 BP  Min: 94/69  Max: 133/80   O2 Sat 92 % SpO2  Min: 68 %  Max: 97 %      Intake/Output Summary (Last 24 hours) at 2/25/2025 1006  Last data filed at 2/25/2025 0818  Gross per 24 hour   Intake 50 ml   Output --    Net 50 ml       Diet NPO; Sips with meds    Invasive Monitoring           Physical Exam   Physical Exam  Vitals and nursing note reviewed.   Eyes:      Conjunctiva/sclera: Conjunctivae normal.      Pupils: Pupils are equal, round, and reactive to light.   Skin:     General: Skin is warm.   HENT:      Head: Normocephalic and atraumatic.      Comments: Bilateral temporal wasting     Mouth/Throat:      Mouth: Mucous membranes are dry.   Cardiovascular:      Rate and Rhythm: Normal rate and regular rhythm.      Heart sounds: Normal heart sounds.   Musculoskeletal:         General: Normal range of motion.   Abdominal: General: Bowel sounds are normal.      Palpations: Abdomen is soft.      Comments: Cachectic   Constitutional:       General: He is in acute distress.      Appearance: He is ill-appearing.   Pulmonary:      Effort: Respiratory distress present.      Comments: On BiPAP 14/6 80%, with SpO2 97%.  Lung sounds severely decreased bilateral  Neurological:      General: No focal deficit present.      Mental Status: He is oriented to person, place and time. Mental status is at baseline.          Diagnostic Studies        Lab Results: I have reviewed the following results:     Medications:  Scheduled PRN   cefepime, 2,000 mg, Q12H  chlorhexidine, 15 mL, Q12H JUSTIN  insulin lispro, 1-6 Units, Q6H JUSTIN  ipratropium, 0.5 mg, 4x Daily  levalbuterol, 1.25 mg, 4x Daily  methylPREDNISolone sodium succinate, 40 mg, Q8H JUSTIN  pantoprazole, 40 mg, Daily  [START ON 2/26/2025] vancomycin, 1,250 mg, Q24H  vancomycin, 25 mg/kg, Once          Continuous          Labs:   CBC    Recent Labs     02/25/25  0607 02/25/25  0705   WBC  --  20.69*   HGB 14.6 14.6   HCT 43 45.1   PLT  --  466*   BANDSPCT  --  11*     BMP    Recent Labs     02/25/25  0607 02/25/25  0705   SODIUM  --  143   K  --  4.0   CL  --  101   CO2 34* 33*   AGAP  --  9   BUN  --  30*   CREATININE  --  0.93   CALCIUM  --  8.6       Coags    Recent Labs     02/25/25  0857    INR 1.50*   PTT 28        Additional Electrolytes  Recent Labs     02/25/25  0607 02/25/25  0705   MG  --  2.2   PHOS  --  5.9*   CAIONIZED 1.21  --           Blood Gas    No recent results  No recent results LFTs  Recent Labs     02/25/25  0705   ALT 39   AST 68*   ALKPHOS 86   ALB 3.5   TBILI 0.63       Infectious  Recent Labs     02/25/25  0705   PROCALCITONI 0.24     Glucose  Recent Labs     02/25/25  0705   GLUC 170*        Administrative Statements

## 2025-02-25 NOTE — PROGRESS NOTES
Jere Lepe is a 74 y.o. male who is currently ordered Vancomycin IV with management by the Pharmacy Consult service.  Relevant clinical data and objective / subjective history reviewed.  Vancomycin Assessment:  Indication and Goal AUC/Trough: Pneumonia (goal -600, trough >10), -600, trough >10  Clinical Status:  New start   Micro:     Renal Function:  SCr: 0.93 mg/dL  CrCl: 50.3 mL/min  Renal replacement: Not on dialysis  Days of Therapy: 1  Current Dose: 1500mg (25 mg/kg) IV once  Vancomycin Plan:  New Dosinmg IV Q24H after initial loading dose as above  Estimated AUC: 533 mcg*hr/mL  Estimated Trough: 12.3 mcg/mL  Next Level: With AM labs at 0600 on 25  Renal Function Monitoring: Daily BMP and UOP  Pharmacy will continue to follow closely for s/sx of nephrotoxicity, infusion reactions and appropriateness of therapy.  BMP and CBC will be ordered per protocol. We will continue to follow the patient’s culture results and clinical progress daily.    Giuseppe Piña, Pharmacist

## 2025-02-25 NOTE — ASSESSMENT & PLAN NOTE
Patient reports smoking 10 cigarettes a day  NRT offered but declined by patient  Continue to encourage cessation

## 2025-02-25 NOTE — ASSESSMENT & PLAN NOTE
Sirs: tachycardia, tachypnea, and leukocytosis.   CAP classification 3 minor: tachypnea, multilobar infiltrates, elevated BUN; DRIP score 5: Abx within 60 days, COPD, poor function status, gastric acid suppression  CTA CAP: No PE; B/L multilobar tree-in-bud infiltrates; COPD  Given Azithromycin, Vanc and Cefepime in ED   PCT 0.24, trend  Patient had been on azithromycin as outpatient and completed a course just prior to admission  Continue with cefepime and Vanco  Follow-up blood cultures, sputum culture, Legionella/strep/MRSA/COVID/flu/RSV

## 2025-02-25 NOTE — ASSESSMENT & PLAN NOTE
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  He was diagnosed with non-Hodgkin's lymphoma in 2023 status postchemotherapy.  Follows with [facility]

## 2025-02-25 NOTE — RESPIRATORY THERAPY NOTE
RT Protocol Note  Jere Lepe 74 y.o. 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)    SIRS (systemic inflammatory response syndrome) (HCC)      Home Pulmonary Medications:  Combivent respimat       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    Description: POST RADIATION AND CHEMOTHERAPY--2008      Stroke (HCC)      Social History     Socioeconomic History    Marital status: Single     Spouse name: None    Number of children: None    Years of education: None    Highest education level: None   Occupational History    Occupation: Retired   Tobacco Use    Smoking status: Every Day     Current packs/day: 0.50     Average packs/day: 0.5 packs/day for 55.2 years (27.6 ttl pk-yrs)     Types: Cigarettes     Start date: 1970    Smokeless tobacco: Never   Vaping Use    Vaping status: Never Used   Substance and Sexual Activity    Alcohol use: Not Currently    Drug use: Yes     Types: Marijuana     Comment: rare    Sexual activity: Not Currently   Other Topics Concern    None   Social History Narrative    Wears seatbelt     Regular exercise    Regular dental care        Caffeine use:  Coffee daily  "   Living situation:  Supportive and safe    No advance directives     Social Drivers of Health     Financial Resource Strain: Low Risk  (1/12/2024)    Overall Financial Resource Strain (CARDIA)     Difficulty of Paying Living Expenses: Not hard at all   Food Insecurity: No Food Insecurity (1/14/2025)    Hunger Vital Sign     Worried About Running Out of Food in the Last Year: Never true     Ran Out of Food in the Last Year: Never true   Transportation Needs: No Transportation Needs (1/14/2025)    PRAPARE - Transportation     Lack of Transportation (Medical): No     Lack of Transportation (Non-Medical): No   Physical Activity: Not on file   Stress: Not on file   Social Connections: Not on file   Intimate Partner Violence: Not on file   Housing Stability: Low Risk  (1/14/2025)    Housing Stability Vital Sign     Unable to Pay for Housing in the Last Year: No     Number of Times Moved in the Last Year: 1     Homeless in the Last Year: No       Subjective         Objective    Physical Exam:   Assessment Type: Pre-treatment  General Appearance: Alert, Awake  Respiratory Pattern: Assisted, Dyspnea at rest  Chest Assessment: Chest expansion symmetrical  Bilateral Breath Sounds: Diminished  O2 Device: BiPAP    Vitals:  Blood pressure 129/88, pulse 105, temperature 98.8 °F (37.1 °C), temperature source Axillary, resp. rate (!) 34, height 5' 7\" (1.702 m), weight 50.8 kg (111 lb 15.9 oz), SpO2 92%.          Imaging and other studies: Results Review Statement: I reviewed radiology reports from this admission including: chest xray.    O2 Device: BiPAP     Plan    Respiratory Plan: Mild Distress pathway, Home Bronchodilator Patient pathway, Vent/NIV/HFNC        Resp Comments: Received pt on BiPAP with 14/8, 70%. Patient remains on BiPAP at this time. Continue TID 1.25mg xopenex with 0.5mg Atrovent as ordered by provider. Add Q6PRN 2.5mg Albuterol for SOB and wheezing per respiratory protocol.   "

## 2025-02-25 NOTE — ASSESSMENT & PLAN NOTE
Patient with a history of emphysema/COPD seen on imaging.  Not on any chronic inhalers as outpatient and does not see pulmonary medicine.  He is a current tobacco smoker, reports 10 cigarettes a day  Now admitted with COPD exacerbation, see plan above  Would benefit from establishing care with pulmonary services as outpatient and obtaining PFTs

## 2025-02-25 NOTE — SEPSIS NOTE
"  Sepsis Note   Jere Lepe 74 y.o. male MRN: [id number]  Unit/Bed#: -01 SDU Encounter: [id number]       Initial Sepsis Screening       Row Name 02/25/25 0812                Is the patient's history suggestive of a new or worsening infection? Yes (Proceed)  -RS        Suspected source of infection pneumonia  -RS        Indicate SIRS criteria Leukocytosis (WBC > 69575 IJL) OR Leukopenia (WBC <4000 IJL) OR Bandemia (WBC >10% bands);Tachycardia > 90 bpm;Tachypnea > 20 resp per min  -RS        Are two or more of the above signs & symptoms of infection both present and new to the patient? Yes (Proceed)  -RS        Assess for evidence of organ dysfunction: Are any of the below criteria present within 6 hours of suspected infection and SIRS criteria that are NOT considered to be chronic conditions? --  -RS        Date of presentation of severe sepsis --        Time of presentation of severe sepsis --        Sepsis Note: Click \"NEXT\" below (NOT \"close\") to generate sepsis note based on above information. YES (proceed by clicking \"NEXT\")  -RS                  User Key  (r) = Recorded By, (t) = Taken By, (c) = Cosigned By      Initials Name Provider Type    RS ANTONIO Rubio Nurse Practitioner                        Body mass index is 17.54 kg/m².  Wt Readings from Last 1 Encounters:   02/25/25 50.8 kg (111 lb 15.9 oz)     IBW (Ideal Body Weight): 66.1 kg    Ideal body weight: 66.1 kg (145 lb 11.6 oz)    "

## 2025-02-25 NOTE — ASSESSMENT & PLAN NOTE
History of prediabetes with A1c of 6  On systemic steroids for COPD exacerbation  Check A1c  Start sliding scale insulin algorithm 3 for glycemic control  Currently n.p.o. but will need carb controlled diet when taking a diet

## 2025-02-25 NOTE — ASSESSMENT & PLAN NOTE
Patient with a history of COPD/emphysema current tobacco smoker.  Does not follow with pulmonary service and is not on inhalers at home.  He was recently ordered Combivent and Medrol pack by his primary care practice on 2/20/2025  Per patient and chart review patient has had a cough, and shortness of breath for the last 2 months.  Over the last few weeks shortness of breath and cough have increased and he has had decreased p.o. intake and increased weakness.  Denies fevers, nausea, vomiting, diarrhea  Patient with recent travel to far [place], was on a cruise but he had to delay his trip short due to feeling unwell.  He returned home on 2/16.    Patient short of breath and with an SpO2 of 68 on room air in the ER.  He was placed on BiPAP for work of breathing and hypoxia  Initial ABG 7.22/7.5/62/31  Given Solu-Medrol 80 mg in the ER and started on empiric antibiotics  Continue BiPAP and admit to stepdown 1 status  Plan to repeat ABG later today  Continue Solu-Medrol 40 3 times daily  Start Xopenex and Atrovent 4 times daily  Continue empiric antibiotics as discussed below  SpO2 goal greater than 88%

## 2025-02-25 NOTE — ASSESSMENT & PLAN NOTE
In 2019 patient sustained a ischemic CVA in the right cerebellum and left frontal lobe.  As residual balance and visual issues  PT OT consulted

## 2025-02-25 NOTE — ED PROVIDER NOTES
Time reflects when diagnosis was documented in both MDM as applicable and the Disposition within this note       Time User Action Codes Description Comment    2/25/2025  6:22 AM Cristina Clements Add [J96.01,  J96.02] Acute respiratory failure with hypoxia and hypercapnia (HCC)     2/25/2025  6:33 AM Cristina Clements Add [J44.1] COPD exacerbation (HCC)           ED Disposition       ED Disposition   Admit    Condition   Stable    Date/Time   Tue Feb 25, 2025  7:18 AM    Comment   Case was discussed with ICU and the patient's admission status was agreed to be Admission Status: inpatient status to the service of Dr. ABEL Olivares               Assessment & Plan       Medical Decision Making  74-year-old male with increased work of breathing, wheezes poor air intake differential diagnosis includes pneumonia, pneumothorax, ACS, arrhythmia, CHF exacerbation, COPD exacerbation, VTE, pericardial effusion  Given fall on Eliquis will obtain imaging to further evaluate for intracranial bleeding, though lower suspicion given baseline neurologic exam    Amount and/or Complexity of Data Reviewed  Labs: ordered. Decision-making details documented in ED Course.  Radiology: ordered. Decision-making details documented in ED Course.    Risk  Prescription drug management.  Decision regarding hospitalization.        ED Course as of 02/25/25 2216   Tue Feb 25, 2025   0558 Procedure Note: EKG  Date/Time: 02/25/25 5:58 AM   Performed by: CRISTINA CLEMENTS  Authorized by: CRISTINA CLEMENTS  Indications / Diagnosis: SOB  ECG reviewed by me, the ED Provider: yes   The EKG demonstrates:  Rhythm:  sinus tachycardia, pvcs  Intervals: normal intervals  Axis: normal axis  QRS/Blocks: normal QRS  ST Changes: No acute ST Changes, no STD/TIM.    Biatrial enlargement     0558 Bedside echo with no pericardial effusion,?  Reduced EF   0622 pCO2, Art i-STAT(!!): 77.5   0623 XR chest portable  My review of imaging patchy bilateral infiltrates, no pleural effusion, no pneumothorax   0625  Critical Care Time Statement: Upon my evaluation, this patient had a high probability of imminent or life-threatening deterioration due to acute respiratory failure, which required my direct attention, intervention, and personal management.  I spent a total of 47 minutes directly providing critical care services, including interpretation of complex medical databases, evaluating for the presence of life-threatening injuries or illnesses, management of organ system failure(s) , complex medical decision making (to support/prevent further life-threatening deterioration)., and interpretation of hemodynamic data. This time is exclusive of procedures, teaching, treating other patients, family meetings, and any prior time recorded by providers other than myself.         0632 Currently saturating 92%, on BiPAP with FiO2 of 60%   Work of breathing significantly improved   0713 CT pe study w abdomen pelvis w contrast       Medications   methylPREDNISolone sodium succinate (Solu-MEDROL) injection 40 mg (has no administration in time range)   levalbuterol (XOPENEX) inhalation solution 1.25 mg (has no administration in time range)   ipratropium (ATROVENT) 0.02 % inhalation solution 0.5 mg (has no administration in time range)   chlorhexidine (PERIDEX) 0.12 % oral rinse 15 mL (has no administration in time range)   albuterol inhalation solution 10 mg (10 mg Nebulization Given 2/25/25 0553)   ipratropium (ATROVENT) 0.02 % inhalation solution 1 mg (1 mg Nebulization Given 2/25/25 0553)   sodium chloride 0.9 % inhalation solution 12 mL (12 mL Nebulization Given 2/25/25 0553)   cefepime (MAXIPIME) IVPB (premix in dextrose) 2,000 mg 50 mL (2,000 mg Intravenous New Bag 2/25/25 0731)   vancomycin (VANCOCIN) 1500 mg in sodium chloride 0.9% 250 mL IVPB (has no administration in time range)   azithromycin (ZITHROMAX) 500 mg in sodium chloride 0.9% 250mL IVPB 500 mg (has no administration in time range)   methylPREDNISolone sodium succinate  (Solu-MEDROL) injection 80 mg (80 mg Intravenous Given 2/25/25 0716)   magnesium sulfate 2 g/50 mL IVPB (premix) 2 g (0 g Intravenous Stopped 2/25/25 0746)   sodium chloride 0.9 % bolus 1,000 mL (1,000 mL Intravenous New Bag 2/25/25 0716)   iohexol (OMNIPAQUE) 350 MG/ML injection (MULTI-DOSE) 100 mL (100 mL Intravenous Given 2/25/25 0691)       ED Risk Strat Scores                                                History of Present Illness       Chief Complaint   Patient presents with    Fall     Pt fell at home and c/op of weakness.       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  Prior tattoo site from large polypectomy in the splenic flexure noted.  Pathology of colon polyps were tubular adenomas.  A 3 year recall advised.    IR BIOPSY INGUINAL LYMPH NODE  2/15/2023    IR PORT PLACEMENT  6/19/2023    SKULL FRACTURE ELEVATION  1984      Family History   Problem Relation Age of Onset    Heart disease Father     Colon polyps Father     Substance Abuse Neg Hx     Mental illness Neg Hx     Colon cancer Neg Hx       Social History     Tobacco Use    Smoking status: Every Day     Current packs/day: 0.50     Average packs/day: 0.5 packs/day  for 55.2 years (27.6 ttl pk-yrs)     Types: Cigarettes     Start date: 1970    Smokeless tobacco: Never   Vaping Use    Vaping status: Never Used   Substance Use Topics    Alcohol use: Not Currently    Drug use: Yes     Types: Marijuana     Comment: rare      E-Cigarette/Vaping    E-Cigarette Use Never User       E-Cigarette/Vaping Substances    Nicotine No     THC No     CBD No     Flavoring No     Other No     Unknown No       I have reviewed and agree with the history as documented.     74 year old male with history of COPD, emphysema, still smokes presents for evaluation of shortness of breath, generalized weakness and a fall this morning.  Patient and her mate provide history, she states that 2 weeks ago they went [place] out of [place], prior to the cruise he had a cough and decompensated throughout the cruise and had to get off the boat, they traveled from [place] through [place] and into [place] 5 days ago and since then he has been having worsening trouble breathing, cough, went to PCP and got started on steroids and antibiotics, chest x-ray was obtained yesterday, patient continued to have worsening shortness of breath and refused to go to the hospital until this morning when he was sitting on the toilet and fell off to the side due to generalized weakness.  He does take Eliquis for history of previous stroke with residual balance issues.  Has been compliant with his medications.  No specific chest pain, no nausea no vomiting no abdominal pain.  Patient has not been smoking for the last few days.  Does have a history of non-Hodgkin's lymphoma has been stable for over a year currently not on treatment.  No History of DVT or PE  Initial evaluation patient significantly short of breath, tachypneic with increased work of breathing and accessory muscle use, poor air intake with dampened bilateral breath sounds.  No external evidence of traumatic injury however does have mottled extremities.            Review of Systems   Constitutional:  Positive for fatigue. Negative for appetite change, chills and fever.   HENT:  Negative for rhinorrhea and sore throat.    Eyes:  Negative for photophobia and visual disturbance.   Respiratory:  Positive for cough and shortness of breath.    Cardiovascular:  Negative for chest pain and palpitations.   Gastrointestinal:  Negative for abdominal pain and diarrhea.   Genitourinary:  Negative for dysuria, frequency and urgency.   Skin:  Negative for rash.   Neurological:  Positive for weakness. Negative for dizziness.   All other systems reviewed and are negative.          Objective       ED Triage Vitals   Temperature Pulse Blood Pressure Respirations SpO2 Patient Position - Orthostatic VS   02/25/25 0544 02/25/25 0544 02/25/25 0544 02/25/25 0544 02/25/25 0544 02/25/25 0701   97.8 °F (36.6 °C) 102 133/80 (!) 31 (!) 68 % Lying      Temp Source Heart Rate Source BP Location FiO2 (%) Pain Score    02/25/25 0814 02/25/25 0701 02/25/25 0701 02/25/25 0615 02/25/25 0815    Axillary Monitor Right arm 100 No Pain      Vitals      Date and Time Temp Pulse SpO2 Resp BP Pain Score FACES Pain Rating User   02/25/25 2200 -- 85 98 % 24 127/69 -- --    02/25/25 2100 -- 84 98 % 22 94/59 -- --    02/25/25 2001 -- 105 96 % 34 132/67 -- --    02/25/25 2000 -- -- -- -- -- No Pain --    02/25/25 1900 98.8 °F (37.1 °C) 82 95 % 37 112/65 -- --    02/25/25 1800 -- 80 90 % 40 103/76 -- -- [facility]   02/25/25 1700 -- 83 100 % 25 94/61 -- -- [facility]   02/25/25 1640 -- -- 97 % -- -- -- --    02/25/25 1622 -- -- 99 % -- -- -- --    02/25/25 1612 -- -- 100 % -- -- -- --    02/25/25 1600 -- 92 100 % 30 103/55 No Pain -- [facility]   02/25/25 1547 -- -- 100 % -- -- -- --    02/25/25 1500 97.6 °F (36.4 °C) 88 99 % 30 114/64 -- -- [facility]   02/25/25 1430 -- 75 100 % 17 98/56 -- -- [facility]   02/25/25 1415 -- 74 100 % 13 90/54 -- -- [facility]   02/25/25 1414 -- -- 100 % -- -- -- --    02/25/25 1400 -- 82 98 % 14 97/57  -- -- [facility]   02/25/25 1340 -- 79 99 % 25 93/54 -- -- [facility]   02/25/25 1300 -- 84 94 % 24 82/50 -- -- [facility]   02/25/25 1245 -- -- 100 % -- -- -- --    02/25/25 1240 -- 87 95 % 32 105/59 -- -- [facility]   02/25/25 1235 -- 86 99 % 31 84/56 -- -- [facility]   02/25/25 1117 -- -- 95 % -- -- -- --    02/25/25 0848 -- -- 92 % -- -- -- --    02/25/25 0819 -- -- 92 % -- -- -- --    02/25/25 0815 -- -- -- -- -- No Pain -- [facility]   02/25/25 0814 98.8 °F (37.1 °C) 105 94 % 34 129/88 -- -- [facility]   02/25/25 0808 -- 105 96 % 32 129/88 -- -- [facility]   02/25/25 0734 -- 103 94 % 30 102/63 -- -- [facility]   02/25/25 0701 -- 118 97 % 32 94/69 -- -- [facility]   02/25/25 0651 -- -- 92 % -- -- -- -- [facility]   02/25/25 0545 -- -- 68 % -- -- -- -- [facility]   02/25/25 0544 97.8 °F (36.6 °C) 102 68 % 31 133/80 -- -- [facility]            Physical Exam  Vitals and nursing note reviewed.   Constitutional:       General: He is in acute distress.      Appearance: He is well-developed. He is ill-appearing.   HENT:      Head: Normocephalic and atraumatic.      Right Ear: External ear normal.      Left Ear: External ear normal.      Mouth/Throat:      Mouth: Mucous membranes are dry.   Eyes:      Conjunctiva/sclera: Conjunctivae normal.      Pupils: Pupils are equal, round, and reactive to light.   Neck:      Vascular: No JVD.      Trachea: No tracheal deviation.   Cardiovascular:      Rate and Rhythm: Regular rhythm. Tachycardia present.      Heart sounds: Normal heart sounds. No murmur heard.     No friction rub. No gallop.   Pulmonary:      Effort: Respiratory distress present.      Breath sounds: No stridor. Wheezing present. No rales.   Abdominal:      General: There is no distension.      Palpations: Abdomen is soft. There is no mass.      Tenderness: There is no abdominal tenderness. There is no guarding or rebound.   Musculoskeletal:         General: Normal range of motion.      Cervical back: Normal range of motion and neck supple.      Right lower leg: No edema.      Left lower leg: No  edema.      Comments: Port in right upper chest wall with no overlying erythema   Skin:     General: Skin is warm and dry.      Coloration: Skin is not pale.      Comments: Mottled extremities   Neurological:      General: No focal deficit present.      Mental Status: He is alert and oriented to person, place, and time.      Cranial Nerves: No cranial nerve deficit.         Results Reviewed       Procedure Component Value Units Date/Time    Strep Pneumoniae, Urine [[id number]]  (Normal) Collected: 02/25/25 1140    Lab Status: Final result Specimen: Urine, Catheter Updated: 02/25/25 1624     Strep pneumoniae antigen, urine Negative    Legionella antigen, urine [[id number]]  (Normal) Collected: 02/25/25 1140    Lab Status: Final result Specimen: Urine, Other Updated: 02/25/25 1624     Legionella Urinary Antigen Negative    Blood culture #1 [[id number]] Collected: 02/25/25 0733    Lab Status: Preliminary result Specimen: Blood from Arm, Left Updated: 02/25/25 1401     Blood Culture Received in Microbiology Lab. Culture in Progress.    Blood culture #2 [[id number]] Collected: 02/25/25 0705    Lab Status: Preliminary result Specimen: Blood from Arm, Right Updated: 02/25/25 1401     Blood Culture Received in Microbiology Lab. Culture in Progress.    HS Troponin I 4hr [[id number]]  (Normal) Collected: 02/25/25 1147    Lab Status: Final result Specimen: Blood from Line Updated: 02/25/25 1217     hs TnI 4hr 49 ng/L      Delta 4hr hsTnI 15 ng/L     Hemoglobin A1c w/EAG Estimation (Prechecked if no A1C within 90 days) [[id number]]  (Abnormal) Collected: 02/25/25 0705    Lab Status: Final result Specimen: Blood from Line Updated: 02/25/25 1142     Hemoglobin A1C 6.6 %       mg/dl     FLU/RSV/COVID - if FLU/RSV clinically relevant [[id number]]  (Normal) Collected: 02/25/25 0852    Lab Status: Final result Specimen: Nares from Nose Updated: 02/25/25 0946     SARS-CoV-2 Negative     INFLUENZA A PCR Negative     INFLUENZA B PCR  Negative     RSV PCR Negative    Narrative:      This test has been performed using the CoV-2/Flu/RSV plus assay on the [employer] GeneXpert platform. This test has been validated by the  and verified by the performing laboratory.     This test is designed to amplify and detect the following: nucleocapsid (N), envelope (E), and RNA-dependent RNA polymerase (RdRP) genes of the SARS-CoV-2 genome; matrix (M), basic polymerase (PB2), and acidic protein (PA) segments of the influenza A genome; matrix (M) and non-structural protein (NS) segments of the influenza B genome, and the nucleocapsid genes of RSV A and RSV B.     Positive results are indicative of the presence of Flu A, Flu B, RSV, and/or SARS-CoV-2 RNA. Positive results for SARS-CoV-2 or suspected novel influenza should be reported to state, local, or federal health departments according to local reporting requirements.      All results should be assessed in conjunction with clinical presentation and other laboratory markers for clinical management.     FOR PEDIATRIC PATIENTS - copy/paste COVID Guidelines URL to browser: [email or web address]       HS Troponin I 2hr [[id number]]  (Abnormal) Collected: 02/25/25 0857    Lab Status: Final result Specimen: Blood from Central Venous Line Updated: 02/25/25 0932     hs TnI 2hr 50 ng/L      Delta 2hr hsTnI 16 ng/L     Protime-INR [[id number]]  (Abnormal) Collected: 02/25/25 0857    Lab Status: Final result Specimen: Blood from Line, Venous Updated: 02/25/25 0926     Protime 18.5 seconds      INR 1.50    Narrative:      INR Therapeutic Range    Indication                                             INR Range      Atrial Fibrillation                                               2.0-3.0  Hypercoagulable State                                    2.0.2.3  Left Ventricular Asist Device                            2.0-3.0  Mechanical Heart Valve                                   -    Aortic(with afib, MI, embolism, HF, LA enlargement,    and/or coagulopathy)                                     2.0-3.0 (2.5-3.5)     Mitral                                                             2.5-3.5  Prosthetic/Bioprosthetic Heart Valve               2.0-3.0  Venous thromboembolism (VTE: VT, PE        2.0-3.0    APTT [[id number]]  (Normal) Collected: 02/25/25 0857    Lab Status: Final result Specimen: Blood from Line, Venous Updated: 02/25/25 0926     PTT 28 seconds     Magnesium [[id number]]  (Normal) Collected: 02/25/25 0705    Lab Status: Final result Specimen: Blood from Line, Venous Updated: 02/25/25 0816     Magnesium 2.2 mg/dL     Phosphorus [[id number]]  (Abnormal) Collected: 02/25/25 0705    Lab Status: Final result Specimen: Blood from Line, Venous Updated: 02/25/25 0816     Phosphorus 5.9 mg/dL     RBC Morphology Reflex Test [[id number]] Collected: 02/25/25 0705    Lab Status: Final result Specimen: Blood from Line Updated: 02/25/25 0801    CBC and differential [[id number]]  (Abnormal) Collected: 02/25/25 0705    Lab Status: Final result Specimen: Blood from Line Updated: 02/25/25 0800     WBC 20.69 Thousand/uL      RBC 4.35 Million/uL      Hemoglobin 14.6 g/dL      Hematocrit 45.1 %       fL      MCH 33.6 pg      MCHC 32.4 g/dL      RDW 15.2 %      MPV 11.0 fL      Platelets 466 Thousands/uL     Narrative:      This is an appended report.  These results have been appended to a previously verified report.    Manual Differential(PHLEBS Do Not Order) [[id number]]  (Abnormal) Collected: 02/25/25 0705    Lab Status: Final result Specimen: Blood from Line Updated: 02/25/25 0800     Segmented % 75 %      Bands % 11 %      Lymphocytes % 1 %      Monocytes % 11 %      Eosinophils % 0 %      Basophils % 0 %      Atypical Lymphocytes % 2 %      Absolute Neutrophils 17.79 Thousand/uL      Absolute Lymphocytes 0.62 Thousand/uL      Absolute Monocytes 2.28 Thousand/uL      Absolute Eosinophils  0.00 Thousand/uL      Absolute Basophils 0.00 Thousand/uL      Total Counted --     RBC Morphology Present     Platelet Estimate Increased     Anisocytosis Present     Julita Cells Present     Macrocytes Present     Poikilocytes Present     Polychromasia Present    Procalcitonin [[id number]]  (Normal) Collected: 02/25/25 0705    Lab Status: Final result Specimen: Blood from Line Updated: 02/25/25 0743     Procalcitonin 0.24 ng/ml     B-Type Natriuretic Peptide(BNP) [[id number]]  (Abnormal) Collected: 02/25/25 0705    Lab Status: Final result Specimen: Blood from Line Updated: 02/25/25 0741      pg/mL     HS Troponin 0hr (reflex protocol) [[id number]]  (Normal) Collected: 02/25/25 0705    Lab Status: Final result Specimen: Blood from Line Updated: 02/25/25 0739     hs TnI 0hr 34 ng/L     Comprehensive metabolic panel [[id number]]  (Abnormal) Collected: 02/25/25 0705    Lab Status: Final result Specimen: Blood from Line, Venous Updated: 02/25/25 0731     Sodium 143 mmol/L      Potassium 4.0 mmol/L      Chloride 101 mmol/L      CO2 33 mmol/L      ANION GAP 9 mmol/L      BUN 30 mg/dL      Creatinine 0.93 mg/dL      Glucose 170 mg/dL      Calcium 8.6 mg/dL      AST 68 U/L      ALT 39 U/L      Alkaline Phosphatase 86 U/L      Total Protein 6.2 g/dL      Albumin 3.5 g/dL      Total Bilirubin 0.63 mg/dL      eGFR 81 ml/min/1.73sq m     Narrative:      National Kid[employer] guidelines for Chronic Kidney Disease (CKD):     Stage 1 with normal or high GFR (GFR > 90 mL/min/1.73 square meters)    Stage 2 Mild CKD (GFR = 60-89 mL/min/1.73 square meters)    Stage 3A Moderate CKD (GFR = 45-59 mL/min/1.73 square meters)    Stage 3B Moderate CKD (GFR = 30-44 mL/min/1.73 square meters)    Stage 4 Severe CKD (GFR = 15-29 mL/min/1.73 square meters)    Stage 5 End Stage CKD (GFR <15 mL/min/1.73 square meters)  Note: GFR calculation is accurate only with a steady state creatinine    Lactic acid [[id number]]  (Normal)  Collected: 02/25/25 0705    Lab Status: Final result Specimen: Blood from Line, Venous Updated: 02/25/25 0730     LACTIC ACID 1.9 mmol/L     Narrative:      Result may be elevated if tourniquet was used during collection.    POCT Blood Gas (CG8+) [[id number]]  (Abnormal) Collected: 02/25/25 0607    Lab Status: Final result Specimen: Arterial Updated: 02/25/25 0617     pH, Art i-STAT 7.223     pCO2, Art i-STAT 77.5 mm HG      pO2, ART i-STAT 62.0 mm HG      BE, i-STAT 2 mmol/L      HCO3, Art i-STAT 31.9 mmol/L      CO2, i-STAT 34 mmol/L      O2 Sat, i-STAT 85 %      SODIUM, I-STAT 144 mmol/l      Potassium, i-STAT 3.3 mmol/L      Calcium, Ionized i-STAT 1.21 mmol/L      Hct, i-STAT 43 %      Hgb, i-STAT 14.6 g/dl      Glucose, i-STAT 169 mg/dl      Specimen Type ARTERIAL    UA w Reflex to Microscopic w Reflex to Culture [[id number]]     Lab Status: No result Specimen: Urine             CT pe study w abdomen pelvis w contrast   Final Interpretation by Kermit Vargas MD (02/25 0715)   Addendum (preliminary) 1 of 1 by Kermit Vargas MD (02/25 0715)   ADDENDUM:      COPD.      Final      CTA chest:      No pulmonary embolus.      No acute posttraumatic thoracic process.      No definitive acute fracture allowing for motion artifact.      Bilateral multi lobar tree-in-bud infiltrates may represent inflammatory or infectious bronchiolitis or aspiration pneumonitis.      Additional chronic findings and negatives as above.      CT abdomen and pelvis:      No evidence of acute abdominopelvic process.      No definitive acute fracture allowing for motion artifact.      Left para-aortic adenopathy slightly improved since December 2024.      Additional chronic findings and negatives as above.      The study was marked in EPIC for immediate notification.               Workstation performed: [id number]         TRAUMA - CT spine cervical wo contrast   Final Interpretation by Taiwo Willis DO  "(02/25 0648)      Mild cervical spondylitic degenerative change with no acute osseous abnormality.      This examination was marked \"immediate notification\" in Epic in order to begin the standard process by which the radiology reading room liaison alerts the referring practitioner.                  Workstation performed: [id number]         TRAUMA - CT head wo contrast   Final Interpretation by Taiwo Willis DO (02/25 0649)      No acute intracranial abnormality.  Stable chronic microangiopathic changes within the brain.      This examination was marked \"immediate notification\" in Epic in order to begin the standard process by which the radiology reading room liaison alerts the referring practitioner.                  Workstation performed: [id number]         XR chest portable   Final Interpretation by Melia Salazar MD (02/25 0913)      No significant change since prior study.            Workstation performed: [id number]             Procedures    ED Medication and Procedure Management   Prior to Admission Medications   Prescriptions Last Dose Informant Patient Reported? Taking?   Eliquis 5 MG 2/24/2025 Self No Yes   Sig: Take 1 tablet by mouth twice daily   Omeprazole 20 MG TBDD 2/24/2025  Yes Yes   Sig: Take 20 mg by mouth in the morning   amLODIPine (NORVASC) 2.5 mg tablet 2/24/2025 Self No Yes   Sig: Take 1 tablet (2.5 mg total) by mouth daily   amoxicillin (AMOXIL) 500 mg capsule More than a month Self Yes No   Sig: if needed   aspirin (ECOTRIN LOW STRENGTH) 81 mg EC tablet 2/24/2025 Morning Self No Yes   Sig: Take 1 tablet (81 mg total) by mouth daily   atorvastatin (LIPITOR) 80 mg tablet 2/24/2025 Self No Yes   Sig: Take 1 tablet by mouth once daily   ipratropium-albuterol (Combivent Respimat) inhaler   No No   Sig: Inhale 1 puff 4 (four) times a day   methylPREDNISolone 4 MG tablet therapy pack 2/24/2025  No Yes   Sig: Use as directed on package      Facility-Administered Medications: None "     Current Discharge Medication List        CONTINUE these medications which have NOT CHANGED    Details   amLODIPine (NORVASC) 2.5 mg tablet Take 1 tablet (2.5 mg total) by mouth daily  Qty: 90 tablet, Refills: 2    Associated Diagnoses: Embolic stroke (HCC)      aspirin (ECOTRIN LOW STRENGTH) 81 mg EC tablet Take 1 tablet (81 mg total) by mouth daily    Associated Diagnoses: Amaurosis fugax      atorvastatin (LIPITOR) 80 mg tablet Take 1 tablet by mouth once daily  Qty: 30 tablet, Refills: 0    Associated Diagnoses: Pure hypercholesterolemia      Eliquis 5 MG Take 1 tablet by mouth twice daily  Qty: 180 tablet, Refills: 0    Associated Diagnoses: Embolic stroke (HCC)      methylPREDNISolone 4 MG tablet therapy pack Use as directed on package  Qty: 21 each, Refills: 0    Associated Diagnoses: Centrilobular emphysema (HCC); URI with cough and congestion      Omeprazole 20 MG TBDD Take 20 mg by mouth in the morning      amoxicillin (AMOXIL) 500 mg capsule if needed      ipratropium-albuterol (Combivent Respimat) inhaler Inhale 1 puff 4 (four) times a day  Qty: 4 g, Refills: 1    Associated Diagnoses: Obstructive chronic bronchitis with exacerbation (HCC)           No discharge procedures on file.  ED SEPSIS DOCUMENTATION   Time reflects when diagnosis was documented in both MDM as applicable and the Disposition within this note       Time User Action Codes Description Comment    2/25/2025  6:22 AM Riddhi Boles [J96.01,  J96.02] Acute respiratory failure with hypoxia and hypercapnia (HCC)     2/25/2025  6:33 AM Riddhi Boles [J44.1] COPD exacerbation (HCC)            Initial Sepsis Screening       Row Name 02/25/25 0812                Is the patient's history suggestive of a new or worsening infection? Yes (Proceed)  -RS        Suspected source of infection pneumonia  -RS        Indicate SIRS criteria Leukocytosis (WBC > 68327 IJL) OR Leukopenia (WBC <4000 IJL) OR Bandemia (WBC >10% bands);Tachycardia > 90  "bpm;Tachypnea > 20 resp per min  -RS        Are two or more of the above signs & symptoms of infection both present and new to the patient? Yes (Proceed)  -RS        Assess for evidence of organ dysfunction: Are any of the below criteria present within 6 hours of suspected infection and SIRS criteria that are NOT considered to be chronic conditions? --  -RS        Date of presentation of severe sepsis --        Time of presentation of severe sepsis --        Sepsis Note: Click \"NEXT\" below (NOT \"close\") to generate sepsis note based on above information. YES (proceed by clicking \"NEXT\")  -RS                  User Key  (r) = Recorded By, (t) = Taken By, (c) = Cosigned By      Initials Name Provider Type    RS ANTONIO Rubio Nurse Practitioner                       Riddhi Boles,   02/25/25 5632    "

## 2025-02-25 NOTE — ASSESSMENT & PLAN NOTE
Patient reports increasing weakness over the last 4 weeks, significantly over the last week.  Rep[place].  Prior to admission patient fell from sitting on toilet due to weakness  CT head/neck negative for acute findings  PT/OT consulted

## 2025-02-26 NOTE — ASSESSMENT & PLAN NOTE
History of prediabetes with A1c of 6  On systemic steroids for COPD exacerbation  A1c 6.6  SSI q6h for NPO with goal glucose 140-180  Currently n.p.o. but will need carb controlled diet when taking a diet  Will need to establish care with endo as OP

## 2025-02-26 NOTE — ASSESSMENT & PLAN NOTE
Patient reports increasing weakness over the last 4 weeks, significantly over the last week.  Reports needing a wheelchair [place] the airport recently.  Prior to admission patient fell from sitting on toilet due to weakness  2/25 CT head/neck negative for acute findings  PT/OT consulted  Fall precautions

## 2025-02-26 NOTE — ASSESSMENT & PLAN NOTE
Sirs: tachycardia, tachypnea, and leukocytosis  CAP classification 3 minor: tachypnea, multilobar infiltrates, elevated BUN; DRIP score 5: Abx within 60 days, COPD, poor function status, gastric acid suppression  2/25 CTA CAP: No PE; B/L multilobar tree-in-bud infiltrates; COPD  Given Azithromycin, Vanc and Cefepime in ED   PCT 0.24, trend  Patient had been on azithromycin as outpatient and completed a course just prior to admission  BC x2, MRSA pending   RP2 + Rhinovirus  Strep/legionella neg   Q-gold and AFB pending     Plan:  Abx D2: Vanc 1, Cefepime 1  Follow-up blood cultures, sputum culture, Legionella/strep/MRSA/COVID/flu/RSV

## 2025-02-26 NOTE — PHYSICAL THERAPY NOTE
Physical Therapy Cancellation Note       02/26/25 1222   PT Last Visit   PT Visit Date 02/26/25   Note Type   Note type Cancelled Session   Cancel Reasons Medical status   Additional Comments Patient is not appropriate for P.T. evaluation due to current medical status. Currently on a non-rebreather at 15L. Will continue to follow     Katherin Sanchez

## 2025-02-26 NOTE — PROGRESS NOTES
Jere Lepe is a 74 y.o. male who is currently ordered Vancomycin IV with management by the Pharmacy Consult service.  Relevant clinical data and objective / subjective history reviewed.  Vancomycin Assessment:  Indication and Goal AUC/Trough: Pneumonia (goal -600, trough >10), -600, trough >10  Clinical Status: improving  Micro:     Renal Function:  SCr:0.73 mg/dL  CrCl: 66.3 mL/min  Renal replacement: Not on dialysis  Days of Therapy: 2  Current Dose: 1250 mg q24h  Vancomycin Plan:  New Dosin mg Q12h  Estimated AUC: 495 mcg*hr/mL  Estimated Trough: 13.3 mcg/mL  Next Level: with AM labs on 25  Renal Function Monitoring: Daily BMP and UOP  Pharmacy will continue to follow closely for s/sx of nephrotoxicity, infusion reactions and appropriateness of therapy.  BMP and CBC will be ordered per protocol. We will continue to follow the patient’s culture results and clinical progress daily.    Estephania Oviedo, Pharmacist

## 2025-02-26 NOTE — PROGRESS NOTES
Progress Note - Critical Care/ICU   Name: Jere Lepe 74 y.o. male I MRN: [id number]  Unit/Bed#: -01 I Date of Admission: 2/25/2025   Date of Service: 2/26/2025 I Hospital Day: 1      Assessment & Plan  COPD with acute exacerbation (HCC)  Patient with a history of COPD/emphysema current tobacco smoker.  Does not follow with pulmonary service and is not on inhalers at home.  He was recently ordered Combivent and Medrol pack by his primary care practice on 2/20/2025  Per patient and chart review patient has had a cough, and shortness of breath for the last 2 months.  Over the last few weeks shortness of breath and cough have increased and he has had decreased p.o. intake and increased weakness.  Denies fevers, nausea, vomiting, diarrhea  Patient with recent travel to [place], was on a cruise but he had to delay his trip short due to feeling unwell.  He returned home on 2/16.    Patient short of breath and with an SpO2 of 68 on room air in the ER.  He was placed on BiPAP for work of breathing and hypoxia  Initial ABG 7.22/7.5/62/31  Given Solu-Medrol 80 mg in the ER and started on empiric antibiotics    Plan  Currently bipap 18/6 60%  Last vbg improving serially   Solumedrol 40mg q8h   Cont  Xopenex and Atrovent 4 times daily  Continue empiric antibiotics as discussed below  SpO2 goal greater than 88%  SIRS (systemic inflammatory response syndrome) ([facility])  Sirs: tachycardia, tachypnea, and leukocytosis  CAP classification 3 minor: tachypnea, multilobar infiltrates, elevated BUN; DRIP score 5: Abx within 60 days, COPD, poor function status, gastric acid suppression  2/25 CTA CAP: No PE; B/L multilobar tree-in-bud infiltrates; COPD  Given Azithromycin, Vanc and Cefepime in ED   PCT 0.24, trend  Patient had been on azithromycin as outpatient and completed a course just prior to admission  BC x2, MRSA pending   RP2 + Rhinovirus  Strep/legionella neg   Q-gold and AFB pending     Plan:  Abx D2: Vanc 1,  Cefepime 1  Follow-up blood cultures, sputum culture, Legionella/strep/MRSA/COVID/flu/RSV  Hypercholesterolemia  Holding home statin due to n.p.o.  Tobacco use disorder  Patient reports smoking 10 cigarettes a day  NRT offered but declined by patient  Continue to encourage cessation  Centrilobular emphysema (HCC)  Patient with a history of emphysema/COPD seen on imaging.  Not on any chronic inhalers as outpatient and does not see pulmonary medicine.  He is a current tobacco smoker, reports 10 cigarettes a day  Now admitted with COPD exacerbation, see plan above  Would benefit from establishing care with pulmonary services as outpatient and obtaining PFTs  Essential hypertension  Home amlodipine held due to n.p.o.  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  He was diagnosed with non-Hodgkin's lymphoma in 2023 status postchemotherapy.  Follows with [facility]  Prediabetes  History of prediabetes with A1c of 6  On systemic steroids for COPD exacerbation  A1c 6.6  SSI q6h for NPO with goal glucose 140-180  Currently n.p.o. 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  As residual balance and visual issues  PT OT consulted  Fall precautions   Fall  Patient reports increasing weakness over the last 4 weeks, significantly over the last week.  Reports needing a wheelchair [place] the airport recently.  Prior to admission patient fell from sitting on toilet due to weakness  2/25 CT head/neck negative for acute findings  PT/OT consulted  Fall precautions   Acute respiratory failure with hypoxia and hypercapnia (HCC)  In the setting of COPD AE and PNA POA   Required bipap on admission to ER, continued on admission to  ICU   Spo2 68% in ER, hypercapnic on ABG     Plan:   Treat underlying causes as above  Goal spo2 > 88%  Disposition: Stepdown Level 1    ICU Core Measures     A: Assess, Prevent, and Manage Pain Has pain been assessed? Yes  Need for changes to pain regimen? No   B: Both SAT/SAT  N/A   C: Choice of Sedation RASS Goal: N/A patient not on sedation  Need for changes to sedation or analgesia regimen? NA   D: Delirium CAM-ICU: Negative   E: Early Mobility  Plan for early mobility? Yes   F: Family Engagement Plan for family engagement today? Yes       Antibiotic Review: Awaiting culture results.     Review of Invasive Devices:      Central access plan:  port accessed on arrival      Prophylaxis:  VTE VTE covered by:    None       Stress Ulcer  covered bypantoprazole (PROTONIX) injection 40 mg [[id number]]         24 Hour Events : 73yo M with PMH COPD not on home regimen, hyper cholesterolemia, tobacco use, emphysema, hypertension, non-Hodgkin lymphoma of the lymph nodes, prediabetes, ataxia secondary to previous CVA who is hospital day 2 for COPD exacerbation, community-acquired pneumonia.  Overnight, he continued on BiPAP and tolerated well.  Serial blood gases improving.  He denies all complaints, endorsing only thirst.  RP2 panel positive for rhinovirus.  Subjective   Review of Systems: See HPI for Review of Systems    Objective :                   Vitals I/O      Most Recent Min/Max in 24hrs   Temp (!) 97.1 °F (36.2 °C) Temp  Min: 97.1 °F (36.2 °C)  Max: 98.8 °F (37.1 °C)   Pulse 80 Pulse  Min: 74  Max: 118   Resp (!) 28 Resp  Min: 13  Max: 40   /60 BP  Min: 82/50  Max: 133/80   O2 Sat 95 % SpO2  Min: 68 %  Max: 100 %      Intake/Output Summary (Last 24 hours) at 2/26/2025 0441  Last data filed at 2/26/2025 0000  Gross per 24 hour   Intake 1100 ml   Output 430 ml   Net 670 ml       Diet NPO; Sips with meds    Invasive Monitoring           Physical Exam   Physical Exam  Vitals and nursing note reviewed.   Skin:      General: Skin is warm.      Capillary Refill: Capillary refill takes less than 2 seconds.      Coloration: Skin is not pale.   HENT:      Head: Normocephalic and atraumatic.   Cardiovascular:      Rate and Rhythm: Normal rate and regular rhythm.   Musculoskeletal:      Right lower leg: No edema.      Left lower leg: No edema.   Abdominal: General: Abdomen is flat.      Palpations: Abdomen is soft.   Constitutional:       General: He is awake. He is not in acute distress.     Appearance: He is well-developed. He is ill-appearing.      Interventions: Face mask in place.   Pulmonary:      Effort: Pulmonary effort is normal.      Breath sounds: Decreased breath sounds and wheezing present.      Comments: Bipap 18/6, -600  Psychiatric:         Behavior: Behavior is cooperative.   Neurological:      General: No focal deficit present.      Mental Status: He is alert and oriented to person, place, and time.   Genitourinary/Anorectal:     Comments: Voiding independently          Diagnostic Studies        Lab Results: I have reviewed the following results:     Medications:  Scheduled PRN   cefepime, 2,000 mg, Q12H  chlorhexidine, 15 mL, Q12H JUSTIN  insulin lispro, 1-6 Units, Q6H JUSTIN  ipratropium, 0.5 mg, 4x Daily  levalbuterol, 1.25 mg, 4x Daily  methylPREDNISolone sodium succinate, 40 mg, Q8H JUSTIN  pantoprazole, 40 mg, Daily  vancomycin, 1,250 mg, Q24H          Continuous          Labs:   CBC    Recent Labs     02/25/25  0607 02/25/25  0705   WBC  --  20.69*   HGB 14.6 14.6   HCT 43 45.1   PLT  --  466*   BANDSPCT  --  11*     BMP    Recent Labs     02/25/25  0607 02/25/25  0705   SODIUM  --  143   K  --  4.0   CL  --  101   CO2 34* 33*   AGAP  --  9   BUN  --  30*   CREATININE  --  0.93   CALCIUM  --  8.6       Coags    Recent Labs     02/25/25  0857   INR 1.50*   PTT 28        Additional Electrolytes  Recent Labs     02/25/25  0607 02/25/25  0705   MG  --  2.2   PHOS  --  5.9*   CAIONIZED 1.21  --           Blood  Gas    Recent Labs     02/25/25  1501   PHART 7.319*   NUQ8SNN 64.9*   PO2ART 82.3   FMN7HYF 32.6*   BEART 4.7   SOURCE Radial, Left     Recent Labs     02/25/25  1501 02/25/25 1959   PHVEN  --  7.331   AMX7TBO  --  64.5*   PO2VEN  --  42.3   ITH5QKK  --  33.3*   BEVEN  --  5.6   Y2DKPFI  --  74.0   SOURCE Radial, Left  --     LFTs  Recent Labs     02/25/25  0705   ALT 39   AST 68*   ALKPHOS 86   ALB 3.5   TBILI 0.63       Infectious  Recent Labs     02/25/25  0705   PROCALCITONI 0.24     Glucose  Recent Labs     02/25/25  0705   GLUC 170*

## 2025-02-26 NOTE — ASSESSMENT & PLAN NOTE
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  He was diagnosed with non-Hodgkin's lymphoma in 2023 status postchemotherapy.  Follows with [facility]

## 2025-02-26 NOTE — ASSESSMENT & PLAN NOTE
Patient with a history of COPD/emphysema current tobacco smoker.  Does not follow with pulmonary service and is not on inhalers at home.  He was recently ordered Combivent and Medrol pack by his primary care practice on 2/20/2025  Per patient and chart review patient has had a cough, and shortness of breath for the last 2 months.  Over the last few weeks shortness of breath and cough have increased and he has had decreased p.o. intake and increased weakness.  Denies fevers, nausea, vomiting, diarrhea  Patient with recent travel to far [place], was on a cruise but he had to delay his trip short due to feeling unwell.  He returned home on 2/16.    Patient short of breath and with an SpO2 of 68 on room air in the ER.  He was placed on BiPAP for work of breathing and hypoxia  Initial ABG 7.22/7.5/62/31  Given Solu-Medrol 80 mg in the ER and started on empiric antibiotics    Plan  Currently bipap 18/6 60%  Last vbg improving serially   Solumedrol 40mg q8h   Cont  Xopenex and Atrovent 4 times daily  Continue empiric antibiotics as discussed below  SpO2 goal greater than 88%

## 2025-02-26 NOTE — ASSESSMENT & PLAN NOTE
In the setting of COPD AE and PNA POA   Required bipap on admission to ER, continued on admission to ICU   Spo2 68% in ER, hypercapnic on ABG     Plan:   Treat underlying causes as above  Goal spo2 > 88%

## 2025-02-26 NOTE — PLAN OF CARE
Problem: PAIN - ADULT  Goal: Verbalizes/displays adequate comfort level or baseline comfort level  Description: Interventions:  - Encourage patient to monitor pain and request assistance  - Assess pain using appropriate pain scale  - Administer analgesics based on type and severity of pain and evaluate response  - Implement non-pharmacological measures as appropriate and evaluate response  - Consider cultural and social influences on pain and pain management  - Notify physician/advanced practitioner if interventions unsuccessful or patient reports new pain  Outcome: Progressing     Problem: INFECTION - ADULT  Goal: Absence or prevention of progression during hospitalization  Description: INTERVENTIONS:  - Assess and monitor for signs and symptoms of infection  - Monitor lab/diagnostic results  - Monitor all insertion sites, i.e. indwelling lines, tubes, and drains  - Monitor endotracheal if appropriate and nasal secretions for changes in amount and color  - [facility] appropriate cooling/warming therapies per order  - Administer medications as ordered  - Instruct and encourage patient and family to use good hand hygiene technique  - Identify and instruct in appropriate isolation precautions for identified infection/condition  Outcome: Progressing  Goal: Absence of fever/infection during neutropenic period  Description: INTERVENTIONS:  - Monitor WBC    Outcome: Progressing     Problem: SAFETY ADULT  Goal: Patient will remain free of falls  Description: INTERVENTIONS:  - Educate patient/family on patient safety including physical limitations  - Instruct patient to call for assistance with activity   - Consult OT/PT to assist with strengthening/mobility   - Keep Call bell within reach  - Keep bed low and locked with side rails adjusted as appropriate  - Keep care items and personal belongings within reach  - Initiate and maintain comfort rounds  - Make Fall Risk Sign visible to staff  - Offer Toileting every 2 Hours,  in advance of need  - Initiate/Maintain bed alarm  - Obtain necessary fall risk management equipment  - Apply yellow socks and bracelet for high fall risk patients  - Consider moving patient to room near nurses station  Outcome: Progressing  Goal: Maintain or return to baseline ADL function  Description: INTERVENTIONS:  -  Assess patient's ability to carry out ADLs; assess patient's baseline for ADL function and identify physical deficits which impact ability to perform ADLs (bathing, care of mouth/teeth, toileting, grooming, dressing, etc.)  - Assess/evaluate cause of self-care deficits   - Assess range of motion  - Assess patient's mobility; develop plan if impaired  - Assess patient's need for assistive devices and provide as appropriate  - Encourage maximum independence but intervene and supervise when necessary  - Involve family in performance of ADLs  - Assess for home care needs following discharge   - Consider OT consult to assist with ADL evaluation and planning for discharge  - Provide patient education as appropriate  Outcome: Progressing  Goal: Maintains/Returns to pre admission functional level  Description: INTERVENTIONS:  - Perform AM-PAC 6 Click Basic Mobility/ Daily Activity assessment daily.  - Set and communicate daily mobility goal to care team and patient/family/caregiver.   - Collaborate with rehabilitation services on mobility goals if consulted  - Out of bed for toileting  - Record patient progress and toleration of activity level   Outcome: Progressing     Problem: DISCHARGE PLANNING  Goal: Discharge to home or other facility with appropriate resources  Description: INTERVENTIONS:  - Identify barriers to discharge w/patient and caregiver  - Arrange for needed discharge resources and transportation as appropriate  - Identify discharge learning needs (meds, wound care, etc.)  - Arrange for interpretive services to assist at discharge as needed  - Refer to Case Management Department for  coordinating discharge planning if the patient needs post-hospital services based on physician/advanced practitioner order or complex needs related to functional status, cognitive ability, or social support system  Outcome: Progressing     Problem: Knowledge Deficit  Goal: Patient/family/caregiver demonstrates understanding of disease process, treatment plan, medications, and discharge instructions  Description: Complete learning assessment and assess knowledge base.  Interventions:  - Provide teaching at level of understanding  - Provide teaching via preferred learning methods  Outcome: Progressing     Problem: RESPIRATORY - ADULT  Goal: Achieves optimal ventilation and oxygenation  Description: INTERVENTIONS:  - Assess for changes in respiratory status  - Assess for changes in mentation and behavior  - Position to facilitate oxygenation and minimize respiratory effort  - Oxygen administered by appropriate delivery if ordered  - Initiate smoking cessation education as indicated  - Encourage broncho-pulmonary hygiene including cough, deep breathe, Incentive Spirometry  - Assess the need for suctioning and aspirate as needed  - Assess and instruct to report SOB or any respiratory difficulty  - Respiratory Therapy support as indicated  Outcome: Progressing     Problem: Prexisting or High Potential for Compromised Skin Integrity  Goal: Skin integrity is maintained or improved  Description: INTERVENTIONS:  - Identify patients at risk for skin breakdown  - Assess and monitor skin integrity  - Assess and monitor nutrition and hydration status  - Monitor labs   - Assess for incontinence   - Turn and reposition patient  - Assist with mobility/ambulation  - Relieve pressure over bony prominences  - Avoid friction and shearing  - Provide appropriate hygiene as needed including keeping skin clean and dry  - Evaluate need for skin moisturizer/barrier cream  - Collaborate with interdisciplinary team   - Patient/family  teaching  - Consider wound care consult   Outcome: Progressing

## 2025-02-26 NOTE — ASSESSMENT & PLAN NOTE
In 2019 patient sustained a ischemic CVA in the right cerebellum and left frontal lobe.  As residual balance and visual issues  PT OT consulted  Fall precautions

## 2025-02-26 NOTE — SEPSIS NOTE
"  Sepsis Note   Jere Lepe 74 y.o. male MRN: [id number]  Unit/Bed#: -01 Encounter: [id number]       Initial Sepsis Screening       Row Name 02/26/25 0719 02/25/25 0812             Is the patient's history suggestive of a new or worsening infection? No  -NANI Yes (Proceed)  -RS       Suspected source of infection -- pneumonia  -RS       Indicate SIRS criteria -- Leukocytosis (WBC > 02027 IJL) OR Leukopenia (WBC <4000 IJL) OR Bandemia (WBC >10% bands);Tachycardia > 90 bpm;Tachypnea > 20 resp per min  -RS       Are two or more of the above signs & symptoms of infection both present and new to the patient? -- Yes (Proceed)  -RS       Assess for evidence of organ dysfunction: Are any of the below criteria present within 6 hours of suspected infection and SIRS criteria that are NOT considered to be chronic conditions? -- --  -RS       Date of presentation of severe sepsis -- --       Time of presentation of severe sepsis -- --       Sepsis Note: Click \"NEXT\" below (NOT \"close\") to generate sepsis note based on above information. -- YES (proceed by clicking \"NEXT\")  -RS                 User Key  (r) = Recorded By, (t) = Taken By, (c) = Cosigned By      Initials Name Provider Type    RS ANTONIO Rubio Nurse Practitioner    NANI Callahan PA-C Physician Assistant                        Body mass index is 17.96 kg/m².  Wt Readings from Last 1 Encounters:   02/26/25 52 kg (114 lb 10.2 oz)     IBW (Ideal Body Weight): 66.1 kg    Ideal body weight: 66.1 kg (145 lb 11.6 oz)    Sepsis time zero fired at 6:50AM  No evidence of worsening infection.    James Callahan PA-C      "

## 2025-02-26 NOTE — OCCUPATIONAL THERAPY NOTE
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 Currently on a non-rebreather at 15L. Will continue to follow as able & appropriate       Eugenia Carbone OTR/L

## 2025-02-26 NOTE — UTILIZATION REVIEW
Initial Clinical Review    Admission: Date/Time/Statement:   Admission Orders (From admission, onward)       Ordered        02/25/25 0719  INPATIENT ADMISSION  Once                          Orders Placed This Encounter   Procedures    INPATIENT ADMISSION     Standing Status:   Standing     Number of Occurrences:   1     Level of Care:   Level 1 Stepdown [13]     Estimated length of stay:   More than 2 Midnights     Certification:   I certify that inpatient services are medically necessary for this patient for a duration of greater than two midnights. See H&P and MD Progress Notes for additional information about the patient's course of treatment.     ED Arrival Information       Expected   -    Arrival   2/25/2025 05:35    Acuity   Emergent              Means of arrival   Ambulance    Escorted by   [facility]    Service   Critical Care/ICU    Admission type   Emergency              Arrival complaint   Weakness             Chief Complaint   Patient presents with    Fall     Pt fell at home and c/op of weakness.       Initial Presentation: 74 y.o. male  to ED via EMS from home.    Admitted to inpatient with Dx: Acute on chronic hypoxic and hypercapnic respiratory failure/Abnormal CT chest - diffuse tree in bud opacities new since December, with recent trip to [facility]/ COPD with acute exacerbation/ Nonhodgkins lymphoma with recent treatment in November.  Presented to ED with worsening shortness of breath since day prior to arrival, starting about 2 weeks ago. Prior to going on cruise about 2 weeks ago to [facility] with some cough, while on cruise worsened, got off boat and traveled from [place], through [facility] and into [place], arriving 5 days ago.  Went to PCP and started on antibiotics and steroids, continues to worsen.  CxR done yesterday showed bibasilar opacities. Did not want to come to ED, so weak day of arrival, fell off toilet.     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   On exam: acute distress. Appears ill.  Hypoxic. Mucous membranes dry.  Tachycardia.  Respiratory distress and wheezing.  Port in Right upper chest no erythema.  Mottled extremities.   Wbc 20.69.  . Initial ABG 7.22/77.5/62/31.    Imaging shows Bilateral multi lobar tree in bud infiltrates.  ED treatment:  given [place] Neb, cefepime, Solu medrol and mg.  Given IVF bolus.      Placed on oxygen 4 liters,  escalated to Bipap.   Plan includes  to admit to Stepdown 1.  Continue Bipap.  Start scheduled nebs.  Continue Solu medrol and empiric antibiotics.   Hold home amlodipine.  Start SSI.  PT/OT     Date: 2/26/25   Day 2:  Overnight tolerated Bipap.  Today complaints of thirst.  RP2 panel + rhinovirus.  Also concern for possible TB given recent travel and immunosuppression   On exam:  appears ill.  Decreased breath sounds and wheezing.  Wbc 22.13.  CO2 - 35.  Bun 28. Creatinine 0.73.   Continue Bipap.   Bronch today, likely need intubation.   Continue Xopenex and Atrovent TID. Continue Cefepime and Vanco.  Started on Levaquin.   Continue Solumedrol 40mg IV TID. Awaiting quantifeuron TB gold testing, AFB x3. Continue airborne isolation.  NPO.  Start tube feeds.  Hold home eliquis. Continue SCDs.  Accuchecks with SSI.      Patient has crossed 3 midnights and requires ongoing care    2/27/2025 .  Patient presents with using Bipap for sleep.  Yesterday able to tolerate simple mask.    On exam bowel sounds decreased.  Decreased breath sounds  Abnormal labs or imaging:  wbc 22.13.    Diagnosis/Plan    COPD exacerbation/SIRS. 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 He was diagnosed with non-Hodgkin's lymphoma in 2023 status postchemotherapy.   On continued IV steroids.  Scheduled nebs.  Antibiotics.   Declines bronch and intubation.   Q-gold indeterminate  and AFB pending.   Wean oxygen as able. PT/OT.  Psyche consulted.     2/27/25 per Psyche:  adjustment disorder with depressed mood.   Recommend Remeron at bedtime.   Does not meet criteria for 302 or inpatient [facility]     ED Treatment-Medication Administration from 02/25/2025 0535 to 02/25/2025 0757         Date/Time Order Dose Route Action     02/25/2025 0553 albuterol inhalation solution 10 mg 10 mg Nebulization Given     02/25/2025 0553 ipratropium (ATROVENT) 0.02 % inhalation solution 1 mg 1 mg Nebulization Given     02/25/2025 0553 sodium chloride 0.9 % inhalation solution 12 mL 12 mL Nebulization Given     02/25/2025 0731 cefepime (MAXIPIME) IVPB (premix in dextrose) 2,000 mg 50 mL 2,000 mg Intravenous New Bag     02/25/2025 0716 methylPREDNISolone sodium succinate (Solu-MEDROL) injection 80 mg 80 mg Intravenous Given     02/25/2025 0719 magnesium sulfate 2 g/50 mL IVPB (premix) 2 g 2 g Intravenous New Bag     02/25/2025 0716 sodium chloride 0.9 % bolus 1,000 mL 1,000 mL Intravenous New Bag            Scheduled Medications:  cefepime, 2,000 mg, Intravenous, Q12H  chlorhexidine, 15 mL, Mouth/Throat, Q12H JUSTIN  insulin lispro, 1-6 Units, Subcutaneous, Q6H JUSTIN  ipratropium, 0.5 mg, Nebulization, 4x Daily  levalbuterol, 1.25 mg, Nebulization, 4x Daily  levofloxacin, 750 mg, Intravenous, Q24H  methylPREDNISolone sodium succinate, 40 mg, Intravenous, Q8H JUSTIN  pantoprazole, 40 mg, Intravenous, Daily  vancomycin, 1,250 mg, Intravenous, Q24H      azithromycin (ZITHROMAX) 500 mg in sodium chloride 0.9% 250mL IVPB 500 mg  Dose: 500 mg  Freq: Once Route: IV  Last Dose: Stopped (02/25/25 1200)  Start: 02/25/25 0630 End: 02/25/25 1200   multi-electrolyte (PLASMALYTE-A/ISOLYTE-S PH 7.4) IV solution 500 mL  Dose: 500 mL  Freq: Once Route: IV  Last Dose: Stopped (02/25/25 1510)  Start: 02/25/25 1345 End: 02/25/25 1510  pneumococcal 20-yvon conj vacc (PREVNAR 20) IM Injection 0.5 mL  Dose: 0.5 mL  Freq: Once Route: IM  Start:  02/25/25 1830 End: 02/25/25 1839    Continuous IV Infusions:     PRN Meds: not used.   ipratropium, 0.5 mg, Nebulization, 4x Daily PRN  levalbuterol, 1.25 mg, Nebulization, 4x Daily PRN      ED Triage Vitals   Temperature Pulse Respirations Blood Pressure SpO2 Pain Score   02/25/25 0544 02/25/25 0544 02/25/25 0544 02/25/25 0544 02/25/25 0544 02/25/25 0815   97.8 °F (36.6 °C) 102 (!) 31 133/80 (!) 68 % No Pain     Weight (last 2 days)       Date/Time Weight    02/27/25 0600 --     Weight: pt refused at 02/27/25 0600    02/26/25 0533 52 (114.64)    02/25/25 0808 50.8 (111.99)    02/25/25 0544 55.3 (122)            Vital Signs (last 3 days)       Date/Time Temp Pulse Resp BP MAP (mmHg) SpO2 FiO2 (%) Calculated FIO2 (%) - Nasal Cannula O2 Flow Rate (L/min) Nasal Cannula O2 Flow Rate (L/min) O2 Device O2 Interface Device Patient Position - Orthostatic VS Jj Coma Scale Score Pain    02/27/25 0800 -- -- -- -- -- 99 % -- -- 15 L/min -- Non-rebreather mask -- -- -- --    02/27/25 0600 -- 79 17 114/62 82 100 % -- -- -- -- -- -- -- -- --    02/27/25 0500 -- 79 18 110/64 82 100 % -- -- -- -- -- -- -- -- --    02/27/25 0400 -- 74 18 100/57 74 100 % -- -- -- -- -- -- -- 15 No Pain    02/27/25 0300 97.9 °F (36.6 °C) 79 21 125/62 87 100 % -- -- -- -- BiPAP -- Lying -- --    02/27/25 0211 -- -- -- -- -- -- 80 -- 15 L/min -- BiPAP Face mask -- -- --    02/27/25 0200 -- 71 15 97/54 71 96 % -- -- -- -- -- -- -- -- --    02/27/25 0100 -- 79 24 113/64 83 100 % -- -- -- -- -- -- -- -- --    02/27/25 0027 -- -- -- -- -- -- -- -- -- -- -- Face mask -- -- --    02/27/25 0000 -- 94 31 142/80 105 100 % -- -- -- -- -- -- -- 15 No Pain    02/26/25 2300 -- 82 20 114/67 86 99 % -- -- -- -- -- -- -- -- --    02/26/25 2200 -- 83 31 120/59 84 99 % -- -- -- -- -- -- -- -- --    02/26/25 2100 -- 83 17 118/65 85 100 % -- -- -- -- -- -- -- -- --    02/26/25 2045 -- -- -- -- -- -- 80 -- -- -- BiPAP Face mask -- -- --    02/26/25 2000 -- 85 19 109/62  80 100 % -- -- -- -- -- -- -- 15 No Pain    02/26/25 1913 98.6 °F (37 °C) 89 17 138/66 95 97 % 80 -- -- -- BiPAP Face mask Lying -- --    02/26/25 1900 -- 83 24 138/66 95 91 % -- -- -- -- -- -- -- -- --    02/26/25 1800 -- 83 26 124/65 89 100 % -- -- -- -- -- -- -- -- --    02/26/25 1700 97.3 °F (36.3 °C) 93 28 131/71 96 98 % -- -- -- -- -- -- -- -- --    02/26/25 1612 -- 82 31 122/66 89 98 % -- 80 15 L/min 15 L/min Non-rebreather mask -- -- -- --    02/26/25 1600 -- 82 22 122/66 89 99 % -- -- -- -- -- -- -- 15 No Pain    02/26/25 1500 -- 84 25 120/64 87 99 % -- -- -- -- -- -- -- -- --    02/26/25 1400 -- 83 25 117/65 85 100 % -- -- -- -- -- -- -- -- --    02/26/25 1315 -- -- -- -- -- 100 % -- -- -- -- -- -- -- -- --    02/26/25 1302 -- -- -- -- -- 100 % -- -- 15 L/min -- Non-rebreather mask -- -- -- --    02/26/25 1301 -- -- -- -- -- 100 % -- -- -- -- -- -- -- -- --    02/26/25 1300 -- 82 40 92/57 69 -- -- -- -- -- -- -- -- -- --    02/26/25 1200 -- 84 43 103/57 75 99 % -- -- -- -- -- -- -- 15 No Pain    02/26/25 1102 -- -- -- -- -- 93 % -- 80 -- 15 L/min Non-rebreather mask -- -- -- --    02/26/25 1100 -- 92 29 135/64 92 100 % -- -- -- -- -- -- -- -- --    02/26/25 1000 -- 91 30 148/74 104 100 % -- -- -- -- -- -- -- -- --    02/26/25 0900 -- 97 33 137/69 96 98 % -- -- -- -- -- -- -- -- --    02/26/25 0850 -- -- -- -- -- 88 % -- -- -- -- -- Face mask -- -- --    02/26/25 0830 -- -- -- -- -- 96 % -- -- -- -- -- MFNC prongs -- -- --    02/26/25 0802 97.7 °F (36.5 °C) -- -- -- -- 98 % -- -- -- -- BiPAP -- Lying -- No Pain    02/26/25 0800 -- 93 17 135/74 99 98 % -- -- -- -- -- -- -- 15 No Pain    02/26/25 0759 -- -- -- -- -- 99 % -- -- -- -- -- -- -- -- --    02/26/25 0742 -- -- -- -- -- 100 % -- -- -- -- BiPAP Full face mask -- -- --    02/26/25 0600 -- 81 31 105/67 80 96 % -- -- -- -- -- -- -- -- --    02/26/25 0500 -- 82 28 132/65 92 99 % -- -- -- -- -- -- -- -- --    02/26/25 0400 -- 78 18 107/63 80 96 % -- -- --  -- -- -- -- 15 No Pain    02/26/25 0349 97.1 °F (36.2 °C) 80 28 -- -- 95 % -- -- -- -- BiPAP -- -- -- --    02/26/25 0303 -- 80 21 114/60 81 97 % 60 -- -- -- BiPAP Full face mask -- -- --    02/26/25 0300 -- 81 18 114/60 81 98 % -- -- -- -- -- -- -- -- --    02/26/25 0116 -- -- -- -- -- 92 % 60 -- -- -- BiPAP -- -- -- --    02/26/25 0100 -- 80 21 128/63 90 98 % -- -- -- -- -- -- -- -- --    02/26/25 0000 98 °F (36.7 °C) 74 19 106/58 76 99 % -- -- -- -- -- -- -- 15 No Pain    02/25/25 2302 -- -- -- -- -- -- -- -- -- -- -- Full face mask -- -- --    02/25/25 2300 -- 76 18 90/58 70 95 % -- -- -- -- -- -- -- -- --    02/25/25 2200 -- 85 24 127/69 91 98 % -- -- -- -- -- -- -- -- --    02/25/25 2100 -- 84 22 94/59 73 98 % -- -- -- -- -- -- -- -- --    02/25/25 2001 -- 105 34 132/67 91 96 % -- -- -- -- -- -- -- -- --    02/25/25 2000 -- -- -- -- -- -- -- -- -- -- -- -- -- 15 No Pain    02/25/25 1949 -- -- -- -- -- -- 70 -- -- -- BiPAP Full face mask -- -- --    02/25/25 1900 98.8 °F (37.1 °C) 82 37 112/65 83 95 % -- -- -- -- BiPAP -- Sitting -- --    02/25/25 1800 -- 80 40 103/76 86 90 % -- -- -- -- -- -- -- -- --    02/25/25 1700 -- 83 25 94/61 72 100 % -- -- -- -- -- -- -- -- --    02/25/25 1640 -- -- -- -- -- 97 % 60 -- -- -- BiPAP Full face mask -- -- --    02/25/25 1622 -- -- -- -- -- 99 % -- 44 -- 6 L/min Nasal cannula -- -- -- --    02/25/25 1612 -- -- -- -- -- 100 % 60 -- -- -- -- -- -- -- --    02/25/25 1600 -- 92 30 103/55 74 100 % -- -- -- -- -- -- -- 15 No Pain    02/25/25 1547 -- -- -- -- -- 100 % 70 -- -- -- BiPAP Full face mask -- -- --    02/25/25 1500 97.6 °F (36.4 °C) 88 30 114/64 84 99 % -- -- -- -- -- -- -- -- --    02/25/25 1437 -- -- -- -- -- -- 70 -- -- -- -- -- -- -- --    02/25/25 1430 -- 75 17 98/56 70 100 % -- -- -- -- -- -- -- -- --    02/25/25 1415 -- 74 13 90/54 67 100 % -- -- -- -- -- -- -- -- --    02/25/25 1414 -- -- -- -- -- 100 % 70 -- -- -- BiPAP -- -- -- --    02/25/25 1400 -- 82 14  97/57 69 98 % -- -- -- -- -- -- -- -- --    02/25/25 1340 -- 79 25 93/54 68 99 % -- -- -- -- -- -- -- -- --    02/25/25 1300 -- 84 24 82/50 61 94 % -- -- -- -- -- -- -- -- --    02/25/25 1245 -- -- -- -- -- 100 % 80 -- -- -- BiPAP Full face mask -- -- --    02/25/25 1240 -- 87 32 105/59 76 95 % -- -- -- -- -- -- -- -- --    02/25/25 1235 -- 86 31 84/56 66 99 % -- -- -- -- -- -- -- -- --    02/25/25 1200 -- -- -- -- -- -- -- -- -- -- -- -- -- 15 --    02/25/25 1117 -- -- -- -- -- 95 % 90 -- -- -- BiPAP Full face mask -- -- --    02/25/25 0848 -- -- -- -- -- 92 % -- -- -- -- -- -- -- -- --    02/25/25 0819 -- -- -- -- -- 92 % 70 -- -- -- BiPAP Full face mask -- -- --    02/25/25 0815 -- -- -- -- -- -- -- -- -- -- -- -- -- 15 No Pain    02/25/25 0814 98.8 °F (37.1 °C) 105 34 129/88 103 94 % -- -- -- -- -- -- -- -- --    02/25/25 0808 -- 105 32 129/88 103 96 % -- -- -- -- BiPAP -- Lying -- --    02/25/25 0758 -- -- -- -- -- -- -- -- -- -- -- Full face mask -- -- --    02/25/25 0734 -- 103 30 102/63 78 94 % -- -- -- -- BiPAP -- Lying -- --    02/25/25 07:01:07 -- 118 32 94/69 78 97 % -- -- -- -- BiPAP -- Lying -- --    02/25/25 0651 -- -- -- -- -- 92 % -- -- -- -- -- -- -- -- --    02/25/25 0636 -- -- -- -- -- -- 80  -- -- -- -- -- -- -- --    02/25/25 0615 -- -- -- -- -- -- 100 -- -- -- BiPAP Face mask -- -- --    02/25/25 0600 -- -- -- -- -- -- -- -- -- -- BiPAP -- -- -- --    02/25/25 0553 -- -- -- -- -- -- -- 36 -- 4 L/min Nasal cannula -- -- -- --    02/25/25 0545 -- -- -- -- -- 68 % -- -- -- -- -- -- -- -- --    02/25/25 0544 97.8 °F (36.6 °C) 102 31 133/80 -- 68 % -- -- -- -- None (Room air) -- -- 15 --          Pertinent Labs/Diagnostic Test Results:   Radiology:  CT pe study w abdomen pelvis w contrast   Final Interpretation by Kermit Vargas MD (02/25 0715)   Addendum (preliminary) 1 of 1 by Kermit Vargas MD (02/25 0715)   ADDENDUM:      COPD.      Final      CTA chest:      No  "pulmonary embolus.      No acute posttraumatic thoracic process.      No definitive acute fracture allowing for motion artifact.      Bilateral multi lobar tree-in-bud infiltrates may represent inflammatory or infectious bronchiolitis or aspiration pneumonitis.      Additional chronic findings and negatives as above.      CT abdomen and pelvis:      No evidence of acute abdominopelvic process.      No definitive acute fracture allowing for motion artifact.      Left para-aortic adenopathy slightly improved since December 2024.      Additional chronic findings and negatives as above.      The study was marked in EPIC for immediate notification.               Workstation performed: [id number]         TRAUMA - CT spine cervical wo contrast   Final Interpretation by Taiwo Willis DO (02/25 0648)      Mild cervical spondylitic degenerative change with no acute osseous abnormality.      This examination was marked \"immediate notification\" in Epic in order to begin the standard process by which the radiology reading room liaison alerts the referring practitioner.                  Workstation performed: [id number]         TRAUMA - CT head wo contrast   Final Interpretation by Taiwo Willis DO (02/25 0649)      No acute intracranial abnormality.  Stable chronic microangiopathic changes within the brain.      This examination was marked \"immediate notification\" in Epic in order to begin the standard process by which the radiology reading room liaison alerts the referring practitioner.                  Workstation performed: [id number]         XR chest portable   Final Interpretation by Melia Salazar MD (02/25 0913)      No significant change since prior study.            Workstation performed: [id number]           2/24/25 OP CxR - New patchy bibasilar opacities, concerning for atelectasis, pneumonia or aspiration. Small bilateral pleural effusions.     Cardiology:  ECG 12 lead    by Yuridia, Walter " Results In (02/26 1038)      ECG 12 lead    by Interface, Ris Results In (02/25 0555)      ECG 12 lead    by Interface, Ris Results In (02/25 0555)        Procedure Note: EKG  Date/Time: 02/25/25 5:58 AM   Indications / Diagnosis: SOB  ECG reviewed by  the ED Provider: yes   The EKG demonstrates:  Rhythm:  sinus tachycardia, pvcs  Intervals: normal intervals  Axis: normal axis  QRS/Blocks: normal QRS  ST Changes: No acute ST Changes, no STD/TIM.       GI:  No orders to display       Results from last 7 days   Lab Units 02/25/25  1140 02/25/25  0852   SARS-COV-2  Not Detected Negative     Results from last 7 days   Lab Units 02/26/25  0532 02/25/25  0705 02/25/25  0607   WBC Thousand/uL 22.13* 20.69*  --    HEMOGLOBIN g/dL 11.4* 14.6  --    I STAT HEMOGLOBIN g/dl  --   --  14.6   HEMATOCRIT % 35.4* 45.1  --    HEMATOCRIT, ISTAT %  --   --  43   PLATELETS Thousands/uL 350 466*  --    BANDS PCT %  --  11*  --      Results from last 7 days   Lab Units 02/26/25  0455 02/25/25  0705 02/25/25  0607   SODIUM mmol/L 147 143  --    POTASSIUM mmol/L 4.1 4.0  --    CHLORIDE mmol/L 106 101  --    CO2 mmol/L 35* 33*  --    CO2, I-STAT mmol/L  --   --  34*   ANION GAP mmol/L 6 9  --    BUN mg/dL 28* 30*  --    CREATININE mg/dL 0.73 0.93  --    EGFR ml/min/1.73sq m 92 81  --    CALCIUM mg/dL 8.6 8.6  --    CALCIUM, IONIZED mmol/L 1.15  --   --    CALCIUM, IONIZED, ISTAT mmol/L  --   --  1.21   MAGNESIUM mg/dL  --  2.2  --    PHOSPHORUS mg/dL  --  5.9*  --      Results from last 7 days   Lab Units 02/26/25  0455 02/25/25  0705   AST U/L 28 68*   ALT U/L 26 39   ALK PHOS U/L 70 86   TOTAL PROTEIN g/dL 5.6* 6.2*   ALBUMIN g/dL 3.1* 3.5   TOTAL BILIRUBIN mg/dL 0.47 0.63     Results from last 7 days   Lab Units 02/27/25  0549 02/26/25  2359 02/26/25  1826 02/26/25  1107 02/26/25  0531 02/26/25  0118 02/26/25  0022 02/25/25  1747 02/25/25  1240 02/25/25  0823   POC GLUCOSE mg/dl 133 151* 137 134 95 136 130 135 152* 133     Results from  last 7 days   Lab Units 02/26/25  0455 02/25/25  0705   GLUCOSE RANDOM mg/dL 136 170*     Results from last 7 days   Lab Units 02/25/25  0705   HEMOGLOBIN A1C % 6.6*   EAG mg/dl 143     Results from last 7 days   Lab Units 02/25/25  1501 02/25/25  1212   PH ART  7.319* 7.279*   PCO2 ART mm Hg 64.9* 72.7*   PO2 ART mm Hg 82.3 191.3*   HCO3 ART mmol/L 32.6* 33.3*   BASE EXC ART mmol/L 4.7 4.3   O2 CONTENT ART mL/dL 16.9 18.8   O2 HGB, ARTERIAL % 93.6* 97.8*   ABG SOURCE  Radial, Left Radial, Left     Results from last 7 days   Lab Units 02/26/25 0455 02/25/25 1959   PH MARQUIS  7.343 7.331   PCO2 MARQUIS mm Hg 63.1* 64.5*   PO2 MARQUIS mm Hg 43.6 42.3   HCO3 MARQUIS mmol/L 33.5* 33.3*   BASE EXC MARQUIS mmol/L 6.0 5.6   O2 CONTENT MARQUIS ml/dL 13.4 13.1   O2 HGB, VENOUS % 75.8 74.0     Results from last 7 days   Lab Units 02/25/25  0607   I STAT BASE EXC mmol/L 2   I STAT O2 SAT % 85   ISTAT PH ART  7.223*   I STAT ART PCO2 mm HG 77.5*   I STAT ART PO2 mm HG 62.0*   I STAT ART HCO3 mmol/L 31.9*     Results from last 7 days   Lab Units 02/25/25  1147 02/25/25  0857 02/25/25  0705   HS TNI 0HR ng/L  --   --  34   HS TNI 2HR ng/L  --  50*  --    HSTNI D2 ng/L  --  16  --    HS TNI 4HR ng/L 49  --   --    HSTNI D4 ng/L 15  --   --      Results from last 7 days   Lab Units 02/25/25  0857   PROTIME seconds 18.5*   INR  1.50*   PTT seconds 28     Results from last 7 days   Lab Units 02/25/25  0705   PROCALCITONIN ng/ml 0.24     Results from last 7 days   Lab Units 02/25/25  0705   LACTIC ACID mmol/L 1.9     Results from last 7 days   Lab Units 02/25/25  0705   BNP pg/mL 326*     Results from last 7 days   Lab Units 02/25/25  1140 02/25/25  0852   STREP PNEUMONIAE ANTIGEN, URINE  Negative  --    LEGIONELLA URINARY ANTIGEN  Negative  --    INFLUENZA A PCR   --  Negative   INFLUENZA B PCR   --  Negative   INFLUENZA B  Not Detected  --    RSV PCR   --  Negative   RESPIRATORY SYNCYTIAL VIRUS  Not Detected  --      Results from last 7 days   Lab Units  02/25/25  1140   ADENOVIRUS  Not Detected   BORDETELLA PARAPERTUSSIS  Not Detected   BORDETELLA PERTUSSIS  Not Detected   CHLAMYDIA PNEUMONIAE  Not Detected   CORONAVIRUS 229E  Not Detected   CORONAVIRUS HKU1  Not Detected   CORONAVIRUS NL63  Not Detected   CORONAVIRUS OC43  Not Detected   METAPNEUMOVIRUS  Not Detected   RHINOVIRUS  Detected*   MYCOPLASMA PNEUMONIAE  Not Detected   PARAINFLUENZA 1  Not Detected   PARAINFLUENZA 2  Not Detected   PARAINFLUENZA 3  Not Detected   PARAINFLUENZA 4  Not Detected     Results from last 7 days   Lab Units 02/25/25  0733 02/25/25  0705   BLOOD CULTURE  No Growth at 24 hrs. No Growth at 24 hrs.     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    Description: POST RADIATION AND CHEMOTHERAPY--2008      Stroke (HCC)      Present on Admission:   Hypercholesterolemia   Tobacco use disorder   Centrilobular emphysema (HCC)   Essential hypertension   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)   Prediabetes   COPD with acute exacerbation (HCC)   SIRS (systemic inflammatory response syndrome) (HCC)   Fall   Acute respiratory failure with hypoxia and hypercapnia (HCC)   Adjustment disorder with depressed mood      Admitting Diagnosis: Weakness [R53.1]  COPD exacerbation (HCC) [J44.1]  Acute respiratory failure with hypoxia and hypercapnia (HCC) [J96.01, J96.02]  Age/Sex: 74 y.o. male    Network Utilization Review Department  ATTENTION: Please call with any questions or concerns to [phone] and  carefully listen to the prompts so that you are directed to the right person. All voicemails are confidential.   For Discharge needs, contact Care Management DC Support Team at [phone] opt. [phone]  Send all requests for admission clinical reviews, approved or denied determinations and any other requests to dedicated fax number below belonging to the campus where the patient is receiving treatment. List of dedicated fax numbers for the Facilities:  FACILITY NAME UR FAX NUMBER   ADMISSION DENIALS (Administrative/Medical Necessity) [phone]   DISCHARGE SUPPORT TEAM (NETWORK) [phone]   PARENT CHILD HEALTH (Maternity/NICU/Pediatrics) [phone]   [facility] [phone]   [facility] [phone]   [facility] [phone]   [facility] [phone]   [facility] [phone]   [facility] [phone]   [facility] [phone]   [facility] [phone]   [facility] [phone]   [facility] [phone]   [facility] [phone]   [facility] [phone]

## 2025-02-26 NOTE — ACP (ADVANCE CARE PLANNING)
"Critical Care Advanced Care Planning Note  Jere Lepe 74 y.o. male MRN: [id number]  Unit/Bed#: -01 Encounter: [id number]    Jere Lepe is a 74 y.o. male requiring critical care evaluation and advanced care planning. The patient has chronic comorbidities, including but not limited to COPD, nonhodgkins lymphoma, CVA on Eliquis, DM which is now further complicated by the following acute conditions: diffuse tree in bud opacities likely representative of tree in bud opacities.  Due to the severity of the patient's acute condition and/or the extent of chronic conditions, additional conversations pertaining to advanced care planning were required. Today's discussion, which was held in a face-to-face meeting, included  Patient and significant other , and it was established that all stake holders understood the rationale for the advanced care planning. The patient was able to participate in the discussion.    Summary of Discussion:  We discussed the concern for atypical infection which is not yet diagnosed.   I explained the need for bronchoscopy to optimize treatment and make diagnosis.  In order to perform this, intubation would likely be required.   The patient was adamant that they do not want intubation or CPR.   They do not want aggressive procedures such as bronchoscopy.   He stated \"I am fine with dying\" and requested water to help with dry mouth.  He understands that without appropriate diagnosis, we may not be able to treat his respiratory failure successfully and accurately.  As a result, he requested DNR level 3 and consideration to less aggressive treatment.      As a result, we will start Levaquin for potential atypical infection in addition to current antibiotics.    Total time spent, (24) minutes (10:02 to 10:24).      CODE STATUS: DNR/DNI - Level 3      SIGNATURE: Ilan Neves DO  DATE: February 26, 2025  TIME: 10:25 AM    "

## 2025-02-27 PROBLEM — Z51.5 PALLIATIVE CARE ENCOUNTER: Status: ACTIVE | Noted: 2025-01-01

## 2025-02-27 PROBLEM — F43.21 ADJUSTMENT DISORDER WITH DEPRESSED MOOD: Status: ACTIVE | Noted: 2025-01-01

## 2025-02-27 NOTE — ASSESSMENT & PLAN NOTE
Patient with a history of COPD/emphysema current tobacco smoker.  Does not follow with pulmonary service and is not on inhalers at home.  He was recently ordered Combivent and Medrol pack by his primary care practice on 2/20/2025  Per patient and chart review patient has had a cough, and shortness of breath for the last 2 months.  Over the last few weeks shortness of breath and cough have increased and he has had decreased p.o. intake and increased weakness.  Denies fevers, nausea, vomiting, diarrhea  Patient with recent travel to [place], was on a cruise but he had to delay his trip short due to feeling unwell.  He returned home on 2/16.    Patient short of breath and with an SpO2 of 68 on room air in the ER.  He was placed on BiPAP for work of breathing and hypoxia  Initial ABG 7.22/7.5/62/31  Given Solu-Medrol 80 mg in the ER and started on empiric antibiotics    Plan  Liberated from continuous bipap 2/26  Bipap 18/6 60% HS  Solumedrol 40mg q8h -- consider wean today  Cont  Xopenex and Atrovent 4 times daily  Continue empiric antibiotics as discussed below  SpO2 goal greater than 88%

## 2025-02-27 NOTE — ASSESSMENT & PLAN NOTE
In the setting of COPD AE and PNA POA   Required bipap on admission to ER, continued on admission to ICU   Spo2 68% in ER, hypercapnic on ABG   2/26 liberated from cont bipap     Plan:   Treat underlying causes as above  Goal spo2 > 88%

## 2025-02-27 NOTE — ASSESSMENT & PLAN NOTE
Acute hypoxic resp failure, COPD exac, Non-hodgkin lymphoma with + PET-CT with close f/u  Symptoms  Per ICU  Goals  Agree with neuropsych eval for capacity - does not appear to have on my exam today  Confirmed level 3, DNAR/DNI; reviewed LW; discussed current condition with SAMREEN/Evelyn at bedside. Patient expresses desire to die. Expresses desire to go home. Would not discuss further or respond to questions. Patient hard of hearing and would not engage in questioning, became agitated and waved me off. Discussed hospice/comfort transition with SO/Evelyn. Patient was not expressing these things prior to coming into hospital. He does not like going to the doctors or hospital, but will if he has to. She says she will honor his wishes. Discussed concern that he does not have capacity to make his own decisions right now. Also discussed concerns with level of oxygen requirements and his refusal of some care. Evelyn familiar with hospice as her father  on hospice. Evelyn states patient's siblings are aware of his condition and will possibly visit over the weekend. SO uncertain what she will decide if patient formally found to not have capacity. She will likely discuss further with patient's siblings(OPAL is a physician). Discussed option of comfort transition for further decline/abrupt deterioration. Emotional support/validation provided. Contact provided.  Will follow intermittently as needed. D/w ICU team

## 2025-02-27 NOTE — PLAN OF CARE
Problem: PAIN - ADULT  Goal: Verbalizes/displays adequate comfort level or baseline comfort level  Description: Interventions:  - Encourage patient to monitor pain and request assistance  - Assess pain using appropriate pain scale  - Administer analgesics based on type and severity of pain and evaluate response  - Implement non-pharmacological measures as appropriate and evaluate response  - Consider cultural and social influences on pain and pain management  - Notify physician/advanced practitioner if interventions unsuccessful or patient reports new pain  Outcome: Progressing     Problem: INFECTION - ADULT  Goal: Absence or prevention of progression during hospitalization  Description: INTERVENTIONS:  - Assess and monitor for signs and symptoms of infection  - Monitor lab/diagnostic results  - Monitor all insertion sites, i.e. indwelling lines, tubes, and drains  - Monitor endotracheal if appropriate and nasal secretions for changes in amount and color  - Isom appropriate cooling/warming therapies per order  - Administer medications as ordered  - Instruct and encourage patient and family to use good hand hygiene technique  - Identify and instruct in appropriate isolation precautions for identified infection/condition  Outcome: Progressing  Goal: Absence of fever/infection during neutropenic period  Description: INTERVENTIONS:  - Monitor WBC    Outcome: Progressing     Problem: SAFETY ADULT  Goal: Patient will remain free of falls  Description: INTERVENTIONS:  - Educate patient/family on patient safety including physical limitations  - Instruct patient to call for assistance with activity   - Consult OT/PT to assist with strengthening/mobility   - Keep Call bell within reach  - Keep bed low and locked with side rails adjusted as appropriate  - Keep care items and personal belongings within reach  - Initiate and maintain comfort rounds  - Make Fall Risk Sign visible to staff  - Offer Toileting every 2 Hours,  in advance of need  - Initiate/Maintain bed alarm  - Obtain necessary fall risk management equipment:   - Apply yellow socks and bracelet for high fall risk patients  - Consider moving patient to room near nurses station  Outcome: Progressing  Goal: Maintain or return to baseline ADL function  Description: INTERVENTIONS:  -  Assess patient's ability to carry out ADLs; assess patient's baseline for ADL function and identify physical deficits which impact ability to perform ADLs (bathing, care of mouth/teeth, toileting, grooming, dressing, etc.)  - Assess/evaluate cause of self-care deficits   - Assess range of motion  - Assess patient's mobility; develop plan if impaired  - Assess patient's need for assistive devices and provide as appropriate  - Encourage maximum independence but intervene and supervise when necessary  - Involve family in performance of ADLs  - Assess for home care needs following discharge   - Consider OT consult to assist with ADL evaluation and planning for discharge  - Provide patient education as appropriate  Outcome: Progressing  Goal: Maintains/Returns to pre admission functional level  Description: INTERVENTIONS:  - Perform AM-PAC 6 Click Basic Mobility/ Daily Activity assessment daily.  - Set and communicate daily mobility goal to care team and patient/family/caregiver.   - Collaborate with rehabilitation services on mobility goals if consulted  - Perform Range of Motion  times a day.  - Reposition patient every  hours.  - Dangle patient  times a day  - Stand patient  times a day  - Ambulate patient  times a day  - Out of bed to chair  times a day   - Out of bed for meals  times a day  - Out of bed for toileting  - Record patient progress and toleration of activity level   Outcome: Progressing     Problem: DISCHARGE PLANNING  Goal: Discharge to home or other facility with appropriate resources  Description: INTERVENTIONS:  - Identify barriers to discharge w/patient and caregiver  - Arrange  for needed discharge resources and transportation as appropriate  - Identify discharge learning needs (meds, wound care, etc.)  - Arrange for interpretive services to assist at discharge as needed  - Refer to Case Management Department for coordinating discharge planning if the patient needs post-hospital services based on physician/advanced practitioner order or complex needs related to functional status, cognitive ability, or social support system  Outcome: Progressing     Problem: Knowledge Deficit  Goal: Patient/family/caregiver demonstrates understanding of disease process, treatment plan, medications, and discharge instructions  Description: Complete learning assessment and assess knowledge base.  Interventions:  - Provide teaching at level of understanding  - Provide teaching via preferred learning methods  Outcome: Progressing     Problem: RESPIRATORY - ADULT  Goal: Achieves optimal ventilation and oxygenation  Description: INTERVENTIONS:  - Assess for changes in respiratory status  - Assess for changes in mentation and behavior  - Position to facilitate oxygenation and minimize respiratory effort  - Oxygen administered by appropriate delivery if ordered  - Initiate smoking cessation education as indicated  - Encourage broncho-pulmonary hygiene including cough, deep breathe, Incentive Spirometry  - Assess the need for suctioning and aspirate as needed  - Assess and instruct to report SOB or any respiratory difficulty  - Respiratory Therapy support as indicated  Outcome: Progressing     Problem: Prexisting or High Potential for Compromised Skin Integrity  Goal: Skin integrity is maintained or improved  Description: INTERVENTIONS:  - Identify patients at risk for skin breakdown  - Assess and monitor skin integrity  - Assess and monitor nutrition and hydration status  - Monitor labs   - Assess for incontinence   - Turn and reposition patient  - Assist with mobility/ambulation  - Relieve pressure over bony  prominences  - Avoid friction and shearing  - Provide appropriate hygiene as needed including keeping skin clean and dry  - Evaluate need for skin moisturizer/barrier cream  - Collaborate with interdisciplinary team   - Patient/family teaching  - Consider wound care consult   Outcome: Progressing     Problem: Nutrition/Hydration-ADULT  Goal: Nutrient/Hydration intake appropriate for improving, restoring or maintaining nutritional needs  Description: Monitor and assess patient's nutrition/hydration status for malnutrition. Collaborate with interdisciplinary team and initiate plan and interventions as ordered.  Monitor patient's weight and dietary intake as ordered or per policy. Utilize nutrition screening tool and intervene as necessary. Determine patient's food preferences and provide high-protein, high-caloric foods as appropriate.     INTERVENTIONS:  - Monitor oral intake, urinary output, labs, and treatment plans  - Assess nutrition and hydration status and recommend course of action  - Evaluate amount of meals eaten  - Assist patient with eating if necessary   - Allow adequate time for meals  - Recommend/ encourage appropriate diets, oral nutritional supplements, and vitamin/mineral supplements  - Order, calculate, and assess calorie counts as needed  - Recommend, monitor, and adjust tube feedings and TPN/PPN based on assessed needs  - Assess need for intravenous fluids  - Provide specific nutrition/hydration education as appropriate  - Include patient/family/caregiver in decisions related to nutrition  Outcome: Progressing

## 2025-02-27 NOTE — CONSULTS
Consultation - Palliative Care   Name: Jere Lepe 74 y.o. male I MRN: [id number]  Unit/Bed#: -01 I Date of Admission: 2/25/2025   Date of Service: 2/27/2025 I Hospital Day: 2   Inpatient consult to Palliative Care  Consult performed by: Brooklyn Peña DO  Consult ordered by: Ryanne Ca PA-C        Physician Requesting Evaluation: Ilan Neves DO   Reason for Evaluation / Principal Problem: goals of care    Assessment & Plan  Acute respiratory failure with hypoxia and hypercapnia (HCC)  Requiring 15L MFNC  Management per ICU  COPD with acute exacerbation (HCC)  H/o COPD/emphysema, current tobacco use  Placed on BiPAP in ED, started on solu-medrol + empiric antibiotics   biPAP qhs  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)  Follicular lymphoma, PET-CT 12/18/24 with concerning uptake, patient declined further w/u, plan for f/u imaging + HemOnc visit 3/3025  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)  Ischemic CVA 2019  S/p fall on day of admission, neg trauma w/u  PT/OT  SIRS (systemic inflammatory response syndrome) (HCC)  + rhinovirus  Given azithromycin, vancomycin, cefipime in ED; now on Vancomycin + cefepime + levaquin  Fall  Ataxia d/t previous CVA  PT/OT  Adjustment disorder with depressed mood  Seen by psychiatry today, recommending remeron 7.5mg qhs  Palliative care encounter  Acute hypoxic resp failure, COPD exac, Non-hodgkin lymphoma with + PET-CT with close f/u  Symptoms  Per ICU  Goals  Agree with neuropsych eval for capacity - does not appear to have on my exam today  Confirmed level 3, DNAR/DNI; reviewed LW; discussed current condition with SO/Evelyn at bedside. Patient expresses desire to die. Expresses desire to go home. Would not discuss further or respond to questions. Patient hard of hearing and would not engage in questioning, became agitated and waved me off. Discussed hospice/comfort transition with SO/Gaylord. Patient was not  expressing these things prior to coming into hospital. He does not like going to the doctors or hospital, but will if he has to. She says she will honor his wishes. Discussed concern that he does not have capacity to make his own decisions right now. Also discussed concerns with level of oxygen requirements and his refusal of some care. Evelyn familiar with hospice as her father  on hospice. Evelyn states patient's siblings are aware of his condition and will possibly visit over the weekend. SO uncertain what she will decide if patient formally found to not have capacity. She will likely discuss further with patient's siblings(OPAL is a physician). Discussed option of comfort transition for further decline/abrupt deterioration. Emotional support/validation provided. Contact provided.  Will follow intermittently as needed. D/w ICU team    Code Status: Level 3 - DNAR and DNI  Advance Directive and Living Will:    yes, reviewed - names Ana Issa: [phone]  Power of :  yes, Evelyn Issa, not in chart  POLST:  No  Decisional apparatus:  Patient does not have capacity to make medical decisions on my exam today. If such capacity is lost, patient's substitute decision maker would default to Ana by LW paperwork by [place]. Patient with siblings, per SO aware of condition, possibly traveling in to see patient this weekend.  Lives with Ana, have been together approx 6 years.      History of Present Illness   HPI: Jere Lepe is a 74 y.o. year old male who presents with increasing weakness, cough, sob, s/p fall off toilet. PMH significant for COPD/emphysema, + current tobacco use, CVA on Eliquis, HTN, HLD, non-Hodgkin's lymphoma  s/p chemotherapy, tonsillar cancer s/p chemoradiation. Patient with recent trip to [facility] and developed cough, placed on medrol pack + azithromycin by PCP, also started on combivent but did not want to come to hospital, came in due to  fall. CT imaging negative for traumatic injuries, bilateral multiloba tree-in-bud infiltrates may represent inflammatory or infections bronchiolitis or aspiration pneumonitis. Patient refusing care, stating he wants to die, wants to go home and die. Psychiatry consulted, patient with adjustment disorder with depressed mood. Neuropsych consulted for capacity eval. Patient last seen by [facility] as outpt 12/30/24 to discuss PET-CT with new FDG avid L para-aortic LN concerning for disease recurrence, patient declined possible biopsy with plan for f/u CT 3/24/25 and HemOnc f/u 3/31/25. Palliative care consulted for goals.    Review of Systems   Constitutional:  Positive for activity change and fatigue.   Respiratory:  Positive for cough and shortness of breath.    Musculoskeletal:  Positive for gait problem.   Neurological:  Positive for weakness.   Psychiatric/Behavioral:  Positive for decreased concentration, dysphoric mood and suicidal ideas. The patient is nervous/anxious.    All other systems reviewed and are negative.      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    Description: POST RADIATION AND CHEMOTHERAPY--2008      Stroke (HCC)      Patient Active Problem List   Diagnosis    Hypercholesterolemia    Tobacco use disorder    Bilateral carotid artery stenosis    History of colon polyps    History of cancer tonsil    Centrilobular emphysema (HCC)    Essential hypertension    Follicular lymphoma of lymph nodes of  inguinal region (HCC)    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)    Prediabetes    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)    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  Prior tattoo site from large polypectomy in the splenic flexure noted.  Pathology of colon polyps were tubular adenomas.  A 3 year recall advised.    IR BIOPSY INGUINAL LYMPH NODE  2/15/2023    IR PORT PLACEMENT  6/19/2023    SKULL FRACTURE ELEVATION  1984     Social History     Socioeconomic History    Marital status: Single     Spouse name: None    Number of children: None    Years of education: None    Highest education level: None   Occupational History    Occupation: Retired   Tobacco Use    Smoking status: Every Day     Current packs/day: 0.50     Average packs/day: 0.5 packs/day for 55.2 years (27.6 ttl pk-yrs)     Types: Cigarettes     Start date: 1970    Smokeless tobacco: Never   Vaping Use    Vaping status: Never Used   Substance and Sexual Activity    Alcohol use: Not Currently    Drug use: Yes     Types: Marijuana     Comment: rare    Sexual activity: Not Currently   Other Topics Concern    None   Social History Narrative    Wears seatbelt     Regular exercise    Regular dental care        Caffeine use:  Coffee daily    Living situation:  Supportive and safe    No advance directives     Social Drivers of  Health     Financial Resource Strain: Low Risk  (2024)    Overall Financial Resource Strain (CARDIA)     Difficulty of Paying Living Expenses: Not hard at all   Food Insecurity: No Food Insecurity (2025)    Nursing - Inadequate Food Risk Classification     Worried About Running Out of Food in the Last Year: Never true     Ran Out of Food in the Last Year: Never true     Ran Out of Food in the Last Year: Never true   Transportation Needs: No Transportation Needs (2025)    Nursing - Transportation Risk Classification     Lack of Transportation: Not on file     Lack of Transportation: No   Physical Activity: Not on file   Stress: Not on file   Social Connections: Not on file   Intimate Partner Violence: Unknown (2025)    Nursing IPS     Feels Physically and Emotionally Safe: Not on file     Physically Hurt by Someone: Not on file     Humiliated or Emotionally Abused by Someone: Not on file     Physically Hurt by Someone: No     Hurt or Threatened by Someone: No   Housing Stability: Unknown (2025)    Nursing: Inadequate Housing Risk Classification     Has Housing: Not on file     Worried About Losing Housing: Not on file     Unable to Get Utilities: Not on file     Unable to Pay for Housing in the Last Year: No     Has Housin     Family History   Problem Relation Age of Onset    Heart disease Father     Colon polyps Father     Substance Abuse Neg Hx     Mental illness Neg Hx     Colon cancer Neg Hx        Meds/Allergies   all current active meds have been reviewed and current meds:   Current Facility-Administered Medications:     cefepime (MAXIPIME) IVPB (premix in dextrose) 2,000 mg 50 mL, Q8H, Last Rate: Stopped (25 0101)    chlorhexidine (PERIDEX) 0.12 % oral rinse 15 mL, Q12H JUSTIN    insulin lispro (HumALOG/ADMELOG) 100 units/mL subcutaneous injection 1-6 Units, Q6H JUSTIN **AND** Fingerstick Glucose (POCT), Q6H    ipratropium (ATROVENT) 0.02 % inhalation solution 0.5 mg, 4x Daily  "PRN    levalbuterol (XOPENEX) inhalation solution 1.25 mg, 4x Daily PRN    levofloxacin (LEVAQUIN) IVPB (premix in dextrose) 750 mg 150 mL, Q24H, Last Rate: 750 mg (02/27/25 1257)    methylPREDNISolone sodium succinate (Solu-MEDROL) injection 40 mg, Q8H JUSTIN    pantoprazole (PROTONIX) injection 40 mg, Daily    No Known Allergies    I have reviewed the patient's controlled substance dispensing history in the Prescription Drug Monitoring Program in compliance with the [facility] regulations before prescribing any controlled substances.     Objective   /62   Pulse 79   Temp 97.9 °F (36.6 °C) (Axillary)   Resp 17   Ht 5' 7\" (1.702 m)   Wt 52 kg (114 lb 10.2 oz)   SpO2 94%   BMI 17.96 kg/m²   Physical Exam  Vitals and nursing note reviewed.   Constitutional:       General: He is not in acute distress.     Appearance: He is ill-appearing.      Comments: Would not engage, hard of hearing   HENT:      Head: Normocephalic and atraumatic.      Nose: Nose normal.      Mouth/Throat:      Mouth: Mucous membranes are moist.      Pharynx: Oropharynx is clear.   Eyes:      General: No scleral icterus.     Extraocular Movements: Extraocular movements intact.   Cardiovascular:      Rate and Rhythm: Normal rate.   Pulmonary:      Effort: Pulmonary effort is normal. No respiratory distress.      Breath sounds: No stridor.   Abdominal:      General: Abdomen is flat. There is no distension.   Musculoskeletal:         General: No swelling. Normal range of motion.      Cervical back: Normal range of motion.   Skin:     General: Skin is warm and dry.   Neurological:      Mental Status: He is alert. He is disoriented.   Psychiatric:      Comments: Agitated mood, dismissive, unclear judgment/thought content, does not appear to have capacity on my encounter.          Lab Results: I have personally reviewed pertinent labs., CBC: No results found for: \"WBC\", \"HGB\", \"HCT\", \"MCV\", \"PLT\", \"ADJUSTEDWBC\", \"RBC\", \"MCH\", \"MCHC\", \"RDW\", \"MPV\", " "\"NRBC\", CMP: No results found for: \"SODIUM\", \"K\", \"CL\", \"CO2\", \"ANIONGAP\", \"BUN\", \"CREATININE\", \"GLUCOSE\", \"CALCIUM\", \"AST\", \"ALT\", \"ALKPHOS\", \"PROT\", \"BILITOT\", \"EGFR\", PT/PTT:No results found for: \"PT\", \"PTT\"  Albumin:  0   Lab Value Date/Time    ALB 3.1 (L) 02/26/2025 0455    ALB 4.5 09/09/2016 1300     Imaging Studies: Results Review Statement: I reviewed radiology reports from this admission including: CT c/a/p-PE study.  EKG, Pathology, and Other Studies: Results Review Statement: I reviewed radiology reports from this admission including: EKG.  QTC: 445    Counseling / Coordination of Care  Total floor / unit time spent today 55 minutes. Greater than 50% of total time was spent with the patient and / or family counseling and / or coordination of care. A description of the counseling / coordination of care: current condition, goals of care, hospice/comfort transition discussion with SO, capacity assessment.    Brooklyn Peña,   Palliative and Supportive Care  [phone]   "

## 2025-02-27 NOTE — ASSESSMENT & PLAN NOTE
+ rhinovirus  Given azithromycin, vancomycin, cefipime in ED; now on Vancomycin + cefepime + levaquin

## 2025-02-27 NOTE — PLAN OF CARE
Problem: PAIN - ADULT  Goal: Verbalizes/displays adequate comfort level or baseline comfort level  Description: Interventions:  - Encourage patient to monitor pain and request assistance  - Assess pain using appropriate pain scale  - Administer analgesics based on type and severity of pain and evaluate response  - Implement non-pharmacological measures as appropriate and evaluate response  - Consider cultural and social influences on pain and pain management  - Notify physician/advanced practitioner if interventions unsuccessful or patient reports new pain  Outcome: Progressing     Problem: INFECTION - ADULT  Goal: Absence or prevention of progression during hospitalization  Description: INTERVENTIONS:  - Assess and monitor for signs and symptoms of infection  - Monitor lab/diagnostic results  - Monitor all insertion sites, i.e. indwelling lines, tubes, and drains  - Monitor endotracheal if appropriate and nasal secretions for changes in amount and color  - [place] appropriate cooling/warming therapies per order  - Administer medications as ordered  - Instruct and encourage patient and family to use good hand hygiene technique  - Identify and instruct in appropriate isolation precautions for identified infection/condition  Outcome: Progressing  Goal: Absence of fever/infection during neutropenic period  Description: INTERVENTIONS:  - Monitor WBC    Outcome: Progressing     Problem: SAFETY ADULT  Goal: Patient will remain free of falls  Description: INTERVENTIONS:  - Educate patient/family on patient safety including physical limitations  - Instruct patient to call for assistance with activity   - Consult OT/PT to assist with strengthening/mobility   - Keep Call bell within reach  - Keep bed low and locked with side rails adjusted as appropriate  - Keep care items and personal belongings within reach  - Initiate and maintain comfort rounds  - Make Fall Risk Sign visible to staff  - Apply yellow socks and bracelet  for high fall risk patients  - Consider moving patient to room near nurses station  Outcome: Progressing  Goal: Maintain or return to baseline ADL function  Description: INTERVENTIONS:  -  Assess patient's ability to carry out ADLs; assess patient's baseline for ADL function and identify physical deficits which impact ability to perform ADLs (bathing, care of mouth/teeth, toileting, grooming, dressing, etc.)  - Assess/evaluate cause of self-care deficits   - Assess range of motion  - Assess patient's mobility; develop plan if impaired  - Assess patient's need for assistive devices and provide as appropriate  - Encourage maximum independence but intervene and supervise when necessary  - Involve family in performance of ADLs  - Assess for home care needs following discharge   - Consider OT consult to assist with ADL evaluation and planning for discharge  - Provide patient education as appropriate  Outcome: Progressing  Goal: Maintains/Returns to pre admission functional level  Description: INTERVENTIONS:  - Perform AM-PAC 6 Click Basic Mobility/ Daily Activity assessment daily.  - Set and communicate daily mobility goal to care team and patient/family/caregiver.   - Collaborate with rehabilitation services on mobility goals if consulted  - Out of bed for toileting  - Record patient progress and toleration of activity level   Outcome: Progressing     Problem: DISCHARGE PLANNING  Goal: Discharge to home or other facility with appropriate resources  Description: INTERVENTIONS:  - Identify barriers to discharge w/patient and caregiver  - Arrange for needed discharge resources and transportation as appropriate  - Identify discharge learning needs (meds, wound care, etc.)  - Arrange for interpretive services to assist at discharge as needed  - Refer to Case Management Department for coordinating discharge planning if the patient needs post-hospital services based on physician/advanced practitioner order or complex needs  related to functional status, cognitive ability, or social support system  Outcome: Progressing     Problem: Knowledge Deficit  Goal: Patient/family/caregiver demonstrates understanding of disease process, treatment plan, medications, and discharge instructions  Description: Complete learning assessment and assess knowledge base.  Interventions:  - Provide teaching at level of understanding  - Provide teaching via preferred learning methods  Outcome: Progressing     Problem: RESPIRATORY - ADULT  Goal: Achieves optimal ventilation and oxygenation  Description: INTERVENTIONS:  - Assess for changes in respiratory status  - Assess for changes in mentation and behavior  - Position to facilitate oxygenation and minimize respiratory effort  - Oxygen administered by appropriate delivery if ordered  - Initiate smoking cessation education as indicated  - Encourage broncho-pulmonary hygiene including cough, deep breathe, Incentive Spirometry  - Assess the need for suctioning and aspirate as needed  - Assess and instruct to report SOB or any respiratory difficulty  - Respiratory Therapy support as indicated  Outcome: Progressing     Problem: Prexisting or High Potential for Compromised Skin Integrity  Goal: Skin integrity is maintained or improved  Description: INTERVENTIONS:  - Identify patients at risk for skin breakdown  - Assess and monitor skin integrity  - Assess and monitor nutrition and hydration status  - Monitor labs   - Assess for incontinence   - Turn and reposition patient  - Assist with mobility/ambulation  - Relieve pressure over bony prominences  - Avoid friction and shearing  - Provide appropriate hygiene as needed including keeping skin clean and dry  - Evaluate need for skin moisturizer/barrier cream  - Collaborate with interdisciplinary team   - Patient/family teaching  - Consider wound care consult   Outcome: Progressing     Problem: Nutrition/Hydration-ADULT  Goal: Nutrient/Hydration intake appropriate for  improving, restoring or maintaining nutritional needs  Description: Monitor and assess patient's nutrition/hydration status for malnutrition. Collaborate with interdisciplinary team and initiate plan and interventions as ordered.  Monitor patient's weight and dietary intake as ordered or per policy. Utilize nutrition screening tool and intervene as necessary. Determine patient's food preferences and provide high-protein, high-caloric foods as appropriate.     INTERVENTIONS:  - Monitor oral intake, urinary output, labs, and treatment plans  - Assess nutrition and hydration status and recommend course of action  - Evaluate amount of meals eaten  - Assist patient with eating if necessary   - Allow adequate time for meals  - Recommend/ encourage appropriate diets, oral nutritional supplements, and vitamin/mineral supplements  - Order, calculate, and assess calorie counts as needed  - Recommend, monitor, and adjust tube feedings and TPN/PPN based on assessed needs  - Assess need for intravenous fluids  - Provide specific nutrition/hydration education as appropriate  - Include patient/family/caregiver in decisions related to nutrition  Outcome: Progressing

## 2025-02-27 NOTE — ASSESSMENT & PLAN NOTE
Follicular lymphoma, PET-CT 12/18/24 with concerning uptake, patient declined further w/u, plan for f/u imaging + [facility] visit 3/3025

## 2025-02-27 NOTE — UTILIZATION REVIEW
NOTIFICATION OF INPATIENT ADMISSION   AUTHORIZATION REQUEST   SERVICING FACILITY:   [facility]  [address] [id number]  Tax ID: [id number]  NPI: [id number] ATTENDING PROVIDER:  Attending Name and NPI#: Ilan Neves Do [[id number]]  Address: [address] [place], [place] [address]  Phone: [phone]   ADMISSION INFORMATION:  Place of Service: Inpatient [facility]  Place of Service Code: 21  Inpatient Admission Date/Time: 2/25/25  7:19 AM  Discharge Date/Time: No discharge date for patient encounter.  Admitting Diagnosis Code/Description:  Weakness [R53.1]  COPD exacerbation (HCC) [J44.1]  Acute respiratory failure with hypoxia and hypercapnia (HCC) [J96.01, J96.02]     UTILIZATION REVIEW CONTACT:  Karina Whaley, Utilization   Network Utilization Review Department  Phone: [phone]  Fax: [phone]  Email: [email or web address]  Contact for approvals/pending authorizations, clinical reviews, and discharge.     PHYSICIAN ADVISORY SERVICES:  Medical Necessity Denial & Boyv-vm-Ljaq Review  Phone: [phone]  Fax: [phone]  Email: [email or web address]     DISCHARGE SUPPORT TEAM:  For Patients Discharge Needs & Updates  Phone: [phone] opt. 2 Fax: [phone]  Email: [email or web address]

## 2025-02-27 NOTE — CONSULTS
TELEConsultation - Behavioral Health   Jere Lepe 74 y.o. male MRN: [id number]  Unit/Bed#: -01 Encounter: [id number]  Hospital Day: 2    VIRTUAL CARE DOCUMENTATION:     1. This service was provided via Telemedicine using: Teams Virtual Rounding      2. Parties in the room with patient during teleconsult: Patient and his girlfriend with his permission    3. Confidentiality: My office door was closed     4. Participants: Patient and girlfriend    5. Patient acknowledged consent and understanding of privacy and security of the  Telemedicine consult. I informed the patient that I have reviewed their record in Epic and presented the opportunity for them to ask any questions regarding the visit today.  The patient agreed to participate.    6. I have spent a total time of 60 minutes in caring for this patient on the day of the visit/encounter, NOT including the time spent for establishing the audio/video connection, but including Diagnostic results, Prognosis, Risks and benefits of tx options, Instructions for management, Patient and family education including obtaining collateral information, Importance of tx compliance, Risk factor reductions, Assessment & Impressions, Counseling / Coordination of care, Documenting in the medical record, Reviewing / ordering tests, medicine, and procedures  , Obtaining or reviewing history  , and Communicating with other healthcare professionals.       Assessment & Plan     Assessment: 74-year-old man with no formal psychiatric history presents to the hospital with COPD exacerbation and atypical infection.  He has a complicated medical history including non-Hodgkin's lymphoma.  Patient has been making statements that he wants to die to his primary treatment team and has made statements that he might as well just go home and shoot himself.  Patient denies intention of acting on the statements to me and reports that he said he just said these things out of frustration.  He  reports that he feels depressed but only because of his ongoing medical problems.  When asked if he would still feel depressed if his medical problems improved he just continued to reiterate that he wants to die.  Attempted to engage in conversation regarding risks and benefits of treatments and what treatments are being recommended and patient was unwilling or unable to do so.  This demonstrates poor insight and poor judgment and a lack of capacity as he does not appreciate the current situation or the consequences of excepting refusing medical treatment.  In the case of people lacking capacity, we should consult with a surrogate decision maker to make medical decisions for him.  Recommend obtaining neuropsychiatry consult for official capacity assessment as well.  The patient does not meet criteria for any major depressive episode, rather meets criteria for adjustment disorder with depressed mood.  No other mood disorder or psychotic disorder on interview.  He does not appear delirious and his attention was consistent throughout the interview.  However, his depressed mood is clearly impacting his judgment and the decisions he is making are unusual for him given that he has never made the statements of wanting to die until several weeks ago.  As his problems with depression are secondary to medical status, I do not believe that the patient meets inpatient psychiatric hospitalization criteria as his medical problems will not improve with psychiatric treatment.  However, we may trial Remeron 7.5 mg at bedtime to assist with mood, sleep, and appetite.  Patient was not receptive to this recommendation of the time but if he changes mind would recommend initiation of Remeron 7.5 mg at bedtime.  Denies SI/HI/AVH.    Although he denies suicidal ideation, intention, or plan, would recommend coordinating with family to remove firearms from his home prior to any discharge home.    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)  Per medicine  SIRS (systemic inflammatory response syndrome) (HCC)  Per medicine  Fall  Per medicine  Acute respiratory failure with hypoxia and hypercapnia (HCC)  Per medicine  Adjustment disorder with depressed mood  --Recommend Remeron 7.5 mg at bedtime  --Patient does not meet 302 or inpatient behavioral health hospitalization criteria at this time  --Monitor patient's mood and statements that he wants to die as he stabilizes medically  --Recommend neuropsychiatry evaluation for capacity as I do not believe the patient has capacity at this time to make medical decisions as he is not able to engage in a conversation regarding risks, benefits, and reasons for refusal and I believe that his current depressed mood is impacting his judgment  --Please ensure that firearms are removed from his house prior to discharge home       Diagnoses, available treatment options, alternatives to treatment, and risks vs. benefits of current psychiatric treatment plan were discussed with the patient.  Prior records were reviewed in [facility].  The case was discussed with the primary team.      History of Present Illness   Physician Requesting Consult: Ilan Neves DO    Chief Complaint: \"I just want to die\"    Reason for Consult / Principal Problem: Suicidal ideations    74-year-old man with numerous medical comorbidities including COPD, non-Hodgkin's lymphoma, CVA on Eliquis, diabetes mellitus who presented with symptoms of atypical infection.  Patient has been making statements that he wants to die and to just let him die.  Psychiatry consulted for evaluation of the statements.    On " "evaluation,    Patient was generally uncooperative with interview.  He endorsed saying that he wants to die and reports that he is \"just done with everything\".  I asked him to elaborate and he put the iPad down and frustration.  Attempted to validate his experience and talk to him regarding his frustrations about his medical problems and medical treatments.  He was not receptive and continue to just reiterate that I just need to go and let him die.  I explained that in order to assess whether his judgment is intact and he appreciates the current situation, I need him to explain to me what treatments are being recommended, what benefit they might offer, and why he is refusing them.  He was not able to or unwilling to participate in this conversation.  He simply repeated that he wanted to die and I should just let him do that.  He explicitly denied suicidal ideation, intention, or plan and denied any intention of harming himself.  When confronted with statements he made to the primary team that he should just go home and shoot himself, he reports that he just saying these things because he is frustrated and denies any intention of harming himself.  Denies any psychiatric history or history of psychiatric treatment.  He endorses feeling depressed about his medical conditions but does not feel that he would be depressed otherwise.  Denies any other symptoms of depression including poor sleep, poor concentration, endorses low appetite because of feeling sick and reports that he has multiple reasons for living including his family and girlfriend.  Attempted to explore his reasons for living but he was not receptive to this conversation.  Overtly denies HI or AVH.  He was alert and oriented on interview.    Also spoke with his girlfriend with patient's permission.  She reports that he has no psychiatric history and that up until several weeks ago he had never expressed any desire to die or take his life otherwise.  She " reports that they recently went on a cruise and got sick with some kind of respiratory infection and since then he has been making the statements stating that he wants to die.  She expressed that she does not want him to die and feels that he may not be at his normal mental status.      Psychiatric Review Of Systems:  Medication side effects: none  Sleep: no change  Appetite: no change  Hygiene: able to tend to instrumental and basic ADLs  Anxiety Symptoms: denies  Psychotic Symptoms: denies  Depression Symptoms: endorses  Manic Symptoms: denies  PTSD Symptoms: denies  Suicidal Thoughts: endorses wish to die, denies SI  Homicidal Thoughts: denies    Historical Information   Psychiatric History:   Diagnoses: None  Inpatient Hx: None  Outpatient Hx: None  Medications/Trials: None    Substance Abuse History:    Social History     Substance and Sexual Activity   Alcohol Use Not Currently     Social History     Substance and Sexual Activity   Drug Use Yes    Types: Marijuana    Comment: rare       I discussed substance abuse with the patient and, if pertinent, discussed risks vs benefits of decreasing frequency of use.    Family History:   Family History   Problem Relation Age of Onset    Heart disease Father     Colon polyps Father     Substance Abuse Neg Hx     Mental illness Neg Hx     Colon cancer Neg Hx        Social History  Rest of social history as per below:  Social History     Socioeconomic History    Marital status: Single     Spouse name: Not on file    Number of children: Not on file    Years of education: Not on file    Highest education level: Not on file   Occupational History    Occupation: Retired   Tobacco Use    Smoking status: Every Day     Current packs/day: 0.50     Average packs/day: 0.5 packs/day for 55.2 years (27.6 ttl pk-yrs)     Types: Cigarettes     Start date: 1970    Smokeless tobacco: Never   Vaping Use    Vaping status: Never Used   Substance and Sexual Activity    Alcohol use: Not  Currently    Drug use: Yes     Types: Marijuana     Comment: rare    Sexual activity: Not Currently   Other Topics Concern    Not on file   Social History Narrative    Wears seatbelt     Regular exercise    Regular dental care        Caffeine use:  Coffee daily    Living situation:  Supportive and safe    No advance directives     Social Drivers of Health     Financial Resource Strain: Low Risk  (2024)    Overall Financial Resource Strain (CARDIA)     Difficulty of Paying Living Expenses: Not hard at all   Food Insecurity: No Food Insecurity (2025)    Nursing - Inadequate Food Risk Classification     Worried About Running Out of Food in the Last Year: Never true     Ran Out of Food in the Last Year: Never true     Ran Out of Food in the Last Year: Never true   Transportation Needs: No Transportation Needs (2025)    Nursing - Transportation Risk Classification     Lack of Transportation: Not on file     Lack of Transportation: No   Physical Activity: Not on file   Stress: Not on file   Social Connections: Not on file   Intimate Partner Violence: Unknown (2025)    Nursing IPS     Feels Physically and Emotionally Safe: Not on file     Physically Hurt by Someone: Not on file     Humiliated or Emotionally Abused by Someone: Not on file     Physically Hurt by Someone: No     Hurt or Threatened by Someone: No   Housing Stability: Unknown (2025)    Nursing: Inadequate Housing Risk Classification     Has Housing: Not on file     Worried About Losing Housing: Not on file     Unable to Get Utilities: Not on file     Unable to Pay for Housing in the Last Year: No     Has Housin       Past Medical History:   Diagnosis Date    Cancer (HCC)     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     Colon polyp     Compression fracture of body of thoracic vertebra (HCC) 10/11/2021    T9      CVA (cerebral vascular accident) (HCC) 2020    Elevated LFTs 2016    Hepatitis B      History of hepatitis-B exposure with positive surface and core antibodies with negative surface antigen    Hyperlipidemia     Hypertension     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    Description: POST RADIATION AND CHEMOTHERAPY--2008      Stroke (HCC)        Meds/Allergies   No Known Allergies    Current Facility-Administered Medications:     cefepime (MAXIPIME) IVPB (premix in dextrose) 2,000 mg 50 mL, Q8H, Last Rate: Stopped (02/27/25 0101)    chlorhexidine (PERIDEX) 0.12 % oral rinse 15 mL, Q12H JUSTIN    insulin lispro (HumALOG/ADMELOG) 100 units/mL subcutaneous injection 1-6 Units, Q6H JUSTIN **AND** Fingerstick Glucose (POCT), Q6H    ipratropium (ATROVENT) 0.02 % inhalation solution 0.5 mg, 4x Daily PRN    levalbuterol (XOPENEX) inhalation solution 1.25 mg, 4x Daily PRN    levofloxacin (LEVAQUIN) IVPB (premix in dextrose) 750 mg 150 mL, Q24H, Last Rate: Stopped (02/26/25 1800)    methylPREDNISolone sodium succinate (Solu-MEDROL) injection 40 mg, Q8H JUSTIN    pantoprazole (PROTONIX) injection 40 mg, Daily    vancomycin (VANCOCIN) IVPB (premix in dextrose) 750 mg 150 mL, Q12H, Last Rate: Stopped (02/27/25 0400)    Current Medications:  Current medications as per above. All medications have been reviewed.   Risks, benefits, alternatives, and possible side effects of patient's psychiatric medications were discussed with patient.     Objective   Vital signs in last 24 hours:  Temp:  [97.3 °F (36.3 °C)-98.6 °F (37 °C)] 97.9 °F (36.6 °C)  HR:  [71-94] 79  BP: ()/(54-80) 114/62  Resp:  [15-43] 17  SpO2:  [91 %-100 %] 100 %  O2 Device: BiPAP  Nasal Cannula O2 Flow Rate (L/min):  [15 L/min] 15 L/min  FiO2 (%):  [80] 80    Mental Status Exam:  Appearance: casually dressed, consistent with stated age  Motor: no psychomotor retardation, no gait abnormalities  Behavior: Uncooperative, resistant to answering questions  Speech: soft, normal rhythm  Mood: Depressed  Affect: constricted,  depressed-appearing  Thought Process: concrete  Thought Content: denies delusions  Risk Potential: denies suicidal ideation, plan, or intent. Denies homicidal ideation  Perceptions: denies auditory hallucinations, denies visual hallucinations,   Sensorium: Oriented to person, place, time, and situation  Cognition: cognitive ability appears intact but was not quantitatively tested  Consciousness: alert and awake  Attention: intact, able to focus without difficulty  Insight: Poor  Judgement: Poor      Laboratory results:  I have personally reviewed all pertinent laboratory/tests results.  Recent Results (from the past 48 hours)   Legionella antigen, urine    Collection Time: 02/25/25 11:40 AM    Specimen: Urine, Other   Result Value Ref Range    Legionella Urinary Antigen Negative Negative   Strep Pneumoniae, Urine    Collection Time: 02/25/25 11:40 AM    Specimen: Urine, Catheter   Result Value Ref Range    Strep pneumoniae antigen, urine Negative Negative   Respiratory Panel 2.1(RP2)with COVID19    Collection Time: 02/25/25 11:40 AM    Specimen: Nasopharyngeal Swab   Result Value Ref Range    Adenovirus Not Detected Not Detected    Bordetella parapertussis Not Detected Not Detected    Bordetella pertussis Not Detected Not Detected    Chlamydia pneumoniae Not Detected Not detected    SARS-CoV-2 Not Detected Not Detected    Coronavirus 229E Not Detected Not Detected    Coronavirus HKU1 Not Detected Not Detected    Coronavirus NL63 Not Detected Not Detected    Coronavirus OC43 Not Detected Not Detected    Human Metapneumovirus Not Detected Not Detected    Rhino/Enterovirus Detected (A) Not Detected    Influenza A Not Detected Not Detected    Influenza B Not Detected No Detected    Mycoplasma pneumoniae Not Detected Not Detected    Parainfluenza 1 Not Detected Not Detected    Parainfluenza 2 Not Detected Not Detected    Parainfluenza 3 Not Detected Not Detected    Parainfluenza 4 Not Detected Not Detected    Respiratory  "Syncytial Virus Not Detected Not Detected   HS Troponin I 4hr    Collection Time: 02/25/25 11:47 AM   Result Value Ref Range    hs TnI 4hr 49 \"Refer to ACS Flowchart\"- see link ng/L    Delta 4hr hsTnI 15 <20 ng/L   Quantiferon TB Gold Plus Gray    Collection Time: 02/25/25 11:47 AM   Result Value Ref Range    QFT Nil 0.02 0 - 8.0 IU/ml   Quantiferon TB Gold Plus Green    Collection Time: 02/25/25 11:47 AM   Result Value Ref Range    QFT TB1-NIL 0.02 IU/ml   Quantiferon TB Gold Plus Yellow    Collection Time: 02/25/25 11:47 AM   Result Value Ref Range    QFT TB2-NIL -0.01 IU/ml   Quantiferon TB Gold Plus Purple    Collection Time: 02/25/25 11:47 AM   Result Value Ref Range    QFT Mitogen-NIL <0.10 IU/ml    QFT Final Interpretation Indeterminate (A) Negative   Blood gas, arterial    Collection Time: 02/25/25 12:12 PM   Result Value Ref Range    pH, Arterial 7.279 (L) 7.350 - 7.450    pCO2, Arterial 72.7 (HH) 36.0 - 44.0 mm Hg    pO2, Arterial 191.3 (H) 75.0 - 129.0 mm Hg    HCO3, Arterial 33.3 (H) 22.0 - 28.0 mmol/L    Base Excess, Arterial 4.3 mmol/L    O2 Content, Arterial 18.8 16.0 - 23.0 mL/dL    O2 HGB,Arterial  97.8 (H) 94.0 - 97.0 %    SOURCE Radial, Left     ORTEGA TEST Yes     Non Vent type BIPAP BIPAP     IPAP 14     EPAP 6     BIPAP fio2 90 %   Fingerstick Glucose (POCT)    Collection Time: 02/25/25 12:40 PM   Result Value Ref Range    POC Glucose 152 (H) 65 - 140 mg/dl   Blood gas, arterial    Collection Time: 02/25/25  3:01 PM   Result Value Ref Range    pH, Arterial 7.319 (L) 7.350 - 7.450    pCO2, Arterial 64.9 (H) 36.0 - 44.0 mm Hg    pO2, Arterial 82.3 75.0 - 129.0 mm Hg    HCO3, Arterial 32.6 (H) 22.0 - 28.0 mmol/L    Base Excess, Arterial 4.7 mmol/L    O2 Content, Arterial 16.9 16.0 - 23.0 mL/dL    O2 HGB,Arterial  93.6 (L) 94.0 - 97.0 %    SOURCE Radial, Left     ORTEGA TEST Yes     Non Vent type BIPAP BIPAP     IPAP 18     EPAP 6     BIPAP fio2 70 %   Fingerstick Glucose (POCT)    Collection Time: " 02/25/25  5:47 PM   Result Value Ref Range    POC Glucose 135 65 - 140 mg/dl   Blood gas, venous    Collection Time: 02/25/25  7:59 PM   Result Value Ref Range    pH, Jayden 7.331 7.300 - 7.400    pCO2, Jayden 64.5 (H) 42.0 - 50.0 mm Hg    pO2, Jayden 42.3 35.0 - 45.0 mm Hg    HCO3, Jayden 33.3 (H) 24 - 30 mmol/L    Base Excess, Jayden 5.6 mmol/L    O2 Content, Jayden 13.1 ml/dL    O2 HGB, VENOUS 74.0 60.0 - 80.0 %    ORTEGA TEST No     Temperature 98.5 Degrees Fehrenheit    Non Vent type BIPAP BIPAP     IPAP 18     EPAP 6     BIPAP fio2 70 %   Fingerstick Glucose (POCT)    Collection Time: 02/26/25 12:22 AM   Result Value Ref Range    POC Glucose 130 65 - 140 mg/dl   Fingerstick Glucose (POCT)    Collection Time: 02/26/25  1:18 AM   Result Value Ref Range    POC Glucose 136 65 - 140 mg/dl   Comprehensive metabolic panel    Collection Time: 02/26/25  4:55 AM   Result Value Ref Range    Sodium 147 135 - 147 mmol/L    Potassium 4.1 3.5 - 5.3 mmol/L    Chloride 106 96 - 108 mmol/L    CO2 35 (H) 21 - 32 mmol/L    ANION GAP 6 4 - 13 mmol/L    BUN 28 (H) 5 - 25 mg/dL    Creatinine 0.73 0.60 - 1.30 mg/dL    Glucose 136 65 - 140 mg/dL    Calcium 8.6 8.4 - 10.2 mg/dL    Corrected Calcium 9.3 8.3 - 10.1 mg/dL    AST 28 13 - 39 U/L    ALT 26 7 - 52 U/L    Alkaline Phosphatase 70 34 - 104 U/L    Total Protein 5.6 (L) 6.4 - 8.4 g/dL    Albumin 3.1 (L) 3.5 - 5.0 g/dL    Total Bilirubin 0.47 0.20 - 1.00 mg/dL    eGFR 92 ml/min/1.73sq m   Calcium, ionized    Collection Time: 02/26/25  4:55 AM   Result Value Ref Range    Calcium, Ionized 1.15 1.12 - 1.32 mmol/L   Blood gas, venous    Collection Time: 02/26/25  4:55 AM   Result Value Ref Range    pH, Jayden 7.343 7.300 - 7.400    pCO2, Jayden 63.1 (H) 42.0 - 50.0 mm Hg    pO2, Jayden 43.6 35.0 - 45.0 mm Hg    HCO3, Jayden 33.5 (H) 24 - 30 mmol/L    Base Excess, Jayden 6.0 mmol/L    O2 Content, Jayden 13.4 ml/dL    O2 HGB, VENOUS 75.8 60.0 - 80.0 %   Fingerstick Glucose (POCT)    Collection Time: 02/26/25  5:31 AM    Result Value Ref Range    POC Glucose 95 65 - 140 mg/dl   CBC and differential    Collection Time: 02/26/25  5:32 AM   Result Value Ref Range    WBC 22.13 (H) 4.31 - 10.16 Thousand/uL    RBC 3.48 (L) 3.88 - 5.62 Million/uL    Hemoglobin 11.4 (L) 12.0 - 17.0 g/dL    Hematocrit 35.4 (L) 36.5 - 49.3 %     (H) 82 - 98 fL    MCH 32.8 26.8 - 34.3 pg    MCHC 32.2 31.4 - 37.4 g/dL    RDW 15.3 (H) 11.6 - 15.1 %    MPV 10.7 8.9 - 12.7 fL    Platelets 350 149 - 390 Thousands/uL    nRBC 0 /100 WBCs   ECG 12 lead    Collection Time: 02/26/25 10:37 AM   Result Value Ref Range    Ventricular Rate 88 BPM    Atrial Rate 88 BPM    KS Interval 120 ms    QRSD Interval 72 ms    QT Interval 368 ms    QTC Interval 445 ms    P Axis 91 degrees    QRS Axis 81 degrees    T Wave Axis 81 degrees   Fingerstick Glucose (POCT)    Collection Time: 02/26/25 11:07 AM   Result Value Ref Range    POC Glucose 134 65 - 140 mg/dl   Fingerstick Glucose (POCT)    Collection Time: 02/26/25  6:26 PM   Result Value Ref Range    POC Glucose 137 65 - 140 mg/dl   Fingerstick Glucose (POCT)    Collection Time: 02/26/25 11:59 PM   Result Value Ref Range    POC Glucose 151 (H) 65 - 140 mg/dl   Fingerstick Glucose (POCT)    Collection Time: 02/27/25  5:49 AM   Result Value Ref Range    POC Glucose 133 65 - 140 mg/dl        Franky Mujica MD    This note has been constructed using a voice recognition system. There may be translation, syntax, or grammatical errors. If you have any questions, please contact the dictating provider.

## 2025-02-27 NOTE — CONSULTS
Consultation - Neuropsychology/Psychology Department  Jere Lepe 74 y.o. male MRN: [id number]  Unit/Bed#: -01 Encounter: [id number]        Reason for Consultation:  Jere Lepe is a 74 y.o. year old male who was referred for a Neuropsychological Exam to a assess cognitive functioning and comment on capacity to make informed medical decisions.      History of Present Illness  COPD, stating suicidal ideation and also wanting to pursue comfort care.    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    Sensorineural hearing loss, bilateral    Personal history of malignant neoplasm of larynx    Exposure to potentially hazardous substance    COPD with acute exacerbation (HCC)    SIRS (systemic inflammatory response syndrome) (HCC)    Fall    Acute respiratory failure with hypoxia and hypercapnia (HCC)    Adjustment disorder with depressed mood         Historical Information   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    Colon polyp     Compression fracture of body of thoracic vertebra (HCC) 10/11/2021    T9      CVA (cerebral vascular accident) (HCC) 01/2020    Elevated LFTs 09/16/2016    Hepatitis B     History of hepatitis-B exposure with positive surface and core antibodies with negative surface antigen    Hyperlipidemia     Hypertension     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  Prior tattoo site from large polypectomy in the splenic flexure noted.  Pathology of colon polyps were tubular adenomas.  A 3 year recall advised.    IR BIOPSY INGUINAL LYMPH NODE  2/15/2023    IR PORT PLACEMENT  6/19/2023    SKULL FRACTURE ELEVATION  1984     Social History   Social History     Substance and Sexual Activity   Alcohol Use Not Currently     Social History     Substance and Sexual Activity   Drug Use Yes    Types: Marijuana    Comment: rare     Social History     Tobacco Use   Smoking Status Every Day    Current packs/day: 0.50    Average packs/day: 0.5 packs/day for 55.2 years (27.6 ttl pk-yrs)    Types: Cigarettes    Start date: 1970   Smokeless Tobacco Never     Family History:   Family History   Problem Relation Age of Onset    Heart disease Father     Colon polyps Father     Substance Abuse Neg Hx     Mental illness Neg Hx     Colon cancer Neg Hx        Meds/Allergies   current meds:   Current Facility-Administered Medications:     cefepime (MAXIPIME) IVPB (premix in dextrose) 2,000 mg 50 mL, Q8H, Last Rate: Stopped (02/27/25 0101)    chlorhexidine (PERIDEX) 0.12 % oral rinse 15 mL, Q12H JUSTIN    insulin lispro (HumALOG/ADMELOG) 100 units/mL subcutaneous injection 1-6 Units, Q6H JUSTIN **AND** Fingerstick Glucose (POCT), Q6H    ipratropium (ATROVENT) 0.02 % inhalation solution 0.5 mg, 4x Daily PRN    levalbuterol (XOPENEX) inhalation solution 1.25 mg, 4x Daily PRN    levofloxacin (LEVAQUIN) IVPB (premix in dextrose) 750 mg 150 mL, Q24H, Last Rate: Stopped (02/26/25 1800)    methylPREDNISolone sodium succinate (Solu-MEDROL) injection 40 mg, Q8H JUSTIN    pantoprazole (PROTONIX)  "injection 40 mg, Daily    vancomycin (VANCOCIN) IVPB (premix in dextrose) 750 mg 150 mL, Q12H, Last Rate: Stopped (02/27/25 0400)    No Known Allergies      Family and Social Support:   No data recorded    Behavioral Observations: Patient was alert but refused to respond to examiners questions regarding orientation; when asked reason for hospitalization stated \"I want to die because I'm tired; he was unable to provide medical history and did not know what he was being treated for in hospital; notably, he denied feeling depressed but frequently reported wanting to end his life; he became very frustrated when examiner asked basic orientation questions, or other questions pertaining to his overall health knowledge and safety knowledge. As such, he reported, \"go away, I don't want to talk with you anymore\". Based upon brief interaction, review of provider notes and discussion with staff, patient does not appear to have capacity to make fully informed medical decisions.    "

## 2025-02-27 NOTE — ASSESSMENT & PLAN NOTE
History of prediabetes with A1c of 6  On systemic steroids for COPD exacerbation  A1c 6.6  SSI q6h for NPO with goal glucose 140-180  Currently clear liquids, but will need carb controlled diet when taking a diet  Will need to establish care with endo as OP

## 2025-02-27 NOTE — ASSESSMENT & PLAN NOTE
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  He was diagnosed with non-Hodgkin's lymphoma in 2023 status postchemotherapy.  Follows with [facility]

## 2025-02-27 NOTE — NURSING NOTE
Pt refused AM blood work and morning medications (cefepime and methylprednisolone). Pt repeatedly stated he wishes to die. Provider aware.

## 2025-02-27 NOTE — ASSESSMENT & PLAN NOTE
--Recommend Remeron 7.5 mg at bedtime  --Patient does not meet 302 or inpatient behavioral health hospitalization criteria at this time  --Monitor patient's mood and statements that he wants to die as he stabilizes medically  --Recommend neuropsychiatry evaluation for capacity as I do not believe the patient has capacity at this time to make medical decisions as he is not able to engage in a conversation regarding risks, benefits, and reasons for refusal and I believe that his current depressed mood is impacting his judgment  --Please ensure that firearms are removed from his house prior to discharge home

## 2025-02-27 NOTE — ASSESSMENT & PLAN NOTE
H/o COPD/emphysema, current tobacco use  Placed on BiPAP in ED, started on solu-medrol + empiric antibiotics   biPAP qhs

## 2025-02-27 NOTE — PROGRESS NOTES
Progress Note - Critical Care/ICU   Name: Jere Lepe 74 y.o. male I MRN: [id number]  Unit/Bed#: -01 I Date of Admission: 2/25/2025   Date of Service: 2/27/2025 I Hospital Day: 2      Assessment & Plan  COPD with acute exacerbation (HCC)  Patient with a history of COPD/emphysema current tobacco smoker.  Does not follow with pulmonary service and is not on inhalers at home.  He was recently ordered Combivent and Medrol pack by his primary care practice on 2/20/2025  Per patient and chart review patient has had a cough, and shortness of breath for the last 2 months.  Over the last few weeks shortness of breath and cough have increased and he has had decreased p.o. intake and increased weakness.  Denies fevers, nausea, vomiting, diarrhea  Patient with recent travel to [place], was on a cruise but he had to delay his trip short due to feeling unwell.  He returned home on 2/16.    Patient short of breath and with an SpO2 of 68 on room air in the ER.  He was placed on BiPAP for work of breathing and hypoxia  Initial ABG 7.22/7.5/62/31  Given Solu-Medrol 80 mg in the ER and started on empiric antibiotics    Plan  Liberated from continuous bipap 2/26  Bipap 18/6 60% HS  Solumedrol 40mg q8h -- consider wean today  Cont  Xopenex and Atrovent 4 times daily  Continue empiric antibiotics as discussed below  SpO2 goal greater than 88%  SIRS (systemic inflammatory response syndrome) (HCC)  Sirs: tachycardia, tachypnea, and leukocytosis  CAP classification 3 minor: tachypnea, multilobar infiltrates, elevated BUN; DRIP score 5: Abx within 60 days, COPD, poor function status, gastric acid suppression  2/25 CTA CAP: No PE; B/L multilobar tree-in-bud infiltrates; COPD  Given Azithromycin, Vanc and Cefepime in ED   PCT 0.24, trend  Patient had been on azithromycin as outpatient and completed a course just prior to admission  BC x2, MRSA pending   RP2 + Rhinovirus  Strep/legionella neg   Q-gold indeterminate and AFB  pending   Patient declined plan to intubate and bronch     Plan:  Abx D3: Vanc 3, Cefepime 3, levaquin 2  Follow-up blood cultures, sputum culture, Legionella/strep/MRSA/COVID/flu/RSV  Hypercholesterolemia  Holding home statin due to n.p.o.  Tobacco use disorder  Patient reports smoking 10 cigarettes a day  NRT offered but declined by patient  Continue to encourage cessation  Centrilobular emphysema (HCC)  Patient with a history of emphysema/COPD seen on imaging.  Not on any chronic inhalers as outpatient and does not see pulmonary medicine.  He is a current tobacco smoker, reports 10 cigarettes a day  Now admitted with COPD exacerbation, see plan above  Would benefit from establishing care with pulmonary services as outpatient and obtaining PFTs  Essential hypertension  Home amlodipine held due to n.p.o.  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  He was diagnosed with non-Hodgkin's lymphoma in 2023 status postchemotherapy.  Follows with 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  As residual balance and visual issues  PT OT consulted  Fall precautions   Fall  Patient reports increasing weakness over the last 4 weeks, significantly over the last week.  Reports [place] a wheelchair [place] get [place] the airport recently.  Prior to admission patient fell from sitting on toilet due to weakness  2/25 CT head/neck negative for acute findings  PT/OT consulted  Fall precautions   Acute respiratory failure with hypoxia and hypercapnia  (HCC)  In the setting of COPD AE and PNA POA   Required bipap on admission to ER, continued on admission to ICU   Spo2 68% in ER, hypercapnic on ABG   2/26 liberated from cont bipap     Plan:   Treat underlying causes as above  Goal spo2 > 88%  Disposition: Stepdown Level 1    ICU Core Measures     A: Assess, Prevent, and Manage Pain Has pain been assessed? Yes  Need for changes to pain regimen? No   B: Both SAT/SAT  N/A   C: Choice of Sedation RASS Goal: N/A patient not on sedation  Need for changes to sedation or analgesia regimen? NA   D: Delirium CAM-ICU: Negative   E: Early Mobility  Plan for early mobility? Yes   F: Family Engagement Plan for family engagement today? Yes       Antibiotic Review: Awaiting culture results.     Review of Invasive Devices:      Central access plan:  port accessed on admission      Prophylaxis:  VTE VTE covered by:    None       Stress Ulcer  covered bypantoprazole (PROTONIX) injection 40 mg [[id number]]         24 Hour Events : 73yo M with PMH COPD not on home regimen, hyper cholesterolemia, tobacco use, emphysema, hypertension, non-Hodgkin lymphoma of the lymph nodes, prediabetes, ataxia secondary to previous CVA who is hospital day 3 for COPD exacerbation, community-acquired pneumonia. Yesterday, he was liberated from continuous bipap. Clear liquid diet allowed. He spent the day on simple mask, did well on NC O2 prior to sleeping on bipap HS. Levaquin was added for atypical CAP coverage. He endorses no overt complaints.   Subjective   Review of Systems: See HPI for Review of Systems    Objective :                   Vitals I/O      Most Recent Min/Max in 24hrs   Temp 97.9 °F (36.6 °C) Temp  Min: 97.3 °F (36.3 °C)  Max: 98.6 °F (37 °C)   Pulse 79 Pulse  Min: 71  Max: 97   Resp 21 Resp  Min: 15  Max: 43   /62 BP  Min: 92/57  Max: 148/74   O2 Sat 100 % SpO2  Min: 88 %  Max: 100 %      Intake/Output Summary (Last 24 hours) at 2/27/2025 0518  Last data filed at 2/27/2025  0200  Gross per 24 hour   Intake 1200 ml   Output 854 ml   Net 346 ml       Diet Clear Liquid    Invasive Monitoring           Physical Exam   Physical Exam  Vitals and nursing note reviewed.   Skin:     General: Skin is warm.      Capillary Refill: Capillary refill takes less than 2 seconds.      Coloration: Skin is not pale.   HENT:      Head: Normocephalic and atraumatic.      Mouth/Throat:      Lips: Pink.      Mouth: Mucous membranes are moist.   Cardiovascular:      Rate and Rhythm: Normal rate and regular rhythm.   Musculoskeletal:      Right lower leg: No edema.      Left lower leg: No edema.   Abdominal: General: Abdomen is flat. Bowel sounds are decreased.      Palpations: Abdomen is soft.      Tenderness: There is no abdominal tenderness.   Constitutional:       General: He is awake. He is not in acute distress.     Appearance: Normal appearance. He is not toxic-appearing.      Interventions: Nasal cannula in place.   Pulmonary:      Effort: Pulmonary effort is normal.      Breath sounds: Decreased breath sounds present.   Psychiatric:         Behavior: Behavior is cooperative.   Neurological:      General: No focal deficit present.      Mental Status: He is alert and oriented to person, place, and time.   Genitourinary/Anorectal:     Comments: Voiding independently         Diagnostic Studies        Lab Results: I have reviewed the following results:     Medications:  Scheduled PRN   cefepime, 2,000 mg, Q8H  chlorhexidine, 15 mL, Q12H JUSTIN  insulin lispro, 1-6 Units, Q6H JUSTIN  ipratropium, 0.5 mg, 4x Daily  levalbuterol, 1.25 mg, 4x Daily  levofloxacin, 750 mg, Q24H  methylPREDNISolone sodium succinate, 40 mg, Q8H JUSTIN  pantoprazole, 40 mg, Daily  vancomycin, 750 mg, Q12H          Continuous          Labs:   CBC    Recent Labs     02/25/25  0705 02/26/25  0532   WBC 20.69* 22.13*   HGB 14.6 11.4*   HCT 45.1 35.4*   * 350   BANDSPCT 11*  --      BMP    Recent Labs     02/25/25  0705 02/26/25  7067    SODIUM 143 147   K 4.0 4.1    106   CO2 33* 35*   AGAP 9 6   BUN 30* 28*   CREATININE 0.93 0.73   CALCIUM 8.6 8.6       Coags    Recent Labs     02/25/25  0857   INR 1.50*   PTT 28        Additional Electrolytes  Recent Labs     02/25/25  0607 02/25/25  0705 02/26/25  0455   MG  --  2.2  --    PHOS  --  5.9*  --    CAIONIZED 1.21  --  1.15          Blood Gas    Recent Labs     02/25/25  1501   PHART 7.319*   MKD8XTF 64.9*   PO2ART 82.3   NCV5IRX 32.6*   BEART 4.7   SOURCE Radial, Left     Recent Labs     02/25/25  1501 02/25/25  1959 02/26/25  0455   PHVEN  --    < > 7.343   BPZ7PTV  --    < > 63.1*   PO2VEN  --    < > 43.6   LJF9NSM  --    < > 33.5*   BEVEN  --    < > 6.0   U6RODFE  --    < > 75.8   SOURCE Radial, Left  --   --     < > = values in this interval not displayed.    LFTs  Recent Labs     02/25/25  0705 02/26/25  0455   ALT 39 26   AST 68* 28   ALKPHOS 86 70   ALB 3.5 3.1*   TBILI 0.63 0.47       Infectious  Recent Labs     02/25/25  0705   PROCALCITONI 0.24     Glucose  Recent Labs     02/25/25  0705 02/26/25  0455   GLUC 170* 136

## 2025-02-27 NOTE — PROGRESS NOTES
Jere Lepe is a 74 y.o. male who is currently ordered Vancomycin IV with management by the Pharmacy Consult service.  Relevant clinical data and objective / subjective history reviewed.  Vancomycin Assessment:  Indication and Goal AUC/Trough: Pneumonia (goal -600, trough >10), -600, trough >10  Clinical Status: stable  Micro:     Renal Function:  SCr: 0.73  mg/dL (02/26)  CrCl: 66.3 mL/min  Renal replacement: Not on dialysis  Days of Therapy: 3  Current Dose: 750 mg Q12h  Vancomycin Plan:  New Dosing: continue the same. pt is refusing the labs  Estimated AUC: 498 mcg*hr/mL  Estimated Trough: 13.5 mcg/mL  Next Level: with AM labs on 02/28  Renal Function Monitoring: Daily BMP and UOP  Pharmacy will continue to follow closely for s/sx of nephrotoxicity, infusion reactions and appropriateness of therapy.  BMP and CBC will be ordered per protocol. We will continue to follow the patient’s culture results and clinical progress daily.    Estephania Oviedo, Pharmacist

## 2025-02-27 NOTE — OCCUPATIONAL THERAPY NOTE
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 Not appropriate for OT intervention at this time. Will f/u as able & appropriate       Eugenia Carbone OTR/L

## 2025-02-27 NOTE — ASSESSMENT & PLAN NOTE
Sirs: tachycardia, tachypnea, and leukocytosis  CAP classification 3 minor: tachypnea, multilobar infiltrates, elevated BUN; DRIP score 5: Abx within 60 days, COPD, poor function status, gastric acid suppression  2/25 CTA CAP: No PE; B/L multilobar tree-in-bud infiltrates; COPD  Given Azithromycin, Vanc and Cefepime in ED   PCT 0.24, trend  Patient had been on azithromycin as outpatient and completed a course just prior to admission  BC x2, MRSA pending   RP2 + Rhinovirus  Strep/legionella neg   Q-gold indeterminate and AFB pending   Patient declined plan to intubate and bronch     Plan:  Abx D3: Vanc 3, Cefepime 3, levaquin 2  Follow-up blood cultures, sputum culture, Legionella/strep/MRSA/COVID/flu/RSV

## 2025-02-27 NOTE — PLAN OF CARE
Problem: PAIN - ADULT  Goal: Verbalizes/displays adequate comfort level or baseline comfort level  Description: Interventions:  - Encourage patient to monitor pain and request assistance  - Assess pain using appropriate pain scale  - Administer analgesics based on type and severity of pain and evaluate response  - Implement non-pharmacological measures as appropriate and evaluate response  - Consider cultural and social influences on pain and pain management  - Notify physician/advanced practitioner if interventions unsuccessful or patient reports new pain  Outcome: Progressing     Problem: INFECTION - ADULT  Goal: Absence or prevention of progression during hospitalization  Description: INTERVENTIONS:  - Assess and monitor for signs and symptoms of infection  - Monitor lab/diagnostic results  - Monitor all insertion sites, i.e. indwelling lines, tubes, and drains  - Monitor endotracheal if appropriate and nasal secretions for changes in amount and color  - [place] appropriate cooling/warming therapies per order  - Administer medications as ordered  - Instruct and encourage patient and family to use good hand hygiene technique  - Identify and instruct in appropriate isolation precautions for identified infection/condition  Outcome: Progressing  Goal: Absence of fever/infection during neutropenic period  Description: INTERVENTIONS:  - Monitor WBC    Outcome: Progressing     Problem: DISCHARGE PLANNING  Goal: Discharge to home or other facility with appropriate resources  Description: INTERVENTIONS:  - Identify barriers to discharge w/patient and caregiver  - Arrange for needed discharge resources and transportation as appropriate  - Identify discharge learning needs (meds, wound care, etc.)  - Arrange for interpretive services to assist at discharge as needed  - Refer to Case Management Department for coordinating discharge planning if the patient needs post-hospital services based on physician/advanced  practitioner order or complex needs related to functional status, cognitive ability, or social support system  Outcome: Progressing     Problem: RESPIRATORY - ADULT  Goal: Achieves optimal ventilation and oxygenation  Description: INTERVENTIONS:  - Assess for changes in respiratory status  - Assess for changes in mentation and behavior  - Position to facilitate oxygenation and minimize respiratory effort  - Oxygen administered by appropriate delivery if ordered  - Initiate smoking cessation education as indicated  - Encourage broncho-pulmonary hygiene including cough, deep breathe, Incentive Spirometry  - Assess the need for suctioning and aspirate as needed  - Assess and instruct to report SOB or any respiratory difficulty  - Respiratory Therapy support as indicated  Outcome: Progressing     Problem: Nutrition/Hydration-ADULT  Goal: Nutrient/Hydration intake appropriate for improving, restoring or maintaining nutritional needs  Description: Monitor and assess patient's nutrition/hydration status for malnutrition. Collaborate with interdisciplinary team and initiate plan and interventions as ordered.  Monitor patient's weight and dietary intake as ordered or per policy. Utilize nutrition screening tool and intervene as necessary. Determine patient's food preferences and provide high-protein, high-caloric foods as appropriate.     INTERVENTIONS:  - Monitor oral intake, urinary output, labs, and treatment plans  - Assess nutrition and hydration status and recommend course of action  - Evaluate amount of meals eaten  - Assist patient with eating if necessary   - Allow adequate time for meals  - Recommend/ encourage appropriate diets, oral nutritional supplements, and vitamin/mineral supplements  - Order, calculate, and assess calorie counts as needed  - Recommend, monitor, and adjust tube feedings and TPN/PPN based on assessed needs  - Assess need for intravenous fluids  - Provide specific nutrition/hydration education  as appropriate  - Include patient/family/caregiver in decisions related to nutrition  Outcome: Progressing

## 2025-02-28 NOTE — OCCUPATIONAL THERAPY NOTE
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 Pt remains on 15L MFNC. Per RN, patient consistently declining OOB. Pt remains not appropriate for OT eval. Will DC OT orders at this time. Please reconsult should needs arise or if/when pt is appropriate & willing to participate       Eugenia Carbone, OTR/L

## 2025-02-28 NOTE — ASSESSMENT & PLAN NOTE
Sirs: tachycardia, tachypnea, and leukocytosis  CAP classification 3 minor: tachypnea, multilobar infiltrates, elevated BUN; DRIP score 5: Abx within 60 days, COPD, poor function status, gastric acid suppression  2/25 CTA CAP: No PE; B/L multilobar tree-in-bud infiltrates; COPD  Given Azithromycin, Vanc and Cefepime in ED   PCT 0.24, trend  Patient had been on azithromycin as outpatient and completed a course just prior to admission  BC x2, MRSA pending   RP2 + Rhinovirus  Strep/legionella neg   Q-gold indeterminate and AFB pending   Patient declined plan to intubate and bronch     Plan:  Abx D4: Vanc D4, Cefepime D4, levaquin D3  Follow-up blood cultures, sputum culture, Legionella/strep/MRSA/COVID/flu/RSV

## 2025-02-28 NOTE — ASSESSMENT & PLAN NOTE
Acute hypoxic resp failure, COPD exac, Non-hodgkin lymphoma with + PET-CT with close f/u  Symptoms  Per ICU  Goals  Neuro psych : Patient does not appear to have capacity for medical decision making.   Discussed with SAMREEN Rivas at the bedside.   Family is planning oncoming to visit over the weekend.   ICU to have meeting with siblings via telephone this afternoon  Confirmed level 3, DNAR/DNI; reviewed LW; discussed current condition with SAMREEN/Evelyn at bedside. Patient expresses desire to die. Expresses desire to go home. Would not discuss further or respond to questions. Patient hard of hearing and would not engage in questioning, became agitated and waved me off. Discussed hospice/comfort transition with SAMREEN/Evelyn. Patient was not expressing these things prior to coming into hospital. He does not like going to the doctors or hospital, but will if he has to. She says she will honor his wishes. Discussed concern that he does not have capacity to make his own decisions right now. Also discussed concerns with level of oxygen requirements and his refusal of some care. Evelyn familiar with hospice as her father  on hospice. Evelyn states patient's siblings are aware of his condition and will possibly visit over the weekend. SO uncertain what she will decide if patient formally found to not have capacity. She will likely discuss further with patient's siblings(OPAL is a physician). Discussed option of comfort transition for further decline/abrupt deterioration. Emotional support/validation provided. Contact provided.  Will follow intermittently as needed. D/w ICU team

## 2025-02-28 NOTE — PLAN OF CARE
Problem: PAIN - ADULT  Goal: Verbalizes/displays adequate comfort level or baseline comfort level  Description: Interventions:  - Encourage patient to monitor pain and request assistance  - Assess pain using appropriate pain scale  - Administer analgesics based on type and severity of pain and evaluate response  - Implement non-pharmacological measures as appropriate and evaluate response  - Consider cultural and social influences on pain and pain management  - Notify physician/advanced practitioner if interventions unsuccessful or patient reports new pain  Outcome: Progressing     Problem: INFECTION - ADULT  Goal: Absence or prevention of progression during hospitalization  Description: INTERVENTIONS:  - Assess and monitor for signs and symptoms of infection  - Monitor lab/diagnostic results  - Monitor all insertion sites, i.e. indwelling lines, tubes, and drains  - Monitor endotracheal if appropriate and nasal secretions for changes in amount and color  - [place] appropriate cooling/warming therapies per order  - Administer medications as ordered  - Instruct and encourage patient and family to use good hand hygiene technique  - Identify and instruct in appropriate isolation precautions for identified infection/condition  Outcome: Progressing  Goal: Absence of fever/infection during neutropenic period  Description: INTERVENTIONS:  - Monitor WBC    Outcome: Progressing     Problem: SAFETY ADULT  Goal: Patient will remain free of falls  Description: INTERVENTIONS:  - Educate patient/family on patient safety including physical limitations  - Instruct patient to call for assistance with activity   - Consult OT/PT to assist with strengthening/mobility   - Keep Call bell within reach  - Keep bed low and locked with side rails adjusted as appropriate  - Keep care items and personal belongings within reach  - Initiate and maintain comfort rounds  - Make Fall Risk Sign visible to staff  - Offer Toileting every  Hours,  in advance of need  - Initiate/Maintain alarm  - Obtain necessary fall risk management equipment:   - Apply yellow socks and bracelet for high fall risk patients  - Consider moving patient to room near nurses station  Outcome: Progressing  Goal: Maintain or return to baseline ADL function  Description: INTERVENTIONS:  -  Assess patient's ability to carry out ADLs; assess patient's baseline for ADL function and identify physical deficits which impact ability to perform ADLs (bathing, care of mouth/teeth, toileting, grooming, dressing, etc.)  - Assess/evaluate cause of self-care deficits   - Assess range of motion  - Assess patient's mobility; develop plan if impaired  - Assess patient's need for assistive devices and provide as appropriate  - Encourage maximum independence but intervene and supervise when necessary  - Involve family in performance of ADLs  - Assess for home care needs following discharge   - Consider OT consult to assist with ADL evaluation and planning for discharge  - Provide patient education as appropriate  Outcome: Progressing  Goal: Maintains/Returns to pre admission functional level  Description: INTERVENTIONS:  - Perform AM-PAC 6 Click Basic Mobility/ Daily Activity assessment daily.  - Set and communicate daily mobility goal to care team and patient/family/caregiver.   - Collaborate with rehabilitation services on mobility goals if consulted  - Perform Range of Motion  times a day.  - Reposition patient every  hours.  - Dangle patient  times a day  - Stand patient  times a day  - Ambulate patient  times a day  - Out of bed to chair  times a day   - Out of bed for meals  times a day  - Out of bed for toileting  - Record patient progress and toleration of activity level   Outcome: Progressing     Problem: DISCHARGE PLANNING  Goal: Discharge to home or other facility with appropriate resources  Description: INTERVENTIONS:  - Identify barriers to discharge w/patient and caregiver  - Arrange for  needed discharge resources and transportation as appropriate  - Identify discharge learning needs (meds, wound care, etc.)  - Arrange for interpretive services to assist at discharge as needed  - Refer to Case Management Department for coordinating discharge planning if the patient needs post-hospital services based on physician/advanced practitioner order or complex needs related to functional status, cognitive ability, or social support system  Outcome: Progressing     Problem: Knowledge Deficit  Goal: Patient/family/caregiver demonstrates understanding of disease process, treatment plan, medications, and discharge instructions  Description: Complete learning assessment and assess knowledge base.  Interventions:  - Provide teaching at level of understanding  - Provide teaching via preferred learning methods  Outcome: Progressing     Problem: RESPIRATORY - ADULT  Goal: Achieves optimal ventilation and oxygenation  Description: INTERVENTIONS:  - Assess for changes in respiratory status  - Assess for changes in mentation and behavior  - Position to facilitate oxygenation and minimize respiratory effort  - Oxygen administered by appropriate delivery if ordered  - Initiate smoking cessation education as indicated  - Encourage broncho-pulmonary hygiene including cough, deep breathe, Incentive Spirometry  - Assess the need for suctioning and aspirate as needed  - Assess and instruct to report SOB or any respiratory difficulty  - Respiratory Therapy support as indicated  Outcome: Progressing     Problem: Prexisting or High Potential for Compromised Skin Integrity  Goal: Skin integrity is maintained or improved  Description: INTERVENTIONS:  - Identify patients at risk for skin breakdown  - Assess and monitor skin integrity  - Assess and monitor nutrition and hydration status  - Monitor labs   - Assess for incontinence   - Turn and reposition patient  - Assist with mobility/ambulation  - Relieve pressure over bony  prominences  - Avoid friction and shearing  - Provide appropriate hygiene as needed including keeping skin clean and dry  - Evaluate need for skin moisturizer/barrier cream  - Collaborate with interdisciplinary team   - Patient/family teaching  - Consider wound care consult   Outcome: Progressing     Problem: Nutrition/Hydration-ADULT  Goal: Nutrient/Hydration intake appropriate for improving, restoring or maintaining nutritional needs  Description: Monitor and assess patient's nutrition/hydration status for malnutrition. Collaborate with interdisciplinary team and initiate plan and interventions as ordered.  Monitor patient's weight and dietary intake as ordered or per policy. Utilize nutrition screening tool and intervene as necessary. Determine patient's food preferences and provide high-protein, high-caloric foods as appropriate.     INTERVENTIONS:  - Monitor oral intake, urinary output, labs, and treatment plans  - Assess nutrition and hydration status and recommend course of action  - Evaluate amount of meals eaten  - Assist patient with eating if necessary   - Allow adequate time for meals  - Recommend/ encourage appropriate diets, oral nutritional supplements, and vitamin/mineral supplements  - Order, calculate, and assess calorie counts as needed  - Recommend, monitor, and adjust tube feedings and TPN/PPN based on assessed needs  - Assess need for intravenous fluids  - Provide specific nutrition/hydration education as appropriate  - Include patient/family/caregiver in decisions related to nutrition  Outcome: Progressing

## 2025-02-28 NOTE — ASSESSMENT & PLAN NOTE
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

## 2025-02-28 NOTE — SPEECH THERAPY NOTE
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)    SIRS (systemic inflammatory response syndrome) (HCC)    Fall    Acute respiratory failure with hypoxia and hypercapnia (HCC)    Adjustment disorder with depressed mood    Palliative care encounter      Past Medical History  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  Prior tattoo site from large polypectomy in the splenic flexure noted.  Pathology of colon polyps were tubular adenomas.  A 3 year recall advised.    IR BIOPSY INGUINAL LYMPH NODE  2/15/2023    IR PORT PLACEMENT  6/19/2023    SKULL FRACTURE ELEVATION  1984       Summary   Pt presents w/ s/s suggestive of at least min-mild pharyngeal dysphagia, unable to determine oral stage at  this time given pt declining further PO trials.     Complete labial seal for retrieval and containment of all materials, no anterior bolus loss present. Mastication not observed this date 2/2 pt declining trials of solids. Adequate bolus transfer of thin liquids, piecemeal deglutition noted. Suspected delayed swallow initiation and reduced hyolaryngeal elevation to palpation. Immediate cough response x1 w/ thin liquids, no overt s/s of aspiration upon secondary attempt. SLP attempted trials of nectar thick liquids though pt adamantly declined despite SLP providing education regarding rationale for and adverse effects of possible aspiration.     Education initiated w/ pt and pt's SO regarding strategies to optimize swallow safety including frequent/thorough oral care and to notify medical staff if s/s of aspiration arise. Pt's SO verbalized understanding and agreement, denies questions or concerns at this time, pt present during education though did not verbalize to SLP understanding/agreement.     Pt w/ increased risk of aspiration 2/2 current admission for SOB, abn lung imaging, h/o tonsillar CA, and multiple medical co-morbidities. SLP to complete dysphagia f/u as able, appropriate, and pt willing. SLP s/w pt and pt's SO regarding rationale for completion of VFSS given s/s of aspiration as well as abn lung imaging. Pt began stating profanities at SLP, pt's SO requested to hold off for now, verbalizing acceptance of risks of aspiration at this time.       Recommended Diet: unable to provide recommended diet at this time given pt declining PO trials, defer to medical team    Recommended Form of Meds: defer to medical team    Aspiration precautions and swallowing strategies: upright posture, only feed when fully alert, slow rate of feeding, and small bites/sips  Other Recommendations: Continue frequent oral care        Current Medical Status  Copied from H&P   Pt is a 74 y.o. male who presents with complaint of  increasing weakness, cough, shortness of breath over the last 4 weeks.  Patient fell off his toilet this morning prompting him to come to the hospital.  Patient with past medical history significant for COPD/emphysema, CVA in 2019 on Eliquis, hypertension, hyperlipidemia, non-Hodgkin's lymphoma in 2023 status post chemo, tonsillar cancer in 2008 status post chemo and radiation.  In January patient saw his PCP for complaints above and was treated with doxycycline.  He went on a cruise in [place] [facility], but he had to cut his trip short due to increasing weakness/cough/shortness of breath.  The cruise without a [place] and after getting off the boot they traveled [place] through [facility] and into [place] 5-7 days ago.  He saw his PCP on 2/20 and was started on a Medrol pack, nebulizer, and azithromycin.   Trauma workup negative for acute findings.  In the ER he was found to be 60% on room air.  He was placed on BiPAP.  Initial ABG 7.22/77.5/62.  CT chest abdomen pelvis negative for PE but did show COPD and bilateral multilobular tree-in-bud infiltrates.  In the ER patient was cultured, started on Solu-Medrol and empiric antibiotics.    Current Precautions:  Aspiration  Contact  Airborne  Droplet     Allergies:  No known food allergies    Past medical history:  Please see H&P for details    Special Studies:  2/28/25 XR chest portable ICU:  Similar appearance of the chest to prior studies.     2/25/25 CT pe study w abdomen pelvis w contrast:  CTA chest:     No pulmonary embolus.     No acute posttraumatic thoracic process.     No definitive acute fracture allowing for motion artifact.     Bilateral multi lobar tree-in-bud infiltrates may represent inflammatory or infectious bronchiolitis or aspiration pneumonitis.     Additional chronic findings and negatives as above.     CT abdomen and pelvis:     No evidence of acute abdominopelvic process.     No definitive acute fracture allowing for motion artifact.     Left  para-aortic adenopathy slightly improved since December 2024.     Additional chronic findings and negatives as above.    2/25/25 TRAUMA- CT spine cervical wo contrast:  Mild cervical spondylitic degenerative change with no acute osseous abnormality.     2/25/25 TRAUMA- CT head wo contrast:  No acute intracranial abnormality.  Stable chronic microangiopathic changes within the brain.     2/25/25 XR chest portable:  No significant change since prior study.     2/24/25 XR chest pa and lateral:  New patchy bibasilar opacities, concerning for atelectasis, pneumonia or aspiration. Small bilateral pleural effusions.     History of VFSS:  N/A     Social/Education/Vocational Hx:  Pt lives w/ SO    Swallow Information   Current Diet: regular diet and thin liquids   Baseline Diet: regular diet and thin liquids per pt report       Baseline Assessment   Behavior/Cognition: alert  Speech/Language Status: able to participate in conversation and able to follow commands  Patient Positioning: upright in bed  Pain Status/Interventions/Response to Interventions: No report of or nonverbal indications of pain.       Oral Mechanism Exam  Facial: symmetrical  Labial: WFL  Lingual: WFL  Velum: symmetrical  Mandible: adequate ROM  Dentition: missing some dentition   Vocal quality:clear/adequate   Volitional Cough: weak   Respiratory Status: on 15L MFNC O2        Consistencies Assessed and Performance   Consistencies Administered: thin liquids- pt declined trial of higher level textures     Oral Stage:   Complete labial seal for retrieval and containment of all materials, no anterior bolus loss present. Mastication not observed this date 2/2 pt declining trials of solids. Adequate bolus transfer of thin liquids, piecemeal deglutition noted.     Pharyngeal Stage:   Suspected delayed swallow initiation and reduced hyolaryngeal elevation to palpation. Immediate cough response x1 w/ thin liquids, no overt s/s of aspiration upon secondary attempt.  SLP attempted trials of nectar thick liquids though pt adamantly declined despite SLP providing education regarding rationale for and adverse effects of possible aspiration.     Esophageal Concerns: none reported      Summary and Recommendations (see above)    Results Reviewed with: patient, RN, and MD     Treatment Recommended: as able, appropriate, and pt willing for diet tolerance/to determine safest and least restrictive diet     Dysphagia LTG  -Patient will demonstrate safe and effective oral intake (without overt s/s significant oral/pharyngeal dysphagia including s/s penetration or aspiration) for the highest appropriate diet level.     Short Term Goals:  -Pt will tolerate least restrictive diet with no significant s/s oral or pharyngeal dysphagia across 1-3 diagnostic session/s    -Patient will comply with a Video/Modified Barium Swallow study for more complete assessment of swallowing anatomy/physiology/aspiration risk and to assess efficacy of treatment techniques so as to best guide treatment plan as able and appropriate/pt willing     Speech Therapy Prognosis   Prognosis: fair    Prognosis Considerations: age, medical status, prior medical history, therapeutic potential, and motivation

## 2025-02-28 NOTE — ASSESSMENT & PLAN NOTE
2/27 Psych consulted, input appreciated:  Does not meet criteria for any major depressive episode   Meets criteria for adjustment disorder with depressed mood   Trial Remeron 7.5mg HS  Poor insight and poor judgment and a lack of capacity as he does not appreciate the current situation or the consequences of excepting refusing medical treatment.   Recommend Neuro Psych consult  2/27 Neuro Psych consulted, input appreciated  Patient was alert but refused to respond to examiners questions   Frequently reported wanting to end his life   Patient does not appear to have capacity to make fully informed medical decisions

## 2025-02-28 NOTE — PROGRESS NOTES
Progress Note - Critical Care/ICU   Name: Jere Lepe 74 y.o. male I MRN: [id number]  Unit/Bed#: -01 I Date of Admission: 2/25/2025   Date of Service: 2/27/2025 I Hospital Day: 2      Assessment & Plan  COPD with acute exacerbation (HCC)  Patient with a history of COPD/emphysema current tobacco smoker.  Does not follow with pulmonary service and is not on inhalers at home.  He was recently ordered Combivent and Medrol pack by his primary care practice on 2/20/2025  Per patient and chart review patient has had a cough, and shortness of breath for the last 2 months.  Over the last few weeks shortness of breath and cough have increased and he has had decreased p.o. intake and increased weakness.  Denies fevers, nausea, vomiting, diarrhea  Patient with recent travel [place], was on a cruise but he had to delay his trip short due to feeling unwell.  He returned home on 2/16.    Patient short of breath and with an SpO2 of 68 on room air in the ER.  He was placed on BiPAP for work of breathing and hypoxia  Initial ABG 7.22/7.5/62/31  Given Solu-Medrol 80 mg in the ER and started on empiric antibiotics  2/26 liberated from continuous bipap    Plan  Bipap 18/6 60% HS  Solumedrol 40mg q8h   Cont  Xopenex and Atrovent TID  Continue empiric antibiotics as discussed below  SpO2 goal greater than 88%  SIRS (systemic inflammatory response syndrome) (HCC)  Sirs: tachycardia, tachypnea, and leukocytosis  CAP classification 3 minor: tachypnea, multilobar infiltrates, elevated BUN; DRIP score 5: Abx within 60 days, COPD, poor function status, gastric acid suppression  2/25 CTA CAP: No PE; B/L multilobar tree-in-bud infiltrates; COPD  Given Azithromycin, Vanc and Cefepime in ED   PCT 0.24, trend  Patient had been on azithromycin as outpatient and completed a course just prior to admission  BC x2, MRSA pending   RP2 + Rhinovirus  Strep/legionella neg   Q-gold indeterminate and AFB pending   Patient declined plan  to intubate and bronch     Plan:  Abx D4: Vanc D4, Cefepime D4, levaquin D3  Follow-up blood cultures, sputum culture, Legionella/strep/MRSA/COVID/flu/RSV  Hypercholesterolemia  Holding home statin due to n.p.o.  Tobacco use disorder  Patient reports smoking 10 cigarettes a day  NRT offered but declined by patient  Continue to encourage cessation  Centrilobular emphysema (HCC)  Patient with a history of emphysema/COPD seen on imaging.  Not on any chronic inhalers as outpatient and does not see pulmonary medicine.  He is a current tobacco smoker, reports 10 cigarettes a day  Now admitted with COPD exacerbation, see plan above  Would benefit from establishing care with pulmonary services as outpatient and obtaining PFTs  Essential hypertension  Home amlodipine held due to n.p.o.  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  He was diagnosed with non-Hodgkin's lymphoma in 2023 status postchemotherapy.  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  As residual balance and visual issues  PT OT consulted  Fall precautions   Fall  Patient reports increasing weakness over the last 4 weeks, significantly over the last week.  Reports needing a wheelchair [place] get [place] the airport recently.  Prior to admission patient fell from sitting on toilet due to weakness  2/25 CT head/neck negative for acute findings  PT/OT consulted  Fall precautions   Acute respiratory failure with hypoxia and hypercapnia (HCC)  In the setting of COPD AE and  PNA POA   Required bipap on admission to ER, continued on admission to ICU   Spo2 68% in ER, hypercapnic on ABG   2/26 liberated from cont bipap     Plan:   Treat underlying causes as above  Goal spo2 > 88%  Adjustment disorder with depressed mood  2/27 Psych consulted, input appreciated:  Does not meet criteria for any major depressive episode   Meets criteria for adjustment disorder with depressed mood   Trial Remeron 7.5mg HS  Poor insight and poor judgment and a lack of capacity as he does not appreciate the current situation or the consequences of excepting refusing medical treatment.   Recommend Neuro Psych consult  2/27 Neuro Psych consulted, input appreciated  Patient was alert but refused to respond to examiners questions   Frequently reported wanting to end his life   Patient does not appear to have capacity to make fully informed medical decisions     Disposition: Stepdown Level 1    ICU Core Measures     A: Assess, Prevent, and Manage Pain Has pain been assessed? Yes  Need for changes to pain regimen? No   B: Both SAT/SAT  N/A   C: Choice of Sedation RASS Goal: N/A patient not on sedation  Need for changes to sedation or analgesia regimen? NA   D: Delirium CAM-ICU: Unable to perform secondary to Acute cognitive dysfunction   E: Early Mobility  Plan for early mobility? Yes   F: Family Engagement Plan for family engagement today? Yes       Antibiotic Review: Awaiting culture results.     Review of Invasive Devices:      Central access plan: PORT accessed on admission    Prophylaxis:  VTE VTE covered by:    None       Stress Ulcer  covered bypantoprazole (PROTONIX) injection 40 mg [[id number]]         24 Hour Events : Yesterday patient was deemed not to have medical decision making capacity by both Psych and Neuro Psych. Overnight, patient was on 15L MFNC with 15L NRB then transitioned to nocturnal BIPAP. No acute events.     Subjective   Review of Systems: Review of Systems   Constitutional:  Positive for  fatigue. Negative for chills and fever.   HENT: Negative.     Eyes: Negative.    Respiratory:  Positive for shortness of breath. Negative for cough and wheezing.    Cardiovascular: Negative.    Gastrointestinal: Negative.    Endocrine: Negative.    Genitourinary: Negative.    Musculoskeletal: Negative.    Skin: Negative.    Allergic/Immunologic: Negative.    Neurological: Negative.    Hematological: Negative.    Psychiatric/Behavioral:  Positive for confusion. Negative for agitation and hallucinations.        Objective :                   Vitals I/O      Most Recent Min/Max in 24hrs   Temp 97.8 °F (36.6 °C) Temp  Min: 97.8 °F (36.6 °C)  Max: 97.9 °F (36.6 °C)   Pulse 71 Pulse  Min: 57  Max: 94   Resp 22 Resp  Min: 15  Max: 31   /59 BP  Min: 97/54  Max: 142/80   O2 Sat 100 % SpO2  Min: 88 %  Max: 100 %      Intake/Output Summary (Last 24 hours) at 2/27/2025 1949  Last data filed at 2/27/2025 1757  Gross per 24 hour   Intake 1360 ml   Output 555 ml   Net 805 ml       Diet Clear Liquid    Invasive Monitoring           Physical Exam   Physical Exam  Vitals and nursing note reviewed.   Eyes:      Extraocular Movements: Extraocular movements intact.      Pupils: Pupils are equal, round, and reactive to light.   Skin:     General: Skin is warm.      Capillary Refill: Capillary refill takes less than 2 seconds.      Coloration: Skin is not jaundiced or pale.   HENT:      Head: Normocephalic and atraumatic.      Mouth/Throat:      Mouth: Mucous membranes are moist.   Cardiovascular:      Rate and Rhythm: Normal rate and regular rhythm.      Pulses: Normal pulses.   Musculoskeletal:      Right lower leg: Trace Edema present.      Left lower leg: Trace Edema present.   Abdominal: General: Bowel sounds are normal. There is no distension.      Palpations: Abdomen is soft.      Tenderness: There is no abdominal tenderness.   Constitutional:       General: He is not in acute distress.     Appearance: He is ill-appearing.    Pulmonary:      Effort: Tachypnea present.      Breath sounds: Normal breath sounds. No wheezing, rhonchi or rales.   Neurological:      General: No focal deficit present.      Mental Status: He is alert. Mental status is at baseline. He is calm and disoriented to time. He is not agitated.      Sensory: Sensation is intact.      Motor: gross motor function is at baseline for patient. Strength full and intact in all extremities.   Genitourinary/Anorectal:     Comments: Spontaneous voiding         Diagnostic Studies        Lab Results: I have reviewed the following results:     Medications:  Scheduled PRN   cefepime, 2,000 mg, Q8H  chlorhexidine, 15 mL, Q12H JUSTIN  insulin lispro, 1-6 Units, Q6H JUSTIN  levofloxacin, 750 mg, Q24H  methylPREDNISolone sodium succinate, 40 mg, Q8H JUSTIN  pantoprazole, 40 mg, Daily      ipratropium, 0.5 mg, 4x Daily PRN  levalbuterol, 1.25 mg, 4x Daily PRN       Continuous          Labs:   CBC    Recent Labs     02/26/25  0532   WBC 22.13*   HGB 11.4*   HCT 35.4*        BMP    Recent Labs     02/26/25  0455   SODIUM 147   K 4.1      CO2 35*   AGAP 6   BUN 28*   CREATININE 0.73   CALCIUM 8.6       Coags    No recent results     Additional Electrolytes  Recent Labs     02/26/25  0455   CAIONIZED 1.15          Blood Gas    No recent results  Recent Labs     02/26/25  0455   PHVEN 7.343   GIB7QCC 63.1*   PO2VEN 43.6   EUE5COF 33.5*   BEVEN 6.0   M9SVSKU 75.8    LFTs  Recent Labs     02/26/25  0455   ALT 26   AST 28   ALKPHOS 70   ALB 3.1*   TBILI 0.47       Infectious  No recent results  Glucose  Recent Labs     02/26/25  0455   GLUC 136

## 2025-02-28 NOTE — PHYSICAL THERAPY NOTE
PHYSICAL THERAPY DISCHARGE/CX NOTE      Patient Name: Jere Lepe  Today's Date: 2/28/2025 02/28/25 1050   Note Type   Note type Cancelled Session   Additional Comments Spoke w/ RN Chiara, performed chart review. Noted that patient w/ ongoing competency/GOC conversations as patient continues to express wanting to die yet not being competent. Per RN, patient consistently declining OOB. Will DC from PT caseload at is time, please reconsult if needs arise       Jeannie Shukla, PT, DPT

## 2025-02-28 NOTE — PLAN OF CARE
Problem: INFECTION - ADULT  Goal: Absence or prevention of progression during hospitalization  Description: INTERVENTIONS:  - Assess and monitor for signs and symptoms of infection  - Monitor lab/diagnostic results  - Monitor all insertion sites, i.e. indwelling lines, tubes, and drains  - Monitor endotracheal if appropriate and nasal secretions for changes in amount and color  - Dunbar appropriate cooling/warming therapies per order  - Administer medications as ordered  - Instruct and encourage patient and family to use good hand hygiene technique  - Identify and instruct in appropriate isolation precautions for identified infection/condition  Outcome: Progressing  Goal: Absence of fever/infection during neutropenic period  Description: INTERVENTIONS:  - Monitor WBC    Outcome: Progressing     Problem: DISCHARGE PLANNING  Goal: Discharge to home or other facility with appropriate resources  Description: INTERVENTIONS:  - Identify barriers to discharge w/patient and caregiver  - Arrange for needed discharge resources and transportation as appropriate  - Identify discharge learning needs (meds, wound care, etc.)  - Arrange for interpretive services to assist at discharge as needed  - Refer to Case Management Department for coordinating discharge planning if the patient needs post-hospital services based on physician/advanced practitioner order or complex needs related to functional status, cognitive ability, or social support system  Outcome: Progressing

## 2025-02-28 NOTE — ASSESSMENT & PLAN NOTE
Patient with a history of COPD/emphysema current tobacco smoker.  Does not follow with pulmonary service and is not on inhalers at home.  He was recently ordered Combivent and Medrol pack by his primary care practice on 2/20/2025  Per patient and chart review patient has had a cough, and shortness of breath for the last 2 months.  Over the last few weeks shortness of breath and cough have increased and he has had decreased p.o. intake and increased weakness.  Denies fevers, nausea, vomiting, diarrhea  Patient with recent travel to [place], was on a cruise but he had to delay his trip short due to feeling unwell.  He returned home on 2/16.    Patient short of breath and with an SpO2 of 68 on room air in the ER.  He was placed on BiPAP for work of breathing and hypoxia  Initial ABG 7.22/7.5/62/31  Given Solu-Medrol 80 mg in the ER and started on empiric antibiotics  2/26 liberated from continuous bipap    Plan  Bipap 18/6 60% HS  Solumedrol 40mg q8h   Cont  Xopenex and Atrovent TID  Continue empiric antibiotics as discussed below  SpO2 goal greater than 88%

## 2025-02-28 NOTE — ASSESSMENT & PLAN NOTE
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  He was diagnosed with non-Hodgkin's lymphoma in 2023 status postchemotherapy.  Follows with [facility]

## 2025-02-28 NOTE — PROGRESS NOTES
Progress Note - Palliative Care   Name: Jere Lepe 74 y.o. 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   Discussed with SAMREEN Rivas at the bedside.   Family is planning oncoming to visit over the weekend.   ICU to have meeting with siblings via telephone this afternoon  Confirmed level 3, DNAR/DNI; reviewed LW; discussed current condition with Ana at bedside. Patient expresses desire to die. Expresses desire to go home. Would not discuss further or respond to questions. Patient hard of hearing and would not engage in questioning, became agitated and waved me off. Discussed hospice/comfort transition with SAMREEN/Evelyn. Patient was not expressing these things prior to coming into hospital. He does not like going to the doctors or  "hospital, but will if he has to. She says she will honor his wishes. Discussed concern that he does not have capacity to make his own decisions right now. Also discussed concerns with level of oxygen requirements and his refusal of some care. Evelyn familiar with hospice as her father  on hospice. Evelyn states patient's siblings are aware of his condition and will possibly visit over the weekend. SO uncertain what she will decide if patient formally found to not have capacity. She will likely discuss further with patient's siblings(OPAL is a physician). Discussed option of comfort transition for further decline/abrupt deterioration. Emotional support/validation provided. Contact provided.  Will follow intermittently as needed. D/w ICU team        PDMP Review: I have reviewed the patient's controlled substance dispensing history in the Prescription Drug Monitoring Program in compliance with the [facility] regulations before prescribing any controlled substances.    Subjective   24 Hour Chart History:  Chart Reviewed prior to visit    Patient resting in bed. Evelyn at bedside.   Patient with oxygen and non rebreathing on. Patient states \" I want out of here\" he does not engage in further discussion.   Discussed with Evelyn that he has been making comments of this sort.   She wants to abide by his wished but always to have his siblings updated. She is hopeful they may have some insight for patient.     Discussed with ICU- plan for meeting with family via telephone call.       Objective :  Temp:  [97.7 °F (36.5 °C)-98.4 °F (36.9 °C)] 98 °F (36.7 °C)  HR:  [57-81] 64  BP: ()/(52-76) 108/60  Resp:  [11-31] 18  SpO2:  [88 %-100 %] 95 %  O2 Device: Mid flow nasal cannula  Nasal Cannula O2 Flow Rate (L/min):  [15 L/min] 15 L/min  FiO2 (%):  [80] 80    Physical Exam  Vitals and nursing note reviewed.   Constitutional:       General: He is awake. He is not in acute distress.     Appearance: He is ill-appearing.      " "Interventions: Nasal cannula in place.   HENT:      Head: Normocephalic and atraumatic.   Cardiovascular:      Rate and Rhythm: Tachycardia present.   Pulmonary:      Effort: Pulmonary effort is normal. No respiratory distress.   Musculoskeletal:      Cervical back: Normal range of motion.   Skin:     General: Skin is warm and dry.   Neurological:      Mental Status: He is disoriented.   Psychiatric:      Comments: Does not engage in discussion            Lab Results: I have reviewed the following results:  Lab Results   Component Value Date/Time    SODIUM 140 02/28/2025 05:37 AM    SODIUM 137 05/24/2023 08:18 AM    K 4.9 02/28/2025 05:37 AM    K 5.9 (H) 05/24/2023 08:18 AM    BUN 23 02/28/2025 05:37 AM    BUN 16 05/24/2023 08:18 AM    CREATININE 0.62 02/28/2025 05:37 AM    CREATININE 0.96 01/11/2018 07:38 AM    GLUC 130 02/28/2025 05:37 AM    GLUC 102 (H) 05/24/2023 08:18 AM    CALCIUM 8.6 02/28/2025 05:37 AM    CALCIUM 9.7 01/11/2018 07:38 AM    AST 14 02/28/2025 05:37 AM    AST 19 05/24/2023 08:18 AM    ALT 15 02/28/2025 05:37 AM    ALT 7 05/24/2023 08:18 AM    ALB 3.1 (L) 02/28/2025 05:37 AM    ALB 4.5 09/09/2016 01:00 PM    TP 5.1 (L) 02/28/2025 05:37 AM    TP 7.4 05/24/2023 08:18 AM    EGFR 98 02/28/2025 05:37 AM     Lab Results   Component Value Date/Time    HGB 11.2 (L) 02/28/2025 05:37 AM    HGB 15.1 01/11/2018 07:38 AM    WBC 30.33 (H) 02/28/2025 05:37 AM    WBC 8.7 01/11/2018 07:38 AM     02/28/2025 05:37 AM     01/11/2018 07:38 AM    INR 1.50 (H) 02/25/2025 08:57 AM    PTT 28 02/25/2025 08:57 AM     No results found for: \"[id number]\"    Code Status: Level 3 - DNAR and DNI  Advance Directive and Living Will:      Power of :    POLST:      Administrative Statements   I have spent a total time of 45 minutes in caring for this patient on the day of the visit/encounter including patient and family education, counseling/coordination of care, documenting in the medical record, reviewing " tests/medicines/procedure, and communicating with other healthcare professionals. Topics discussed with patient and significant other included psychosocial support, goals of care, supportive listening, and anticipatory guidance.

## 2025-03-01 NOTE — PROGRESS NOTES
"Pt awake in bed on HFNC, appears angry at staff. Stating \"you all are making me suffer, I want to die\". Pt uncooperative with answering orientation questions but does state he is in the hospital and denies pain. Refusing medication and any care, stating \"don't look at me, leave\". Provider, Eric called to bedside. Sig other at bedside. Bed alarm on. Pt lying quietly watching TV with callbell in reach.  "

## 2025-03-01 NOTE — QUICK NOTE
Alerted by nursing patients blood glucose was 69. Not symptomatic. Patient still NPO pending SLP evaluation. Order placed for 1/2 amp D50. Nursing to reassess 30 min after administration.

## 2025-03-01 NOTE — RESPIRATORY THERAPY NOTE
03/01/25 0922   Respiratory Assessment   Subjective Data pt was transitioned to comfort care , placed on 2 L NC   Oxygen Therapy/Pulse Ox   O2 Device Nasal cannula   Nasal Cannula O2 Flow Rate (L/min) 2 L/min   Calculated FIO2 (%) - Nasal Cannula 28   SpO2 97 %

## 2025-03-01 NOTE — ACP (ADVANCE CARE PLANNING)
"Critical Care Advanced Care Planning Note  Jere Lepe 74 y.o. male MRN: [id number]  Unit/Bed#: -01 Encounter: [id number]    Jere Lepe is a 74 y.o. male requiring critical care evaluation and advanced care planning. The patient has chronic comorbidities, including but not limited to undiagnosed COPD with radiographic evidence of Emphysema, tobacco abuse, prior CVA, Extensive CA history, which is now further complicated by the following acute conditions: acute influenza infection, postviral bacterial PNA, toxic/metabolic encephalopathy.  Due to the severity of the patient's acute condition and/or the extent of chronic conditions, additional conversations pertaining to advanced care planning were required. Today's discussion, which was held in a face-to-face meeting and over the phone, included  significant other Muriel, brother in law Zacarias Virgen, two sisters , and it was established that all stake holders understood the rationale for the advanced care planning. The patient was unable to participate in the discussion due to encephalopathy, agitation.    Summary of Discussion:  74 YOU[facility] D4 with Acute respiratory failure in setting of influenza infection, exacerbation of underlying COPD. Hospital course complicated by worsening agitation, patient refusal of various therapies and diagnostics.   3/1 AM when removed from Bipap, was refusing HFNC initially. Stating, \"I just want to die, you all get off on watching me suffer. Why will you not let me die?\" When attempting to ask orientation questions, patient initially stating that he is in [place], and that he is in the hospital because his, \"life is not worth living\". When asked to elaborate, Poncho stated, \"Fuck you, stop watching me suffer. Let me die\"  Previously this hospitalization, Poncho failed neuropsych competency evaluation as he was unwilling/able to participate in their evaluation. On my evaluation, patient does not have medical " decision making capacity  Discussed with Gina at bedside as Poncho was hypoxic with supplemental oxygen removed. Noninvasive monitoring was then removed by Poncho. Explained that given legal documentation and deferment of patient's sisters, Gina has decision making responsibility regarding next steps in Poncho's care. These next steps being either transition to comfort focused care, or chemical and physical retraining so that Poncho may continue to receive his current medical care. Stated that the latter may lead to unintended harm of patient and staff as Poncho was threatening physical violence on my evaluation. Discussed that Poncho continues to worsen clinically despite medical therapy, and refusal of care and increasing agitation will increase odd's of Poncho's mortality and worsen prognosis.  When discussed with Zacarias Virgen (brother in law) and Poncho's two sisters via phone, they reiterated that given an increasingly likely best case scenario of Poncho needing long term rehabilitation, possibly assisted care with or without oxygen, that this best case scenario is one that Poncho would not want. Poncho would rather pass away than be dependent on other people for care, even if only temporarily.   At this time, will transition Poncho to Comfort Focused Care, Level 4 per Gina and sisters' request. Will administer morphine for comfort and respiratory distress and haldol for agitation, continue close assessment for effectiveness of the above plan. Poncho's family was encouraged to come and spend time with him at the bedside. Plan discussed with Gina and Poncho personally, and all questions were answered to their satisfaction.      Total time spent, (30) minutes ([id number] to [id number]).      CODE STATUS: COMFORT CARE - Level 4  POA:    POLST:      SIGNATURE: ANTONIO Wayne  DATE: March 1, 2025  TIME: 12:01 PM

## 2025-03-01 NOTE — PLAN OF CARE
Problem: PAIN - ADULT  Goal: Verbalizes/displays adequate comfort level or baseline comfort level  Description: Interventions:  - Encourage patient to monitor pain and request assistance  - Assess pain using appropriate pain scale  - Administer analgesics based on type and severity of pain and evaluate response  - Implement non-pharmacological measures as appropriate and evaluate response  - Consider cultural and social influences on pain and pain management  - Notify physician/advanced practitioner if interventions unsuccessful or patient reports new pain  Outcome: Progressing     Problem: INFECTION - ADULT  Goal: Absence or prevention of progression during hospitalization  Description: INTERVENTIONS:  - Assess and monitor for signs and symptoms of infection  - Monitor lab/diagnostic results  - Monitor all insertion sites, i.e. indwelling lines, tubes, and drains  - Monitor endotracheal if appropriate and nasal secretions for changes in amount and color  - [place] appropriate cooling/warming therapies per order  - Administer medications as ordered  - Instruct and encourage patient and family to use good hand hygiene technique  - Identify and instruct in appropriate isolation precautions for identified infection/condition  Outcome: Progressing  Goal: Absence of fever/infection during neutropenic period  Description: INTERVENTIONS:  - Monitor WBC    Outcome: Progressing     Problem: SAFETY ADULT  Goal: Patient will remain free of falls  Description: INTERVENTIONS:  - Educate patient/family on patient safety including physical limitations  - Instruct patient to call for assistance with activity   - Consult OT/PT to assist with strengthening/mobility   - Keep Call bell within reach  - Keep bed low and locked with side rails adjusted as appropriate  - Keep care items and personal belongings within reach  - Initiate and maintain comfort rounds  - Make Fall Risk Sign visible to staff  - Offer Toileting every 2 Hours,  in advance of need  - Initiate/Maintain bed/chair alarm  - Obtain necessary fall risk management equipment:   - Apply yellow socks and bracelet for high fall risk patients  - Consider moving patient to room near nurses station  Outcome: Progressing  Goal: Maintain or return to baseline ADL function  Description: INTERVENTIONS:  -  Assess patient's ability to carry out ADLs; assess patient's baseline for ADL function and identify physical deficits which impact ability to perform ADLs (bathing, care of mouth/teeth, toileting, grooming, dressing, etc.)  - Assess/evaluate cause of self-care deficits   - Assess range of motion  - Assess patient's mobility; develop plan if impaired  - Assess patient's need for assistive devices and provide as appropriate  - Encourage maximum independence but intervene and supervise when necessary  - Involve family in performance of ADLs  - Assess for home care needs following discharge   - Consider OT consult to assist with ADL evaluation and planning for discharge  - Provide patient education as appropriate  Outcome: Progressing  Goal: Maintains/Returns to pre admission functional level  Description: INTERVENTIONS:  - Perform AM-PAC 6 Click Basic Mobility/ Daily Activity assessment daily.  - Set and communicate daily mobility goal to care team and patient/family/caregiver.   - Collaborate with rehabilitation services on mobility goals if consulted  - Perform Range of Motion 4 times a day.  - Reposition patient every 3 hours.  - Dangle patient 3 times a day  - Stand patient 3 times a day  - Ambulate patient 3 times a day  - Out of bed to chair 3 times a day   - Out of bed for meals 3 times a day  - Out of bed for toileting  - Record patient progress and toleration of activity level   Outcome: Progressing     Problem: DISCHARGE PLANNING  Goal: Discharge to home or other facility with appropriate resources  Description: INTERVENTIONS:  - Identify barriers to discharge w/patient and  caregiver  - Arrange for needed discharge resources and transportation as appropriate  - Identify discharge learning needs (meds, wound care, etc.)  - Arrange for interpretive services to assist at discharge as needed  - Refer to Case Management Department for coordinating discharge planning if the patient needs post-hospital services based on physician/advanced practitioner order or complex needs related to functional status, cognitive ability, or social support system  Outcome: Progressing     Problem: RESPIRATORY - ADULT  Goal: Achieves optimal ventilation and oxygenation  Description: INTERVENTIONS:  - Assess for changes in respiratory status  - Assess for changes in mentation and behavior  - Position to facilitate oxygenation and minimize respiratory effort  - Oxygen administered by appropriate delivery if ordered  - Initiate smoking cessation education as indicated  - Encourage broncho-pulmonary hygiene including cough, deep breathe, Incentive Spirometry  - Assess the need for suctioning and aspirate as needed  - Assess and instruct to report SOB or any respiratory difficulty  - Respiratory Therapy support as indicated  Outcome: Progressing

## 2025-03-01 NOTE — ASSESSMENT & PLAN NOTE
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  He was diagnosed with non-Hodgkin's lymphoma in 2023 status postchemotherapy.  Follows with [facility]   Include Z78.9 (Other Specified Conditions Influencing Health Status) As An Associated Diagnosis?: Yes Consent: The patient's consent was obtained including but not limited to risks of crusting, scabbing, blistering, scarring, darker or lighter pigmentary change, recurrence, incomplete removal and infection. Application Tool (Optional): Liquid Nitrogen Sprayer Spray Paint Text: The liquid nitrogen was applied to the skin utilizing a spray paint frosting technique. Medical Necessity Clause: The following lesions were treated due to the following symtoms: inflamed. Spray Paint Technique: No Medical Necessity Information: It is in your best interest to select a reason for this procedure from the list below. All of these items fulfill various CMS LCD requirements except the new and changing color options. Number Of Freeze-Thaw Cycles: 2 freeze-thaw cycles Duration Of Freeze Thaw-Cycle (Seconds): 7 Detail Level: Simple Post-Care Instructions: I reviewed with the patient in detail post-care instructions. Patient is to wear sunprotection, and avoid picking at any of the treated lesions. Pt may apply Vaseline to crusted or scabbing areas. Duration Of Freeze Thaw-Cycle (Seconds): 3

## 2025-03-01 NOTE — PROGRESS NOTES
Progress Note - Pulmonology   Name: Jere Lepe 74 y.o. male I MRN: [id number]  Unit/Bed#: -01 I Date of Admission: 2/25/2025   Date of Service: 3/1/2025 I Hospital Day: 4       Critical Care Attending Note    74 year old man with prior CVA on eliquis, follicular lymphoma not on chemotherapy, prior tonsillary cancer, HTN, emphysema/COPD, nicotine dependence, presented 2/25 with acute hypoxic/hypercarbic respiratory failure requiring BiPAP.  Noted outpatient abx use, recent trip to  [place].  Found to have Rhinovirus and suspected bacterial pneumonia.    Resistant to care and deemed to not have capacity per NeuroPsych evaluation secondary to not participating in exam.      24hr events - BiPAP and HFNC overnight, mild hypoglycemia given 1/2 D50, increasingly agitated and refusing care, removing care devices.  Discussions this am with significant other and family (sisters, OPAL) determined further care measures are not in patient's wishes and to transition to comfort focused care measures    VS AF, HR 60-70's, MAPS 70-90's, SpO2 99% HFNC 0.9/60L, I/Os +330cc  Exam  Older man in bed, nontoxic, awake, will not answer orientation questions  Rapidly becomes agitated and verbally abusive to any staff  Denied pain or dyspnea currently  Reports does not desire any further care or interventions  RUE swelling noted  All other examination attempts refused by patient    Laboratory and Diagnostics  Results from last 7 days   Lab Units 03/01/25  0600 02/28/25  0537 02/26/25  0532 02/25/25  0705 02/25/25  0607   WBC Thousand/uL 28.28* 30.33* 22.13* 20.69*  --    HEMOGLOBIN g/dL 10.9* 11.2* 11.4* 14.6  --    I STAT HEMOGLOBIN g/dl  --   --   --   --  14.6   HEMATOCRIT % 34.4* 35.7* 35.4* 45.1  --    HEMATOCRIT, ISTAT %  --   --   --   --  43   PLATELETS Thousands/uL 224 283 350 466*  --    BANDS PCT %  --   --   --  11*  --    MONO PCT % 3*  --   --  11  --    EOS PCT % 0  --   --  0  --      Results from last 7  days   Lab Units 03/01/25  0600 02/28/25  0537 02/26/25  0455 02/25/25  0705 02/25/25  0607   SODIUM mmol/L 138 140 147 143  --    POTASSIUM mmol/L 4.4 4.9 4.1 4.0  --    CHLORIDE mmol/L 96 99 106 101  --    CO2 mmol/L 39* 40* 35* 33*  --    CO2, I-STAT mmol/L  --   --   --   --  34*   ANION GAP mmol/L 3* 1* 6 9  --    BUN mg/dL 23 23 28* 30*  --    CREATININE mg/dL 0.62 0.62 0.73 0.93  --    CALCIUM mg/dL 8.0* 8.6 8.6 8.6  --    GLUCOSE RANDOM mg/dL 258* 130 136 170*  --    ALT U/L  --  15 26 39  --    AST U/L  --  14 28 68*  --    ALK PHOS U/L  --  67 70 86  --    ALBUMIN g/dL  --  3.1* 3.1* 3.5  --    TOTAL BILIRUBIN mg/dL  --  0.48 0.47 0.63  --      Results from last 7 days   Lab Units 02/25/25  0705   MAGNESIUM mg/dL 2.2   PHOSPHORUS mg/dL 5.9*      Results from last 7 days   Lab Units 02/25/25  0857   INR  1.50*   PTT seconds 28          Results from last 7 days   Lab Units 02/25/25  0705   LACTIC ACID mmol/L 1.9                     Results from last 7 days   Lab Units 03/01/25  0600 02/25/25  0705   PROCALCITONIN ng/ml 0.19 0.24       ABG:   Results from last 7 days   Lab Units 02/25/25  1501   PH ART  7.319*   PCO2 ART mm Hg 64.9*   PO2 ART mm Hg 82.3   HCO3 ART mmol/L 32.6*   BASE EXC ART mmol/L 4.7   ABG SOURCE  Radial, Left       MICRO  Sputum AFB 2/28 - pending  Strep/legionella ag neg  RP2 (+) Rhino/enterovirus  MRSA neg  Bld CX 2/25 NGTD  Indeterminate quantiferon    RADIOGRAPHS - New images and reports personally reviewed  CXR 2/28 - port in place, no PTX, scattered mild patchy infiltrates at bases, suggestion hyperinflation    CT C/A/P 02/25 - no PE, motion artifact, basilar predominant TIB infiltrates, basilar atelectasis vs infiltrates, no acute IA pathology    CT head - no acute mass/bleed/shift    TTE 2/2025 - EF 65%, bicuspid aortic valve, mod AS    Assessment  Acute hypoxic/hypercarbic respiratory failure  Viral Entero/rhinovirus pneumonia with likely bacterial superinfection  Suspected COPD  with AE  Tobacco use disorder  Toxic/metabolic encephalopathy  History NH lymphoma  Prior CVA on eliquis  Prior tonsillar cancer    PLAN  I personally discussed with Gina his significant other current findings  She reports he has history of aggressive behavior  She reports further care would not be in his GOC and transition to comfort is what he desires  Plan to transition to comfort focused care measures - NC O2, prn morphine and haldol for now  Pending status consider prn benzo use if needed but utilize haldol first  Further family coming in now  DNAR Level IV status     Upon my evaluation, this patient has a high probability of imminent or life-threatening deterioration due to hypoxic respiratory failure, pneumonia, AECOPD which required my direct attention, intervention, and personal management.     I have personally provided 32 minutes of critical care time, exclusive of procedures, teaching, and any prior time recorded by providers other than myself. Time includes review of laboratory data, radiology results, discussion with consultants, GOC discussions, and monitoring for potential decompensation.    Harsh Whitehead, DO

## 2025-03-01 NOTE — ASSESSMENT & PLAN NOTE
Sirs: tachycardia, tachypnea, and leukocytosis  CAP classification 3 minor: tachypnea, multilobar infiltrates, elevated BUN; DRIP score 5: Abx within 60 days, COPD, poor function status, gastric acid suppression  2/25 CTA CAP: No PE; B/L multilobar tree-in-bud infiltrates; COPD  Given Azithromycin, Vanc and Cefepime in ED   PCT 0.24, trend  Patient had been on azithromycin as outpatient and completed a course just prior to admission  BC x2, MRSA pending   RP2 + Rhinovirus  Strep/legionella neg   Q-gold indeterminate- Unlikely primary TB  Patient declined plan to intubate and bronch   Urine strep/Legionella negative  MRSA negative     Plan:  Levaquin/Cefepime stopped 2/28- to finish up 3 more days of CFX  Cultures negative to date

## 2025-03-01 NOTE — PROGRESS NOTES
Progress Note - Critical Care/ICU   Name: Jere Lepe 74 y.o. male I MRN: [id number]  Unit/Bed#: -01 I Date of Admission: 2/25/2025   Date of Service: 3/1/2025 I Hospital Day: 4      Assessment & Plan  COPD with acute exacerbation (HCC)  Patient with a history of COPD/emphysema current tobacco smoker.  Does not follow with pulmonary service and is not on inhalers at home.  He was recently ordered Combivent and Medrol pack by his primary care practice on 2/20/2025  Per patient and chart review patient has had a cough, and shortness of breath for the last 2 months.  Over the last few weeks shortness of breath and cough have increased and he has had decreased p.o. intake and increased weakness.  Denies fevers, nausea, vomiting, diarrhea  Patient with recent travel to [place], was on a cruise but he had to delay his trip short due to feeling unwell.  He returned home on 2/16.    Patient short of breath and with an SpO2 of 68 on room air in the ER.  He was placed on BiPAP for work of breathing and hypoxia  Initial ABG 7.22/7.5/62/31  Given Solu-Medrol 80 mg in the ER and started on empiric antibiotics  2/26 liberated from continuous bipap    Plan  Bipap 18/6 60% HS required HFNC 60L/100% over the previous day  Start Prednisone 40 mg Qday  Cont  Xopenex and Atrovent TID  Continue empiric antibiotics as discussed below  SpO2 goal greater than 88%  Patient still comments on not wanting anything further and wants to be left to die. Refused to participate in psych/neuro psych evaluation to determine capacity. Family meeting over the phone 2/28- family agreed to continue medical management through the weekend and if the patient does not continue to improve then would consider comfort measures. Family trying to come up from [place] to visit patient this weekend  Patient remains DNR/DNI  SIRS (systemic inflammatory response syndrome) (HCC)  Sirs: tachycardia, tachypnea, and leukocytosis  CAP  classification 3 minor: tachypnea, multilobar infiltrates, elevated BUN; DRIP score 5: Abx within 60 days, COPD, poor function status, gastric acid suppression  2/25 CTA CAP: No PE; B/L multilobar tree-in-bud infiltrates; COPD  Given Azithromycin, Vanc and Cefepime in ED   PCT 0.24, trend  Patient had been on azithromycin as outpatient and completed a course just prior to admission  BC x2, MRSA pending   RP2 + Rhinovirus  Strep/legionella neg   Q-gold indeterminate- Unlikely primary TB  Patient declined plan to intubate and bronch   Urine strep/Legionella negative  MRSA negative     Plan:  Levaquin/Cefepime stopped 2/28- to finish up 3 more days of CFX  Cultures negative to date   Hypercholesterolemia  Continue home dose Statin  Tobacco use disorder  Patient reports smoking 10 cigarettes a day  NRT offered but declined by patient  Continue to encourage cessation  Centrilobular emphysema (HCC)  Patient with a history of emphysema/COPD seen on imaging.  Not on any chronic inhalers as outpatient and does not see pulmonary medicine.  He is a current tobacco smoker, reports 10 cigarettes a day  Now admitted with COPD exacerbation, see plan above  Would benefit from establishing care with pulmonary services as outpatient and obtaining PFTs  Essential hypertension  Home amlodipine held due to n.p.o.  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  He was diagnosed with non-Hodgkin's lymphoma in 2023 status postchemotherapy.  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  As residual balance and visual issues  PT OT consulted  Fall precautions   Fall  Patient reports increasing weakness over the last 4 weeks, significantly over the last week.  Reports needing a wheelchair to get through the airport recently.  Prior to admission patient fell from sitting on toilet due to weakness  2/25 CT head/neck negative for acute findings  PT/OT consulted  Fall precautions   Acute respiratory failure with hypoxia and hypercapnia (HCC)  In the setting of COPD AE and PNA POA   Required bipap on admission to ER, continued on admission to ICU   Spo2 68% in ER, hypercapnic on ABG   2/26 liberated from cont bipap   Over the previous day was maxed on HFNC. Wore BIPAP HS    Plan:   Treat underlying causes as above  Goal spo2 > 88%  SLP pending   Adjustment disorder with depressed mood  2/27 Psych consulted, input appreciated:  Does not meet criteria for any major depressive episode   Meets criteria for adjustment disorder with depressed mood   Trial Remeron 7.5mg HS  Poor insight and poor judgment and a lack of capacity as he does not appreciate the current situation or the consequences of excepting refusing medical treatment.   Recommend Neuro Psych consult  2/27 Neuro Psych consulted, input appreciated  Patient was alert but refused to respond to examiners questions   Frequently reported wanting to end his life   Patient does not appear to have capacity to make fully informed medical decisions   Palliative care encounter  Patient has made several comments that he does not want any further medical treatment done and he would like to die. Patient has become belligerent at times and argumentative with staff. Attempted to get patient evaluated by psych/neuro psych to determine competence but patient refused to participate in exam. Due to this, was determined not to have capacity. Family aware. Ongoing GOC conversations with family and patient.   Disposition: Stepdown  Level 1    ICU Core Measures     A: Assess, Prevent, and Manage Pain Has pain been assessed? Yes  Need for changes to pain regimen? No   B: Both SAT/SAT  N/A   C: Choice of Sedation RASS Goal: 0 Alert and Calm or N/A patient not on sedation  Need for changes to sedation or analgesia regimen? NA   D: Delirium CAM-ICU: Negative   E: Early Mobility  Plan for early mobility? Yes   F: Family Engagement Plan for family engagement today? Yes       Antibiotic Review: Continue broad spectrum secondary to severity of illness.     Review of Invasive Devices:      Central access plan:  Accessed port to be maintained until d/c      Prophylaxis:  VTE VTE covered by:  apixaban, Oral, 5 mg at 02/28/25 2109       Stress Ulcer  covered bypantoprazole (PROTONIX) injection 40 mg [[id number]]         24 Hour Events : 74-year-old male with a past medical history significant for COPD/emphysema, CVA on Eliquis, hypertension, hyperlipidemia, non-Hodgkin's lymphoma status postchemotherapy in 2023, tonsillar cancer in 2008 status post chemo/radiation.  Presented initially 2/25 for evaluation of weakness, cough, shortness of breath x 4 weeks.  Patient had recently returned from travel from [place] ([place], [place], [place]).  Present[facility] to [facility] 2/25/2025 for evaluation of shortness of breath found to have a oxygen saturation in the 60s in the ER.  Placed on BiPAP.  Respiratory viral panel positive for rhinovirus.  Patient mid to the ICU for respiratory failure secondary to acute viral illness, superimposed pneumonia, COPD exacerbation.     Over the previous day patient's O2 requirements increasing requiring max high flow nasal cannula.  Patient continues to elicit that he does not want further treatment.  Family meeting over the phone with the critical care team yesterday agreed to auto the patient is DNR/DNI status, blood patient to continue getting treatment over the weekend and to continue having goals of care  discussions.  Overnight patient was placed on BiPAP.  No acute events.    Subjective   Review of Systems: Review of Systems   Constitutional:  Positive for fatigue. Negative for chills and fever.   Respiratory:  Positive for cough. Negative for shortness of breath and wheezing.    Cardiovascular:  Negative for chest pain and palpitations.   Gastrointestinal:  Negative for abdominal distention, constipation, diarrhea, nausea and vomiting.   Neurological:  Negative for dizziness and weakness.       Objective :                   Vitals I/O      Most Recent Min/Max in 24hrs   Temp 97.5 °F (36.4 °C) Temp  Min: 97.5 °F (36.4 °C)  Max: 98.4 °F (36.9 °C)   Pulse 71 Pulse  Min: 64  Max: 87   Resp 18 Resp  Min: 11  Max: 34   /58 BP  Min: 105/59  Max: 126/73   O2 Sat 100 % SpO2  Min: 89 %  Max: 100 %      Intake/Output Summary (Last 24 hours) at 3/1/2025 0123  Last data filed at 2/28/2025 2000  Gross per 24 hour   Intake 1110 ml   Output 641 ml   Net 469 ml       Diet NPO; Sips with meds    Invasive Monitoring           Physical Exam   Physical Exam  Eyes:      General: Vision grossly intact. No scleral icterus.     Extraocular Movements: Extraocular movements intact.      Conjunctiva/sclera: Conjunctivae normal.      Pupils: Pupils are equal, round, and reactive to light.   Skin:     General: Skin is warm and dry.      Capillary Refill: Capillary refill takes less than 2 seconds.   HENT:      Head: Normocephalic and atraumatic.      Mouth/Throat:      Mouth: Mucous membranes are dry.   Cardiovascular:      Rate and Rhythm: Normal rate and regular rhythm.      Heart sounds: Normal heart sounds.   Musculoskeletal:         General: Normal range of motion.      Right lower leg: No edema.      Left lower leg: No edema.   Abdominal: General: Bowel sounds are normal.      Palpations: Abdomen is soft.      Tenderness: There is no abdominal tenderness.   Constitutional:       General: He is not in acute distress.     Appearance:  He is ill-appearing.   Pulmonary:      Effort: Pulmonary effort is normal.      Breath sounds: Examination of the right-upper field reveals wheezing. Examination of the left-upper field reveals wheezing. Wheezing present. No rhonchi or rales.   Neurological:      General: No focal deficit present.      Mental Status: He is alert. Mental status is at baseline. He is calm and disoriented to time.      Sensory: Sensation is intact.      Motor: gross motor function is at baseline for patient. Strength full and intact in all extremities.        Corneal reflex present, cough reflex and gag reflex intact.          Diagnostic Studies        Lab Results: I have reviewed the following results:     Medications:  Scheduled PRN   apixaban, 5 mg, BID  aspirin, 81 mg, Daily  atorvastatin, 80 mg, Daily With Dinner  budesonide, 0.5 mg, Q12H  cefTRIAXone, 1,000 mg, Q24H  chlorhexidine, 15 mL, Q12H JUSTIN  formoterol, 20 mcg, Q12H  insulin lispro, 1-6 Units, Q6H JUSTIN  ipratropium, 0.5 mg, TID  levalbuterol, 1.25 mg, TID  mirtazapine, 7.5 mg, HS  pantoprazole, 40 mg, Daily  predniSONE, 40 mg, Daily          Continuous          Labs:   CBC    Recent Labs     02/28/25  0537   WBC 30.33*   HGB 11.2*   HCT 35.7*        BMP    Recent Labs     02/28/25  0537   SODIUM 140   K 4.9   CL 99   CO2 40*   AGAP 1*   BUN 23   CREATININE 0.62   CALCIUM 8.6       Coags    No recent results     Additional Electrolytes  No recent results       Blood Gas    No recent results  No recent results LFTs  Recent Labs     02/28/25  0537   ALT 15   AST 14   ALKPHOS 67   ALB 3.1*   TBILI 0.48       Infectious  No recent results  Glucose  Recent Labs     02/28/25  0537   GLUC 130

## 2025-03-01 NOTE — ASSESSMENT & PLAN NOTE
Patient has made several comments that he does not want any further medical treatment done and he would like to die. Patient has become belligerent at times and argumentative with staff. Attempted to get patient evaluated by psych/neuro psych to determine competence but patient refused to participate in exam. Due to this, was determined not to have capacity. Family aware. Ongoing C conversations with family and patient.

## 2025-03-01 NOTE — ASSESSMENT & PLAN NOTE
Patient with a history of COPD/emphysema current tobacco smoker.  Does not follow with pulmonary service and is not on inhalers at home.  He was recently ordered Combivent and Medrol pack by his primary care practice on 2/20/2025  Per patient and chart review patient has had a cough, and shortness of breath for the last 2 months.  Over the last few weeks shortness of breath and cough have increased and he has had decreased p.o. intake and increased weakness.  Denies fevers, nausea, vomiting, diarrhea  Patient with recent travel to [place], was on a cruise but he had to delay his trip short due to feeling unwell.  He returned home on 2/16.    Patient short of breath and with an SpO2 of 68 on room air in the ER.  He was placed on BiPAP for work of breathing and hypoxia  Initial ABG 7.22/7.5/62/31  Given Solu-Medrol 80 mg in the ER and started on empiric antibiotics  2/26 liberated from continuous bipap    Plan  Bipap 18/6 60% HS required HFNC 60L/100% over the previous day  Start Prednisone 40 mg Qday  Cont  Xopenex and Atrovent TID  Continue empiric antibiotics as discussed below  SpO2 goal greater than 88%  Patient still comments on not wanting anything further and wants to be left to die. Refused to participate in psych/neuro psych evaluation to determine capacity. Family meeting over the phone 2/28- family agreed to continue medical management through the weekend and if the patient does not continue to improve then would consider comfort measures. Family trying to come up from [place] to visit patient this weekend  Patient remains DNR/DNI

## 2025-03-01 NOTE — ASSESSMENT & PLAN NOTE
In the setting of COPD AE and PNA POA   Required bipap on admission to ER, continued on admission to ICU   Spo2 68% in ER, hypercapnic on ABG   2/26 liberated from cont bipap   Over the previous day was maxed on HFNC. Wore BIPAP HS    Plan:   Treat underlying causes as above  Goal spo2 > 88%  SLP pending

## 2025-03-02 PROBLEM — I69.393 ATAXIA DUE TO OLD CEREBROVASCULAR ACCIDENT (CVA): Status: RESOLVED | Noted: 2019-12-16 | Resolved: 2025-01-01

## 2025-03-02 PROBLEM — F43.21 ADJUSTMENT DISORDER WITH DEPRESSED MOOD: Status: RESOLVED | Noted: 2025-01-01 | Resolved: 2025-01-01

## 2025-03-02 PROBLEM — J44.1 COPD WITH ACUTE EXACERBATION (HCC): Status: RESOLVED | Noted: 2025-01-01 | Resolved: 2025-01-01

## 2025-03-02 PROBLEM — R65.10 SIRS (SYSTEMIC INFLAMMATORY RESPONSE SYNDROME) (HCC): Status: RESOLVED | Noted: 2025-01-01 | Resolved: 2025-01-01

## 2025-03-02 PROBLEM — I10 ESSENTIAL HYPERTENSION: Status: RESOLVED | Noted: 2021-10-11 | Resolved: 2025-01-01

## 2025-03-02 PROBLEM — R73.03 PREDIABETES: Status: RESOLVED | Noted: 2023-04-14 | Resolved: 2025-01-01

## 2025-03-02 PROBLEM — J43.2 CENTRILOBULAR EMPHYSEMA (HCC): Status: RESOLVED | Noted: 2021-10-11 | Resolved: 2025-01-01
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
## 2025-03-02 NOTE — ASSESSMENT & PLAN NOTE
Patient has made several comments that he does not want any further medical treatment done and he would like to die. Patient has become belligerent at times and argumentative with staff. Attempted to get patient evaluated by psych/neuro psych to determine competence but patient refused to participate in exam. Due to this, was determined not to have capacity.   3/1 Patient again demanding to be made comfortable. Belligerent with staff, refusing care. Discussed GOC again with patients significant other and family. Family came to the determination to allow patient to be made comfort care and to withdrawal further treatment.     Plan:  Comfort order set in place  Consult Hospice

## 2025-03-02 NOTE — ASSESSMENT & PLAN NOTE
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  He was diagnosed with non-Hodgkin's lymphoma in 2023 status postchemotherapy.  Follows with [facility]

## 2025-03-02 NOTE — DISCHARGE SUMMARY
"Discharge Summary - Critical Care/ICU   Name: Jere Lepe 74 y.o. male I MRN: [id number]  Unit/Bed#: -01 I Date of Admission: 2/25/2025   Date of Service: 3/2/2025 I Hospital Day: 5    Admission Date: 2/25/2025   Admitting Diagnosis: Weakness [R53.1]  COPD exacerbation (HCC) [J44.1]  Acute respiratory failure with hypoxia and hypercapnia (HCC) [J96.01, J96.02]  Discharge Date: 03/02/25    HPI per Yu ALVAREZ: \"Jere Lepe is a 74 y.o. who presents with complaint of increasing weakness, cough, shortness of breath over the last 4 weeks.  Patient fell off his toilet this morning prompting him to come to the hospital.  Patient with past medical history significant for COPD/emphysema, CVA in 2019 on Eliquis, hypertension, hyperlipidemia, non-Hodgkin's lymphoma in 2023 status post chemo, tonsillar cancer in 2008 status post chemo and radiation.  In January patient saw his PCP for complaints above and was treated with doxycycline.  He went [place], but he had to cut his trip short due to increasing weakness/cough/shortness of breath.  The cruise without a [place] and after getting off the boot they traveled [place] through [facility] and into [place] 5-7 days ago.  He saw his PCP on 2/20 and was started on a Medrol pack, nebulizer, and azithromycin.   Trauma workup negative for acute findings.  In the ER he was found to be 60% on room air.  He was placed on BiPAP.  Initial ABG 7.22/77.5/62.  CT chest abdomen pelvis negative for PE but did show COPD and bilateral multilobular tree-in-bud infiltrates.  In the ER patient was cultured, started on Solu-Medrol and empiric antibiotics\"     Procedures Performed: No orders of the defined types were placed in this encounter.      Summary of Hospital Course:   Admitted to hospital 2/25 with AHRF in setting of acute influenza infection with presumed bacterial PNA. S/p Broad spectrum ABX coverage and increasingly supplemental Oxygen. " With escalation of supplemental O2 from MFNC to max HFNC with intermittent bipap use. Hospital course complicated by patient refusal of various different therapies and diagnostic procedures. 3/1 with refusal to wear supplemental oxygen and request to be transitioned to comfort care. Patient did not have medical decision making capabilities at that time. Conferenced with significant other and sisters, who stated that Poncho would not want continued aggressive medical care knowing that his best case scenario would likely mean extensive rehabilitation and increased dependence on others. Transitioned to comfort care 3 AM per families request. Poncho passed peacefully at 1315 surrounded by his family, and our thoughts are with them in this difficult time.    Significant Findings, Care, Treatment and Services Provided: Influenza A infection with presumed superimposed bacterial infection, pneumonia. Acute hypoxic respiratory failure with escalating supplemental oxygen requirements. Patient found not to have decision making capacity by neuropsych    Complications: Patient refusal of various diagnostic tests    Disposition:      Final Diagnosis: Acute hypoxic respiratory failure due to multifocal pneumonia in setting of acute influenza A infection    Medical Problems       Resolved Problems  Date Reviewed: 3/2/2025          Resolved    Hypercholesterolemia 3/2/2025     Resolved by  ANTONIO Lynn    Tobacco use disorder 3/2/2025     Resolved by  ANTONIO Lynn    Centrilobular emphysema (HCC) 3/2/2025     Resolved by  ANTONIO Lynn    Essential hypertension 3/2/2025     Resolved by  ANTONIO Lynn    Prediabetes 3/2/2025     Resolved by  ANTONIO Lynn    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     Resolved by  ANTONIO Lynn    Overview Addendum 2024  3:42 PM by Svetlana Ruvalcaba   From note dated 2023    >>OVERVIEW FOR CEREBROVASCULAR  ACCIDENT (CVA) DUE TO EMBOLISM OF PRECEREBRAL ARTERY (HCC) WRITTEN ON 10/6/2020 11:26 AM BY SYL POWELL MD    Embolic -cerebellar and frontal lobe         * (Principal) COPD with acute exacerbation (HCC) 3/2/2025     Resolved by  ANTONIO Lynn    SIRS (systemic inflammatory response syndrome) (HCC) 3/2/2025     Resolved by  ANTONIO Lynn    Fall 3/2/2025     Resolved by  ANTONIO Lynn    Adjustment disorder with depressed mood 3/2/2025     Resolved by  ANTONIO Lynn        Date, Time and Cause of Death    Date of Death: 3/2/25  Time of Death:  1:15 PM  Preliminary Cause of Death: Multifocal pneumonia  Entered by: Nomi ALVAREZ[TS1.1]       Attribution       TS1.1 ANTONIO Wayne 25 13:26            Death Note:  INPATIENT DEATH NOTE  Jere Lepe 74 y.o. male MRN: [id number]  Unit/Bed#: [place] [address] Encounter: [id number]    Date, Time and Cause of Death    Date of Death: 3/2/25  Time of Death:  1:15 PM  Preliminary Cause of Death: Multifocal pneumonia  Entered by: Nomi ALVAREZ[TS1.1]       Attribution       TS1.1 ANTONIO Wayne 25 13:26             Patient's Information  Pronounced by: Nomi ALVAREZ  Did the patient's death occur in the ED?: No  Did the patient's death occur in the OR?: No  Did the patient's death occur less than 10 days post-op?: No  Did the patient's death occur within 24 hours of admission?: No  Was code status DNR at the time of death?: Yes    PHYSICAL EXAM:  Unresponsive to noxious stimuli, Spontaneous respirations absent, Breath sounds absent, Carotid pulse absent, Heart sounds absent, Pupillary light reflex absent, and Corneal blink reflex absent    Medical Examiner notification criteria:  NONE APPLICABLE   Medical Examiner's office notified?:  No, does not meet ME notification criteria     Family Notification  Was the family notified?: Yes  Date Notified: 25  Time Notified:  1316  Notified by: Nomi ALVAREZ  Name of Family Notified of Death: Mark Issa   Relationship to Patient: Other (Comment) (significant other)  Family Notification Route: At bedside  Was the family told to contact a  home?: Yes  Name of  Home:: pending    Autopsy Options:  Post-mortem examination declined by next of kin    Primary Service Attending Physician notified?:  yes - Attending:  Harsh Whitehead, DO    Physician/Resident responsible for completing Discharge Summary:  Nomi ALVAREZ

## 2025-03-02 NOTE — ASSESSMENT & PLAN NOTE
In the setting of COPD AE and PNA POA   Required bipap on admission to ER, continued on admission to ICU   Spo2 68% in ER, hypercapnic on ABG   2/26 liberated from cont bipap   Over the previous day was maxed on HFNC. Wore BIPAP HS  Patient making multiple demands to stop further treatment and to be made comfortable. Concern for capacity. Discussed clinical condition with patient's significant others and sisters who agreed to transition patient to comfort measures    Plan:   Comfort measures as stated above

## 2025-03-02 NOTE — PROGRESS NOTES
Progress Note - Critical Care/ICU   Name: Jere Lepe 74 y.o. 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  He was diagnosed with non-Hodgkin's lymphoma in 2023 status postchemotherapy.  Follows with [facility] oncology  Acute respiratory failure with hypoxia and hypercapnia (HCC)  In the setting of COPD AE and PNA POA   Required bipap on admission to ER, continued on admission to ICU   Spo2 68% in ER, hypercapnic on ABG   2/26 liberated from cont bipap   Over the previous day was maxed on HFNC. Wore BIPAP HS  Patient making multiple demands to stop further treatment and to be made comfortable. Concern for capacity. Discussed clinical condition with patient's significant others and sisters who agreed to transition patient to comfort measures    Plan:   Comfort measures as stated above   Comfort measures only status  Patient has made several comments that he does not want any further medical treatment done and he would like to die. Patient has become belligerent at times and argumentative with staff. Attempted to get patient evaluated by psych/neuro psych to determine competence but patient refused to participate in exam. Due to this, was determined not to have capacity.   3/1 Patient again demanding to be made comfortable. Belligerent with staff, refusing care. Discussed GOC again with patients significant other and family. Family came to the determination to allow patient to be made comfort care and to withdrawal further treatment.     Plan:  Comfort order set in place  Consult Hospice   Disposition: Med Surg    ICU Core Measures     A: Assess, Prevent, and Manage Pain Has pain been assessed? Yes  Need for changes to pain regimen? No   B: Both SAT/SAT  N/A   C:  Choice of Sedation RASS Goal: -4 Deep Sedation or N/A patient not on sedation  Need for changes to sedation or analgesia regimen? NA   D: Delirium CAM-ICU: Unable to perform secondary to Acute cognitive dysfunction   E: Early Mobility  Plan for early mobility? No   F: Family Engagement Plan for family engagement today? Yes       Review of Invasive Devices:      Central access plan:  Port to be maintained while inpatinet       Prophylaxis:  VTE VTE covered by:    None       Stress Ulcer  not ordered         24 Hour Events : Over the previous day patient resistant to care, making demands to be left alone and made comfortable.  Concern for patient's capacity to make medical decisions.  Patient significant other and family were reengaged for goals of care discussions.  Family made decision to transition patient to level 4 comfort measures only.  Received 4 as needed doses of morphine overnight for air hunger.  1 as needed dose of Ativan for agitation, and 1 as needed dose of Haldol.  Patient appears comfortable on exam, not arousable.  Significant other at bedside caring for the patient.    Subjective   Review of Systems: Review of Systems not obtainable due to Clinical Condition    Objective :                   Vitals I/O      Most Recent Min/Max in 24hrs   Temp 98.2 °F (36.8 °C) Temp  Min: 98.2 °F (36.8 °C)  Max: 98.2 °F (36.8 °C)   Pulse 81 Pulse  Min: 62  Max: 81   Resp (!) 40 Resp  Min: 14  Max: 42   /70 BP  Min: 124/70  Max: 124/70   O2 Sat (!) 38 % (Comfort care) SpO2  Min: 9 %  Max: 100 %      Intake/Output Summary (Last 24 hours) at 3/2/2025 0111  Last data filed at 3/1/2025 0800  Gross per 24 hour   Intake 20 ml   Output 169 ml   Net -149 ml       Diet Regular; Pleasure Feed    Invasive Monitoring           Physical Exam   Physical Exam  HENT:      Head: Normocephalic and atraumatic.   Cardiovascular:      Rate and Rhythm: Normal rate and regular rhythm.      Pulses: Normal pulses.      Heart sounds:  Normal heart sounds.   Abdominal: General: Bowel sounds are normal.      Palpations: Abdomen is soft.      Tenderness: There is no abdominal tenderness.   Constitutional:       General: He is not in acute distress.     Appearance: He is ill-appearing.   Pulmonary:      Effort: Pulmonary effort is normal.      Breath sounds: Examination of the right-upper field reveals decreased breath sounds. Examination of the left-upper field reveals decreased breath sounds. Examination of the right-middle field reveals decreased breath sounds. Examination of the left-middle field reveals decreased breath sounds. Examination of the right-lower field reveals decreased breath sounds. Examination of the left-lower field reveals decreased breath sounds. Decreased breath sounds present.   Neurological:      Mental Status: He is unresponsive.      GCS: GCS eye subscore is 1. GCS verbal subscore is 1. GCS motor subscore is 1.          Diagnostic Studies        Lab Results: I have reviewed the following results:     Medications:  Scheduled PRN      bisacodyl, 10 mg, Daily PRN  glycopyrrolate, 0.1 mg, Q4H PRN  haloperidol lactate, 1 mg, Q1H PRN  LORazepam, 1 mg, Q10 Min PRN  morphine injection, 2 mg, Q10 Min PRN       Continuous          Labs:   CBC    Recent Labs     02/28/25  0537 03/01/25  0600   WBC 30.33* 28.28*   HGB 11.2* 10.9*   HCT 35.7* 34.4*    224     BMP    Recent Labs     02/28/25  0537 03/01/25  0600   SODIUM 140 138   K 4.9 4.4   CL 99 96   CO2 40* 39*   AGAP 1* 3*   BUN 23 23   CREATININE 0.62 0.62   CALCIUM 8.6 8.0*       Coags    No recent results     Additional Electrolytes  No recent results       Blood Gas    No recent results  No recent results LFTs  Recent Labs     02/28/25  0537   ALT 15   AST 14   ALKPHOS 67   ALB 3.1*   TBILI 0.48       Infectious  Recent Labs     03/01/25  0600   PROCALCITONI 0.19     Glucose  Recent Labs     02/28/25  0537 03/01/25  0600   GLUC 130 258*

## 2025-03-02 NOTE — DEATH NOTE
INPATIENT DEATH NOTE  Jere Lepe 74 y.o. male MRN: [id number]  Unit/Bed#: -01 Encounter: [id number]    Date, Time and Cause of Death    Date of Death: 3/2/25  Time of Death:  1:15 PM  Preliminary Cause of Death: Multifocal pneumonia  Entered by: Nomi ALVAREZ[TS1.1]       Attribution       TS1.1 ANTONIO Wayne 25 13:26             Patient's Information  Pronounced by: Nomi ALVAREZ  Did the patient's death occur in the ED?: No  Did the patient's death occur in the OR?: No  Did the patient's death occur less than 10 days post-op?: No  Did the patient's death occur within 24 hours of admission?: No  Was code status DNR at the time of death?: Yes    PHYSICAL EXAM:  Unresponsive to noxious stimuli, Spontaneous respirations absent, Breath sounds absent, Carotid pulse absent, Heart sounds absent, Pupillary light reflex absent, and Corneal blink reflex absent    Medical Examiner notification criteria:  NONE APPLICABLE   Medical Examiner's office notified?:  No, does not meet ME notification criteria     Family Notification  Was the family notified?: Yes  Date Notified: 25  Time Notified: 1316  Notified by: Nomi ALVAREZ  Name of Family Notified of Death: Mark Issa   Relationship to Patient: Other (Comment) (significant other)  Family Notification Route: At bedside  Was the family told to contact a  home?: Yes  Name of  Home:: pending    Autopsy Options:  Post-mortem examination declined by next of kin    Primary Service Attending Physician notified?:  yes - Attending:  Harsh Whitehead, DO    Physician/Resident responsible for completing Discharge Summary:  Nomi ALVAREZ

## 2025-03-03 NOTE — UTILIZATION REVIEW
NOTIFICATION OF ADMISSION DISCHARGE   This is a Notification of Discharge from [facility]. Please be advised that this patient has been discharge from our facility. Below you will find the admission and discharge date and time including the patient’s disposition.   UTILIZATION REVIEW CONTACT:  Karina Whaley  Utilization   Network Utilization Review Department  Phone: [phone] x carefully listen to the prompts. All voicemails are confidential.  Email: [email or web address]     ADMISSION INFORMATION  PRESENTATION DATE: 2025  5:40 AM  OBERVATION ADMISSION DATE: N/A  INPATIENT ADMISSION DATE: 25  7:19 AM   DISCHARGE DATE: 3/2/2025  3:42 PM   DISPOSITION:    Network Utilization Review Department  ATTENTION: Please call with any questions or concerns to [phone] and carefully listen to the prompts so that you are directed to the right person. All voicemails are confidential.   For Discharge needs, contact Care Management DC Support Team at [phone] opt. 2  Send all requests for admission clinical reviews, approved or denied determinations and any other requests to dedicated fax number below belonging to the campus where the patient is receiving treatment. List of dedicated fax numbers for the Facilities:  FACILITY NAME UR FAX NUMBER   ADMISSION DENIALS (Administrative/Medical Necessity) [phone]   DISCHARGE SUPPORT TEAM ([facility]) [phone]   PARENT CHILD HEALTH (Maternity/NICU/Pediatrics) [phone]   [facility] [phone]   [facility] [phone]   Replaced by [facility] [phone]   [facility] [phone]   [facility] [phone]   [facility] [phone]   [facility] [phone]   [facility] [phone]   [facility]  [facility] [phone]   Critical access hospital [phone]   [facility] [phone]   [facility] [phone]

## 2025-03-11 LAB
MYCOBACTERIUM SPEC CULT: NORMAL
RHODAMINE-AURAMINE STN SPEC: NORMAL

## 2025-03-18 LAB
MYCOBACTERIUM SPEC CULT: NORMAL
RHODAMINE-AURAMINE STN SPEC: NORMAL

## 2025-03-25 LAB
MYCOBACTERIUM SPEC CULT: NORMAL
RHODAMINE-AURAMINE STN SPEC: NORMAL

## 2025-04-02 LAB
MYCOBACTERIUM SPEC CULT: ABNORMAL
RHODAMINE-AURAMINE STN SPEC: ABNORMAL

## 2025-04-04 LAB — SCAN RESULT: NORMAL

## 2025-04-07 LAB
MYCOBACTERIUM SPEC CULT: ABNORMAL
RHODAMINE-AURAMINE STN SPEC: ABNORMAL
SCAN RESULT: NORMAL

## 2025-05-01 LAB
MYCOBACTERIUM SPEC CULT: ABNORMAL
RHODAMINE-AURAMINE STN SPEC: ABNORMAL
SCAN RESULT: NORMAL

## 2025-05-10 LAB — SCAN RESULT: NORMAL

## 2025-05-14 LAB
MYCOBACTERIUM SPEC CULT: ABNORMAL
MYCOBACTERIUM SPEC CULT: ABNORMAL
RHODAMINE-AURAMINE STN SPEC: ABNORMAL